# Patient Record
Sex: FEMALE | Race: WHITE | NOT HISPANIC OR LATINO | Employment: OTHER | URBAN - METROPOLITAN AREA
[De-identification: names, ages, dates, MRNs, and addresses within clinical notes are randomized per-mention and may not be internally consistent; named-entity substitution may affect disease eponyms.]

---

## 2017-01-09 ENCOUNTER — TRANSFERRED RECORDS (OUTPATIENT)
Dept: HEALTH INFORMATION MANAGEMENT | Facility: CLINIC | Age: 47
End: 2017-01-09

## 2017-01-24 ENCOUNTER — RADIANT APPOINTMENT (OUTPATIENT)
Dept: MAMMOGRAPHY | Facility: CLINIC | Age: 47
End: 2017-01-24

## 2017-01-24 ENCOUNTER — OFFICE VISIT (OUTPATIENT)
Dept: INTERNAL MEDICINE | Facility: CLINIC | Age: 47
End: 2017-01-24

## 2017-01-24 VITALS
SYSTOLIC BLOOD PRESSURE: 137 MMHG | OXYGEN SATURATION: 97 % | HEART RATE: 74 BPM | WEIGHT: 92.2 LBS | BODY MASS INDEX: 18.59 KG/M2 | HEIGHT: 59 IN | DIASTOLIC BLOOD PRESSURE: 88 MMHG | TEMPERATURE: 98.7 F

## 2017-01-24 DIAGNOSIS — J45.30 MILD PERSISTENT ASTHMA, UNCOMPLICATED: ICD-10-CM

## 2017-01-24 DIAGNOSIS — Z12.31 VISIT FOR SCREENING MAMMOGRAM: ICD-10-CM

## 2017-01-24 DIAGNOSIS — Z00.00 ROUTINE GENERAL MEDICAL EXAMINATION AT HEALTH CARE FACILITY: ICD-10-CM

## 2017-01-24 DIAGNOSIS — Z12.4 SCREENING FOR MALIGNANT NEOPLASM OF CERVIX: Primary | ICD-10-CM

## 2017-01-24 RX ORDER — ALBUTEROL SULFATE 0.83 MG/ML
1 SOLUTION RESPIRATORY (INHALATION) EVERY 6 HOURS PRN
Qty: 360 ML | Refills: 11 | Status: SHIPPED | OUTPATIENT
Start: 2017-01-24 | End: 2018-10-08

## 2017-01-24 RX ORDER — ALBUTEROL SULFATE 90 UG/1
2 AEROSOL, METERED RESPIRATORY (INHALATION) EVERY 4 HOURS PRN
Qty: 1 INHALER | Refills: 11 | Status: SHIPPED | OUTPATIENT
Start: 2017-01-24 | End: 2018-02-12

## 2017-01-24 ASSESSMENT — ENCOUNTER SYMPTOMS
WEIGHT GAIN: 1
HOARSE VOICE: 0
HOT FLASHES: 0
FATIGUE: 1
TROUBLE SWALLOWING: 1
EYE PAIN: 0
FEVER: 0
SINUS CONGESTION: 0
ALTERED TEMPERATURE REGULATION: 1
SINUS PAIN: 0
DECREASED APPETITE: 0
CHILLS: 0
INCREASED ENERGY: 1
EYE WATERING: 0
POLYPHAGIA: 0
WEIGHT LOSS: 0
SORE THROAT: 0
EYE IRRITATION: 1
TASTE DISTURBANCE: 0
POLYDIPSIA: 1
NIGHT SWEATS: 0
NECK MASS: 0
HALLUCINATIONS: 0
DOUBLE VISION: 0
EYE REDNESS: 0
DECREASED LIBIDO: 1
SMELL DISTURBANCE: 0

## 2017-01-24 ASSESSMENT — ACTIVITIES OF DAILY LIVING (ADL)
DO_MEMBERS_OF_YOUR_HOUSEHOLD_USE_SAFETY_HELMETS?: N
ARE_THERE_CARBON_MONOXIDE_DETECTORS_IN_YOUR_HOME?: Y
ARE_THERE_FIREARMS_IN_YOUR_HOME?: N
DO_MEMBERS_OF_YOUR_HOUSEHOLD_USE_SUNSCREEN?: N
DO_MEMBERS_OF_YOUR_HOUSEHOLD_WEAR_SEAT_BELTS?: N
ARE_THERE_SMOKE_DETECTORS_IN_YOUR_HOME?: Y

## 2017-01-24 ASSESSMENT — PAIN SCALES - GENERAL: PAINLEVEL: NO PAIN (0)

## 2017-01-24 NOTE — PROGRESS NOTES
Progress Note    SUBJECTIVE:  CC: Kristina Eldridge is an 46 year old female who presents for preventive health visit.      Besides routine health maintenance, needs inhalers refilled and also needs a note stating that she needs electricity restored to her home because of her nebulizer use.      Screening:  Outside of work or daily activities, how many days per week do you exercise for 30 minutes or longer? No    Have you had an eye exam in the past two years? yes    Do you see a dentist twice per year? No, has dentures      Abuse: Current or Past (Physical, Sexual, or Emotional): no  Do you feel safe in your environment?: yes    Alcohol use:  The patient does not drink >3 drinks per day nor >7 drinks per week.    [place] Risk Score: 2% (13 Total Points)    History of abnormal Pap smear: NO - age 30-65 PAP every 5 years with negative HPV co-testing recommended    ROS:  Gen: no fevers, night sweats or weight change  Eyes: no vision change, diplopia or red eyes  Ears, Noses, Mouth, Throat: no ringing in ears or hearing change, no epistaxis or nasal discharge, no oral lesions, throat clear  Cardiac: no chest pain, palpitations, or pain with walking  Lungs: no dyspnea, cough, or shortness of breath  GI: no nausea, vomiting, diarrhea or constipation, no abdominal pain  : no change in urine, hematuria, or sexual dysfunction  Musculoskeletal: no joint or muscle pain or swelling  Skin: no concerning lesions or moles  Neuro: no loss of strength or sensation, no numbness or tingling, no tremor  Endo: no polyuria or polydipsia, no temperature intolerance  Heme/Lymph: no concerning bumps, no bleeding problems  Allergy: no environmental or drug allergies  Psych: no depression or anxiety    Past Medical History   Diagnosis Date     Hepatitis C      treated ribaviron & interferon in 2008 for serotype 2 with failed 6 month treatment     TB lung, latent 1994     treated     COPD (chronic obstructive pulmonary disease) (H)       PFT's  FEV1/FVC = 2.68/3.20 = 84%     Hyperlipidemia      on simvatatin     RLS (restless legs syndrome)      Chronic back pain      hx spondylolisthesis     Chronic pain      Positive TB test      has taken INH     Uncomplicated asthma      Arthritis      Past Surgical History   Procedure Laterality Date     Gastric bypass       lost 150 lbs     D & c       Tubal ligation       Cholecystectomy       Optical tracking system fusion posterior lumbar one level  2013     Procedure: OPTICAL TRACKING SYSTEM FUSION SPINE POSTERIOR LUMBAR ONE LEVEL;  Lumbar 4-5 Transforaminal Interbody Fusion;  Surgeon: Amilcar Baldwin MD;  [facility]     Esophagoscopy, gastroscopy, duodenoscopy (egd), combined  2014     Procedure: COMBINED ESOPHAGOSCOPY, GASTROSCOPY, DUODENOSCOPY (EGD);  Surgeon: Ramesh Self MD;  [facility]     Fusion spine posterior one level N/A 2014     Procedure: FUSION SPINE POSTERIOR ONE LEVEL;  Surgeon: Amilcar Baldwin MD;  [facility]: [facility]     Fracture tx, ankle rt/lt       ORIF rt ankle     Orthopedic surgery       shoulder surgery, removed bone spur     Graft bone from iliac crest Left 2014     Procedure: GRAFT BONE FROM ILIAC CREST;  Surgeon: Amilcar Baldwin MD;  Location: [facility]     Fusion spine posterior one level Right 2015     Procedure: FUSION SPINE POSTERIOR ONE LEVEL;  Surgeon: Amilcar Baldwin MD;  Location: [facility]     Family History   Problem Relation Age of Onset     Hypertension Father      Respiratory Father      COPD     Glaucoma Father      C.A.D. Mother      CHF,  age 59 of MI     Other Cancer Mother      DIABETES Maternal Grandmother      DIABETES Paternal Grandmother      Glaucoma Paternal Grandmother      Social History   Substance Use Topics     Smoking status: Former Smoker -- 0.15 packs/day for 0 years     Types: Cigarettes     Quit date: 2014     Smokeless  "tobacco: Never Used     Alcohol Use: 0.0 oz/week     0 Standard drinks or equivalent per week      Comment: x3 drinks per year     Current Outpatient Prescriptions   Medication     cholecalciferol (VITAMIN D) 1000 UNIT tablet     cetirizine (ZYRTEC) 10 MG tablet     mirtazapine (REMERON) 15 MG tablet     diphenhydrAMINE (BENADRYL) 25 MG tablet     cyanocobalamin (VITAMIN B12) 1000 MCG/ML injection     oxyCODONE (ROXICODONE) 5 MG immediate release tablet     fluticasone (FLONASE) 50 MCG/ACT nasal spray     hydrocortisone 2.5 % cream     pramipexole (MIRAPEX) 0.125 MG tablet     traZODone (DESYREL) 100 MG tablet     mometasone-formoterol (DULERA) 100-5 MCG/ACT oral inhaler     albuterol (PROAIR HFA, PROVENTIL HFA, VENTOLIN HFA) 108 (90 BASE) MCG/ACT inhaler     No current facility-administered medications for this visit.       OBJECTIVE:    Estimated body mass index is 18.91 kg/(m^2) as calculated from the following:    Height as of this encounter: 1.487 m (4' 10.54\").    Weight as of this encounter: 41.822 kg (92 lb 3.2 oz).    Vitals:  /88 mmHg  Pulse 74  Temp(Src) 98.7  F (37.1  C) (Oral)  Ht 1.487 m (4' 10.54\")  Wt 41.822 kg (92 lb 3.2 oz)  BMI 18.91 kg/m2  SpO2 97%  Breastfeeding? No     Constitutional: no distress, comfortable, pleasant   Eyes: anicteric, normal extra-ocular movements   Ears, Nose and Throat: tympanic membranes clear, nose clear and free of lesions, throat clear, neck supple with full range of motion, no thyromegaly.   Cardiovascular: regular rate and rhythm, normal S1 and S2, no murmurs, rubs or gallops, peripheral pulses full and symmetric   Respiratory: clear to auscultation, no wheezes or crackles, normal breath sounds   Gastrointestinal: positive bowel sounds, nontender, no hepatosplenomegaly, no masses   Gentiourinary: normal external genitalia,  no enlargement of the [place] or [place] glands, urethra normal, perineum normal and free of lesions, cervix normal without " lesions  Musculoskeletal: full range of motion, no edema   Skin: no concerning lesions, no jaundice   Neurological: cranial nerves intact, normal strength and sensation, reflexes at patella and biceps normal, normal gait, no tremor   Psychological: appropriate mood   Lymphatic: no cervical  lymphadenopathy    Last Mammogram:  Mammo Screening Digital (bilat)    11/18/2015 Narrative: SCREENING MAMMOGRAM, BILATERAL, DIGITAL w/CAD - 11/17/2015 12:21 PM. HISTORY: No current breast complaints. 2 second degree relatives with history of breast cancer. Cousin with history of male breast cancer. Aunt with history of ovarian cancer in her 50s. COMPARISON:  10/1/2014, 6/14/2013, 8/6/2009. BREAST DENSITY: Scattered fibroglandular densities. COMMENTS: No significant change.         Mammogram - Routine Screening    10/2/2014 Narrative: Digital screening mammography with computer aided detection. Comparison: 6/14/2013, 8/6/2009 History: No symptoms, routine screening. BREAST DENSITY: Almost entirely fat. COMMENTS: There has been no significant change.          Ma Screening Digital Bilateral    6/20/2013 Narrative:  EXAM: Bilateral digital screening mammography with computer aided detection  HISTORY: No current breast problems are noted. One or 2 first degree relatives with breast cancer. Aunt with ovarian cancer in 50s or 60s.  COMPARISON: 08/06/2009  FINDINGS: Tissue density has scattered fibroglandular densities. No significant change.        Last Lipid Profile:  CHOLESTEROL   Date Value Ref Range Status   01/21/2016 209* <200 mg/dL Final     Comment:     Desirable:       <200 mg/dl     LDL CHOLESTEROL CALCULATED   Date Value Ref Range Status   01/21/2016 153* <100 mg/dL Final     Comment:     Above desirable:  100-129 mg/dl   Borderline High:  130-159 mg/dL   High:             160-189 mg/dL   Very high:       >189 mg/dl       HDL CHOLESTEROL   Date Value Ref Range Status   01/21/2016 30* >49 mg/dL Final     TRIGLYCERIDES   Date  Value Ref Range Status   01/21/2016 128 <150 mg/dL Final     Comment:     Fasting specimen     CHOLESTEROL/HDL RATIO   Date Value Ref Range Status   03/05/2015 4.0 0.0 - 5.0 Final     NON HDL CHOLESTEROL   Date Value Ref Range Status   01/21/2016 178* <130 mg/dL Final     Comment:     Above Desirable:  130-159 mg/dl   Borderline high:  160-189 mg/dl   High:             190-219 mg/dl   Very high:       >219 mg/dl         Health Maintenance   Topic Date Due     ANGEL QUESTIONNAIRE 1 YEAR  1970     DEPRESSION ACTION PLAN Q1 YR (NO INBASKET)  1970     ASTHMA ACTION PLAN Q1 YR (NO INBASKET)  08/26/1973     ASTHMA CONTROL TEST Q6 MOS (NO INBASKET)  08/26/1973     URINE DRUG SCREEN Q1 YR  08/26/1983     PAP SCREENING Q3 YR (SYSTEM ASSIGNED)  06/06/2016     PHQ-9 Q6 MONTHS (NO INBASKET)  11/06/2016     INFLUENZA VACCINE (SYSTEM ASSIGNED)  09/01/2017     LIPID SCREEN Q5 YR FEMALE (SYSTEM ASSIGNED)  01/21/2021     TETANUS IMMUNIZATION (SYSTEM ASSIGNED)  01/31/2023        ASSESSMENT/PLAN    Kristina was seen today for physical.    Diagnoses and all orders for this visit:    Screening for malignant neoplasm of cervix  -     Pap imaged thin layer screen with HPV - recommended age 30 - 65 years (select HPV order below)  -     HPV High Risk Types DNA Cervical    Routine general medical examination at health care facility    Mild persistent asthma, uncomplicated  -     albuterol (PROAIR HFA/PROVENTIL HFA/VENTOLIN HFA) 108 (90 BASE) MCG/ACT Inhaler; Inhale 2 puffs into the lungs every 4 hours as needed for shortness of breath / dyspnea or wheezing  -     mometasone-formoterol (DULERA) 100-5 MCG/ACT oral inhaler; Inhale 2 puffs into the lungs 2 times daily  -     albuterol (2.5 MG/3ML) 0.083% neb solution; Take 1 vial (2.5 mg) by nebulization every 6 hours as needed for shortness of breath / dyspnea or wheezing        COUNSELING:  I have reviewed with Ms. Eldridge and she was given a hand out adressing the following preventative  health recommendations: diet, exercise, cancer prevention, bone health, vaccinations, diabetes screening, vision screening and dental health.  She was given a hand out that addresses vision and hearing screening, and vaccinations.    Kristina  reports that she quit smoking about 2 years ago. Her smoking use included Cigarettes. She smoked 0.15 packs per day for 0 years. She has never used smokeless tobacco.      Body mass index is 18.91 kg/(m^2).    Mili Burch MD  01/24/2017

## 2017-01-24 NOTE — MR AVS SNAPSHOT
After Visit Summary   1/24/2017    Kristina Eldridge    MRN: [id number]           Patient Information     Date Of Birth          1970        Visit Information        Provider Department      1/24/2017 3:30 PM Mili Leon MD [facility]        Today's Diagnoses     Screening for malignant neoplasm of cervix    -  1     Routine general medical examination at health care facility         Mild persistent asthma, uncomplicated           Care Instructions    Primary Care Center Medication Refill Request Information:  * Please contact your pharmacy regarding ANY request for medication refills.  ** [facility] Prescription Fax = [phone]  * Please allow 3 business days for routine medication refills.  * Please allow 5 business days for controlled substance medication refills.     Primary Care Center Test Result notification information:  *You will be notified with in 7-10 days of your appointment day regarding the results of your test.  If you are on MyChart you will be notified as soon as the provider has reviewed the results and signed off on them.    Preventive Health Recommendations  Female Ages 40 to 49    Yearly exam:     See your health care provider every year in order to  1. Review health changes.   2. Discuss preventive care.    3. Review your medicines if your doctor prescribed any.      Get a Pap test every three years (unless you have an abnormal result and your provider advises testing more often).      If you get Pap tests with HPV test, you only need to test every 5 years, unless you have an abnormal result. You do not need a Pap test if your uterus was removed (hysterectomy) and you have not had cancer.      You should be tested each year for STDs (sexually transmitted diseases), if you're at risk.       Ask your doctor if you should have a mammogram.      Have a colonoscopy (test for colon cancer) if someone in your family has had colon cancer or polyps before  age 50.       Have a cholesterol test every 5 years.       Have a diabetes test (fasting glucose) after age 45. If you are at risk for diabetes, you should have this test every 3 years.    Shots: Get a flu shot each year. Get a tetanus shot every 10 years.     Nutrition:     Eat at least 5 servings of fruits and vegetables each day.    Eat whole-grain bread, whole-wheat pasta and brown rice instead of white grains and rice.    Talk to your provider about Calcium and Vitamin D.     Lifestyle    Exercise at least 150 minutes a week (an average of 30 minutes a day, 5 days a week). This will help you control your weight and prevent disease.    Limit alcohol to one drink per day.    No smoking.     Wear sunscreen to prevent skin cancer.    See your dentist every six months for an exam and cleaning.        Follow-ups after your visit        Who to contact     Please call your clinic at [phone] to:    Ask questions about your health    Make or cancel appointments    Discuss your medicines    Learn about your test results    Speak to your doctor   If you have compliments or concerns about an experience at your clinic, or if you wish to file a complaint, please contact [facility] Patient Relations at [phone] or email us at [email or web address]         Additional Information About Your Visit        [employer] Information     [employer] gives you secure access to your electronic health record. If you see a primary care provider, you can also send messages to your care team and make appointments. If you have questions, please call your primary care clinic.  If you do not have a primary care provider, please call [phone] and they will assist you.      [employer] is an electronic gateway that provides easy, online access to your medical records. With [employer], you can request a clinic appointment, read your test results, renew a prescription or communicate with your care team.     To  "access your existing account, please contact your [facility] or call [phone] for assistance.        Care EveryWhere ID     This is your Care EveryWhere ID. This could be used by other organizations to access your [facility] medical records  [id number]        Your Vitals Were     Pulse Temperature Height BMI (Body Mass Index) Pulse Oximetry Breastfeeding?    74 98.7  F (37.1  C) (Oral) 1.487 m (4' 10.54\") 18.91 kg/m2 97% No       Blood Pressure from Last 3 Encounters:   01/24/17 137/88   11/07/16 129/70   08/12/16 178/81    Weight from Last 3 Encounters:   01/24/17 41.822 kg (92 lb 3.2 oz)   11/07/16 39.917 kg (88 lb)   08/12/16 43.228 kg (95 lb 4.8 oz)              We Performed the Following     HPV High Risk Types DNA Cervical     Pap imaged thin layer screen with HPV - recommended age 30 - 65 years (select HPV order below)          Today's Medication Changes          These changes are accurate as of: 1/24/17  4:14 PM.  If you have any questions, ask your nurse or doctor.               These medicines have changed or have updated prescriptions.        Dose/Directions    * albuterol 108 (90 BASE) MCG/ACT Inhaler   Commonly known as:  PROAIR HFA/PROVENTIL HFA/VENTOLIN HFA   This may have changed:  Another medication with the same name was added. Make sure you understand how and when to take each.   Used for:  Mild persistent asthma, uncomplicated   Changed by:  Mili Leon MD        Dose:  2 puff   Inhale 2 puffs into the lungs every 4 hours as needed for shortness of breath / dyspnea or wheezing   Quantity:  1 Inhaler   Refills:  11       * albuterol (2.5 MG/3ML) 0.083% neb solution   This may have changed:  You were already taking a medication with the same name, and this prescription was added. Make sure you understand how and when to take each.   Used for:  Mild persistent asthma, uncomplicated   Changed by:  Mili Leon MD        Dose:  1 vial "   Take 1 vial (2.5 mg) by nebulization every 6 hours as needed for shortness of breath / dyspnea or wheezing   Quantity:  360 mL   Refills:  11       * Notice:  This list has 2 medication(s) that are the same as other medications prescribed for you. Read the directions carefully, and ask your doctor or other care provider to review them with you.         Where to get your medicines      These medications were sent to [facility] [address] [address] AT SEC [address]  [address] [facility] [address]     Phone:  [phone]    - albuterol (2.5 MG/3ML) 0.083% neb solution  - albuterol 108 (90 BASE) MCG/ACT Inhaler  - mometasone-formoterol 100-5 MCG/ACT oral inhaler             Primary Care Provider Office Phone # Fax #    Mili Juli Burch [phone]       [address] [place] [address]        Thank you!     Thank you for choosing [facility]  for your care. Our goal is always to provide you with excellent care. Hearing back from our patients is one way we can continue to improve our services. Please take a few minutes to complete the written survey that you may receive in the mail after your visit with us. Thank you!             Your Updated Medication List - Protect others around you: Learn how to safely use, store and throw away your medicines at www.disposemymeds.org.          This list is accurate as of: 1/24/17  4:14 PM.  Always use your most recent med list.                   Brand Name Dispense Instructions for use    * albuterol 108 (90 BASE) MCG/ACT Inhaler    PROAIR HFA/PROVENTIL HFA/VENTOLIN HFA    1 Inhaler    Inhale 2 puffs into the lungs every 4 hours as needed for shortness of breath / dyspnea or wheezing       * albuterol (2.5 MG/3ML) 0.083% neb solution     360 mL    Take 1 vial (2.5 mg) by nebulization every 6 hours as needed for shortness of breath / dyspnea or wheezing        cetirizine 10 MG tablet    zyrTEC    30 tablet    Take 1 tablet (10 mg) by mouth daily       cholecalciferol 1000 UNIT tablet    vitamin D    100 tablet    Take 1 tablet (1,000 Units) by mouth daily       cyanocobalamin 1000 MCG/ML injection    VITAMIN B12    1 mL    Inject 1 mL (1,000 mcg) into the muscle every 30 days       diphenhydrAMINE 25 MG tablet    BENADRYL    60 tablet    Take 1 tablet (25 mg) by mouth every 6 hours as needed for itching or allergies       fluticasone 50 MCG/ACT spray    FLONASE    16 g    Spray 2 sprays into both nostrils daily       hydrocortisone 2.5 % cream     30 g    Apply topically 2 times daily       mirtazapine 15 MG tablet    REMERON    90 tablet    Take 1 tablet (15 mg) by mouth At Bedtime       mometasone-formoterol 100-5 MCG/ACT oral inhaler    DULERA    1 Inhaler    Inhale 2 puffs into the lungs 2 times daily       oxyCODONE 5 MG IR tablet    ROXICODONE    30 tablet    Take by  mouth 1 tabs every 6 hrs prn pain       pramipexole 0.125 MG tablet    MIRAPEX    180 tablet    Take 1-3 tablets at bedtime       traZODone 100 MG tablet    DESYREL    30 tablet    Take 2 tablets (200 mg) by mouth nightly as needed for sleep       * Notice:  This list has 2 medication(s) that are the same as other medications prescribed for you. Read the directions carefully, and ask your doctor or other care provider to review them with you.

## 2017-01-24 NOTE — NURSING NOTE
Chief Complaint   Patient presents with     Physical     Patient here for physical.       Marcy Rollins CMA at 3:49 PM on 1/24/2017.

## 2017-01-24 NOTE — PATIENT INSTRUCTIONS
Primary Care Center Medication Refill Request Information:  * Please contact your pharmacy regarding ANY request for medication refills.  ** [facility] Prescription Fax = [phone]  * Please allow 3 business days for routine medication refills.  * Please allow 5 business days for controlled substance medication refills.     Primary Care Center Test Result notification information:  *You will be notified with in 7-10 days of your appointment day regarding the results of your test.  If you are on MyChart you will be notified as soon as the provider has reviewed the results and signed off on them.    Preventive Health Recommendations  Female Ages 40 to 49    Yearly exam:     See your health care provider every year in order to  1. Review health changes.   2. Discuss preventive care.    3. Review your medicines if your doctor prescribed any.      Get a Pap test every three years (unless you have an abnormal result and your provider advises testing more often).      If you get Pap tests with HPV test, you only need to test every 5 years, unless you have an abnormal result. You do not need a Pap test if your uterus was removed (hysterectomy) and you have not had cancer.      You should be tested each year for STDs (sexually transmitted diseases), if you're at risk.       Ask your doctor if you should have a mammogram.      Have a colonoscopy (test for colon cancer) if someone in your family has had colon cancer or polyps before age 50.       Have a cholesterol test every 5 years.       Have a diabetes test (fasting glucose) after age 45. If you are at risk for diabetes, you should have this test every 3 years.    Shots: Get a flu shot each year. Get a tetanus shot every 10 years.     Nutrition:     Eat at least 5 servings of fruits and vegetables each day.    Eat whole-grain bread, whole-wheat pasta and brown rice instead of white grains and rice.    Talk to your provider about Calcium and Vitamin D.     Lifestyle    Exercise  at least 150 minutes a week (an average of 30 minutes a day, 5 days a week). This will help you control your weight and prevent disease.    Limit alcohol to one drink per day.    No smoking.     Wear sunscreen to prevent skin cancer.    See your dentist every six months for an exam and cleaning.

## 2017-01-27 LAB
COPATH REPORT: ABNORMAL
PAP: ABNORMAL

## 2017-01-30 LAB
FINAL DIAGNOSIS: NORMAL
HPV HR 12 DNA CVX QL NAA+PROBE: NEGATIVE
HPV16 DNA SPEC QL NAA+PROBE: NEGATIVE
HPV18 DNA SPEC QL NAA+PROBE: NEGATIVE
SPECIMEN DESCRIPTION: NORMAL

## 2017-01-31 ENCOUNTER — TELEPHONE (OUTPATIENT)
Dept: INTERNAL MEDICINE | Facility: CLINIC | Age: 47
End: 2017-01-31

## 2017-01-31 DIAGNOSIS — R87.612 PAPANICOLAOU SMEAR OF CERVIX WITH LOW GRADE SQUAMOUS INTRAEPITHELIAL LESION (LGSIL): Primary | ICD-10-CM

## 2017-01-31 NOTE — TELEPHONE ENCOUNTER
Pap came back with LSIL. Needs to go to OB/GYN for colposcopy-IGLESIA  --------------------  Call to pt, discussed the above message with pt, verbalize understanding and agreed with the plan.  Provided phone # for pt to call for appt.  Irasema Lindo RN

## 2017-03-07 ENCOUNTER — TRANSFERRED RECORDS (OUTPATIENT)
Dept: HEALTH INFORMATION MANAGEMENT | Facility: CLINIC | Age: 47
End: 2017-03-07

## 2017-04-20 ENCOUNTER — TELEPHONE (OUTPATIENT)
Dept: OBGYN | Facility: CLINIC | Age: 47
End: 2017-04-20

## 2017-06-03 DIAGNOSIS — R63.4 LOSS OF WEIGHT: ICD-10-CM

## 2017-06-06 RX ORDER — MIRTAZAPINE 15 MG/1
TABLET, FILM COATED ORAL
Qty: 90 TABLET | Refills: 1 | Status: SHIPPED | OUTPATIENT
Start: 2017-06-06 | End: 2017-12-18

## 2017-06-06 NOTE — TELEPHONE ENCOUNTER
remeron  Last Written Prescription Date:  11/10/16  Last Fill Quantity: 90,   # refills: 1  Last Office Visit : 1/24/17  Future Office visit:  no    Routing refill request to provider for review/approval because:  Notes indicate pain clinic pt, routing to ensure med should be refilled

## 2017-06-27 DIAGNOSIS — G25.81 RESTLESS LEGS SYNDROME (RLS): ICD-10-CM

## 2017-06-27 DIAGNOSIS — Z00.00 PREVENTATIVE HEALTH CARE: Primary | ICD-10-CM

## 2017-06-27 RX ORDER — PRAMIPEXOLE DIHYDROCHLORIDE 0.12 MG/1
TABLET ORAL
Qty: 180 TABLET | Refills: 1 | Status: SHIPPED | OUTPATIENT
Start: 2017-06-27 | End: 2017-10-25

## 2017-06-27 RX ORDER — ASPIRIN 325 MG
1 TABLET ORAL DAILY
Qty: 90 TABLET | Refills: 1 | Status: SHIPPED | OUTPATIENT
Start: 2017-06-27 | End: 2018-01-25

## 2017-06-27 NOTE — TELEPHONE ENCOUNTER
Mirapex  Last Written Prescription Date:  5/6/16  Last Fill Quantity: 180,   # refills: 2  Last Office Visit : 1/24/17  Future Office visit:  No  [facility]  Last Written Prescription Date:  Not on med list    Routing refill request to provider for review/approval because:  Drug not active on patient's medication list  Mirapex last ordered by Dr. Costello, not mentioned in last chart note

## 2017-08-24 DIAGNOSIS — F51.01 PRIMARY INSOMNIA: ICD-10-CM

## 2017-08-24 RX ORDER — TRAZODONE HYDROCHLORIDE 100 MG/1
200 TABLET ORAL
Qty: 30 TABLET | Refills: 1 | Status: SHIPPED | OUTPATIENT
Start: 2017-08-24 | End: 2017-09-25

## 2017-08-24 NOTE — TELEPHONE ENCOUNTER
traZODone       Last Written Prescription Date:  5/6/16  Last Fill Quantity: 30,   # refills: 1  Last Office Visit : 1/24/17  Future Office visit:  0

## 2017-08-28 DIAGNOSIS — Z98.84 BARIATRIC SURGERY STATUS: Primary | ICD-10-CM

## 2017-08-28 NOTE — TELEPHONE ENCOUNTER
aspirin 81 MG chewable tablet   Last Written Prescription Date:  5/6/16  Last Fill Quantity: 100,   # refills: 3  Last Office Visit with G, P or [facility] prescribing provider: 1/24/17  Future Office visit:   none    Routing refill request to provider for review/approval because:  Aspirin 81 mg med end date 11/7/16. If approved need MD entry.  Please verify entry.   thanks.

## 2017-09-25 DIAGNOSIS — F51.01 PRIMARY INSOMNIA: ICD-10-CM

## 2017-09-26 RX ORDER — TRAZODONE HYDROCHLORIDE 100 MG/1
200 TABLET ORAL
Qty: 60 TABLET | Refills: 3 | Status: SHIPPED | OUTPATIENT
Start: 2017-09-26 | End: 2018-01-25

## 2017-09-26 NOTE — TELEPHONE ENCOUNTER
traZODone (DESYREL) 100 MG tablet    Last Written Prescription Date:  8/24/17  Last Fill Quantity: 30,   # refills: 1  Last Office Visit with [facility], [facility] or [facility] prescribing provider:  1/24/17  Future Office visit:   10/17/17

## 2017-10-17 ENCOUNTER — OFFICE VISIT (OUTPATIENT)
Dept: INTERNAL MEDICINE | Facility: CLINIC | Age: 47
End: 2017-10-17

## 2017-10-17 VITALS — RESPIRATION RATE: 16 BRPM | SYSTOLIC BLOOD PRESSURE: 120 MMHG | DIASTOLIC BLOOD PRESSURE: 76 MMHG | HEART RATE: 57 BPM

## 2017-10-17 DIAGNOSIS — L29.9 ITCHING: ICD-10-CM

## 2017-10-17 DIAGNOSIS — Z98.84 STATUS POST BARIATRIC SURGERY: ICD-10-CM

## 2017-10-17 DIAGNOSIS — L30.9 DERMATITIS: ICD-10-CM

## 2017-10-17 DIAGNOSIS — J44.9 CHRONIC OBSTRUCTIVE PULMONARY DISEASE, UNSPECIFIED COPD TYPE (H): ICD-10-CM

## 2017-10-17 DIAGNOSIS — Z98.84 BARIATRIC SURGERY STATUS: Primary | ICD-10-CM

## 2017-10-17 DIAGNOSIS — Z23 NEED FOR PROPHYLACTIC VACCINATION AND INOCULATION AGAINST INFLUENZA: ICD-10-CM

## 2017-10-17 DIAGNOSIS — J30.1 CHRONIC SEASONAL ALLERGIC RHINITIS DUE TO POLLEN: ICD-10-CM

## 2017-10-17 RX ORDER — FLUTICASONE PROPIONATE 50 MCG
2 SPRAY, SUSPENSION (ML) NASAL DAILY
Qty: 16 G | Refills: 2 | Status: SHIPPED | OUTPATIENT
Start: 2017-10-17 | End: 2018-01-16

## 2017-10-17 RX ORDER — CYANOCOBALAMIN 1000 UG/ML
1 INJECTION, SOLUTION INTRAMUSCULAR; SUBCUTANEOUS
Qty: 1 ML | Refills: 11 | Status: SHIPPED | OUTPATIENT
Start: 2017-10-17 | End: 2018-11-05

## 2017-10-17 RX ORDER — CETIRIZINE HYDROCHLORIDE 10 MG/1
10 TABLET ORAL DAILY
Qty: 30 TABLET | Refills: 11 | Status: SHIPPED | OUTPATIENT
Start: 2017-10-17 | End: 2019-11-18

## 2017-10-17 RX ORDER — DIPHENHYDRAMINE HCL 25 MG
25 TABLET ORAL EVERY 6 HOURS PRN
Qty: 60 TABLET | Refills: 3 | Status: SHIPPED | OUTPATIENT
Start: 2017-10-17 | End: 2018-09-13

## 2017-10-17 RX ORDER — HYDROCORTISONE 2.5 %
CREAM (GRAM) TOPICAL 2 TIMES DAILY
Qty: 30 G | Refills: 1 | Status: SHIPPED | OUTPATIENT
Start: 2017-10-17 | End: 2020-05-27

## 2017-10-17 ASSESSMENT — PAIN SCALES - GENERAL: PAINLEVEL: NO PAIN (0)

## 2017-10-17 NOTE — PATIENT INSTRUCTIONS
[facility]: [phone]     [facility] Medication Refill Request Information:  * Please contact your pharmacy regarding ANY request for medication refills.  ** [facility] Prescription Fax = [phone]  * Please allow 3 business days for routine medication refills.  * Please allow 5 business days for controlled substance medication refills.     [facility] Test Result notification information:  *You will be notified with in 7-10 days of your appointment day regarding the results of your test.  If you are on MyChart you will be notified as soon as the provider has reviewed the results and signed off on them.    For itching:   Do not use perfumed lotion  Try eucerin, aquaphor, Cera Ve (specifically can help with itching).   Can also use coconut oil, cocoa butter  Hydrocortisone as needed.

## 2017-10-17 NOTE — PROGRESS NOTES
PRIMARY CARE CENTER       SUBJECTIVE:  Kristina Eldridge is a 47 year old female who comes in for medication refills, needs mobility paperwork refilled, has dry skin, and her nebulizer broke.     1) Medications. Vit B12, Vit D, Zyrtec, Flonase, hydrocortisone cream, benadryl need to be refilled.  2) Mobility. Needs a form filled out for [employer] for low mobility. This is what her significant other has.  3) needs a new nebulizer.  4) lso has been having really dry skin. Has started to use a new perfumed lotion from [employer] to help this, however it has not helped. She has also noted some peeling of the skin. It is primarily affecting her back and her chest.    Medications and allergies reviewed by me today.     ROS:   Constitutional, HEENT, cardiovascular, pulmonary, gi and gu systems are negative, except as otherwise noted.      OBJECTIVE:  /76 (BP Location: Right arm, Patient Position: Chair, Cuff Size: Adult Regular)  Pulse 57  Resp 16  Breastfeeding? No   Wt Readings from Last 1 Encounters:   01/24/17 41.8 kg (92 lb 3.2 oz)     Gen.: Pleasant female, in no apparent distress  Skin:Dry scaly skin of her back and chest. No lesions, no obvious rashes.     ASSESSMENT/PLAN:    Kristina was seen today for refill request.medications refilled as listed below. Provided a new prescription for a new nebulizer machine. Completed mobility paperwork. Finally regarding her dry skin, I suspect that this is somewhat of a chronic issue as she has had medications prescribed for itching in the past. However I also suspect that she might have a low-grade dermatitis. This might actually be exacerbated by the lotion that she got from [employer]. I've instructed her to now longer uses. I have advised her to use either Eucerin cream, Aquaphor, or Cera Ve.she can also use the hydrocortisone cream as needed for significant itch.    Diagnoses and all orders for this visit:    Bariatric  surgery status  -     cyanocobalamin (VITAMIN B12) 1000 MCG/ML injection; Inject 1 mL (1,000 mcg) into the muscle every 30 days    Need for prophylactic vaccination and inoculation against influenza  -     FLU VACCINE, 3 YRS +, IM  [19524]    Status post bariatric surgery  -     cholecalciferol (VITAMIN D) 1000 UNIT tablet; Take 1 tablet (1,000 Units) by mouth daily    Chronic seasonal allergic rhinitis due to pollen  -     cetirizine (ZYRTEC) 10 MG tablet; Take 1 tablet (10 mg) by mouth daily  -     fluticasone (FLONASE) 50 MCG/ACT spray; Spray 2 sprays into both nostrils daily    Itching  -     diphenhydrAMINE (BENADRYL) 25 MG tablet; Take 1 tablet (25 mg) by mouth every 6 hours as needed for itching or allergies  -     hydrocortisone 2.5 % cream; Apply topically 2 times daily    Chronic obstructive pulmonary disease, unspecified COPD type (H)  -     order for DME; Nebulizer         Pt should return to clinic for f/u with me in PRN      Mili Burch MD  10/17/17

## 2017-10-17 NOTE — MR AVS SNAPSHOT
After Visit Summary   10/17/2017    Kristina Eldridge    MRN: [id number]           Patient Information     Date Of Birth          1970        Visit Information        Provider Department      10/17/2017 3:00 PM Mili Leon MD [facility]        Today's Diagnoses     Bariatric surgery status    -  1    Need for prophylactic vaccination and inoculation against influenza        Status post bariatric surgery        Chronic seasonal allergic rhinitis due to pollen        Itching        Chronic obstructive pulmonary disease, unspecified COPD type (H)          Care Instructions    Primary Care Center: [phone]     Primary Care Center Medication Refill Request Information:  * Please contact your pharmacy regarding ANY request for medication refills.  ** [facility] Prescription Fax = [phone]  * Please allow 3 business days for routine medication refills.  * Please allow 5 business days for controlled substance medication refills.     [facility] Test Result notification information:  *You will be notified with in 7-10 days of your appointment day regarding the results of your test.  If you are on MyChart you will be notified as soon as the provider has reviewed the results and signed off on them.    For itching:   Do not use perfumed lotion  Try eucerin, aquaphor, Cera Ve (specifically can help with itching).   Can also use coconut oil, cocoa butter  Hydrocortisone as needed.                   Follow-ups after your visit        Follow-up notes from your care team     Return in about 3 months (around 1/24/2018) for Pap.      Who to contact     Please call your clinic at [phone] to:    Ask questions about your health    Make or cancel appointments    Discuss your medicines    Learn about your test results    Speak to your doctor   If you have compliments or concerns about an experience at your clinic, or if you wish to file a complaint, please contact [facility] Patient Relations at [phone] or email us at [email or web address]         Additional Information About Your Visit        [employer] Information     [employer] gives you secure access to your electronic health record. If you see a primary care provider, you can also send messages to your care team and make appointments. If you have questions, please call your primary care clinic.  If you do not have a primary care provider, please call [phone] and they will assist you.      Smule is an electronic gateway that provides easy, online access to your medical records. With [employer]ule, you can request a clinic appointment, read your test results, renew a prescription or communicate with your care team.     To access your existing account, please contact your [facility] or call [phone] for assistance.        Care EveryWhere ID     This is your Care EveryWhere ID. This could be used by other organizations to access your [facility] medical records  [id number]        Your Vitals Were     Pulse Respirations Breastfeeding?             57 16 No          Blood Pressure from Last 3 Encounters:   10/17/17 120/76   01/24/17 137/88   11/07/16 129/70    Weight from Last 3 Encounters:   01/24/17 41.8 kg (92 lb 3.2 oz)   11/07/16 39.9 kg (88 lb)   08/12/16 43.2 kg (95 lb 4.8 oz)              We Performed the Following     FLU VACCINE, 3 YRS +, IM  [41363]          Today's Medication Changes          These changes are accurate as of: 10/17/17  3:37 PM.  If you have any questions, ask your nurse or doctor.               Start taking these medicines.        Dose/Directions    order for DME   Used for:  Chronic obstructive pulmonary disease, unspecified COPD type (H)   Started by:  Mili Leon MD        Nebulizer   Quantity:  1 each   Refills:  0            Where to get your medicines      These medications were sent to [id number] -  [place], [place] - [address]  [address] [address]     Phone:  [phone]     cetirizine 10 MG tablet    cholecalciferol 1000 UNIT tablet    cyanocobalamin 1000 MCG/ML injection    diphenhydrAMINE 25 MG tablet    fluticasone 50 MCG/ACT spray    hydrocortisone 2.5 % cream         Some of these will need a paper prescription and others can be bought over the counter.  Ask your nurse if you have questions.     Bring a paper prescription for each of these medications     order for DME                Primary Care Provider Office Phone # Fax #    Mili Juli Burch [phone]       [address] [place] [address] [address]  [facility] [address]        Equal Access to Services     JAGUAR BRONSON : James brodericko Sokeerthi, waaxda luqadaha, qaybta kaalmada adeegyada, kevin hoff . So [facility] [phone].    ATENCIÓN: Si habla español, tiene a pagan disposición servicios gratuitos de asistencia lingüística. Llame al [phone].    We comply with applicable federal civil rights laws and [place] laws. We do not discriminate on the basis of race, color, national origin, age, disability, sex, sexual orientation, or gender identity.            Thank you!     Thank you for choosing [facility]  for your care. Our goal is always to provide you with excellent care. Hearing back from our patients is one way we can continue to improve our services. Please take a few minutes to complete the written survey that you may receive in the mail after your visit with us. Thank you!             Your Updated Medication List - Protect others around you: Learn how to safely use, store and throw away your medicines at www.disposemymeds.org.          This list is accurate as of: 10/17/17  3:37 PM.  Always use your most recent med list.                   Brand Name Dispense Instructions for use Diagnosis    * albuterol 108 (90 BASE)  MCG/ACT Inhaler    PROAIR HFA/PROVENTIL HFA/VENTOLIN HFA    1 Inhaler    Inhale 2 puffs into the lungs every 4 hours as needed for shortness of breath / dyspnea or wheezing    Mild persistent asthma, uncomplicated       * albuterol (2.5 MG/3ML) 0.083% neb solution     360 mL    Take 1 vial (2.5 mg) by nebulization every 6 hours as needed for shortness of breath / dyspnea or wheezing    Mild persistent asthma, uncomplicated       aspirin 81 MG tablet     100 tablet    Chew and swallow 1 tab (81mg) by mouth daily    Bariatric surgery status       cetirizine 10 MG tablet    zyrTEC    30 tablet    Take 1 tablet (10 mg) by mouth daily    Chronic seasonal allergic rhinitis due to pollen       cholecalciferol 1000 UNIT tablet    vitamin D    100 tablet    Take 1 tablet (1,000 Units) by mouth daily    Status post bariatric surgery       cyanocobalamin 1000 MCG/ML injection    VITAMIN B12    1 mL    Inject 1 mL (1,000 mcg) into the muscle every 30 days    Bariatric surgery status       diphenhydrAMINE 25 MG tablet    BENADRYL    60 tablet    Take 1 tablet (25 mg) by mouth every 6 hours as needed for itching or allergies    Itching       fluticasone 50 MCG/ACT spray    FLONASE    16 g    Spray 2 sprays into both nostrils daily    Chronic seasonal allergic rhinitis due to pollen       hydrocortisone 2.5 % cream     30 g    Apply topically 2 times daily    Itching       mirtazapine 15 MG tablet    REMERON    90 tablet    TAKE 1 TABLET BY MOUTH EVERY DAY AT BEDTIME    Loss of weight       mometasone-formoterol 100-5 MCG/ACT oral inhaler    DULERA    1 Inhaler    Inhale 2 puffs into the lungs 2 times daily    Mild persistent asthma, uncomplicated       ONE DAILY MULTIVITAMIN/IRON Tabs     90 tablet    Take 1 tablet by mouth daily    Preventative health care       order for DME     1 each    Nebulizer    Chronic obstructive pulmonary disease, unspecified COPD type (H)       oxyCODONE 5 MG IR tablet    ROXICODONE    30 tablet     Take by  mouth 1 tabs every 6 hrs prn pain    Lumbar pseudoarthrosis       pramipexole 0.125 MG tablet    MIRAPEX    180 tablet    Take 1-3 tablets at bedtime    Restless legs syndrome (RLS)       traZODone 100 MG tablet    DESYREL    60 tablet    Take 2 tablets (200 mg) by mouth nightly as needed for sleep    Primary insomnia       * Notice:  This list has 2 medication(s) that are the same as other medications prescribed for you. Read the directions carefully, and ask your doctor or other care provider to review them with you.

## 2017-10-17 NOTE — NURSING NOTE
Chief Complaint   Patient presents with     Refill Request     Patient here for medication refill.       Marcy Rollins CMA at 3:08 PM on 10/17/2017.

## 2017-10-17 NOTE — NURSING NOTE
"Injectable Influenza Immunization Documentation    1.  Has the patient received the information for the injectable influenza vaccine? YES     2. Is the patient 6 months of age or older? YES     3. Does the patient have any of the following contraindications?         Severe allergy to eggs? No     Severe allergic reaction to previous influenza vaccines? No   Severe allergy to latex? No       History of Guillain-Blodgett syndrome? No     Currently have a temperature greater than 100.4F? No        4.  Severely egg allergic patients should have flu vaccine eligibility assessed by an MD, RN, or pharmacist, and those who received flu vaccine should be observed for 15 min by an MD, RN, Pharmacist, Medical Technician, or member of clinic staff.\": YES    5. Latex-allergic patients should be given latex-free influenza vaccine Yes. Please reference the Vaccine latex table to determine if your clinic s product is latex-containing.       Administered Influenza Fluzone Quadrivalent (see Immunizations in Chart Review). Patient tolerated well.        Omar Machado CMA at 3:52 PM on 10/17/2017         "

## 2017-10-25 DIAGNOSIS — G25.81 RESTLESS LEGS SYNDROME (RLS): ICD-10-CM

## 2017-10-25 DIAGNOSIS — J30.1 CHRONIC SEASONAL ALLERGIC RHINITIS DUE TO POLLEN: ICD-10-CM

## 2017-10-27 RX ORDER — CETIRIZINE HYDROCHLORIDE 10 MG/1
TABLET ORAL
Qty: 30 TABLET | Refills: 0 | OUTPATIENT
Start: 2017-10-27

## 2017-10-27 RX ORDER — PRAMIPEXOLE DIHYDROCHLORIDE 0.12 MG/1
TABLET ORAL
Qty: 180 TABLET | Refills: 3 | Status: SHIPPED | OUTPATIENT
Start: 2017-10-27 | End: 2020-05-27

## 2017-11-02 ENCOUNTER — MEDICAL CORRESPONDENCE (OUTPATIENT)
Dept: HEALTH INFORMATION MANAGEMENT | Facility: CLINIC | Age: 47
End: 2017-11-02

## 2017-12-18 DIAGNOSIS — R63.4 LOSS OF WEIGHT: ICD-10-CM

## 2017-12-20 NOTE — TELEPHONE ENCOUNTER
mirtazapine (REMERON) 15 MG tablet  Last Written Prescription Date:  6/6/17  Last Fill Quantity: 90,   # refills: 1  Last Office Visit : 10/17/17  Future Office visit:  None    PHQ-9 score:    PHQ-9 SCORE 5/6/2016   Total Score 8

## 2017-12-21 RX ORDER — MIRTAZAPINE 15 MG/1
15 TABLET, FILM COATED ORAL AT BEDTIME
Qty: 90 TABLET | Refills: 3 | Status: ON HOLD | OUTPATIENT
Start: 2017-12-21 | End: 2020-01-08

## 2018-01-16 DIAGNOSIS — J30.1 CHRONIC SEASONAL ALLERGIC RHINITIS DUE TO POLLEN: ICD-10-CM

## 2018-01-17 RX ORDER — FLUTICASONE PROPIONATE 50 MCG
2 SPRAY, SUSPENSION (ML) NASAL DAILY
Qty: 3 BOTTLE | Refills: 2 | Status: SHIPPED | OUTPATIENT
Start: 2018-01-17 | End: 2018-11-13

## 2018-01-17 NOTE — TELEPHONE ENCOUNTER
Last Clinic Visit: 10/17/2017  [facility]    Flonase is ordered for allergies - no Asthma - no ACT needed.

## 2018-01-25 DIAGNOSIS — Z00.00 PREVENTATIVE HEALTH CARE: ICD-10-CM

## 2018-01-25 DIAGNOSIS — F51.01 PRIMARY INSOMNIA: ICD-10-CM

## 2018-01-25 RX ORDER — TRAZODONE HYDROCHLORIDE 100 MG/1
200 TABLET ORAL
Qty: 60 TABLET | Refills: 5 | Status: SHIPPED | OUTPATIENT
Start: 2018-01-25 | End: 2018-08-13

## 2018-02-12 DIAGNOSIS — J45.30 MILD PERSISTENT ASTHMA, UNCOMPLICATED: ICD-10-CM

## 2018-02-12 RX ORDER — ALBUTEROL SULFATE 90 UG/1
2 AEROSOL, METERED RESPIRATORY (INHALATION) EVERY 4 HOURS PRN
Qty: 1 INHALER | Refills: 0 | Status: SHIPPED | OUTPATIENT
Start: 2018-02-12 | End: 2018-10-09

## 2018-03-25 ENCOUNTER — HEALTH MAINTENANCE LETTER (OUTPATIENT)
Age: 48
End: 2018-03-25

## 2018-04-23 ENCOUNTER — OFFICE VISIT (OUTPATIENT)
Dept: OBGYN | Facility: CLINIC | Age: 48
End: 2018-04-23
Attending: OBSTETRICS & GYNECOLOGY
Payer: COMMERCIAL

## 2018-04-23 VITALS
HEART RATE: 81 BPM | BODY MASS INDEX: 19.96 KG/M2 | HEIGHT: 59 IN | SYSTOLIC BLOOD PRESSURE: 119 MMHG | DIASTOLIC BLOOD PRESSURE: 75 MMHG | WEIGHT: 99 LBS

## 2018-04-23 DIAGNOSIS — Z00.00 ENCOUNTER FOR ROUTINE ADULT HEALTH EXAMINATION WITHOUT ABNORMAL FINDINGS: Primary | ICD-10-CM

## 2018-04-23 PROCEDURE — G0476 HPV COMBO ASSAY CA SCREEN: HCPCS | Performed by: OBSTETRICS & GYNECOLOGY

## 2018-04-23 PROCEDURE — G0145 SCR C/V CYTO,THINLAYER,RESCR: HCPCS | Performed by: OBSTETRICS & GYNECOLOGY

## 2018-04-23 PROCEDURE — G0463 HOSPITAL OUTPT CLINIC VISIT: HCPCS

## 2018-04-23 NOTE — LETTER
4/30/2018         Kristina Eldridge   [address]  [facility] [address]        Dear Ms. Eldridge:    Your labs were reviewed by Nereida Streeter MD. Recommend colposcopy for further evaluation.     Results for orders placed or performed in visit on 04/23/18   Pap imaged thin layer screen with HPV - recommended age 30 - 65 years (select HPV order below)   Result Value Ref Range    PAP ASC-US (A)     Copath Report         Patient Name: KRISTINA ELDRIDGE  MR#: [id number]  Specimen #: [id number]  Collected: 4/23/2018  Received: 4/24/2018  Reported: 4/25/2018 17:34  Ordering Phy(s): RAHUL HAINES    For improved result formatting, select 'View Enhanced Report Format' under   Linked Documents section.    SPECIMEN/STAIN PROCESS:  Pap imaged thin layer prep screening (Surepath, FocalPoint with guided   screening)       Pap-Cyto x 1, HPV ordered x 1    SOURCE: Cervical, endocervical  ----------------------------------------------------------------   Pap imaged thin layer prep screening (Surepath, FocalPoint with guided   screening)  SPECIMEN ADEQUACY:  Satisfactory for evaluation.  -Transformation zone component present.    CYTOLOGIC INTERPRETATION:    Epithelial cell abnormality:  squamous cell:  atypical squamous cells-of   undetermined significance (ASC-US).    I have personally reviewed all specimens and/or slides, including the   listed special stains, and used them  with my medical judgment to det ermine the final diagnosis.    Electronically signed out by:    Keshawn Lim M.D., [facility]    Processed and screened at [facility]    CLINICAL HISTORY:  LMP: 6/11/2012  Post Menopausal, Previous LGSIL  Date of Last Pap: 1/24/2017,    Papanicolaou Test Limitations:  Cervical cytology is a screening test with   limited sensitivity; regular  screening is critical for cancer prevention; Pap tests are primarily   effective for the diagnosis/prevention  of  squamous cell carcinoma, not adenocarcinomas or other cancers.    TESTING LAB LOCATION:  [facility], [facility] [address]  [address] [place]  [address] [phone]    COLLECTION SITE:  Client:  [facility]  [facility]       HPV High Risk Types DNA Cervical   Result Value Ref Range    HPV Source SurePath     HPV 16 DNA Negative NEG^Negative    HPV 18 DNA Negative NEG^Negative    Other HR HPV Negative NEG^Negative    Final Diagnosis This patient's sample is negative for HPV DNA.     Specimen Description Cervical Cells          Please note that test explanations are brief and do not reflect all diagnostic uses.  If you have any questions or concerns, please call the clinic at [phone].      Sincerely,      Ni Natarajan sent on behalf of  Nereida Streeter MD

## 2018-04-23 NOTE — PROGRESS NOTES
[facility]    HPI: Kristina Eldridge is a 47 year old P2 who presents for repeat pap. Last pap 1/24/17 was LSIL, HPV negative. Prior pap was NILM 6/2013. She reports feeling well; no new symptoms. Menopause at age 41; no bleeding since. Not sexually active. Sees Dr. Fuller for primary care. Has a mammogram scheduled next week- last was normal in 2017.    She is smoking 1 PPD; previously smoked 2 PPD. x40 years.     Past Medical History:   Diagnosis Date     Abnormal Pap smear 2/21/2017    Dr said pap was abnormal     Arthritis      Chronic back pain     hx spondylolisthesis     Chronic pain      COPD (chronic obstructive pulmonary disease) (H)     PFT's 2013 FEV1/FVC = 2.68/3.20 = 84%     Hepatitis C     treated ribaviron & interferon in 2008 for serotype 2 with failed 6 month treatment     Hyperlipidemia     on simvatatin     Positive TB test     has taken INH     RLS (restless legs syndrome)      TB lung, latent 1994    treated     Uncomplicated asthma      Wounds and injuries 2003     Past Surgical History:   Procedure Laterality Date     CHOLECYSTECTOMY       D & C  1987     ESOPHAGOSCOPY, GASTROSCOPY, DUODENOSCOPY (EGD), COMBINED  7/30/2014    Procedure: COMBINED ESOPHAGOSCOPY, GASTROSCOPY, DUODENOSCOPY (EGD);  Surgeon: Ramesh Self MD;  Location: [facility]     FRACTURE TX, ANKLE RT/LT  1991    ORIF rt ankle     FUSION SPINE POSTERIOR ONE LEVEL N/A 11/21/2014    Procedure: FUSION SPINE POSTERIOR ONE LEVEL;  Surgeon: Amilcar Baldwin MD;  Location: [facility]     FUSION SPINE POSTERIOR ONE LEVEL Right 7/30/2015    Procedure: FUSION SPINE POSTERIOR ONE LEVEL;  Surgeon: Amilcar Baldwin MD;  Location: [facility]     GASTRIC BYPASS  2007    lost 150 lbs     GRAFT BONE FROM ILIAC CREST Left 11/21/2014    Procedure: GRAFT BONE FROM ILIAC CREST;  Surgeon: Amilcar Baldwin MD;  Location: [facility]     OPTICAL TRACKING SYSTEM FUSION POSTERIOR SPINE LUMBAR  11/20/2013     Procedure: OPTICAL TRACKING SYSTEM FUSION SPINE POSTERIOR LUMBAR ONE LEVEL;  Lumbar 4-5 Transforaminal Interbody Fusion;  Surgeon: Amilcar Baldwin MD;  Location: [facility] OR     ORTHOPEDIC SURGERY      shoulder surgery, removed bone spur     TUBAL LIGATION         Current Outpatient Prescriptions:      albuterol (2.5 MG/3ML) 0.083% neb solution, Take 1 vial (2.5 mg) by nebulization every 6 hours as needed for shortness of breath / dyspnea or wheezing, Disp: 360 mL, Rfl: 11     albuterol (VENTOLIN HFA) 108 (90 BASE) MCG/ACT Inhaler, Inhale 2 puffs into the lungs every 4 hours as needed for shortness of breath / dyspnea or wheezing, Disp: 1 Inhaler, Rfl: 0     aspirin 81 MG tablet, Chew and swallow 1 tab (81mg) by mouth daily, Disp: 100 tablet, Rfl: 3     cetirizine (ZYRTEC) 10 MG tablet, Take 1 tablet (10 mg) by mouth daily, Disp: 30 tablet, Rfl: 11     cholecalciferol (VITAMIN D) 1000 UNIT tablet, Take 1 tablet (1,000 Units) by mouth daily, Disp: 100 tablet, Rfl: 3     cyanocobalamin (VITAMIN B12) 1000 MCG/ML injection, Inject 1 mL (1,000 mcg) into the muscle every 30 days, Disp: 1 mL, Rfl: 11     diphenhydrAMINE (BENADRYL) 25 MG tablet, Take 1 tablet (25 mg) by mouth every 6 hours as needed for itching or allergies, Disp: 60 tablet, Rfl: 3     fluticasone (FLONASE) 50 MCG/ACT spray, Spray 2 sprays into both nostrils daily, Disp: 3 Bottle, Rfl: 2     hydrocortisone 2.5 % cream, Apply topically 2 times daily, Disp: 30 g, Rfl: 1     mirtazapine (REMERON) 15 MG tablet, Take 1 tablet (15 mg) by mouth At Bedtime, Disp: 90 tablet, Rfl: 3     mometasone-formoterol (DULERA) 100-5 MCG/ACT oral inhaler, Inhale 2 puffs into the lungs 2 times daily, Disp: 1 Inhaler, Rfl: 11     Multiple Vitamins-Iron (DAILY-GEMA/IRON/BETA-CAROTENE) TABS, Take 1 tablet by mouth daily, Disp: 90 tablet, Rfl: 3     order for DME, Nebulizer, Disp: 1 each, Rfl: 0     oxyCODONE (ROXICODONE) 5 MG immediate release tablet, Take by  mouth 1 tabs  "every 6 hrs prn pain, Disp: 30 tablet, Rfl: 0     pramipexole (MIRAPEX) 0.125 MG tablet, TAKE 1 TO 3 TABLETS BY MOUTH AT BEDTIME, Disp: 180 tablet, Rfl: 3     traZODone (DESYREL) 100 MG tablet, Take 2 tablets (200 mg) by mouth nightly as needed for sleep, Disp: 60 tablet, Rfl: 5       Allergies   Allergen Reactions     Bee Venom Anaphylaxis, Anxiety, Difficulty breathing, Hives, Itching, Nausea and Vomiting, Palpitations, Shortness Of Breath and Swelling     Opium Alkaloids, Hcls Itching     Can take opiate derived narcotics with Benadryl.     Latex Rash     Exam:  Vitals:    04/23/18 1104   BP: 119/75   Pulse: 81   Weight: 44.9 kg (99 lb)   Height: 1.487 m (4' 10.54\")     Gen: alert, oriented, NAD  : normal external genitalia. Vaginal walls pink. Cervix multiparous and deviated to left. No lesions.   Bimanual: uterus mobile, small, non-tender. No adnexal masses.    A/P:  47 year old presenting for repeat pap one year after LSIL, HPV negative pap.    - Repeat co-testing collected  - Discussed with patient may need colpo, described procedure  - Phone call with results    Christel Penaloza MD          "

## 2018-04-23 NOTE — LETTER
4/23/2018       RE: Kristina Eldridge  [address]  [facility] [address]     Dear Colleague,    Thank you for referring your patient, Kristina Eldridge, to the [facility] at [facility]. Please see a copy of my visit note below.    [facility]    HPI: Kristina Eldridge is a 47 year old P2 who presents for repeat pap. Last pap 1/24/17 was LSIL, HPV negative. Prior pap was NILM 6/2013. She reports feeling well; no new symptoms. Menopause at age 41; no bleeding since. Not sexually active. Sees Dr. Fuller for primary care. Has a mammogram scheduled next week- last was normal in 2017.    She is smoking 1 PPD; previously smoked 2 PPD. x40 years.     Past Medical History:   Diagnosis Date     Abnormal Pap smear 2/21/2017     said pap was abnormal     Arthritis      Chronic back pain     hx spondylolisthesis     Chronic pain      COPD (chronic obstructive pulmonary disease) (H)     PFT's 2013 FEV1/FVC = 2.68/3.20 = 84%     Hepatitis C     treated ribaviron & interferon in 2008 for serotype 2 with failed 6 month treatment     Hyperlipidemia     on simvatatin     Positive TB test     has taken INH     RLS (restless legs syndrome)      TB lung, latent 1994    treated     Uncomplicated asthma      Wounds and injuries 2003     Past Surgical History:   Procedure Laterality Date     CHOLECYSTECTOMY       D & C  1987     ESOPHAGOSCOPY, GASTROSCOPY, DUODENOSCOPY (EGD), COMBINED  7/30/2014    Procedure: COMBINED ESOPHAGOSCOPY, GASTROSCOPY, DUODENOSCOPY (EGD);  Surgeon: Ramesh Self MD;  Location: [facility]     FRACTURE TX, ANKLE RT/LT  1991    ORIF rt ankle     FUSION SPINE POSTERIOR ONE LEVEL N/A 11/21/2014    Procedure: FUSION SPINE POSTERIOR ONE LEVEL;  Surgeon: Amilcar Baldwin MD;  Location: [facility]     FUSION SPINE POSTERIOR ONE LEVEL Right 7/30/2015    Procedure: FUSION SPINE POSTERIOR ONE LEVEL;  Surgeon: Amilcar Baldwin  MD [place];  Location: [facility]     GASTRIC BYPASS  2007    lost 150 lbs     GRAFT BONE FROM ILIAC CREST Left 11/21/2014    Procedure: GRAFT BONE FROM ILIAC CREST;  Surgeon: Amilcar Baldwin MD;  Location: [facility]     OPTICAL TRACKING SYSTEM FUSION POSTERIOR SPINE LUMBAR  11/20/2013    Procedure: OPTICAL TRACKING SYSTEM FUSION SPINE POSTERIOR LUMBAR ONE LEVEL;  Lumbar 4-5 Transforaminal Interbody Fusion;  Surgeon: Amilcar Baldwin MD;  Location: [facility]     ORTHOPEDIC SURGERY      shoulder surgery, removed bone spur     TUBAL LIGATION         Current Outpatient Prescriptions:      albuterol (2.5 MG/3ML) 0.083% neb solution, Take 1 vial (2.5 mg) by nebulization every 6 hours as needed for shortness of breath / dyspnea or wheezing, Disp: 360 mL, Rfl: 11     albuterol (VENTOLIN HFA) 108 (90 BASE) MCG/ACT Inhaler, Inhale 2 puffs into the lungs every 4 hours as needed for shortness of breath / dyspnea or wheezing, Disp: 1 Inhaler, Rfl: 0     aspirin 81 MG tablet, Chew and swallow 1 tab (81mg) by mouth daily, Disp: 100 tablet, Rfl: 3     cetirizine (ZYRTEC) 10 MG tablet, Take 1 tablet (10 mg) by mouth daily, Disp: 30 tablet, Rfl: 11     cholecalciferol (VITAMIN D) 1000 UNIT tablet, Take 1 tablet (1,000 Units) by mouth daily, Disp: 100 tablet, Rfl: 3     cyanocobalamin (VITAMIN B12) 1000 MCG/ML injection, Inject 1 mL (1,000 mcg) into the muscle every 30 days, Disp: 1 mL, Rfl: 11     diphenhydrAMINE (BENADRYL) 25 MG tablet, Take 1 tablet (25 mg) by mouth every 6 hours as needed for itching or allergies, Disp: 60 tablet, Rfl: 3     fluticasone (FLONASE) 50 MCG/ACT spray, Spray 2 sprays into both nostrils daily, Disp: 3 Bottle, Rfl: 2     hydrocortisone 2.5 % cream, Apply topically 2 times daily, Disp: 30 g, Rfl: 1     mirtazapine (REMERON) 15 MG tablet, Take 1 tablet (15 mg) by mouth At Bedtime, Disp: 90 tablet, Rfl: 3     mometasone-formoterol (DULERA) 100-5 MCG/ACT oral inhaler, Inhale 2 puffs into the  "lungs 2 times daily, Disp: 1 Inhaler, Rfl: 11     Multiple Vitamins-Iron (DAILY-GEMA/IRON/BETA-CAROTENE) TABS, Take 1 tablet by mouth daily, Disp: 90 tablet, Rfl: 3     order for DME, Nebulizer, Disp: 1 each, Rfl: 0     oxyCODONE (ROXICODONE) 5 MG immediate release tablet, Take by  mouth 1 tabs every 6 hrs prn pain, Disp: 30 tablet, Rfl: 0     pramipexole (MIRAPEX) 0.125 MG tablet, TAKE 1 TO 3 TABLETS BY MOUTH AT BEDTIME, Disp: 180 tablet, Rfl: 3     traZODone (DESYREL) 100 MG tablet, Take 2 tablets (200 mg) by mouth nightly as needed for sleep, Disp: 60 tablet, Rfl: 5       Allergies   Allergen Reactions     Bee Venom Anaphylaxis, Anxiety, Difficulty breathing, Hives, Itching, Nausea and Vomiting, Palpitations, Shortness Of Breath and Swelling     Opium Alkaloids, Hcls Itching     Can take opiate derived narcotics with Benadryl.     Latex Rash     Exam:  Vitals:    04/23/18 1104   BP: 119/75   Pulse: 81   Weight: 44.9 kg (99 lb)   Height: 1.487 m (4' 10.54\")     Gen: alert, oriented, NAD  : normal external genitalia. Vaginal walls pink. Cervix multiparous and deviated to left. No lesions.   Bimanual: uterus mobile, small, non-tender. No adnexal masses.    A/P:  47 year old presenting for repeat pap one year after LSIL, HPV negative pap.    - Repeat co-testing collected  - Discussed with patient may need colpo, described procedure  - Phone call with results      Again, thank you for allowing me to participate in the care of your patient.      Sincerely,    Christel Penaloza MD      "

## 2018-04-23 NOTE — MR AVS SNAPSHOT
"              After Visit Summary   4/23/2018    Kristina Eldridge    MRN: [id number]           Patient Information     Date Of Birth          1970        Visit Information        Provider Department      4/23/2018 11:15 AM Christel Penaloza MD [facility]        Today's Diagnoses     Encounter for routine adult health examination without abnormal findings    -  1       Follow-ups after your visit        Your next 10 appointments already scheduled     Apr 26, 2018  1:45 PM CDT   (Arrive by 1:30 PM)   MA SCREENING DIGITAL BILATERAL with [id number]   [facility] ([facility] [place])    [address]  [address]  [facility] [address] [phone]           Do not use any powder, lotion or deodorant under your arms or on your breast. If you do, we will ask you to remove it before your exam.  Wear comfortable, two-piece clothing.  If you have any allergies, tell your care team.  Bring any previous mammograms from other facilities or have them mailed to the breast center. Three-dimensional (3D) mammograms are available at [place] locations in [facility], [facility], [facility], and [place]. [facility] locations include [place] [facility] [place] in [place]. Benefits of 3D mammograms include: - Improved rate of cancer detection - Decreases your chance of having to go back for more tests, which means fewer: - \"False-positive\" results (This means that there is an abnormal area but it isn't cancer.) - Invasive testing procedures, such as a biopsy or surgery - Can provide clearer images of the breast if you have dense breast tissue. 3D mammography is an optional exam that anyone can have with a 2D mammogram. It doesn't replace or take the place of a 2D mammogram. 2D mammograms remain an effective screening test for all women.  Not all insurance companies cover the cost of a 3D mammogram. Check with " "your insurance.            Apr 26, 2018  2:20 PM CDT   (Arrive by 2:05 PM)   Return Visit with DO EDMOND Mc [facility] ([facility] [place])    [address] [address] [phone]              Who to contact     Please call your clinic at [phone] to:    Ask questions about your health    Make or cancel appointments    Discuss your medicines    Learn about your test results    Speak to your doctor            Additional Information About Your Visit        [employer] Information     [employer] gives you secure access to your electronic health record. If you see a primary care provider, you can also send messages to your care team and make appointments. If you have questions, please call your primary care clinic.  If you do not have a primary care provider, please call [phone] and they will assist you.      [employer] is an electronic gateway that provides easy, online access to your medical records. With [employer], you can request a clinic appointment, read your test results, renew a prescription or communicate with your care team.     To access your existing account, please contact your [facility] or call [phone] for assistance.        Care EveryWhere ID     This is your Care EveryWhere ID. This could be used by other organizations to access your [place] medical records  [id number]        Your Vitals Were     Pulse Height Last Period BMI (Body Mass Index)          81 1.487 m (4' 10.54\") 06/11/2012 20.31 kg/m2         Blood Pressure from Last 3 Encounters:   04/23/18 119/75   10/17/17 120/76   01/24/17 137/88    Weight from Last 3 Encounters:   04/23/18 44.9 kg (99 lb)   01/24/17 41.8 kg (92 lb 3.2 oz)   11/07/16 39.9 kg (88 lb)              We Performed the Following     Obtaining, preparing and conveyance of cervical or vaginal smear to laboratory.     Pap imaged thin layer screen with HPV - recommended " age 30 - 65 years (select HPV order below)        Primary Care Provider Office Phone # Fax #    Mili Juli Alina Burch [phone]       [address] [place] [address] [address] [place] [id number]        Equal Access to Services     JAGUAR BRONSON : Hadii aad ku hadjoaquinao Soomaali, waaxda luqadaha, qaybta kaalmada adeegyada, waxderrell baljitin haydenicen jenn glenisyariel del castillo. So [facility] [phone].    ATENCIÓN: Si habla español, tiene a pagan disposición servicios gratuitos de asistencia lingüística. Llame al [phone].    We comply with applicable federal civil rights laws and [place] laws. We do not discriminate on the basis of race, color, national origin, age, disability, sex, sexual orientation, or gender identity.            Thank you!     Thank you for choosing [facility]  for your care. Our goal is always to provide you with excellent care. Hearing back from our patients is one way we can continue to improve our services. Please take a few minutes to complete the written survey that you may receive in the mail after your visit with us. Thank you!             Your Updated Medication List - Protect others around you: Learn how to safely use, store and throw away your medicines at www.disposemymeds.org.          This list is accurate as of 4/23/18 12:00 PM.  Always use your most recent med list.                   Brand Name Dispense Instructions for use Diagnosis    * albuterol (2.5 MG/3ML) 0.083% neb solution     360 mL    Take 1 vial (2.5 mg) by nebulization every 6 hours as needed for shortness of breath / dyspnea or wheezing    Mild persistent asthma, uncomplicated       * albuterol 108 (90 Base) MCG/ACT Inhaler    VENTOLIN HFA    1 Inhaler    Inhale 2 puffs into the lungs every 4 hours as needed for shortness of breath / dyspnea or wheezing    Mild persistent asthma, uncomplicated       aspirin 81 MG tablet     100 tablet    Chew and swallow 1 tab (81mg) by mouth daily    Bariatric  surgery status       cetirizine 10 MG tablet    zyrTEC    30 tablet    Take 1 tablet (10 mg) by mouth daily    Chronic seasonal allergic rhinitis due to pollen       cholecalciferol 1000 UNIT tablet    vitamin D3    100 tablet    Take 1 tablet (1,000 Units) by mouth daily    Status post bariatric surgery       cyanocobalamin 1000 MCG/ML injection    VITAMIN B12    1 mL    Inject 1 mL (1,000 mcg) into the muscle every 30 days    Bariatric surgery status       DAILY-GEMA/IRON/BETA-CAROTENE Tabs     90 tablet    Take 1 tablet by mouth daily    Preventative health care       diphenhydrAMINE 25 MG tablet    BENADRYL    60 tablet    Take 1 tablet (25 mg) by mouth every 6 hours as needed for itching or allergies    Itching       fluticasone 50 MCG/ACT spray    FLONASE    3 Bottle    Spray 2 sprays into both nostrils daily    Chronic seasonal allergic rhinitis due to pollen       hydrocortisone 2.5 % cream     30 g    Apply topically 2 times daily    Itching       mirtazapine 15 MG tablet    REMERON    90 tablet    Take 1 tablet (15 mg) by mouth At Bedtime    Loss of weight       mometasone-formoterol 100-5 MCG/ACT oral inhaler    DULERA    1 Inhaler    Inhale 2 puffs into the lungs 2 times daily    Mild persistent asthma, uncomplicated       order for DME     1 each    Nebulizer    Chronic obstructive pulmonary disease, unspecified COPD type (H)       oxyCODONE IR 5 MG tablet    ROXICODONE    30 tablet    Take by  mouth 1 tabs every 6 hrs prn pain    Lumbar pseudoarthrosis       pramipexole 0.125 MG tablet    MIRAPEX    180 tablet    TAKE 1 TO 3 TABLETS BY MOUTH AT BEDTIME    Restless legs syndrome (RLS)       traZODone 100 MG tablet    DESYREL    60 tablet    Take 2 tablets (200 mg) by mouth nightly as needed for sleep    Primary insomnia       * Notice:  This list has 2 medication(s) that are the same as other medications prescribed for you. Read the directions carefully, and ask your doctor or other care provider to  review them with you.

## 2018-04-25 LAB
COPATH REPORT: ABNORMAL
PAP: ABNORMAL

## 2018-04-26 ENCOUNTER — RADIANT APPOINTMENT (OUTPATIENT)
Dept: MAMMOGRAPHY | Facility: CLINIC | Age: 48
End: 2018-04-26
Payer: COMMERCIAL

## 2018-04-26 ENCOUNTER — OFFICE VISIT (OUTPATIENT)
Dept: INTERNAL MEDICINE | Facility: CLINIC | Age: 48
End: 2018-04-26
Payer: COMMERCIAL

## 2018-04-26 VITALS
OXYGEN SATURATION: 96 % | WEIGHT: 100.1 LBS | DIASTOLIC BLOOD PRESSURE: 73 MMHG | BODY MASS INDEX: 20.54 KG/M2 | RESPIRATION RATE: 16 BRPM | SYSTOLIC BLOOD PRESSURE: 109 MMHG | HEART RATE: 79 BPM

## 2018-04-26 DIAGNOSIS — Z01.818 PRE-OP EXAM: Primary | ICD-10-CM

## 2018-04-26 DIAGNOSIS — Z12.31 VISIT FOR SCREENING MAMMOGRAM: ICD-10-CM

## 2018-04-26 ASSESSMENT — PAIN SCALES - GENERAL: PAINLEVEL: NO PAIN (0)
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
## 2018-04-26 NOTE — PROGRESS NOTES
Surgeon (please enter first and last name):  Zafar Jeff MD  Fax number for Preop Evaluation:  [phone]  Location of Surgery: [facility]  Date of Surgery:  5/8/18  Procedure:  Spinal cord stimulator
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
## 2018-04-26 NOTE — MR AVS SNAPSHOT
After Visit Summary   4/26/2018    Kristina Eldridge    MRN: [id number]           Patient Information     Date Of Birth          1970        Visit Information        Provider Department      4/26/2018 2:20 PM Erasmo Grimes DO M [facility]        Today's Diagnoses     Pre-op exam    -  1      Care Instructions    [facility]: [phone]     [facility] Medication Refill Request Information:  * Please contact your pharmacy regarding ANY request for medication refills.  ** [facility] Prescription Fax = [phone]  * Please allow 3 business days for routine medication refills.  * Please allow 5 business days for controlled substance medication refills.     Primary [facility] Center Test Result notification information:  *You will be notified with in 7-10 days of your appointment day regarding the results of your test.  If you are on MyChart you will be notified as soon as the provider has reviewed the results and signed off on them.          Follow-ups after your visit        Follow-up notes from your care team     Return if symptoms worsen or fail to improve.      Who to contact     Please call your clinic at [phone] to:    Ask questions about your health    Make or cancel appointments    Discuss your medicines    Learn about your test results    Speak to your doctor            Additional Information About Your Visit        MyChart Information     [email or web address] gives you secure access to your electronic health record. If you see a primary care provider, you can also send messages to your care team and make appointments. If you have questions, please call your primary care clinic.  If you do not have a primary care provider, please call [phone] and they will assist you.      [email or web address] is an electronic gateway that provides easy, online access to your medical records. With [email or web address], you can request a clinic appointment, read your test results, renew a prescription or communicate  with your care team.     To access your existing account, please contact your [facility] or call [phone] for assistance.        Care EveryWhere ID     This is your Care EveryWhere ID. This could be used by other organizations to access your [place] medical records  [id number]        Your Vitals Were     Pulse Respirations Last Period Pulse Oximetry Breastfeeding? BMI (Body Mass Index)    79 16 06/11/2012 96% No 20.54 kg/m2       Blood Pressure from Last 3 Encounters:   04/26/18 109/73   04/23/18 119/75   10/17/17 120/76    Weight from Last 3 Encounters:   04/26/18 45.4 kg (100 lb 1.6 oz)   04/23/18 44.9 kg (99 lb)   01/24/17 41.8 kg (92 lb 3.2 oz)              Today, you had the following     No orders found for display       Primary Care Provider Office Phone # Fax #    Mili Juli Burch [phone]       [address] [place] [address] [phone]  [facility] [address]        Equal Access to Services     [facility]: Hadii aad ku hadasho Soomaali, waaxda luqadaha, qaybta kaalmada adeegyada, kevin hoff . So [facility] [phone].    ATENCIÓN: Si habla español, tiene a pagan disposición servicios gratuitos de asistencia lingüística. Llame al [phone].    We comply with applicable federal civil rights laws and [place] laws. We do not discriminate on the basis of race, color, national origin, age, disability, sex, sexual orientation, or gender identity.            Thank you!     Thank you for choosing [facility]  for your care. Our goal is always to provide you with excellent care. Hearing back from our patients is one way we can continue to improve our services. Please take a few minutes to complete the written survey that you may receive in the mail after your visit with us. Thank you!             Your Updated Medication List - Protect others around you: Learn how to safely use, store and throw away your medicines at  www.disposemymeds.org.          This list is accurate as of 4/26/18  3:20 PM.  Always use your most recent med list.                   Brand Name Dispense Instructions for use Diagnosis    * albuterol (2.5 MG/3ML) 0.083% neb solution     360 mL    Take 1 vial (2.5 mg) by nebulization every 6 hours as needed for shortness of breath / dyspnea or wheezing    Mild persistent asthma, uncomplicated       * albuterol 108 (90 Base) MCG/ACT Inhaler    VENTOLIN HFA    1 Inhaler    Inhale 2 puffs into the lungs every 4 hours as needed for shortness of breath / dyspnea or wheezing    Mild persistent asthma, uncomplicated       cetirizine 10 MG tablet    zyrTEC    30 tablet    Take 1 tablet (10 mg) by mouth daily    Chronic seasonal allergic rhinitis due to pollen       cholecalciferol 1000 UNIT tablet    vitamin D3    100 tablet    Take 1 tablet (1,000 Units) by mouth daily    Status post bariatric surgery       cyanocobalamin 1000 MCG/ML injection    VITAMIN B12    1 mL    Inject 1 mL (1,000 mcg) into the muscle every 30 days    Bariatric surgery status       DAILY-GEMA/IRON/BETA-CAROTENE Tabs     90 tablet    Take 1 tablet by mouth daily    Preventative health care       diphenhydrAMINE 25 MG tablet    BENADRYL    60 tablet    Take 1 tablet (25 mg) by mouth every 6 hours as needed for itching or allergies    Itching       fluticasone 50 MCG/ACT spray    FLONASE    3 Bottle    Spray 2 sprays into both nostrils daily    Chronic seasonal allergic rhinitis due to pollen       hydrocortisone 2.5 % cream     30 g    Apply topically 2 times daily    Itching       mirtazapine 15 MG tablet    REMERON    90 tablet    Take 1 tablet (15 mg) by mouth At Bedtime    Loss of weight       mometasone-formoterol 100-5 MCG/ACT oral inhaler    DULERA    1 Inhaler    Inhale 2 puffs into the lungs 2 times daily    Mild persistent asthma, uncomplicated       order for DME     1 each    Nebulizer    Chronic obstructive pulmonary disease, unspecified  COPD type (H)       oxyCODONE IR 5 MG tablet    ROXICODONE    30 tablet    Take by  mouth 1 tabs every 6 hrs prn pain    Lumbar pseudoarthrosis       pramipexole 0.125 MG tablet    MIRAPEX    180 tablet    TAKE 1 TO 3 TABLETS BY MOUTH AT BEDTIME    Restless legs syndrome (RLS)       traZODone 100 MG tablet    DESYREL    60 tablet    Take 2 tablets (200 mg) by mouth nightly as needed for sleep    Primary insomnia       * Notice:  This list has 2 medication(s) that are the same as other medications prescribed for you. Read the directions carefully, and ask your doctor or other care provider to review them with you.

## 2018-04-26 NOTE — LETTER
2018      RE: Kristina Eldridge  [address]  [facility] [address]       .[facility]  [address]  [address]  [facility] [address] [phone]    PRE-OP EVALUATION:  Today's date: 2018    Kristina STEFFANIE Eldridge (: 1970) presents for pre-operative evaluation assessment as requested by  ***.  She requires evaluation and anesthesia risk assessment prior to undergoing surgery/procedure for treatment of *** .    {PREOP QUESTIONNAIRE OPTIONS (by MA):902068}    HPI:     HPI related to upcoming procedure: ***      {Ch. Problems:139562}    MEDICAL HISTORY:     Patient Active Problem List    Diagnosis Date Noted     Lumbar pseudoarthrosis 2015     Priority: Medium     Pseudoarthrosis of lumbar spine with nonunion 2014     Priority: Medium     HIV exposure 2014     Priority: Medium     Personal history of spine surgery 2013     Priority: Medium     Lumbar stenosis 10/23/2013     Priority: Medium     Depression 2013     Priority: Medium     Radicular leg pain 2013     Priority: Medium     Spondylolisthesis, grade 2 2013     Priority: Medium     Marijuana smoker, continuous (H) 2013     Priority: Medium     Chronic low back pain 02/15/2013     Priority: Medium     Chronic hepatitis C (H) 10/19/2012     Priority: Medium     Bariatric surgery status 10/19/2012     Priority: Medium     Hyperlipidemia      Priority: Medium     on simvatatin        Past Medical History:   Diagnosis Date     Abnormal Pap smear 2017     said pap was abnormal     Arthritis      Chronic back pain     hx spondylolisthesis     Chronic pain      COPD (chronic obstructive pulmonary disease) (H)     PFT's  FEV1/FVC = 2.68/3.20 = 84%     Hepatitis C     treated ribaviron & interferon in  for serotype 2 with failed 6 month treatment     Hyperlipidemia     on simvatatin     Positive TB test     has taken INH     RLS (restless legs syndrome)       TB lung, latent 1994    treated     Uncomplicated asthma      Wounds and injuries 2003     Past Surgical History:   Procedure Laterality Date     CHOLECYSTECTOMY       D & C  1987     ESOPHAGOSCOPY, GASTROSCOPY, DUODENOSCOPY (EGD), COMBINED  7/30/2014    Procedure: COMBINED ESOPHAGOSCOPY, GASTROSCOPY, DUODENOSCOPY (EGD);  Surgeon: Ramesh Self MD;  Location: [facility]     FRACTURE TX, ANKLE RT/LT  1991    ORIF rt ankle     FUSION SPINE POSTERIOR ONE LEVEL N/A 11/21/2014    Procedure: FUSION SPINE POSTERIOR ONE LEVEL;  Surgeon: Amilcar Baldwin MD;  [facility]     FUSION SPINE POSTERIOR ONE LEVEL Right 7/30/2015    Procedure: FUSION SPINE POSTERIOR ONE LEVEL;  Surgeon: Amilcar Baldwin MD;  [facility]     GASTRIC BYPASS  2007    lost 150 lbs     GRAFT BONE FROM ILIAC CREST Left 11/21/2014    Procedure: GRAFT BONE FROM ILIAC CREST;  Surgeon: Amilcar Baldwin MD;  [facility]     OPTICAL TRACKING SYSTEM FUSION POSTERIOR SPINE LUMBAR  11/20/2013    Procedure: OPTICAL TRACKING SYSTEM FUSION SPINE POSTERIOR LUMBAR ONE LEVEL;  Lumbar 4-5 Transforaminal Interbody Fusion;  Surgeon: Amilcar Baldwin MD;  [facility]     ORTHOPEDIC SURGERY      shoulder surgery, removed bone spur     TUBAL LIGATION       Current Outpatient Prescriptions   Medication Sig Dispense Refill     albuterol (2.5 MG/3ML) 0.083% neb solution Take 1 vial (2.5 mg) by nebulization every 6 hours as needed for shortness of breath / dyspnea or wheezing 360 mL 11     albuterol (VENTOLIN HFA) 108 (90 BASE) MCG/ACT Inhaler Inhale 2 puffs into the lungs every 4 hours as needed for shortness of breath / dyspnea or wheezing 1 Inhaler 0     aspirin 81 MG tablet Chew and swallow 1 tab (81mg) by mouth daily 100 tablet 3     cetirizine (ZYRTEC) 10 MG tablet Take 1 tablet (10 mg) by mouth daily 30 tablet 11     cholecalciferol (VITAMIN D) 1000 UNIT tablet Take 1 tablet (1,000 Units) by  "mouth daily 100 tablet 3     cyanocobalamin (VITAMIN B12) 1000 MCG/ML injection Inject 1 mL (1,000 mcg) into the muscle every 30 days 1 mL 11     diphenhydrAMINE (BENADRYL) 25 MG tablet Take 1 tablet (25 mg) by mouth every 6 hours as needed for itching or allergies 60 tablet 3     fluticasone (FLONASE) 50 MCG/ACT spray Spray 2 sprays into both nostrils daily 3 Bottle 2     hydrocortisone 2.5 % cream Apply topically 2 times daily 30 g 1     mirtazapine (REMERON) 15 MG tablet Take 1 tablet (15 mg) by mouth At Bedtime 90 tablet 3     mometasone-formoterol (DULERA) 100-5 MCG/ACT oral inhaler Inhale 2 puffs into the lungs 2 times daily 1 Inhaler 11     Multiple Vitamins-Iron (DAILY-GEMA/IRON/BETA-CAROTENE) TABS Take 1 tablet by mouth daily 90 tablet 3     order for DME Nebulizer 1 each 0     oxyCODONE (ROXICODONE) 5 MG immediate release tablet Take by  mouth 1 tabs every 6 hrs prn pain 30 tablet 0     pramipexole (MIRAPEX) 0.125 MG tablet TAKE 1 TO 3 TABLETS BY MOUTH AT BEDTIME 180 tablet 3     traZODone (DESYREL) 100 MG tablet Take 2 tablets (200 mg) by mouth nightly as needed for sleep 60 tablet 5     OTC products: {OTC ANALGESICS:453721}    Allergies   Allergen Reactions     Bee Venom Anaphylaxis, Anxiety, Difficulty breathing, Hives, Itching, Nausea and Vomiting, Palpitations, Shortness Of Breath and Swelling     Opium Alkaloids, Hcls Itching     Can take opiate derived narcotics with Benadryl.     Latex Rash      Latex Allergy: {YES/NO WITH DEFAULT:933259::\"NO\"}    Social History   Substance Use Topics     Smoking status: Former Smoker     Packs/day: 0.15     Years: 0.00     Types: Cigarettes     Quit date: 6/16/2014     Smokeless tobacco: Never Used     Alcohol use 0.0 oz/week      Comment: x3 drinks per year     History   Drug Use No     Comment: Marijuana---- denies use since June 2014         REVIEW OF SYSTEMS:   {ROS Preop Choices:971111}    EXAM:   /73  Pulse 79  Resp 16  Wt 45.4 kg (100 lb 1.6 oz)  " "LMP 06/11/2012  SpO2 96%  Breastfeeding? No  BMI 20.54 kg/m2  {EXAM Preop Choices:995054}    DIAGNOSTICS:   {DIAGNOSTIC FOR PREOP:611616}    Recent Labs   Lab Test  09/16/15   1720  08/03/15   1350  08/02/15   0641  08/01/15   0625   04/16/15   1245   04/07/14   1250   HGB  11.2*   --   8.4*  8.3*   < >   --    < >  9.9*   PLT  305   --    --   194   < >  184   < >  506*   INR   --    --    --    --    --   1.05   --   1.02   NA  137  136  142  144   < >   --    < >   --    POTASSIUM  3.4  3.4  3.3*  3.7   < >   --    < >   --    CR  0.83  0.70  0.72  0.88   < >  0.77   < >   --     < > = values in this interval not displayed.        IMPRESSION:   {PREOP REASONS:093056::\"Reason for surgery/procedure: ***\",\"Diagnosis/reason for consult: ***\"}    The proposed surgical procedure is considered {HIGH=major cardiovascular or procedures requiring prolonged anesthesia >4 hours or large fluid shifts;    INTERMEDIATE=abdominal, most orthopedic and intrathoracic surgery; LOW= endoscopy, cataract and breast surgery:418726} risk.    REVISED CARDIAC RISK INDEX  The patient has the following serious cardiovascular risks for perioperative complications such as (MI, PE, VFib and 3  AV Block):  {PREOP REVISED CARDIAC INDEX (RCI):133466:p:\"No serious cardiac risks\"}  INTERPRETATION: {REVISED CARDIAC RISK INTERPRETATION:497272}    The patient has the following additional risks for perioperative complications:  {Additional perioperative risks:716069:p:\"No identified additional risks\"}    No diagnosis found.    RECOMMENDATIONS:     {IMPORTANT - Conditions - complete carefully!!:732110}    {IMPORTANT - Medications:025380::\"--Patient is to take all scheduled medications on the day of surgery EXCEPT for modifications listed below.\"}    {IMPORTANT - Approval:611893:p:\"APPROVAL GIVEN to proceed with proposed procedure, without further diagnostic evaluation\"}       Signed Electronically by: Erasmo Grimes, DO    Copy of this evaluation report " is provided to requesting physician.    [facility] Preop Guidelines    Revised Cardiac Risk Index    Surgeon (please enter first and last name):  Zafar Jeff MD  Fax number for Preop Evaluation:  [phone]  Location of Surgery: [facility]  Date of Surgery:  5/8/18  Procedure:  Spinal cord stimulator       Erasmo Grimes DO

## 2018-04-26 NOTE — PATIENT INSTRUCTIONS
[facility]: [phone]     [facility] Medication Refill Request Information:  * Please contact your pharmacy regarding ANY request for medication refills.  ** [facility] Prescription Fax = [phone]  * Please allow 3 business days for routine medication refills.  * Please allow 5 business days for controlled substance medication refills.     [facility] Test Result notification information:  *You will be notified with in 7-10 days of your appointment day regarding the results of your test.  If you are on MyChart you will be notified as soon as the provider has reviewed the results and signed off on them.

## 2018-04-26 NOTE — NURSING NOTE
Chief Complaint   Patient presents with     Pre-Op Exam     Patient is here for pre-op exam on 5/8/18.      Lachelle Momin LPN at 2:19 PM on 4/26/2018.

## 2018-04-27 LAB
FINAL DIAGNOSIS: NORMAL
HPV HR 12 DNA CVX QL NAA+PROBE: NEGATIVE
HPV16 DNA SPEC QL NAA+PROBE: NEGATIVE
HPV18 DNA SPEC QL NAA+PROBE: NEGATIVE
SPECIMEN DESCRIPTION: NORMAL
SPECIMEN SOURCE CVX/VAG CYTO: NORMAL

## 2018-04-30 ENCOUNTER — OFFICE VISIT (OUTPATIENT)
Dept: DERMATOLOGY | Facility: CLINIC | Age: 48
End: 2018-04-30
Payer: COMMERCIAL

## 2018-04-30 VITALS — DIASTOLIC BLOOD PRESSURE: 86 MMHG | HEART RATE: 73 BPM | SYSTOLIC BLOOD PRESSURE: 138 MMHG

## 2018-04-30 DIAGNOSIS — D22.9 DERMAL AND EPIDERMAL NEVUS: ICD-10-CM

## 2018-04-30 ASSESSMENT — PAIN SCALES - GENERAL
PAINLEVEL: NO PAIN (0)
PAINLEVEL: NO PAIN (0)

## 2018-04-30 NOTE — PATIENT INSTRUCTIONS

## 2018-04-30 NOTE — LETTER
4/30/2018       RE: Kristina Eldridge  [address]  [place] [address]     Dear Colleague,    Thank you for referring your patient, Kristina Eldridge, to the [facility] DERMAT[facility]Y at [facility]. Please see a copy of my visit note below.    [facility] Dermatology Note      Dermatology Problem List:  1.comes for exophytic lesion on scalp temporal right    CC:   Chief Complaint   Patient presents with     Derm Problem     Yarelis is here related to a mole on the side of her head. She states that she would like the area removed.      Has this lesion since childhood and had it once a while injured. Now it was injured again by  and patient want it removed.   Otherwise no major changes on skin.    Encounter Date: Apr 30, 2018    History of Present Illness:  Ms. Kristina Eldridge is a 47 year old female     Past Medical History:   Patient Active Problem List   Diagnosis     Chronic hepatitis C (H)     Hyperlipidemia     Bariatric surgery status     Chronic low back pain     Radicular leg pain     Spondylolisthesis, grade 2     Marijuana smoker, continuous (H)     Depression     Lumbar stenosis     Personal history of spine surgery     HIV exposure     Pseudoarthrosis of lumbar spine with nonunion     Lumbar pseudoarthrosis     Past Medical History:   Diagnosis Date     Abnormal Pap smear 2/21/2017     said pap was abnormal     Arthritis      Chronic back pain     hx spondylolisthesis     Chronic pain      COPD (chronic obstructive pulmonary disease) (H)     PFT's 2013 FEV1/FVC = 2.68/3.20 = 84%     Hepatitis C     treated ribaviron & interferon in 2008 for serotype 2 with failed 6 month treatment     Hyperlipidemia     on simvatatin     Positive TB test     has taken INH     RLS (restless legs syndrome)      TB lung, latent 1994    treated     Uncomplicated asthma      Wounds and injuries 2003     Past Surgical History:   Procedure Laterality Date      CHOLECYSTECTOMY       D & C  1987     ESOPHAGOSCOPY, GASTROSCOPY, DUODENOSCOPY (EGD), COMBINED  7/30/2014    Procedure: COMBINED ESOPHAGOSCOPY, GASTROSCOPY, DUODENOSCOPY (EGD);  Surgeon: Ramesh Self MD;  Location: [facility]     FRACTURE TX, ANKLE RT/LT  1991    ORIF rt ankle     FUSION SPINE POSTERIOR ONE LEVEL N/A 11/21/2014    Procedure: FUSION SPINE POSTERIOR ONE LEVEL;  Surgeon: Amilcar Baldwin MD;  [facility]     FUSION SPINE POSTERIOR ONE LEVEL Right 7/30/2015    Procedure: FUSION SPINE POSTERIOR ONE LEVEL;  Surgeon: Amilcar Baldwin MD;  [facility]     GASTRIC BYPASS  2007    lost 150 lbs     GRAFT BONE FROM ILIAC CREST Left 11/21/2014    Procedure: GRAFT BONE FROM ILIAC CREST;  Surgeon: Amilcar Baldwin MD;  [facility]     OPTICAL TRACKING SYSTEM FUSION POSTERIOR SPINE LUMBAR  11/20/2013    Procedure: OPTICAL TRACKING SYSTEM FUSION SPINE POSTERIOR LUMBAR ONE LEVEL;  Lumbar 4-5 Transforaminal Interbody Fusion;  Surgeon: Amilcar Baldwin MD;  [facility]     ORTHOPEDIC SURGERY      shoulder surgery, removed bone spur     TUBAL LIGATION         Social History:  Does not really make sun protection, but does not expose to sun frequently    Family History:  In family cancers, but no skin cancers    Medications:  Current Outpatient Prescriptions   Medication Sig Dispense Refill     albuterol (2.5 MG/3ML) 0.083% neb solution Take 1 vial (2.5 mg) by nebulization every 6 hours as needed for shortness of breath / dyspnea or wheezing 360 mL 11     albuterol (VENTOLIN HFA) 108 (90 BASE) MCG/ACT Inhaler Inhale 2 puffs into the lungs every 4 hours as needed for shortness of breath / dyspnea or wheezing 1 Inhaler 0     cetirizine (ZYRTEC) 10 MG tablet Take 1 tablet (10 mg) by mouth daily 30 tablet 11     cyanocobalamin (VITAMIN B12) 1000 MCG/ML injection Inject 1 mL (1,000 mcg) into the muscle every 30 days 1 mL 11     diphenhydrAMINE  (BENADRYL) 25 MG tablet Take 1 tablet (25 mg) by mouth every 6 hours as needed for itching or allergies 60 tablet 3     fluticasone (FLONASE) 50 MCG/ACT spray Spray 2 sprays into both nostrils daily 3 Bottle 2     hydrocortisone 2.5 % cream Apply topically 2 times daily 30 g 1     mirtazapine (REMERON) 15 MG tablet Take 1 tablet (15 mg) by mouth At Bedtime 90 tablet 3     mometasone-formoterol (DULERA) 100-5 MCG/ACT oral inhaler Inhale 2 puffs into the lungs 2 times daily 1 Inhaler 11     order for DME Nebulizer 1 each 0     oxyCODONE (ROXICODONE) 5 MG immediate release tablet Take by  mouth 1 tabs every 6 hrs prn pain 30 tablet 0     pramipexole (MIRAPEX) 0.125 MG tablet TAKE 1 TO 3 TABLETS BY MOUTH AT BEDTIME 180 tablet 3     traZODone (DESYREL) 100 MG tablet Take 2 tablets (200 mg) by mouth nightly as needed for sleep 60 tablet 5     cholecalciferol (VITAMIN D) 1000 UNIT tablet Take 1 tablet (1,000 Units) by mouth daily (Patient not taking: Reported on 4/30/2018) 100 tablet 3     Multiple Vitamins-Iron (DAILY-GEMA/IRON/BETA-CAROTENE) TABS Take 1 tablet by mouth daily (Patient not taking: Reported on 4/30/2018) 90 tablet 3     Allergies   Allergen Reactions     Bee Venom Anaphylaxis, Anxiety, Difficulty breathing, Hives, Itching, Nausea and Vomiting, Palpitations, Shortness Of Breath and Swelling     Opium Alkaloids, Hcls Itching     Can take opiate derived narcotics with Benadryl.     Latex Rash         Review of Systems:  -As per HPI  -Constitutional: The patient denies fatigue, fevers, chills, unintended weight loss, and night sweats.  -HEENT: Patient denies nonhealing oral sores.  -Skin: As above in HPI. No additional skin concerns.    Physical exam:  Vitals: /86 (BP Location: Right arm, Patient Position: Chair, Cuff Size: Adult Regular)  Pulse 73  LMP 06/11/2012  GEN: This is a well developed, well-nourished female in no acute distress, in a pleasant mood.    SKIN: Waist-up skin, which includes the  head/face, neck, both arms, chest, back, abdomen, digits and/or nails was examined.  - on the scalp temporal right an exophytic dermal mole with some crust on the top  - on right upper lateral arm a histiocytoma (dermatofibroma)    Impression/Plan:      1. Exophytic irritated dermal naevus    Removal by shave biopsy in local anesthesia    Shave biopsy:  After discussion of benefits and risks including but not limited to bleeding/bruising, pain/swelling, infection, scar, incomplete removal, nerve damage/numbness, recurrence, and non-diagnostic biopsy, written consent, verbal consent and photographs were obtained. Time-out was performed. The area was cleaned with isopropyl alcohol. 0.5mL of 1% lidocaine with epinephrine was injected to obtain adequate anesthesia of the lesion on the scalp. A shave biopsy was performed. Hemostasis was achieved with aluminium chloride. Vaseline and a sterile dressing were applied. The patient tolerated the procedure and no complications were noted. The patient was provided with verbal and written post care instructions.     No further actions necessary    2. Dermatofibrom on right arm        Staff Involved:  Staff Only    Meet Garcia MD

## 2018-04-30 NOTE — MR AVS SNAPSHOT
After Visit Summary   4/30/2018    Kristina Eldridge    MRN: [id number]           Patient Information     Date Of Birth          1970        Visit Information        Provider Department      4/30/2018 12:15 PM Meet Garcia MD [facility]        Today's Diagnoses     Dermal and epidermal nevus          Care Instructions    Wound Care After a Biopsy    What is a skin biopsy?  A skin biopsy allows the doctor to examine a very small piece of tissue under the microscope to determine the diagnosis and the best treatment for the skin condition. A local anesthetic (numbing medicine)  is injected with a very small needle into the skin area to be tested. A small piece of skin is taken from the area. Sometimes a suture (stitch) is used.     What are the risks of a skin biopsy?  I will experience scar, bleeding, swelling, pain, crusting and redness. I may experience incomplete removal or recurrence. Risks of this procedure are excessive bleeding, bruising, infection, nerve damage, numbness, thick (hypertrophic or keloidal) scar and non-diagnostic biopsy.    How should I care for my wound for the first 24 hours?    Keep the wound dry and covered for 24 hours    If it bleeds, hold direct pressure on the area for 15 minutes. If bleeding does not stop then go to the emergency room    Avoid strenuous exercise the first 1-2 days or as your doctor instructs you    How should I care for the wound after 24 hours?    After 24 hours, remove the bandage    You may bathe or shower as normal    If you had a scalp biopsy, you can shampoo as usual and can use shower water to clean the biopsy site daily    Clean the wound twice a day with gentle soap and water    Do not scrub, be gentle    Apply white petroleum/Vaseline after cleaning the wound with a cotton swab or a clean finger, and keep the site covered with a Bandaid /bandage. Bandages are not necessary with a scalp biopsy    If you are unable to cover the  site with a Bandaid /bandage, re-apply ointment 2-3 times a day to keep the site moist. Moisture will help with healing    Avoid strenuous activity for first 1-2 days    Avoid lakes, rivers, pools, and oceans until the stitches are removed or the site is healed    How do I clean my wound?    Wash hands thoroughly with soap or use hand  before all wound care    Clean the wound with gentle soap and water    Apply white petroleum/Vaseline  to wound after it is clean    Replace the Bandaid /bandage to keep the wound covered for the first few days or as instructed by your doctor    If you had a scalp biopsy, warm shower water to the area on a daily basis should suffice    What should I use to clean my wound?     Cotton-tipped applicators (Qtips )    White petroleum jelly (Vaseline ). Use a clean new container and use Q-tips to apply.    Bandaids   as needed    Gentle soap     How should I care for my wound long term?    Do not get your wound dirty    Keep up with wound care for one week or until the area is healed.    A small scab will form and fall off by itself when the area is completely healed. The area will be red and will become pink in color as it heals. Sun protection is very important for how your scar will turn out. Sunscreen with an SPF 30 or greater is recommended once the area is healed.    You should have some soreness but it should be mild and slowly go away over several days. Talk to your doctor about using tylenol for pain,    When should I call my doctor?  If you have increased:     Pain or swelling    Pus or drainage (clear or slightly yellow drainage is ok)    Temperature over 100F    Spreading redness or warmth around wound    When will I hear about my results?  The biopsy results can take 2-3 weeks to come back. The clinic will call you with the results, send you a [employer] message, or have you schedule a follow-up clinic or phone time to discuss the results. Contact our clinics if you do  not hear from us in 3 weeks.     Who should I call with questions?    [facility]: [phone]     [facility]: [phone]    For urgent needs outside of business hours call the [facility] at [phone] and ask for the dermatology resident on call              Follow-ups after your visit        Your next 10 appointments already scheduled     May 15, 2018  1:00 PM CDT   Procedure with Christel Penaloza MD, [facility] RESOURCE PROCEDURE   [facility] ([facility])    [facility] [address]  [address]  [facility] [address] [phone]              Who to contact     Please call your clinic at [phone] to:    Ask questions about your health    Make or cancel appointments    Discuss your medicines    Learn about your test results    Speak to your doctor            Additional Information About Your Visit        [employer] Information     [employer] gives you secure access to your electronic health record. If you see a primary care provider, you can also send messages to your care team and make appointments. If you have questions, please call your primary care clinic.  If you do not have a primary care provider, please call [phone] and they will assist you.      [employer] is an electronic gateway that provides easy, online access to your medical records. With [employer], you can request a clinic appointment, read your test results, renew a prescription or communicate with your care team.     To access your existing account, please contact your [facility] or call [phone] for assistance.        Care EveryWhere ID     This is your Care EveryWhere ID. This could be used by other organizations to access your [place] medical records  [id number]        Your Vitals Were     Pulse Last Period                73 06/11/2012           Blood Pressure from Last  3 Encounters:   04/30/18 138/86   04/26/18 109/73   04/23/18 119/75    Weight from Last 3 Encounters:   04/26/18 45.4 kg (100 lb 1.6 oz)   04/23/18 44.9 kg (99 lb)   01/24/17 41.8 kg (92 lb 3.2 oz)              Today, you had the following     No orders found for display       Primary Care Provider Office Phone # Fax #    Mili Juli Alina Burch [phone]       [address] [place] [address] [address] [place] [address]        Equal Access to Services     [facility]: Hadii aad terrell hadasho Sokeerthi, waaxda lujonathanadaha, qaybta kaalmada adeanabel, kevin hoff . So [facility] [phone].    ATENCIÓN: Si habla español, tiene a pagan disposición servicios gratuitos de asistencia lingüística. [facility] [phone].    We comply with applicable federal civil rights laws and [place] laws. We do not discriminate on the basis of race, color, national origin, age, disability, sex, sexual orientation, or gender identity.            Thank you!     Thank you for choosing [facility]  for your care. Our goal is always to provide you with excellent care. Hearing back from our patients is one way we can continue to improve our services. Please take a few minutes to complete the written survey that you may receive in the mail after your visit with us. Thank you!             Your Updated Medication List - Protect others around you: Learn how to safely use, store and throw away your medicines at www.disposemymeds.org.          This list is accurate as of 4/30/18  1:18 PM.  Always use your most recent med list.                   Brand Name Dispense Instructions for use Diagnosis    * albuterol (2.5 MG/3ML) 0.083% neb solution     360 mL    Take 1 vial (2.5 mg) by nebulization every 6 hours as needed for shortness of breath / dyspnea or wheezing    Mild persistent asthma, uncomplicated       * albuterol 108 (90 Base) MCG/ACT Inhaler    VENTOLIN HFA    1 Inhaler    Inhale 2 puffs into the lungs  every 4 hours as needed for shortness of breath / dyspnea or wheezing    Mild persistent asthma, uncomplicated       cetirizine 10 MG tablet    zyrTEC    30 tablet    Take 1 tablet (10 mg) by mouth daily    Chronic seasonal allergic rhinitis due to pollen       cholecalciferol 1000 UNIT tablet    vitamin D3    100 tablet    Take 1 tablet (1,000 Units) by mouth daily    Status post bariatric surgery       cyanocobalamin 1000 MCG/ML injection    VITAMIN B12    1 mL    Inject 1 mL (1,000 mcg) into the muscle every 30 days    Bariatric surgery status       DAILY-GEMA/IRON/BETA-CAROTENE Tabs     90 tablet    Take 1 tablet by mouth daily    Preventative health care       diphenhydrAMINE 25 MG tablet    BENADRYL    60 tablet    Take 1 tablet (25 mg) by mouth every 6 hours as needed for itching or allergies    Itching       fluticasone 50 MCG/ACT spray    FLONASE    3 Bottle    Spray 2 sprays into both nostrils daily    Chronic seasonal allergic rhinitis due to pollen       hydrocortisone 2.5 % cream     30 g    Apply topically 2 times daily    Itching       mirtazapine 15 MG tablet    REMERON    90 tablet    Take 1 tablet (15 mg) by mouth At Bedtime    Loss of weight       mometasone-formoterol 100-5 MCG/ACT oral inhaler    DULERA    1 Inhaler    Inhale 2 puffs into the lungs 2 times daily    Mild persistent asthma, uncomplicated       order for DME     1 each    Nebulizer    Chronic obstructive pulmonary disease, unspecified COPD type (H)       oxyCODONE IR 5 MG tablet    ROXICODONE    30 tablet    Take by  mouth 1 tabs every 6 hrs prn pain    Lumbar pseudoarthrosis       pramipexole 0.125 MG tablet    MIRAPEX    180 tablet    TAKE 1 TO 3 TABLETS BY MOUTH AT BEDTIME    Restless legs syndrome (RLS)       traZODone 100 MG tablet    DESYREL    60 tablet    Take 2 tablets (200 mg) by mouth nightly as needed for sleep    Primary insomnia       * Notice:  This list has 2 medication(s) that are the same as other medications  prescribed for you. Read the directions carefully, and ask your doctor or other care provider to review them with you.

## 2018-04-30 NOTE — PROGRESS NOTES
[facility] Dermatology Note      Dermatology Problem List:  1.comes for exophytic lesion on scalp temporal right    CC:   Chief Complaint   Patient presents with     Derm Problem     Yarelis is here related to a mole on the side of her head. She states that she would like the area removed.      Has this lesion since childhood and had it once a while injured. Now it was injured again by  and patient want it removed.   Otherwise no major changes on skin.    Encounter Date: Apr 30, 2018    History of Present Illness:  Ms. Kristina Eldridge is a 47 year old female     Past Medical History:   Patient Active Problem List   Diagnosis     Chronic hepatitis C (H)     Hyperlipidemia     Bariatric surgery status     Chronic low back pain     Radicular leg pain     Spondylolisthesis, grade 2     Marijuana smoker, continuous (H)     Depression     Lumbar stenosis     Personal history of spine surgery     HIV exposure     Pseudoarthrosis of lumbar spine with nonunion     Lumbar pseudoarthrosis     Past Medical History:   Diagnosis Date     Abnormal Pap smear 2/21/2017     said pap was abnormal     Arthritis      Chronic back pain     hx spondylolisthesis     Chronic pain      COPD (chronic obstructive pulmonary disease) (H)     PFT's 2013 FEV1/FVC = 2.68/3.20 = 84%     Hepatitis C     treated ribaviron & interferon in 2008 for serotype 2 with failed 6 month treatment     Hyperlipidemia     on simvatatin     Positive TB test     has taken INH     RLS (restless legs syndrome)      TB lung, latent 1994    treated     Uncomplicated asthma      Wounds and injuries 2003     Past Surgical History:   Procedure Laterality Date     CHOLECYSTECTOMY       D & C  1987     ESOPHAGOSCOPY, GASTROSCOPY, DUODENOSCOPY (EGD), COMBINED  7/30/2014    Procedure: COMBINED ESOPHAGOSCOPY, GASTROSCOPY, DUODENOSCOPY (EGD);  Surgeon: Ramesh Self MD;  Location: [facility]     FRACTURE TX, ANKLE RT/LT  1991    ORIF rt ankle      FUSION SPINE POSTERIOR ONE LEVEL N/A 11/21/2014    Procedure: FUSION SPINE POSTERIOR ONE LEVEL;  Surgeon: Amilcar Baldwin MD;  Location: [facility]     FUSION SPINE POSTERIOR ONE LEVEL Right 7/30/2015    Procedure: FUSION SPINE POSTERIOR ONE LEVEL;  Surgeon: Amilcar Baldwin MD;  Location: [facility]     GASTRIC BYPASS  2007    lost 150 lbs     GRAFT BONE FROM ILIAC CREST Left 11/21/2014    Procedure: GRAFT BONE FROM ILIAC CREST;  Surgeon: Amilcar Baldwin MD;  Location: [facility]     OPTICAL TRACKING SYSTEM FUSION POSTERIOR SPINE LUMBAR  11/20/2013    Procedure: OPTICAL TRACKING SYSTEM FUSION SPINE POSTERIOR LUMBAR ONE LEVEL;  Lumbar 4-5 Transforaminal Interbody Fusion;  Surgeon: Amilcar Baldwin MD;  Location: [facility] OR     ORTHOPEDIC SURGERY      shoulder surgery, removed bone spur     TUBAL LIGATION         Social History:  Does not really make sun protection, but does not expose to sun frequently    Family History:  In family cancers, but no skin cancers    Medications:  Current Outpatient Prescriptions   Medication Sig Dispense Refill     albuterol (2.5 MG/3ML) 0.083% neb solution Take 1 vial (2.5 mg) by nebulization every 6 hours as needed for shortness of breath / dyspnea or wheezing 360 mL 11     albuterol (VENTOLIN HFA) 108 (90 BASE) MCG/ACT Inhaler Inhale 2 puffs into the lungs every 4 hours as needed for shortness of breath / dyspnea or wheezing 1 Inhaler 0     cetirizine (ZYRTEC) 10 MG tablet Take 1 tablet (10 mg) by mouth daily 30 tablet 11     cyanocobalamin (VITAMIN B12) 1000 MCG/ML injection Inject 1 mL (1,000 mcg) into the muscle every 30 days 1 mL 11     diphenhydrAMINE (BENADRYL) 25 MG tablet Take 1 tablet (25 mg) by mouth every 6 hours as needed for itching or allergies 60 tablet 3     fluticasone (FLONASE) 50 MCG/ACT spray Spray 2 sprays into both nostrils daily 3 Bottle 2     hydrocortisone 2.5 % cream Apply topically 2 times daily 30 g 1      mirtazapine (REMERON) 15 MG tablet Take 1 tablet (15 mg) by mouth At Bedtime 90 tablet 3     mometasone-formoterol (DULERA) 100-5 MCG/ACT oral inhaler Inhale 2 puffs into the lungs 2 times daily 1 Inhaler 11     order for DME Nebulizer 1 each 0     oxyCODONE (ROXICODONE) 5 MG immediate release tablet Take by  mouth 1 tabs every 6 hrs prn pain 30 tablet 0     pramipexole (MIRAPEX) 0.125 MG tablet TAKE 1 TO 3 TABLETS BY MOUTH AT BEDTIME 180 tablet 3     traZODone (DESYREL) 100 MG tablet Take 2 tablets (200 mg) by mouth nightly as needed for sleep 60 tablet 5     cholecalciferol (VITAMIN D) 1000 UNIT tablet Take 1 tablet (1,000 Units) by mouth daily (Patient not taking: Reported on 4/30/2018) 100 tablet 3     Multiple Vitamins-Iron (DAILY-GEMA/IRON/BETA-CAROTENE) TABS Take 1 tablet by mouth daily (Patient not taking: Reported on 4/30/2018) 90 tablet 3     Allergies   Allergen Reactions     Bee Venom Anaphylaxis, Anxiety, Difficulty breathing, Hives, Itching, Nausea and Vomiting, Palpitations, Shortness Of Breath and Swelling     Opium Alkaloids, Hcls Itching     Can take opiate derived narcotics with Benadryl.     Latex Rash         Review of Systems:  -As per HPI  -Constitutional: The patient denies fatigue, fevers, chills, unintended weight loss, and night sweats.  -HEENT: Patient denies nonhealing oral sores.  -Skin: As above in HPI. No additional skin concerns.    Physical exam:  Vitals: /86 (BP Location: Right arm, Patient Position: Chair, Cuff Size: Adult Regular)  Pulse 73  LMP 06/11/2012  GEN: This is a well developed, well-nourished female in no acute distress, in a pleasant mood.    SKIN: Waist-up skin, which includes the head/face, neck, both arms, chest, back, abdomen, digits and/or nails was examined.  - on the scalp temporal right an exophytic dermal mole with some crust on the top  - on right upper lateral arm a histiocytoma (dermatofibroma)    Impression/Plan:      1. Exophytic irritated dermal  naevus    Removal by shave biopsy in local anesthesia    Shave biopsy:  After discussion of benefits and risks including but not limited to bleeding/bruising, pain/swelling, infection, scar, incomplete removal, nerve damage/numbness, recurrence, and non-diagnostic biopsy, written consent, verbal consent and photographs were obtained. Time-out was performed. The area was cleaned with isopropyl alcohol. 0.5mL of 1% lidocaine with epinephrine was injected to obtain adequate anesthesia of the lesion on the scalp. A shave biopsy was performed. Hemostasis was achieved with aluminium chloride. Vaseline and a sterile dressing were applied. The patient tolerated the procedure and no complications were noted. The patient was provided with verbal and written post care instructions.     No further actions necessary    2. Dermatofibrom on right arm        Staff Involved:  Staff Only

## 2018-04-30 NOTE — NURSING NOTE
Dermatology Rooming Note    Kristina Eldridge's goals for this visit include:   Chief Complaint   Patient presents with     Derm Problem     Yarelis is here related to a mole on the side of her head. She states that she would like the area removed.      Chel Link LPN

## 2018-05-02 LAB — COPATH REPORT: NORMAL

## 2018-05-04 ENCOUNTER — TELEPHONE (OUTPATIENT)
Dept: INTERNAL MEDICINE | Facility: CLINIC | Age: 48
End: 2018-05-04

## 2018-05-04 NOTE — TELEPHONE ENCOUNTER
[facility] Call Center    Phone Message    May a detailed message be left on voicemail: yes    Reason for Call: Other: Pt calling again, states she is needing her pre-op records faxed to the [facility] for an upcoming pain pump procedure. Their fax is [phone]. Please advise.     Action Taken: Message routed to:  [facility]

## 2018-05-04 NOTE — TELEPHONE ENCOUNTER
[facility] Call Center    Phone Message    May a detailed message be left on voicemail: yes    Reason for Call: Other: Please follow up with patient about medical records being faxed.      Action Taken: Message routed to:  [facility] ([facility]): silverio coello

## 2018-05-22 ENCOUNTER — OFFICE VISIT (OUTPATIENT)
Dept: OBGYN | Facility: CLINIC | Age: 48
End: 2018-05-22
Payer: COMMERCIAL

## 2018-05-22 VITALS
HEART RATE: 81 BPM | BODY MASS INDEX: 19.49 KG/M2 | DIASTOLIC BLOOD PRESSURE: 75 MMHG | WEIGHT: 95 LBS | SYSTOLIC BLOOD PRESSURE: 111 MMHG

## 2018-05-22 DIAGNOSIS — R87.612 PAPANICOLAOU SMEAR OF CERVIX WITH LOW GRADE SQUAMOUS INTRAEPITHELIAL LESION (LGSIL): Primary | ICD-10-CM

## 2018-05-22 PROCEDURE — 88305 TISSUE EXAM BY PATHOLOGIST: CPT | Performed by: STUDENT IN AN ORGANIZED HEALTH CARE EDUCATION/TRAINING PROGRAM

## 2018-05-22 PROCEDURE — 57455 BIOPSY OF CERVIX W/SCOPE: CPT | Mod: ZF | Performed by: STUDENT IN AN ORGANIZED HEALTH CARE EDUCATION/TRAINING PROGRAM

## 2018-05-22 ASSESSMENT — PAIN SCALES - GENERAL: PAINLEVEL: EXTREME PAIN (8)

## 2018-05-22 NOTE — MR AVS SNAPSHOT
After Visit Summary   5/22/2018    Kristina Eldridge    MRN: [id number]           Patient Information     Date Of Birth          1970        Visit Information        Provider Department      5/22/2018 1:30 PM Tete Carter MD; [facility] RESOURCE PROCEDURE [facility]        Today's Diagnoses     Papanicolaou smear of cervix with low grade squamous intraepithelial lesion (LGSIL)    -  1      Care Instructions    Nice to meet you today!   We will contact you with the results of the cervical biopsies.     Colposcopy Post-Procedure Patient Instructions      You may experience any of the following for a couple of days following colposcopy:    Mild cramping    Vaginal bleeding     Vaginal discharge that looks black and clumpy    Please call your healthcare provider if you have any of the following symptoms:    Fever--Temperature greater than 100 degrees    Cramping after 48 hours    Bleeding heavier than a normal period in the first 24-48 hours    If you are bleeding and soaking more than one pad an hour    Or any other worrisome problems.    We recommend that:    You use pads, not tampons for the bleeding.    You may resume sexual activity in 2-3 days, or after bleeding stops.    You may use Tylenol or ibuprofen (Motrin or Advil) for any discomfort.          Follow-ups after your visit        Follow-up notes from your care team     Return if symptoms worsen or fail to improve, for pending Pathology results.      Who to contact     Please call your clinic at [phone] to:    Ask questions about your health    Make or cancel appointments    Discuss your medicines    Learn about your test results    Speak to your doctor            Additional Information About Your Visit        MyChart Information     [facility] gives you secure access to your electronic health record. If you see a primary care provider, you can also send messages to your care team and make appointments. If you  have questions, please call your primary care clinic.  If you do not have a primary care provider, please call [phone] and they will assist you.      [employer] is an electronic gateway that provides easy, online access to your medical records. With [employer], you can request a clinic appointment, read your test results, renew a prescription or communicate with your care team.     To access your existing account, please contact your [facility] or call [phone] for assistance.        Care EveryWhere ID     This is your Care EveryWhere ID. This could be used by other organizations to access your [place] medical records  [id number]        Your Vitals Were     Pulse Last Period Breastfeeding? BMI (Body Mass Index)          81 06/11/2012 No 19.49 kg/m2         Blood Pressure from Last 3 Encounters:   05/22/18 111/75   04/30/18 138/86   04/26/18 109/73    Weight from Last 3 Encounters:   05/22/18 43.1 kg (95 lb)   04/26/18 45.4 kg (100 lb 1.6 oz)   04/23/18 44.9 kg (99 lb)              We Performed the Following     Colposcopy Cervix/Upper Vagina with Biopsy of Cervix [71671]     Surgical pathology exam [[id number]]          Today's Medication Changes          These changes are accurate as of 5/22/18  2:24 PM.  If you have any questions, ask your nurse or doctor.               Stop taking these medicines if you haven't already. Please contact your care team if you have questions.     cholecalciferol 1000 UNIT tablet   Commonly known as:  vitamin D3   Stopped by:  Tete Carter MD           DAILY-GEMA/IRON/BETA-CAROTENE Tabs   Stopped by:  Tete Carter MD                    Primary Care Provider Office Phone # Fax #    Mili Juli Alina Burch [phone]       [address] [place] [address] [address] [place] [address]        Equal Access to Services     JAGUAR BRONSON AH: austin Paul, kevin justice  christina sendyemilia kennedypeter fentonaaanupam ah. So [facility] [phone].    ATENCIÓN: Si heatherla gio, tiene a pagan disposición servicios gratuitos de asistencia lingüística. Sohail kline [phone].    We comply with applicable federal civil rights laws and [place] laws. We do not discriminate on the basis of race, color, national origin, age, disability, sex, sexual orientation, or gender identity.            Thank you!     Thank you for choosing [facility]  for your care. Our goal is always to provide you with excellent care. Hearing back from our patients is one way we can continue to improve our services. Please take a few minutes to complete the written survey that you may receive in the mail after your visit with us. Thank you!             Your Updated Medication List - Protect others around you: Learn how to safely use, store and throw away your medicines at www.disposemymeds.org.          This list is accurate as of 5/22/18  2:24 PM.  Always use your most recent med list.                   Brand Name Dispense Instructions for use Diagnosis    * albuterol (2.5 MG/3ML) 0.083% neb solution     360 mL    Take 1 vial (2.5 mg) by nebulization every 6 hours as needed for shortness of breath / dyspnea or wheezing    Mild persistent asthma, uncomplicated       * albuterol 108 (90 Base) MCG/ACT Inhaler    VENTOLIN HFA    1 Inhaler    Inhale 2 puffs into the lungs every 4 hours as needed for shortness of breath / dyspnea or wheezing    Mild persistent asthma, uncomplicated       cetirizine 10 MG tablet    zyrTEC    30 tablet    Take 1 tablet (10 mg) by mouth daily    Chronic seasonal allergic rhinitis due to pollen       cyanocobalamin 1000 MCG/ML injection    VITAMIN B12    1 mL    Inject 1 mL (1,000 mcg) into the muscle every 30 days    Bariatric surgery status       diphenhydrAMINE 25 MG tablet    BENADRYL    60 tablet    Take 1 tablet (25 mg) by mouth every 6 hours as needed for itching or allergies    Itching        fluticasone 50 MCG/ACT spray    FLONASE    3 Bottle    Spray 2 sprays into both nostrils daily    Chronic seasonal allergic rhinitis due to pollen       hydrocortisone 2.5 % cream     30 g    Apply topically 2 times daily    Itching       mirtazapine 15 MG tablet    REMERON    90 tablet    Take 1 tablet (15 mg) by mouth At Bedtime    Loss of weight       mometasone-formoterol 100-5 MCG/ACT oral inhaler    DULERA    1 Inhaler    Inhale 2 puffs into the lungs 2 times daily    Mild persistent asthma, uncomplicated       order for DME     1 each    Nebulizer    Chronic obstructive pulmonary disease, unspecified COPD type (H)       oxyCODONE IR 5 MG tablet    ROXICODONE    30 tablet    Take by  mouth 1 tabs every 6 hrs prn pain    Lumbar pseudoarthrosis       pramipexole 0.125 MG tablet    MIRAPEX    180 tablet    TAKE 1 TO 3 TABLETS BY MOUTH AT BEDTIME    Restless legs syndrome (RLS)       traZODone 100 MG tablet    DESYREL    60 tablet    Take 2 tablets (200 mg) by mouth nightly as needed for sleep    Primary insomnia       * Notice:  This list has 2 medication(s) that are the same as other medications prescribed for you. Read the directions carefully, and ask your doctor or other care provider to review them with you.

## 2018-05-22 NOTE — PATIENT INSTRUCTIONS
Nice to meet you today!   We will contact you with the results of the cervical biopsies.     Colposcopy Post-Procedure Patient Instructions      You may experience any of the following for a couple of days following colposcopy:    Mild cramping    Vaginal bleeding     Vaginal discharge that looks black and clumpy    Please call your healthcare provider if you have any of the following symptoms:    Fever--Temperature greater than 100 degrees    Cramping after 48 hours    Bleeding heavier than a normal period in the first 24-48 hours    If you are bleeding and soaking more than one pad an hour    Or any other worrisome problems.    We recommend that:    You use pads, not tampons for the bleeding.    You may resume sexual activity in 2-3 days, or after bleeding stops.    You may use Tylenol or ibuprofen (Motrin or Advil) for any discomfort.

## 2018-05-22 NOTE — PROGRESS NOTES
Colposcopy Visit/Procedure Note:    Kristina Eldridge is an 47 year old,  who comes in for diagnosis of abnormal pap screen. See pap history below. She reports tobacco use, 1 PPD with history of 2 PPD for about 40 years. She is menopausal and her last period was 3-4 years ago.     Menstrual History:  Menstrual History 10/19/2012   LAST MENSTRUAL PERIOD 2012       Lab Results   Component Value Date    PAP ASC-US 2018    PAP LSIL 2017    PAP NIL 2013       Last   Lab Results   Component Value Date    HPV16 Negative 2018     Last   Lab Results   Component Value Date    HPV18 Negative 2018     Last   Lab Results   Component Value Date    HRHPV Negative 2018     Dates/results of previous cervical pathology: None        Dates of previous cervical pathology treatment: None    History   Smoking Status     Former Smoker     Packs/day: 0.15     Years: 0.00     Types: Cigarettes     Quit date: 2014   Smokeless Tobacco     Never Used       Allergies as of 2018 - Esdras as Reviewed 2018   Allergen Reaction Noted     Bee venom Anaphylaxis, Anxiety, Difficulty breathing, Hives, Itching, Nausea and Vomiting, Palpitations, Shortness Of Breath, and Swelling 2016     Opium alkaloids, hcls Itching 2012     Latex Rash 2014        Colposcopy Procedure:    Consent:  Details of the colposcopic procedure were reviewed. Risks, benefits of treatment, and alternate forms of evaluation were discussed.  Patient's questions were elicited and answered.   Written consent was obtained and scanned into medical record.     Verification of Procedure  Just before the procedure begins, through verbal and active participation of team members, I verified:   Initials   Patient Name MRS   Patient  MRS   Procedure to be performed MRS       OBJECTIVE: /75  Pulse 81  Wt 43.1 kg (95 lb)  LMP 2012  Breastfeeding? No  BMI 19.49 kg/m2    Pelvic Exam:  EG/BUS: Normal  genital architecture without lesions, erythema or abnormal secretions.   Vagina: moist, pink, rugae with creamy, white and odorlesssecretions  Cervix: Multiparous, and no lesions  Rectum:anus normal    PROCEDURE:   Acetic acid solution applied to cervix.  Colposcopic exam of the cervix and apex of the vagina was conducted in the usual fashion.     Findings:  SCJ was not seen entirely and the exam unsatisfactory.    There were acetowhite area(s) at 3:00 and 9:00    Biopsies were obtained at 3:00 and 9:00 and placed in labeled Formalin Jar.    ECC: was obtained and placed in labeled Formalin Jar.    Assessment: Kristina Eldridge is a 47 year old  who presents for colposcopy due to abnormal pap smears. Unsatisfactory colposcopy with acetowhite changes noted.    Plan:   Biopsies sent to pathology.  Will contact patient with results and recommended follow-up plan.     Patient advised to contact clinic with heavy vaginal bleeding, fever over 101 degrees F, or any other concerns.    Verbalized understanding and agreement with plan.    She will follow up in clinic as needed.     Patient seen and discussed with Dr. Sharp, who was present for the entire procedure.     Tete Carter MD PhD  Ob/Gyn PGY-2  5/22/2018 4:10 PM    The Patient was seen in [facility] by Dr. Carter.   I reviewed the history & exam. Assessment and plan were jointly made.  I was present for the entire colposcopy procedure as described above.   Radha Sharp MD, MPH

## 2018-06-03 LAB — COPATH REPORT: NORMAL

## 2018-06-04 NOTE — PROGRESS NOTES
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 No evidence of cervical cancer, which is great news.  It is important for her to return to clinic in one year for repeat Pap and HPV testing. Please call OBGYN clinic if you have any questions or concerns or need to schedule an appointment. You can reach them at [phone].    Thank you,    Helen Sprague MD   OBGYN, PGY-4  6/4/2018 10:32 AM

## 2018-08-13 DIAGNOSIS — F51.01 PRIMARY INSOMNIA: ICD-10-CM

## 2018-08-14 RX ORDER — TRAZODONE HYDROCHLORIDE 100 MG/1
TABLET ORAL
Qty: 60 TABLET | Refills: 2 | Status: SHIPPED | OUTPATIENT
Start: 2018-08-14 | End: 2018-11-05

## 2018-08-15 ENCOUNTER — OFFICE VISIT (OUTPATIENT)
Dept: INTERNAL MEDICINE | Facility: CLINIC | Age: 48
End: 2018-08-15
Payer: COMMERCIAL

## 2018-08-15 VITALS
DIASTOLIC BLOOD PRESSURE: 49 MMHG | SYSTOLIC BLOOD PRESSURE: 83 MMHG | BODY MASS INDEX: 17.89 KG/M2 | HEART RATE: 75 BPM | WEIGHT: 87.2 LBS

## 2018-08-15 DIAGNOSIS — Z72.0 TOBACCO ABUSE: ICD-10-CM

## 2018-08-15 DIAGNOSIS — K08.89 POORLY FITTING DENTURES: ICD-10-CM

## 2018-08-15 DIAGNOSIS — Z97.2 POORLY FITTING DENTURES: ICD-10-CM

## 2018-08-15 DIAGNOSIS — J98.01 ACUTE BRONCHOSPASM: ICD-10-CM

## 2018-08-15 DIAGNOSIS — Z02.89 ENCOUNTER FOR COMPLETION OF FORM WITH PATIENT: ICD-10-CM

## 2018-08-15 DIAGNOSIS — R09.A2 GLOBUS SENSATION: ICD-10-CM

## 2018-08-15 DIAGNOSIS — R13.13 PHARYNGEAL DYSPHAGIA: Primary | ICD-10-CM

## 2018-08-15 DIAGNOSIS — M43.10 SPONDYLOLISTHESIS, GRADE 2: ICD-10-CM

## 2018-08-15 RX ORDER — PREDNISONE 20 MG/1
40 TABLET ORAL DAILY
Qty: 10 TABLET | Refills: 0 | Status: SHIPPED | OUTPATIENT
Start: 2018-08-15 | End: 2019-02-15

## 2018-08-15 ASSESSMENT — PAIN SCALES - GENERAL: PAINLEVEL: SEVERE PAIN (7)

## 2018-08-15 NOTE — MR AVS SNAPSHOT
After Visit Summary   8/15/2018    Kristina Eldridge    MRN: [id number]           Patient Information     Date Of Birth          1970        Visit Information        Provider Department      8/15/2018 3:15 PM Katlin Steele APRN [facility]        Today's Diagnoses     Pharyngeal dysphagia    -  1    Globus sensation        Tobacco abuse        Encounter for completion of form with patient        Acute bronchospasm        Spondylolisthesis, grade 2        Poorly fitting dentures          Care Instructions    Primary Care Center Medication Refill Request Information:  * Please contact your pharmacy regarding ANY request for medication refills.  ** [facility] Prescription Fax = [phone]  * Please allow 3 business days for routine medication refills.  * Please allow 5 business days for controlled substance medication refills.     Primary Care Center Test Result notification information:  *You will be notified with in 7-10 days of your appointment day regarding the results of your test.  If you are on MyChart you will be notified as soon as the provider has reviewed the results and signed off on them.    Primary Care Center: [phone]       [phone] ([facility], 4th Floor)     Dental [phone]           Follow-ups after your visit        Additional Services     DENTAL REFERRAL       Your provider has referred you to: [facility]: Dental Clinic (Adult) - [place] [phone]   [email or web address]    Type: unable to wear dentures  Urgency: Routine      Please be aware that coverage of these services is subject to the terms and limitations of your health insurance plan.  Call member services at your health plan with any benefit or coverage questions.      Please bring the following with you to your appointment:    (1) Any X-Rays, CTs or MRIs which have been performed.  Contact the facility where they were done to arrange for  prior to your  scheduled appointment.  Any new CT, MRI or other procedures ordered by your specialist must be performed at a [facility] facility or coordinated by your clinic's referral office.    (2) List of current medications   (3) This referral request   (4) Any documents/labs given to you for this referral            OTOLARYNGOLOGY REFERRAL       Your provider has referred you to: [facility]: Adult Ear, Nose [facility] Throat Clinic (Otolaryngology) - [place] [phone]  [email or web address]    Please be aware that coverage of these services is subject to the terms and limitations of your health insurance plan.  Call member services at your health plan with any benefit or coverage questions.      Please bring the following with you to your appointment:    (1) Any X-Rays, CTs or MRIs which have been performed.  Contact the facility where they were done to arrange for  prior to your scheduled appointment.   (2) List of current medications  (3) This referral request   (4) Any documents/labs given to you for this referral                  Who to contact     Please call your clinic at [phone] to:    Ask questions about your health    Make or cancel appointments    Discuss your medicines    Learn about your test results    Speak to your doctor            Additional Information About Your Visit        [employer] Information     [employer] gives you secure access to your electronic health record. If you see a primary care provider, you can also send messages to your care team and make appointments. If you have questions, please call your primary care clinic.  If you do not have a primary care provider, please call [phone] and they will assist you.      [employer] is an electronic gateway that provides easy, online access to your medical records. With [employer], you can request a clinic appointment, read your test results, renew a prescription or communicate with your care team.     To access  your existing account, please contact your [facility] or call [phone] for assistance.        Care EveryWhere ID     This is your Care EveryWhere ID. This could be used by other organizations to access your [place] medical records  [id number]        Your Vitals Were     Pulse Last Period BMI (Body Mass Index)             75 06/11/2012 17.89 kg/m2          Blood Pressure from Last 3 Encounters:   08/15/18 (!) 83/49   05/22/18 111/75   04/30/18 138/86    Weight from Last 3 Encounters:   08/15/18 39.6 kg (87 lb 3.2 oz)   05/22/18 43.1 kg (95 lb)   04/26/18 45.4 kg (100 lb 1.6 oz)              We Performed the Following     DENTAL REFERRAL     OTOLARYNGOLOGY REFERRAL          Today's Medication Changes          These changes are accurate as of 8/15/18  3:45 PM.  If you have any questions, ask your nurse or doctor.               Start taking these medicines.        Dose/Directions    predniSONE 20 MG tablet   Commonly known as:  DELTASONE   Used for:  Acute bronchospasm   Started by:  Katlin Steele APRN CNP        Dose:  40 mg   Take 2 tablets (40 mg) by mouth daily   Quantity:  10 tablet   Refills:  0            Where to get your medicines      These medications were sent to [facility] [id number] [address] AT SEC OF [address] [address]     Phone:  [phone]     predniSONE 20 MG tablet                Primary Care Provider Office Phone # Fax #    Mili Juli Burch [phone]       [address] [place] [address] 74  [facility] [address]        Equal Access to Services     [facility]ELE [facility] : Hadii marcus Alexander, wapolida luqadaha, qaybta kaalmada perla, kevin del castillo. So [facility] [phone].    ATENCIÓN: Si habla español, tiene a pagan disposición servicios gratuitos de asistencia lingüística. Llame al [phone].    We comply with applicable federal  civil rights laws and [place] laws. We do not discriminate on the basis of race, color, national origin, age, disability, sex, sexual orientation, or gender identity.            Thank you!     Thank you for choosing [facility]  for your care. Our goal is always to provide you with excellent care. Hearing back from our patients is one way we can continue to improve our services. Please take a few minutes to complete the written survey that you may receive in the mail after your visit with us. Thank you!             Your Updated Medication List - Protect others around you: Learn how to safely use, store and throw away your medicines at www.disposemymeds.org.          This list is accurate as of 8/15/18  3:45 PM.  Always use your most recent med list.                   Brand Name Dispense Instructions for use Diagnosis    * albuterol (2.5 MG/3ML) 0.083% neb solution     360 mL    Take 1 vial (2.5 mg) by nebulization every 6 hours as needed for shortness of breath / dyspnea or wheezing    Mild persistent asthma, uncomplicated       * albuterol 108 (90 Base) MCG/ACT inhaler    VENTOLIN HFA    1 Inhaler    Inhale 2 puffs into the lungs every 4 hours as needed for shortness of breath / dyspnea or wheezing    Mild persistent asthma, uncomplicated       cetirizine 10 MG tablet    zyrTEC    30 tablet    Take 1 tablet (10 mg) by mouth daily    Chronic seasonal allergic rhinitis due to pollen       cyanocobalamin 1000 MCG/ML injection    VITAMIN B12    1 mL    Inject 1 mL (1,000 mcg) into the muscle every 30 days    Bariatric surgery status       diphenhydrAMINE 25 MG tablet    BENADRYL    60 tablet    Take 1 tablet (25 mg) by mouth every 6 hours as needed for itching or allergies    Itching       fluticasone 50 MCG/ACT spray    FLONASE    3 Bottle    Spray 2 sprays into both nostrils daily    Chronic seasonal allergic rhinitis due to pollen       hydrocortisone 2.5 % cream     30 g    Apply topically 2  times daily    Itching       mirtazapine 15 MG tablet    REMERON    90 tablet    Take 1 tablet (15 mg) by mouth At Bedtime    Loss of weight       mometasone-formoterol 100-5 MCG/ACT oral inhaler    DULERA    1 Inhaler    Inhale 2 puffs into the lungs 2 times daily    Mild persistent asthma, uncomplicated       order for DME     1 each    Nebulizer    Chronic obstructive pulmonary disease, unspecified COPD type (H)       oxyCODONE IR 5 MG tablet    ROXICODONE    30 tablet    Take by  mouth 1 tabs every 6 hrs prn pain    Lumbar pseudoarthrosis       pramipexole 0.125 MG tablet    MIRAPEX    180 tablet    TAKE 1 TO 3 TABLETS BY MOUTH AT BEDTIME    Restless legs syndrome (RLS)       predniSONE 20 MG tablet    DELTASONE    10 tablet    Take 2 tablets (40 mg) by mouth daily    Acute bronchospasm       traZODone 100 MG tablet    DESYREL    60 tablet    TAKE 2 TABLETS(200 MG) BY MOUTH EVERY NIGHT AS NEEDED FOR SLEEP    Primary insomnia       * Notice:  This list has 2 medication(s) that are the same as other medications prescribed for you. Read the directions carefully, and ask your doctor or other care provider to review them with you.

## 2018-08-15 NOTE — PROGRESS NOTES
[facility]  Primary Care Center   Katlin MARTINMILEY Arce CNP  08/15/2018      Chief Complaint:   Paperwork     History of Present Illness:   Kristina Eldridge is a 47 year old female with a history of chronic back pain and COPD who presents for paperwork.  The patient is requesting forms to be filled out so she have had a designated disability parking spot in front of her home.  Currently her daughter has to park farther away from her home wherever there is an available spot.  She cannot walk long distances without stopping to rest due to her chronic back pain.  She already has a disability card and [employer].    She also complains of 1 month of a sensation of a lump in the left side of her throat that does not clear with coughing or swallowing.  Her throat feels irritated, as if she has been coughing a lot.  She has been on a mainly liquid diet in the past month as she has not had much of an appetite after a recent back surgery.  She has a dry mouth normally which is worse at night because she breathes through her mouth.  She has postnasal drip from allergies even with being on Zyrtec and Benadryl.  She has had to use her rescue inhaler quite frequently in the past 3 weeks even though she has continued her Dulera inhaler as prescribed.  She has been a smoker for ~30 years, other than a temporary break for 1 year.  She notes a strong family history of various cancers in her mother's side of the family including lung, cervical, rectal, prostate.    She would like a referral to a dentist as she has dentures that were made for her which she is unable to wear due to discomfort and gum irritation.    Review of Systems:   Pertinent items are noted in HPI, remainder of complete ROS is negative.      Active Medications:      albuterol (2.5 MG/3ML) 0.083% neb solution, Take 1 vial (2.5 mg) by nebulization every 6 hours as needed for shortness of breath / dyspnea or wheezing, Disp: 360 mL, Rfl: 11     albuterol (VENTOLIN  HFA) 108 (90 BASE) MCG/ACT Inhaler, Inhale 2 puffs into the lungs every 4 hours as needed for shortness of breath / dyspnea or wheezing, Disp: 1 Inhaler, Rfl: 0     cetirizine (ZYRTEC) 10 MG tablet, Take 1 tablet (10 mg) by mouth daily, Disp: 30 tablet, Rfl: 11     cyanocobalamin (VITAMIN B12) 1000 MCG/ML injection, Inject 1 mL (1,000 mcg) into the muscle every 30 days, Disp: 1 mL, Rfl: 11     diphenhydrAMINE (BENADRYL) 25 MG tablet, Take 1 tablet (25 mg) by mouth every 6 hours as needed for itching or allergies, Disp: 60 tablet, Rfl: 3     fluticasone (FLONASE) 50 MCG/ACT spray, Spray 2 sprays into both nostrils daily, Disp: 3 Bottle, Rfl: 2     hydrocortisone 2.5 % cream, Apply topically 2 times daily, Disp: 30 g, Rfl: 1     mirtazapine (REMERON) 15 MG tablet, Take 1 tablet (15 mg) by mouth At Bedtime, Disp: 90 tablet, Rfl: 3     mometasone-formoterol (DULERA) 100-5 MCG/ACT oral inhaler, Inhale 2 puffs into the lungs 2 times daily, Disp: 1 Inhaler, Rfl: 11     oxyCODONE (ROXICODONE) 5 MG immediate release tablet, Take by  mouth 1 tabs every 6 hrs prn pain, Disp: 30 tablet, Rfl: 0     pramipexole (MIRAPEX) 0.125 MG tablet, TAKE 1 TO 3 TABLETS BY MOUTH AT BEDTIME, Disp: 180 tablet, Rfl: 3     traZODone (DESYREL) 100 MG tablet, TAKE 2 TABLETS(200 MG) BY MOUTH EVERY NIGHT AS NEEDED FOR SLEEP, Disp: 60 tablet, Rfl: 2      Allergies:   Opium alkaloids - itching  Latex - rash      Past Medical History:  Chronic Hepatitis C   Hyperlipidemia  Chronic obstructive pulmonary disease   Chronic low back pain  Radicular leg pain  Spondylolisthesis, grade 2  Lumbar pseudoarthrosis   Marijuana smoker, continuous  Depression  Latent tuberculosis      Past Surgical History:  Spinal cord stimulator placement and removal 2018  Posterior spinal fusion 11/21/14, 7/30/15  Posterior spinal fusion, L4-5 11/20/13  Gastric bypass 2007  Open reduction with internal fixation, right ankle 1991  Dilation and curettage  1987  Shoulder  surgery  Bilateral tubal ligation   Cholecystectomy    Family History:   Hypertension - father  COPD - father  Glaucoma - father, paternal grandmother   Coronary artery disease - mother  Congestive heart failure - mother  Cancer - mother  Diabetes - maternal grandmother, paternal grandmother  Crohn's disease - brother      Social History:   Marital Status:   Presents to clinic with her   Tobacco Use: Current daily smoker, 1 PPD.   Alcohol Use: 3 drinks per year  PCP: Mili Burch      Physical Exam:   BP (!) 83/49  Pulse 75  Wt 39.6 kg (87 lb 3.2 oz)  LMP 06/11/2012  BMI 17.89 kg/m2     Constitutional: Alert, oriented, pleasant, no acute distress  Head: Normocephalic, atraumatic  Eyes: Extra-ocular movements intact, pupils equally round and reactive bilaterally, no scleral icterus  Ears: tympanic membranes pearly gray with positive light reflex  ENT: Oropharynx clear, moist mucus membranes, edentulous  Neck: Supple, no lymphadenopathy, no thyromegaly, no palpable masses  Cardiovascular: Regular rate and rhythm, no murmurs, rubs or gallops  Respiratory: Good air movement bilaterally, mild expiratory wheezes on right side  Psychiatric: normal mentation, affect and mood       Assessment and Plan:  Pharyngeal dysphagia  Globus sensation  Visualized oropharynx is normal and there are no palpable masses in her neck.  Will have her see ENT for further evaluation and likely laryngoscopy, especially given her long-standing history of smoking. Encouraged her to continue with Flonase and Zyrtec to help reduce postnasal drip.  - OTOLARYNGOLOGY REFERRAL    Tobacco abuse  Encouraged smoking cessation.     Encounter for completion of form with patient  Disability parking form filled out for 6 months due to underlying back pain which prevents her from walking long distances.    Acute bronchospasm  Will treat for acute COPD exacerbation with 5-day Prednisone burst.  Patient encouraged to keep hydrated,  cut back on smoking if able, and continue her Dulera inhaler as prescribed.  - predniSONE (DELTASONE) 20 MG tablet  Dispense: 10 tablet; Refill: 0    Spondylolisthesis, grade 2  Managed as per [facility].    Poorly fitting dentures  - DENTAL REFERRAL        Scribe Disclosure:   I, Homa Vazquez, am serving as a scribe to document services personally performed by MILEY Hernadez CNP at this visit, based upon the provider's statements to me. All documentation has been reviewed by the aforementioned provider prior to being entered into the official medical record.     Portions of this medical record were completed by a scribe. UPON MY REVIEW AND AUTHENTICATION BY ELECTRONIC SIGNATURE, this confirms (a) I performed the applicable clinical services, and (b) the record is accurate.      MILEY Hernadez CNP

## 2018-08-15 NOTE — PATIENT INSTRUCTIONS
Primary Care Center Medication Refill Request Information:  * Please contact your pharmacy regarding ANY request for medication refills.  ** [facility] Prescription Fax = [phone]  * Please allow 3 business days for routine medication refills.  * Please allow 5 business days for controlled substance medication refills.     Primary Care Center Test Result notification information:  *You will be notified with in 7-10 days of your appointment day regarding the results of your test.  If you are on MyChart you will be notified as soon as the provider has reviewed the results and signed off on them.    Primary Care Center: [phone]       [phone] ([facility], 4th Floor)     Dental [phone]

## 2018-08-15 NOTE — NURSING NOTE
Chief Complaint   Patient presents with     Forms     pt has forms to be filled out     Throat Problem     pt states she feels a lump in her throat that makes it hard to swallow, feels raw,        Smiley Irwin CMA at 3:15 PM on 8/15/2018.

## 2018-09-13 DIAGNOSIS — L29.9 ITCHING: ICD-10-CM

## 2018-09-16 RX ORDER — DIPHENHYDRAMINE HCL 25 MG
TABLET ORAL
Qty: 60 TABLET | Refills: 0 | Status: SHIPPED | OUTPATIENT
Start: 2018-09-16 | End: 2019-05-28

## 2018-10-08 DIAGNOSIS — J45.30 MILD PERSISTENT ASTHMA, UNCOMPLICATED: ICD-10-CM

## 2018-10-09 ENCOUNTER — OFFICE VISIT (OUTPATIENT)
Dept: INTERNAL MEDICINE | Facility: CLINIC | Age: 48
End: 2018-10-09
Payer: COMMERCIAL

## 2018-10-09 ENCOUNTER — NURSE TRIAGE (OUTPATIENT)
Dept: NURSING | Facility: CLINIC | Age: 48
End: 2018-10-09

## 2018-10-09 ENCOUNTER — RADIANT APPOINTMENT (OUTPATIENT)
Dept: GENERAL RADIOLOGY | Facility: CLINIC | Age: 48
End: 2018-10-09
Attending: INTERNAL MEDICINE
Payer: COMMERCIAL

## 2018-10-09 VITALS
SYSTOLIC BLOOD PRESSURE: 118 MMHG | BODY MASS INDEX: 18.24 KG/M2 | WEIGHT: 88.9 LBS | OXYGEN SATURATION: 94 % | TEMPERATURE: 99.1 F | DIASTOLIC BLOOD PRESSURE: 80 MMHG | HEART RATE: 82 BPM

## 2018-10-09 DIAGNOSIS — J44.1 COPD EXACERBATION (H): ICD-10-CM

## 2018-10-09 DIAGNOSIS — J45.30 MILD PERSISTENT ASTHMA, UNCOMPLICATED: ICD-10-CM

## 2018-10-09 DIAGNOSIS — R05.9 COUGH: ICD-10-CM

## 2018-10-09 DIAGNOSIS — R05.9 COUGH: Primary | ICD-10-CM

## 2018-10-09 RX ORDER — PREDNISONE 20 MG/1
40 TABLET ORAL DAILY
Qty: 10 TABLET | Refills: 0 | Status: SHIPPED | OUTPATIENT
Start: 2018-10-09 | End: 2019-03-05

## 2018-10-09 RX ORDER — ALBUTEROL SULFATE 90 UG/1
2 AEROSOL, METERED RESPIRATORY (INHALATION) EVERY 4 HOURS PRN
Qty: 1 INHALER | Refills: 11 | Status: SHIPPED | OUTPATIENT
Start: 2018-10-09 | End: 2019-02-05

## 2018-10-09 RX ORDER — DOXYCYCLINE 100 MG/1
100 CAPSULE ORAL 2 TIMES DAILY
Qty: 10 CAPSULE | Refills: 0 | Status: SHIPPED | OUTPATIENT
Start: 2018-10-09 | End: 2019-02-15

## 2018-10-09 RX ORDER — ALBUTEROL SULFATE 0.83 MG/ML
SOLUTION RESPIRATORY (INHALATION)
Qty: 360 ML | Refills: 2 | Status: SHIPPED | OUTPATIENT
Start: 2018-10-09 | End: 2019-02-05

## 2018-10-09 ASSESSMENT — PAIN SCALES - GENERAL: PAINLEVEL: SEVERE PAIN (7)

## 2018-10-09 NOTE — TELEPHONE ENCOUNTER
[facility] didn't get the antibiotic or Albuterol Neb solution E-prescription.  Verbal order given.  Kita Escobar RN  [facility]    Additional Information    Pharmacy calling with prescription question and triager answers question    Protocols used: MEDICATION QUESTION CALL-ADULT-

## 2018-10-09 NOTE — NURSING NOTE
Chief Complaint   Patient presents with     Cough     Patient is here for cough and difficulty breathing     Refill Request     Also here for medication refills       Omar Machado CMA (AAMA) at 3:23 PM on 10/9/2018

## 2018-10-09 NOTE — MR AVS SNAPSHOT
After Visit Summary   10/9/2018    Kristina Eldridge    MRN: [id number]           Patient Information     Date Of Birth          1970        Visit Information        Provider Department      10/9/2018 3:30 PM Mili Leon MD [facility]        Today's Diagnoses     Cough    -  1    Mild persistent asthma, uncomplicated        COPD exacerbation (H)           Follow-ups after your visit        Your next 10 appointments already scheduled     Oct 09, 2018  4:05 PM CDT   XR CHEST 2 VIEWS with UCXR1   [facility] ([facility])    [address] [place] [id number] [phone]           How do I prepare for my exam? (Food and drink instructions) No Food and Drink Restrictions.  How do I prepare for my exam? (Other instructions) You do not need to do anything special for this exam.  What should I wear: Wear comfortable clothes.  How long does the exam take: Most scans take less than 5 minutes.  What should I bring: Bring a list of your medicines, including vitamins, minerals and over-the-counter drugs. It is safest to leave personal items at home.  Do I need a :  No  is needed.  What do I need to tell my doctor: Tell your doctor if there s any chance you are pregnant.  What should I do after the exam: No restrictions, You may resume normal activities.  What is this test: An image of a specific body part shown in shades of black and white.  Who should I call with questions: If you have any questions, please call the Imaging Department where you will have your exam. Directions, parking instructions, and other information is available on our website, [email or web address].            Oct 16, 2018  3:00 PM CDT   (Arrive by 2:45 PM)   Return Visit with Mili Burch MD   [facility] ([facility])    [address] [place] [address]  [address]   [phone]              Future tests that were ordered for you today     Open Future Orders        Priority Expected Expires Ordered    XR Chest 2 Views Routine 10/9/2018 10/9/2019 10/9/2018            Who to contact     Please call your clinic at [phone] to:    Ask questions about your health    Make or cancel appointments    Discuss your medicines    Learn about your test results    Speak to your doctor            Additional Information About Your Visit        [employer] Information     [employer] gives you secure access to your electronic health record. If you see a primary care provider, you can also send messages to your care team and make appointments. If you have questions, please call your primary care clinic.  If you do not have a primary care provider, please call [phone] and they will assist you.      [employer] is an electronic gateway that provides easy, online access to your medical records. With [employer], you can request a clinic appointment, read your test results, renew a prescription or communicate with your care team.     To access your existing account, please contact your [facility] or call [phone] for assistance.        Care EveryWhere ID     This is your Care EveryWhere ID. This could be used by other organizations to access your [place] medical records  [id number]        Your Vitals Were     Pulse Temperature Last Period Pulse Oximetry Breastfeeding? BMI (Body Mass Index)    82 99.1  F (37.3  C) (Oral) 06/11/2012 94% No 18.24 kg/m2       Blood Pressure from Last 3 Encounters:   10/09/18 118/80   08/15/18 (!) 83/49   05/22/18 111/75    Weight from Last 3 Encounters:   10/09/18 40.3 kg (88 lb 14.4 oz)   08/15/18 39.6 kg (87 lb 3.2 oz)   05/22/18 43.1 kg (95 lb)                 Today's Medication Changes          These changes are accurate as of 10/9/18  3:59 PM.  If you have any questions, ask your nurse or doctor.               Start taking  these medicines.        Dose/Directions    doxycycline 100 MG capsule   Commonly known as:  VIBRAMYCIN   Used for:  COPD exacerbation (H)   Started by:  Mili Leon MD        Dose:  100 mg   Take 1 capsule (100 mg) by mouth 2 times daily for 5 days   Quantity:  10 capsule   Refills:  0         These medicines have changed or have updated prescriptions.        Dose/Directions    * predniSONE 20 MG tablet   Commonly known as:  DELTASONE   This may have changed:  Another medication with the same name was added. Make sure you understand how and when to take each.   Used for:  Acute bronchospasm   Changed by:  Mili Leon MD        Dose:  40 mg   Take 2 tablets (40 mg) by mouth daily   Quantity:  10 tablet   Refills:  0       * predniSONE 20 MG tablet   Commonly known as:  DELTASONE   This may have changed:  You were already taking a medication with the same name, and this prescription was added. Make sure you understand how and when to take each.   Used for:  COPD exacerbation (H), Cough   Changed by:  Mili Leon MD        Dose:  40 mg   Take 2 tablets (40 mg) by mouth daily for 5 days   Quantity:  10 tablet   Refills:  0       * Notice:  This list has 2 medication(s) that are the same as other medications prescribed for you. Read the directions carefully, and ask your doctor or other care provider to review them with you.         Where to get your medicines      These medications were sent to [facility] [id number] [address] AT SEC OF [address] [address]     Phone:  [phone]     albuterol 108 (90 Base) MCG/ACT inhaler    doxycycline 100 MG capsule    mometasone-formoterol 100-5 MCG/ACT oral inhaler    predniSONE 20 MG tablet                Primary Care Provider Office Phone # Fax #    Mili Burch [phone]       [address] [place] [address] [address] [place] [address]  [id number]        Equal Access to Services     [facility]: Hadii marcus tejada megganjessica Rafaali, wapolida luqjoel, qaybta karajeevgracy duncan, kevin del castillo. So [facility] [phone].    ATENCIÓN: Si habla español, tiene a pagan disposición servicios gratuitos de asistencia lingüística. Jorgitoame al [phone].    We comply with applicable federal civil rights laws and [place] laws. We do not discriminate on the basis of race, color, national origin, age, disability, sex, sexual orientation, or gender identity.            Thank you!     Thank you for choosing [facility]  for your care. Our goal is always to provide you with excellent care. Hearing back from our patients is one way we can continue to improve our services. Please take a few minutes to complete the written survey that you may receive in the mail after your visit with us. Thank you!             Your Updated Medication List - Protect others around you: Learn how to safely use, store and throw away your medicines at www.disposemymeds.org.          This list is accurate as of 10/9/18  3:59 PM.  Always use your most recent med list.                   Brand Name Dispense Instructions for use Diagnosis    * albuterol (2.5 MG/3ML) 0.083% neb solution     360 mL    TAKE 1 VIAL BY NEBULIZATION EVERY 6 HOURS AS NEEDED FOR SHORTNESS OF BREATH    Mild persistent asthma, uncomplicated       * albuterol 108 (90 Base) MCG/ACT inhaler    VENTOLIN HFA    1 Inhaler    Inhale 2 puffs into the lungs every 4 hours as needed for shortness of breath / dyspnea or wheezing    Mild persistent asthma, uncomplicated       cetirizine 10 MG tablet    zyrTEC    30 tablet    Take 1 tablet (10 mg) by mouth daily    Chronic seasonal allergic rhinitis due to pollen       cyanocobalamin 1000 MCG/ML injection    VITAMIN B12    1 mL    Inject 1 mL (1,000 mcg) into the muscle every 30 days    Bariatric surgery status       diphenhydrAMINE 25 MG tablet    WAL-DRYL  ALLERGY    60 tablet    TAKE 1 TABLET(25 MG) BY MOUTH EVERY 6 HOURS AS NEEDED FOR ITCHING OR ALLERGIES    Itching       doxycycline 100 MG capsule    VIBRAMYCIN    10 capsule    Take 1 capsule (100 mg) by mouth 2 times daily for 5 days    COPD exacerbation (H)       fluticasone 50 MCG/ACT spray    FLONASE    3 Bottle    Spray 2 sprays into both nostrils daily    Chronic seasonal allergic rhinitis due to pollen       hydrocortisone 2.5 % cream     30 g    Apply topically 2 times daily    Itching       mirtazapine 15 MG tablet    REMERON    90 tablet    Take 1 tablet (15 mg) by mouth At Bedtime    Loss of weight       mometasone-formoterol 100-5 MCG/ACT oral inhaler    DULERA    1 Inhaler    Inhale 2 puffs into the lungs 2 times daily    Mild persistent asthma, uncomplicated       order for DME     1 each    Nebulizer    Chronic obstructive pulmonary disease, unspecified COPD type (H)       oxyCODONE IR 5 MG tablet    ROXICODONE    30 tablet    Take by  mouth 1 tabs every 6 hrs prn pain    Lumbar pseudoarthrosis       pramipexole 0.125 MG tablet    MIRAPEX    180 tablet    TAKE 1 TO 3 TABLETS BY MOUTH AT BEDTIME    Restless legs syndrome (RLS)       * predniSONE 20 MG tablet    DELTASONE    10 tablet    Take 2 tablets (40 mg) by mouth daily    Acute bronchospasm       * predniSONE 20 MG tablet    DELTASONE    10 tablet    Take 2 tablets (40 mg) by mouth daily for 5 days    COPD exacerbation (H), Cough       traZODone 100 MG tablet    DESYREL    60 tablet    TAKE 2 TABLETS(200 MG) BY MOUTH EVERY NIGHT AS NEEDED FOR SLEEP    Primary insomnia       * Notice:  This list has 4 medication(s) that are the same as other medications prescribed for you. Read the directions carefully, and ask your doctor or other care provider to review them with you.

## 2018-10-10 ENCOUNTER — TELEPHONE (OUTPATIENT)
Dept: INTERNAL MEDICINE | Facility: CLINIC | Age: 48
End: 2018-10-10

## 2018-10-10 DIAGNOSIS — J44.9 CHRONIC OBSTRUCTIVE PULMONARY DISEASE, UNSPECIFIED COPD TYPE (H): Primary | ICD-10-CM

## 2018-10-10 NOTE — TELEPHONE ENCOUNTER
Central Prior Authorization Team   Phone: [phone]      PA Initiation    Medication: mometasone-formoterol (DULERA) 100-5 MCG/ACT oral inhaler-Pa initiated  Insurance Company: [employer] - Phone [phone] Fax [phone]  Pharmacy Filling the Rx: [facility] [id number] [place] [address] [address] AT SEC OF [address]  Filling Pharmacy Phone: [phone]  Filling Pharmacy Fax: [phone]  Start Date: 10/10/2018

## 2018-10-10 NOTE — TELEPHONE ENCOUNTER
Prior Authorization Retail Medication Request    Medication/Dose: Dulera  ICD code (if different than what is on RX):  J45.30  Previously Tried and Failed:  Unknown  Rationale:  None    Insurance Name:  [employer]  Insurance ID:  [id number]      Pharmacy Information (if different than what is on RX)  Name:  [facility]  Phone:  [phone]

## 2018-10-10 NOTE — TELEPHONE ENCOUNTER
PRIOR AUTHORIZATION DENIED    Medication: mometasone-formoterol (DULERA) 100-5 MCG/ACT oral inhaler-DENIED    Denial Date: 10/10/2018    Denial Rational: Must have inadequate response or medical contraindications to generic AirDuo.            Appeal Information:

## 2018-10-10 NOTE — PROGRESS NOTES
PRIMARY CARE CENTER       SUBJECTIVE:  Kristina Eldridge is a 48 year old female who comes in for evaluation of a cough. Had a cold along with other family members about 1 mo ago. Everyone else recovered, she has persistent cough. Has gone through 3-4 albuterol inhalers. Cough is productive with clear sputum. Initially had fevers/chills/myalgias. No ear pain, throat pain, sinus congestion. Not using dulera. Doesn't feel albuterol is helping.     Past Medical History:   Diagnosis Date     Abnormal Pap smear 2/21/2017     said pap was abnormal     Arthritis      Chronic back pain     hx spondylolisthesis     Chronic pain      COPD (chronic obstructive pulmonary disease) (H)     PFT's 2013 FEV1/FVC = 2.68/3.20 = 84%     Hepatitis C     treated ribaviron & interferon in 2008 for serotype 2 with failed 6 month treatment     Hyperlipidemia     on simvatatin     Positive TB test     has taken INH     RLS (restless legs syndrome)      TB lung, latent 1994    treated     Uncomplicated asthma      Wounds and injuries 2003         Medications and allergies reviewed by me today.     ROS:   Constitutional, HEENT, cardiovascular, pulmonary, gi and gu systems are negative, except as otherwise noted.    OBJECTIVE:    /80 (BP Location: Left arm, Patient Position: Sitting, Cuff Size: Adult Small)  Pulse 82  Temp 99.1  F (37.3  C) (Oral)  Wt 40.3 kg (88 lb 14.4 oz)  LMP 06/11/2012  SpO2 94%  Breastfeeding? No  BMI 18.24 kg/m2   Wt Readings from Last 1 Encounters:   10/09/18 40.3 kg (88 lb 14.4 oz)     Gen: Thin female, in NAD  ENT: TMs normal, oropharynx clear, MMM  Neck: no cervical LAD  Resp: lungs diminished in bases, expiratory wheezes, no crackles  CV: Heart RRR, no MRG    CXR: no acute infiltrates.      ASSESSMENT/PLAN:    Kristina was seen today for cough and refill request. Will treat as COPD exacerbation with prednisone and doxycycline x5 days. Discussed importance of using Dulera regularly. RTC  in 1 week.     Diagnoses and all orders for this visit:    Cough  -     XR Chest 2 Views; Future  -     predniSONE (DELTASONE) 20 MG tablet; Take 2 tablets (40 mg) by mouth daily for 5 days    Mild persistent asthma, uncomplicated  -     mometasone-formoterol (DULERA) 100-5 MCG/ACT oral inhaler; Inhale 2 puffs into the lungs 2 times daily  -     albuterol (VENTOLIN HFA) 108 (90 Base) MCG/ACT inhaler; Inhale 2 puffs into the lungs every 4 hours as needed for shortness of breath / dyspnea or wheezing    COPD exacerbation (H)  -     predniSONE (DELTASONE) 20 MG tablet; Take 2 tablets (40 mg) by mouth daily for 5 days  -     doxycycline (VIBRAMYCIN) 100 MG capsule; Take 1 capsule (100 mg) by mouth 2 times daily for 5 days         Pt should return to clinic for f/u with me in 1 week     Mili Burch MD  10/10/18

## 2018-10-11 RX ORDER — FLUTICASONE PROPIONATE AND SALMETEROL 232; 14 UG/1; UG/1
1 POWDER, METERED RESPIRATORY (INHALATION) 2 TIMES DAILY
Qty: 3 INHALER | Refills: 3 | Status: SHIPPED | OUTPATIENT
Start: 2018-10-11 | End: 2019-02-05

## 2018-10-15 DIAGNOSIS — J44.1 COPD EXACERBATION (H): ICD-10-CM

## 2018-10-15 DIAGNOSIS — R05.9 COUGH: ICD-10-CM

## 2018-10-17 RX ORDER — PREDNISONE 20 MG/1
40 TABLET ORAL DAILY
Qty: 10 TABLET | Refills: 0 | OUTPATIENT
Start: 2018-10-17

## 2018-10-29 ENCOUNTER — TELEPHONE (OUTPATIENT)
Dept: INTERNAL MEDICINE | Facility: CLINIC | Age: 48
End: 2018-10-29

## 2018-10-29 NOTE — TELEPHONE ENCOUNTER
[facility] Call Center    Phone Message    May a detailed message be left on voicemail: yes    Reason for Call: Other: Pt calling asking for a referral to orthopedics for her knee. She also mentioned that Dr. Fuller was going to talk to another doctor about her ears and she is wondering if that was done. Please call to follow up.     Action Taken: Message routed to:  [facility] ([facility] HETAL MORGAN

## 2018-10-29 NOTE — TELEPHONE ENCOUNTER
Attempted to call patient, but it states that the patient is unable to accept phone calls at this time.     Patient has ENT referral placed by Katlin Steele. Will need to discuss with patient further as to why she is inquiring about referral to ortho as it was not mention in last two office visits. Lisa Hilton LPN 10/29/2018 3:18 PM

## 2018-11-05 ENCOUNTER — PATIENT OUTREACH (OUTPATIENT)
Dept: CARE COORDINATION | Facility: CLINIC | Age: 48
End: 2018-11-05

## 2018-11-05 DIAGNOSIS — F51.01 PRIMARY INSOMNIA: ICD-10-CM

## 2018-11-05 DIAGNOSIS — Z98.84 BARIATRIC SURGERY STATUS: ICD-10-CM

## 2018-11-06 RX ORDER — CYANOCOBALAMIN 1000 UG/ML
INJECTION, SOLUTION INTRAMUSCULAR; SUBCUTANEOUS
Qty: 1 ML | Refills: 11 | Status: SHIPPED | OUTPATIENT
Start: 2018-11-06 | End: 2019-11-25

## 2018-11-06 RX ORDER — TRAZODONE HYDROCHLORIDE 100 MG/1
TABLET ORAL
Qty: 60 TABLET | Refills: 2 | Status: SHIPPED | OUTPATIENT
Start: 2018-11-06 | End: 2022-03-29

## 2018-11-06 NOTE — TELEPHONE ENCOUNTER
Trazodone Refill  Last Clinic Visit: 10/9/2018  [facility]

## 2018-11-06 NOTE — TELEPHONE ENCOUNTER
cyanocobalamin (VITAMIN B12) 1000 MCG/ML injection      Last Written Prescription Date:  10-17-17  Last Fill Quantity: 1 ml,   # refills: 11  Last Office Visit : 10-9-18  Future Office visit:  none    Routing refill request to provider for review/approval because:  No Vitamin B 12 lab

## 2018-11-08 ENCOUNTER — OFFICE VISIT (OUTPATIENT)
Dept: ORTHOPEDICS | Facility: CLINIC | Age: 48
End: 2018-11-08
Payer: COMMERCIAL

## 2018-11-08 DIAGNOSIS — M25.562 ACUTE PAIN OF LEFT KNEE: Primary | ICD-10-CM

## 2018-11-08 RX ORDER — LORAZEPAM 0.5 MG/1
TABLET ORAL
Qty: 1 TABLET | Refills: 0 | Status: SHIPPED | OUTPATIENT
Start: 2018-11-08 | End: 2019-03-11

## 2018-11-08 NOTE — LETTER
"  11/8/2018      RE: Kristina Eldridge  [address]  [facility] [address]       November 8, 2018: Kristina Eldridge is a 48 year old female who is seen in f/u up with left knee pain. She states that she was squatting four weeks when she heard a pop in the knee.  The knee became swollen \"2-3x size\".  With icing the swelling resolved.  She now has limited ROM and pain.  Clicking and popping.  Points to medial joint line as area of pain.  She was given a knee brace two years ago but lost 45 pounds, so the brace no longer fits.  Had gastric bypass.    Exam: Bilateral knees, 3 views each. 2/16/2016.     Comparison: 4/1/2014.     Clinical history: Pain.     Findings: AP, lateral, and tangential views of the bilateral knees  were obtained. The bones are well aligned. The joint spaces are  well-maintained. No large joint effusion in either knee joint.         Impression: No acute bone abnormality in the bilateral knees.     KIMBERLY LUCAS MD        PMH:  Past Medical History:   Diagnosis Date     Abnormal Pap smear 2/21/2017    Dr said pap was abnormal     Arthritis      Chronic back pain     hx spondylolisthesis     Chronic pain      COPD (chronic obstructive pulmonary disease) (H)     PFT's 2013 FEV1/FVC = 2.68/3.20 = 84%     Hepatitis C     treated ribaviron & interferon in 2008 for serotype 2 with failed 6 month treatment     Hyperlipidemia     on simvatatin     Positive TB test     has taken INH     RLS (restless legs syndrome)      TB lung, latent 1994    treated     Uncomplicated asthma      Wounds and injuries 2003       Active problem list:  Patient Active Problem List   Diagnosis     Chronic hepatitis C (H)     Hyperlipidemia     Bariatric surgery status     Chronic low back pain     Radicular leg pain     Spondylolisthesis, grade 2     Marijuana smoker, continuous     Depression     Lumbar stenosis     Personal history of spine surgery     HIV exposure     Pseudoarthrosis of lumbar spine with nonunion     " Lumbar pseudoarthrosis     Dermal and epidermal nevus       FH:  Family History   Problem Relation Age of Onset     Hypertension Father      Respiratory Father      COPD     Glaucoma Father      C.A.D. Mother      CHF,  age 59 of [place]     Other Cancer Mother      Diabetes Maternal Grandmother      Diabetes Paternal Grandmother      Glaucoma Paternal Grandmother      Melanoma No family hx of      Skin Cancer No family hx of        SH:  Social History     Social History     Marital status:      Spouse name: N/A     Number of children: N/A     Years of education: N/A     Occupational History     Not on file.     Social History Main Topics     Smoking status: Current Every Day Smoker     Packs/day: 1.00     Years: 30.00     Types: Cigarettes     Last attempt to quit: 2014     Smokeless tobacco: Never Used      Comment: pack a day     Alcohol use 0.0 oz/week      Comment: x3 drinks per year     Drug use: No      Comment: Marijuana---- denies use since 2014       Sexual activity: Not Currently     Partners: Male     Birth control/ protection: Post-menopausal     Other Topics Concern     Not on file     Social History Narrative    Lives in [place] with significant other and 3 cats.  Pt does not work currently, has applied for disability        MEDS:  See EMR, reviewed  ALL:  See EMR, reviewed    REVIEW OF SYSTEMS:  CONSTITUTIONAL:NEGATIVE for fever, chills, change in weight  INTEGUMENTARY/SKIN: NEGATIVE for worrisome rashes, moles or lesions  EYES: NEGATIVE for vision changes or irritation  ENT/MOUTH: NEGATIVE for ear, mouth and throat problems  RESP:NEGATIVE for significant cough or SOB  BREAST: NEGATIVE for masses, tenderness or discharge  CV: NEGATIVE for chest pain, palpitations or peripheral edema  GI: NEGATIVE for nausea, abdominal pain, heartburn, or change in bowel habits  :NEGATIVE for frequency, dysuria, or hematuria  :NEGATIVE for frequency, dysuria, or hematuria  NEURO: NEGATIVE for  weakness, dizziness or paresthesias  ENDOCRINE: NEGATIVE for temperature intolerance, skin/hair changes  HEME/ALLERGY/IMMUNE: NEGATIVE for bleeding problems  PSYCHIATRIC: NEGATIVE for changes in mood or affect    Objective: She points to her medial left knee as the area persistent pain.  Bothers her with weightbearing squatting kneeling.  She is tender along the posterior medial joint line.  There is no effusion at the knee.  She is nontender about the medial lateral patellar facets.  Patellar apprehension signs are negative.  She can do an active straight leg raise with no extension lag.  Nontender over the patellar tendon peds anserine bursa or distal IT band.  Nontender over the lateral joint line.  Lachman's has a firm endpoint.  Anterior posterior drawer is negative.  Shadi's is negative.  Normal range of motion at the hip.  She is not willing to do a full squat due to pain.  Skin is normal.  Appropriate in conversation and affect.    Assessment: Left-sided knee discomfort times 4 weeks, rule out meniscal tear    Plan: An MRI of the knee is pending.  She would like a supportive knee brace which was provided.  She is on no narcotics currently.  She is on Remeron.  She would like a an anxiolytic to assist with claustrophobia for the MRI.  0.5 mg of Ativan, 1 pill was provided.  She will follow-up after the MRI to go over results.  We discussed if the MRI shows no signs of meniscal tear that this may be discomfort coming from patellofemoral space in which case physical therapy may be needed.  Discuss it further after the MRI.    This note was created with the use of Dragon software and unintentional spelling or errors may have occurred.            Marquez Love MD

## 2018-11-08 NOTE — PROGRESS NOTES
"November 8, 2018: Kristina Eldridge is a 48 year old female who is seen in f/u up with left knee pain. She states that she was squatting four weeks when she heard a pop in the knee.  The knee became swollen \"2-3x size\".  With icing the swelling resolved.  She now has limited ROM and pain.  Clicking and popping.  Points to medial joint line as area of pain.  She was given a knee brace two years ago but lost 45 pounds, so the brace no longer fits.  Had gastric bypass.    Exam: Bilateral knees, 3 views each. 2/16/2016.     Comparison: 4/1/2014.     Clinical history: Pain.     Findings: AP, lateral, and tangential views of the bilateral knees  were obtained. The bones are well aligned. The joint spaces are  well-maintained. No large joint effusion in either knee joint.         Impression: No acute bone abnormality in the bilateral knees.     KIMBERLY LUCAS MD        PMH:  Past Medical History:   Diagnosis Date     Abnormal Pap smear 2/21/2017     said pap was abnormal     Arthritis      Chronic back pain     hx spondylolisthesis     Chronic pain      COPD (chronic obstructive pulmonary disease) (H)     PFT's 2013 FEV1/FVC = 2.68/3.20 = 84%     Hepatitis C     treated ribaviron & interferon in 2008 for serotype 2 with failed 6 month treatment     Hyperlipidemia     on simvatatin     Positive TB test     has taken INH     RLS (restless legs syndrome)      TB lung, latent 1994    treated     Uncomplicated asthma      Wounds and injuries 2003       Active problem list:  Patient Active Problem List   Diagnosis     Chronic hepatitis C (H)     Hyperlipidemia     Bariatric surgery status     Chronic low back pain     Radicular leg pain     Spondylolisthesis, grade 2     Marijuana smoker, continuous     Depression     Lumbar stenosis     Personal history of spine surgery     HIV exposure     Pseudoarthrosis of lumbar spine with nonunion     Lumbar pseudoarthrosis     Dermal and epidermal nevus       FH:  Family History   Problem " Relation Age of Onset     Hypertension Father      Respiratory Father      COPD     Glaucoma Father      C.A.D. Mother      CHF,  age 59 of [place]     Other Cancer Mother      Diabetes Maternal Grandmother      Diabetes Paternal Grandmother      Glaucoma Paternal Grandmother      Melanoma No family hx of      Skin Cancer No family hx of        SH:  Social History     Social History     Marital status:      Spouse name: N/A     Number of children: N/A     Years of education: N/A     Occupational History     Not on file.     Social History Main Topics     Smoking status: Current Every Day Smoker     Packs/day: 1.00     Years: 30.00     Types: Cigarettes     Last attempt to quit: 2014     Smokeless tobacco: Never Used      Comment: pack a day     Alcohol use 0.0 oz/week      Comment: x3 drinks per year     Drug use: No      Comment: Marijuana---- denies use since 2014       Sexual activity: Not Currently     Partners: Male     Birth control/ protection: Post-menopausal     Other Topics Concern     Not on file     Social History Narrative    Lives in [place] with significant other and 3 cats.  Pt does not work currently, has applied for disability        MEDS:  See EMR, reviewed  ALL:  See EMR, reviewed    REVIEW OF SYSTEMS:  CONSTITUTIONAL:NEGATIVE for fever, chills, change in weight  INTEGUMENTARY/SKIN: NEGATIVE for worrisome rashes, moles or lesions  EYES: NEGATIVE for vision changes or irritation  ENT/MOUTH: NEGATIVE for ear, mouth and throat problems  RESP:NEGATIVE for significant cough or SOB  BREAST: NEGATIVE for masses, tenderness or discharge  CV: NEGATIVE for chest pain, palpitations or peripheral edema  GI: NEGATIVE for nausea, abdominal pain, heartburn, or change in bowel habits  :NEGATIVE for frequency, dysuria, or hematuria  :NEGATIVE for frequency, dysuria, or hematuria  NEURO: NEGATIVE for weakness, dizziness or paresthesias  ENDOCRINE: NEGATIVE for temperature intolerance,  skin/hair changes  HEME/ALLERGY/IMMUNE: NEGATIVE for bleeding problems  PSYCHIATRIC: NEGATIVE for changes in mood or affect    Objective: She points to her medial left knee as the area persistent pain.  Bothers her with weightbearing squatting kneeling.  She is tender along the posterior medial joint line.  There is no effusion at the knee.  She is nontender about the medial lateral patellar facets.  Patellar apprehension signs are negative.  She can do an active straight leg raise with no extension lag.  Nontender over the patellar tendon peds anserine bursa or distal IT band.  Nontender over the lateral joint line.  Lachman's has a firm endpoint.  Anterior posterior drawer is negative.  Shadi's is negative.  Normal range of motion at the hip.  She is not willing to do a full squat due to pain.  Skin is normal.  Appropriate in conversation and affect.    Assessment: Left-sided knee discomfort times 4 weeks, rule out meniscal tear    Plan: An MRI of the knee is pending.  She would like a supportive knee brace which was provided.  She is on no narcotics currently.  She is on Remeron.  She would like a an anxiolytic to assist with claustrophobia for the MRI.  0.5 mg of Ativan, 1 pill was provided.  She will follow-up after the MRI to go over results.  We discussed if the MRI shows no signs of meniscal tear that this may be discomfort coming from patellofemoral space in which case physical therapy may be needed.  Discuss it further after the MRI.    This note was created with the use of Dragon software and unintentional spelling or errors may have occurred.

## 2018-11-08 NOTE — MR AVS SNAPSHOT
After Visit Summary   11/8/2018    Kristina Eldridge    MRN: [id number]           Patient Information     Date Of Birth          1970        Visit Information        Provider Department      11/8/2018 12:30 PM Marquez Love MD [facility]        Today's Diagnoses     Acute pain of left knee    -  1       Follow-ups after your visit        Additional Services     DME REFERRAL       Please be aware that coverage of these services is subject to the terms and limitations of your health insurance plan.  Call member services at your health plan with any benefit or coverage questions.      Knee Brace    Please bring the following to your appointment:  Any x-rays, CTs or MRIs which have been performed.  Contact the facility where they were done to arrange for  prior to your scheduled appointment.    List of current medications   This referral request   Any documents/labs given to you for this referral                  Your next 10 appointments already scheduled     Nov 13, 2018  4:00 PM CST   (Arrive by 3:45 PM)   Return Visit with Mili Burch MD   [facility] ([facility])    [address]  4th Floor  [facility] [address] [phone]            Nov 20, 2018  3:15 PM CST   MR KNEE LEFT W/O CONTRAST with [id number]   [facility] [place] [facility] ([facility])    [address]  1st Floor  [facility] [id number] [phone]           How do I prepare for my exam? (Food and drink instructions) **If you will be receiving sedation or general anesthesia, please see special notes below.**  How do I prepare for my exam? (Other instructions) Take your medicines as usual, unless your doctor tells you not to. Please remove any body piercings and hair extensions before you arrive. Follow your doctor s orders. If you do not, we may have to postpone your exam. You may or may not receive IV  contrast for this exam pending the discretion of the Radiologist.  You do not need to do anything special to prepare. **If you will be receiving sedation or general anesthesia, please see special notes below.**  What should I wear:  The MRI machine uses a strong magnet. Please wear clothes without metal (snaps, zippers). A sweatsuit works well, or we may give you a hospital gown.  How long does the exam take: Most tests take 30 to 60 minutes.  HOWEVER, IF YOUR DOCTOR PRESCRIBES ANESTHESIA please plan on spending four to five hours in the recovery room.  What should I bring: Bring a list of your current medicines to your exam (including vitamins, minerals and over-the-counter drugs). Also bring the results of similar scans you may have had.  Do I need a : **If you will be receiving sedation or general anesthesia, please see special notes below.**  What should I do after the exam? No Restrictions, You may resume normal activities.  What is this test: MRI (magnetic resonance imaging) uses a strong magnet and radio waves to look inside the body. An MRA (magnetic resonance angiogram) does the same thing, but it lets us look at your blood vessels. A computer turns the radio waves into pictures showing cross sections of the body, much like slices of bread. This helps us see any problems more clearly.  Who should I call with questions: Please call the Imaging Department at your exam site with any questions. Directions, parking instructions, and other information is available on our website, [email or web address].  How do I prepare if I m having sedation or anesthesia? **IMPORTANT** THE INSTRUCTIONS BELOW ARE ONLY FOR THOSE PATIENTS WHO HAVE BEEN TOLD THEY WILL RECEIVE SEDATION OR GENERAL ANESTHESIA DURING THEIR MRI PROCEDURE:  IF YOU WILL RECEIVE SEDATION (take medicine to help you relax during your exam): You must get the medicine from your doctor before you arrive. Bring the medicine to the exam. Do not take it at  home. Arrive one hour early. Bring someone who can take you home after the test. Your medicine will make you sleepy. After the exam, you may not drive, take a bus or take a taxi by yourself. No eating 8 hours before your exam. You may have clear liquids up until 4 hours before your exam. (Clear liquids include water, clear tea, black coffee and fruit juice without pulp.)  IF YOU WILL RECEIVE ANESTHESIA (be asleep for your exam): Arrive 1 1/2 hours early. Bring someone who can take you home after the test. You may not drive, take a bus or take a taxi by yourself. No eating 8 hours before your exam. You may have clear liquids up until 4 hours before your exam. (Clear liquids include water, clear tea, black coffee and fruit juice without pulp.) You will spend four to five hours in the recovery room.            Nov 26, 2018  2:30 PM CST   (Arrive by 2:15 PM)   Return Visit with Marquez Love MD   [facility] ([facility])    [address] [place] [address] [phone]              Future tests that were ordered for you today     Open Future Orders        Priority Expected Expires Ordered    MR Knee Left w/o Contrast Routine  11/8/2019 11/8/2018            Who to contact     Please call your clinic at [phone] to:    Ask questions about your health    Make or cancel appointments    Discuss your medicines    Learn about your test results    Speak to your doctor            Additional Information About Your Visit        [employer] Information     [employer] gives you secure access to your electronic health record. If you see a primary care provider, you can also send messages to your care team and make appointments. If you have questions, please call your primary care clinic.  If you do not have a primary care provider, please call [phone] and they will assist you.      [employer] is an electronic gateway that provides easy, online access to your  medical records. With [employer], you can request a clinic appointment, read your test results, renew a prescription or communicate with your care team.     To access your existing account, please contact your [facility] or call [phone] for assistance.        Care EveryWhere ID     This is your Care EveryWhere ID. This could be used by other organizations to access your [place] medical records  [id number]        Your Vitals Were     Last Period                   06/11/2012            Blood Pressure from Last 3 Encounters:   10/09/18 118/80   08/15/18 (!) 83/49   05/22/18 111/75    Weight from Last 3 Encounters:   10/09/18 88 lb 14.4 oz (40.3 kg)   08/15/18 87 lb 3.2 oz (39.6 kg)   05/22/18 95 lb (43.1 kg)              We Performed the Following     DME REFERRAL          Today's Medication Changes          These changes are accurate as of 11/8/18  1:43 PM.  If you have any questions, ask your nurse or doctor.               Start taking these medicines.        Dose/Directions    LORazepam 0.5 MG tablet   Commonly known as:  ATIVAN   Used for:  Acute pain of left knee   Started by:  Marquez Love MD        Take one pill 30 minutes prior to procedure.  Do not operate a vehicle after taking this medication   Quantity:  1 tablet   Refills:  0            Where to get your medicines      Some of these will need a paper prescription and others can be bought over the counter.  Ask your nurse if you have questions.     Bring a paper prescription for each of these medications     LORazepam 0.5 MG tablet                Primary Care Provider Office Phone # Fax #    Mili Juli Alina Burch [phone]       [address] [facility] [address]        Equal Access to Services     [facility] AH: James Alexander, austin flores, kevin justice. [facility] [phone].    ATENCIÓN: Bakari powers,  tiene a pagan disposición servicios gratuitos de asistencia lingüística. Sohail kline [phone].    We comply with applicable federal civil rights laws and [place] laws. We do not discriminate on the basis of race, color, national origin, age, disability, sex, sexual orientation, or gender identity.            Thank you!     Thank you for choosing [facility]  for your care. Our goal is always to provide you with excellent care. Hearing back from our patients is one way we can continue to improve our services. Please take a few minutes to complete the written survey that you may receive in the mail after your visit with us. Thank you!             Your Updated Medication List - Protect others around you: Learn how to safely use, store and throw away your medicines at www.disposemymeds.org.          This list is accurate as of 11/8/18  1:43 PM.  Always use your most recent med list.                   Brand Name Dispense Instructions for use Diagnosis    * albuterol (2.5 MG/3ML) 0.083% neb solution     360 mL    TAKE 1 VIAL BY NEBULIZATION EVERY 6 HOURS AS NEEDED FOR SHORTNESS OF BREATH    Mild persistent asthma, uncomplicated       * albuterol 108 (90 Base) MCG/ACT inhaler    VENTOLIN HFA    1 Inhaler    Inhale 2 puffs into the lungs every 4 hours as needed for shortness of breath / dyspnea or wheezing    Mild persistent asthma, uncomplicated       cetirizine 10 MG tablet    zyrTEC    30 tablet    Take 1 tablet (10 mg) by mouth daily    Chronic seasonal allergic rhinitis due to pollen       cyanocobalamin 1000 MCG/ML injection    VITAMIN B12    1 mL    ADMINISTER 1 ML(1000 MCG) IN THE MUSCLE EVERY 30 DAYS    Bariatric surgery status       diphenhydrAMINE 25 MG tablet    WAL-DRYL ALLERGY    60 tablet    TAKE 1 TABLET(25 MG) BY MOUTH EVERY 6 HOURS AS NEEDED FOR ITCHING OR ALLERGIES    Itching       fluticasone 50 MCG/ACT spray    FLONASE    3 Bottle    Spray 2 sprays into both nostrils daily    Chronic  seasonal allergic rhinitis due to pollen       fluticasone-salmeterol 232-14 MCG/ACT inhaler    AIRDUO RESPICLICK    3 Inhaler    Inhale 1 puff into the lungs 2 times daily    Chronic obstructive pulmonary disease, unspecified COPD type (H)       hydrocortisone 2.5 % cream     30 g    Apply topically 2 times daily    Itching       LORazepam 0.5 MG tablet    ATIVAN    1 tablet    Take one pill 30 minutes prior to procedure.  Do not operate a vehicle after taking this medication    Acute pain of left knee       mirtazapine 15 MG tablet    REMERON    90 tablet    Take 1 tablet (15 mg) by mouth At Bedtime    Loss of weight       mometasone-formoterol 100-5 MCG/ACT oral inhaler    DULERA    1 Inhaler    Inhale 2 puffs into the lungs 2 times daily    Mild persistent asthma, uncomplicated       order for DME     1 each    Nebulizer    Chronic obstructive pulmonary disease, unspecified COPD type (H)       oxyCODONE IR 5 MG tablet    ROXICODONE    30 tablet    Take by  mouth 1 tabs every 6 hrs prn pain    Lumbar pseudoarthrosis       pramipexole 0.125 MG tablet    MIRAPEX    180 tablet    TAKE 1 TO 3 TABLETS BY MOUTH AT BEDTIME    Restless legs syndrome (RLS)       predniSONE 20 MG tablet    DELTASONE    10 tablet    Take 2 tablets (40 mg) by mouth daily    Acute bronchospasm       traZODone 100 MG tablet    DESYREL    60 tablet    TAKE 2 TABLETS(200 MG) BY MOUTH EVERY NIGHT AS NEEDED FOR SLEEP    Primary insomnia       * Notice:  This list has 2 medication(s) that are the same as other medications prescribed for you. Read the directions carefully, and ask your doctor or other care provider to review them with you.

## 2018-11-13 ENCOUNTER — OFFICE VISIT (OUTPATIENT)
Dept: INTERNAL MEDICINE | Facility: CLINIC | Age: 48
End: 2018-11-13
Payer: COMMERCIAL

## 2018-11-13 VITALS
SYSTOLIC BLOOD PRESSURE: 117 MMHG | RESPIRATION RATE: 24 BRPM | DIASTOLIC BLOOD PRESSURE: 72 MMHG | WEIGHT: 92 LBS | HEART RATE: 58 BPM | BODY MASS INDEX: 18.87 KG/M2

## 2018-11-13 DIAGNOSIS — H69.93 DYSFUNCTION OF BOTH EUSTACHIAN TUBES: Primary | ICD-10-CM

## 2018-11-13 RX ORDER — FLUTICASONE PROPIONATE 50 MCG
2 SPRAY, SUSPENSION (ML) NASAL DAILY
Qty: 1 BOTTLE | Refills: 3 | Status: SHIPPED | OUTPATIENT
Start: 2018-11-13 | End: 2020-05-27

## 2018-11-13 ASSESSMENT — PAIN SCALES - GENERAL: PAINLEVEL: NO PAIN (0)

## 2018-11-13 NOTE — PROGRESS NOTES
"                     PRIMARY CARE CENTER       SUBJECTIVE:  Kristina Eldridge is a 48 year old female who comes in for evaluation of ear pain. She reports that when she pulls on her L ear, she has a \"pop\" and feels it drains a little fluid. She'd like an ENT referral. She uses flonase nasal spray sometimes, but not regularly. Also needs handicap permit signed.       Medications and allergies reviewed by me today.     ROS:   Constitutional, HEENT, cardiovascular, pulmonary, gi and gu systems are negative, except as otherwise noted.    OBJECTIVE:    /72 (BP Location: Right arm, Patient Position: Sitting, Cuff Size: Adult Regular)  Pulse 58  Resp 24  Wt 41.7 kg (92 lb)  LMP 06/11/2012  Breastfeeding? No  BMI 18.87 kg/m2   Wt Readings from Last 1 Encounters:   11/13/18 41.7 kg (92 lb)     Gen: Pleasant female, in NAD  ENT: TMs clear bilaterally, no effusions     ASSESSMENT/PLAN:    Kristina was seen today for referral and forms. Ears are normal on exam, will treat for eustachian tube dysfunction, using flonase 2 sprays each nostril every day. If no improvement, refer to ENT at that time. Handicap parking form signed.     Diagnoses and all orders for this visit:    Dysfunction of both eustachian tubes  -     fluticasone (FLONASE) 50 MCG/ACT spray; Spray 2 sprays into both nostrils daily         Pt should return to clinic for f/u with me in PRN      Mili Burch MD  11/13/18    "

## 2018-11-13 NOTE — NURSING NOTE
Chief Complaint   Patient presents with     Referral     Patient is here for ENT referral for ear issues     Forms     Also here for disability parking form       Omar Machado CMA ([facility]) at 3:42 PM on 11/13/2018

## 2018-11-13 NOTE — MR AVS SNAPSHOT
After Visit Summary   11/13/2018    Kristina Eldridge    MRN: [id number]           Patient Information     Date Of Birth          1970        Visit Information        Provider Department      11/13/2018 4:00 PM Mili Leon MD [facility]        Today's Diagnoses     Dysfunction of both eustachian tubes    -  1      Care Instructions        Two sprays of flonase, each nostril every day.             Follow-ups after your visit        Your next 10 appointments already scheduled     Nov 20, 2018  3:15 PM CST   MR KNEE LEFT W/O CONTRAST with [id number]   [facility] ([facility])    [address] [address] [phone]           How do I prepare for my exam? (Food and drink instructions) **If you will be receiving sedation or general anesthesia, please see special notes below.**  How do I prepare for my exam? (Other instructions) Take your medicines as usual, unless your doctor tells you not to. Please remove any body piercings and hair extensions before you arrive. Follow your doctor s orders. If you do not, we may have to postpone your exam. You may or may not receive IV contrast for this exam pending the discretion of the Radiologist.  You do not need to do anything special to prepare. **If you will be receiving sedation or general anesthesia, please see special notes below.**  What should I wear:  The MRI machine uses a strong magnet. Please wear clothes without metal (snaps, zippers). A sweatsuit works well, or we may give you a hospital gown.  How long does the exam take: Most tests take 30 to 60 minutes.  HOWEVER, IF YOUR DOCTOR PRESCRIBES ANESTHESIA please plan on spending four to five hours in the recovery room.  What should I bring: Bring a list of your current medicines to your exam (including vitamins, minerals and over-the-counter drugs). Also bring the results of similar scans you may  have had.  Do I need a : **If you will be receiving sedation or general anesthesia, please see special notes below.**  What should I do after the exam? No Restrictions, You may resume normal activities.  What is this test: MRI (magnetic resonance imaging) uses a strong magnet and radio waves to look inside the body. An MRA (magnetic resonance angiogram) does the same thing, but it lets us look at your blood vessels. A computer turns the radio waves into pictures showing cross sections of the body, much like slices of bread. This helps us see any problems more clearly.  Who should I call with questions: Please call the Imaging Department at your exam site with any questions. Directions, parking instructions, and other information is available on our website, [employer].  How do I prepare if I m having sedation or anesthesia? **IMPORTANT** THE INSTRUCTIONS BELOW ARE ONLY FOR THOSE PATIENTS WHO HAVE BEEN TOLD THEY WILL RECEIVE SEDATION OR GENERAL ANESTHESIA DURING THEIR MRI PROCEDURE:  IF YOU WILL RECEIVE SEDATION (take medicine to help you relax during your exam): You must get the medicine from your doctor before you arrive. Bring the medicine to the exam. Do not take it at home. Arrive one hour early. Bring someone who can take you home after the test. Your medicine will make you sleepy. After the exam, you may not drive, take a bus or take a taxi by yourself. No eating 8 hours before your exam. You may have clear liquids up until 4 hours before your exam. (Clear liquids include water, clear tea, black coffee and fruit juice without pulp.)  IF YOU WILL RECEIVE ANESTHESIA (be asleep for your exam): Arrive 1 1/2 hours early. Bring someone who can take you home after the test. You may not drive, take a bus or take a taxi by yourself. No eating 8 hours before your exam. You may have clear liquids up until 4 hours before your exam. (Clear liquids include water, clear tea, black coffee and fruit juice  without pulp.) You will spend four to five hours in the recovery room.            Nov 26, 2018  2:30 PM CST   (Arrive by 2:15 PM)   Return Visit with Marquez Love MD   [facility] ([facility])    [address] [id number] [phone]              Who to contact     Please call your clinic at [phone] to:    Ask questions about your health    Make or cancel appointments    Discuss your medicines    Learn about your test results    Speak to your doctor            Additional Information About Your Visit        [employer] Information     [employer] gives you secure access to your electronic health record. If you see a primary care provider, you can also send messages to your care team and make appointments. If you have questions, please call your primary care clinic.  If you do not have a primary care provider, please call [phone] and they will assist you.      [employer] is an electronic gateway that provides easy, online access to your medical records. With [employer], you can request a clinic appointment, read your test results, renew a prescription or communicate with your care team.     To access your existing account, please contact your [facility] or call [phone] for assistance.        Care EveryWhere ID     This is your Care EveryWhere ID. This could be used by other organizations to access your [place] medical records  [id number]        Your Vitals Were     Pulse Respirations Last Period Breastfeeding? BMI (Body Mass Index)       58 24 06/11/2012 No 18.87 kg/m2        Blood Pressure from Last 3 Encounters:   11/13/18 117/72   10/09/18 118/80   08/15/18 (!) 83/49    Weight from Last 3 Encounters:   11/13/18 41.7 kg (92 lb)   10/09/18 40.3 kg (88 lb 14.4 oz)   08/15/18 39.6 kg (87 lb 3.2 oz)              Today, you had the following     No orders found for display         Where to get your  medicines      These medications were sent to [id number] - [place] [address]  [address] [facility] [address]     Phone:  [phone]     fluticasone 50 MCG/ACT spray          Primary Care Provider Office Phone # Fax #    Mili Juli Alina Burch [phone] [address] [facility] [address]        Equal Access to Services     JAGUAR BRONSON : Hadii aad ku hadasho Soomaali, waaxda luqadaha, qaybta kaalmada adeegyada, waxay idiin hayaan adeeg kharayariel laardha . So [facility] [phone].    ATENCIÓN: Si habla español, tiene a pagan disposición servicios gratuitos de asistencia lingüística. [facility] [phone].    We comply with applicable federal civil rights laws and [place] laws. We do not discriminate on the basis of race, color, national origin, age, disability, sex, sexual orientation, or gender identity.            Thank you!     Thank you for choosing [facility]  for your care. Our goal is always to provide you with excellent care. Hearing back from our patients is one way we can continue to improve our services. Please take a few minutes to complete the written survey that you may receive in the mail after your visit with us. Thank you!             Your Updated Medication List - Protect others around you: Learn how to safely use, store and throw away your medicines at www.disposemymeds.org.          This list is accurate as of 11/13/18  4:11 PM.  Always use your most recent med list.                   Brand Name Dispense Instructions for use Diagnosis    * albuterol (2.5 MG/3ML) 0.083% neb solution     360 mL    TAKE 1 VIAL BY NEBULIZATION EVERY 6 HOURS AS NEEDED FOR SHORTNESS OF BREATH    Mild persistent asthma, uncomplicated       * albuterol 108 (90 Base) MCG/ACT inhaler    VENTOLIN HFA    1 Inhaler    Inhale 2 puffs into the lungs every 4 hours as needed for shortness of breath / dyspnea or  wheezing    Mild persistent asthma, uncomplicated       cetirizine 10 MG tablet    zyrTEC    30 tablet    Take 1 tablet (10 mg) by mouth daily    Chronic seasonal allergic rhinitis due to pollen       cyanocobalamin 1000 MCG/ML injection    VITAMIN B12    1 mL    ADMINISTER 1 ML(1000 MCG) IN THE MUSCLE EVERY 30 DAYS    Bariatric surgery status       diphenhydrAMINE 25 MG tablet    WAL-DRYL ALLERGY    60 tablet    TAKE 1 TABLET(25 MG) BY MOUTH EVERY 6 HOURS AS NEEDED FOR ITCHING OR ALLERGIES    Itching       fluticasone 50 MCG/ACT spray    FLONASE    1 Bottle    Spray 2 sprays into both nostrils daily    Dysfunction of both eustachian tubes       fluticasone-salmeterol 232-14 MCG/ACT inhaler    AIRDUO RESPICLICK    3 Inhaler    Inhale 1 puff into the lungs 2 times daily    Chronic obstructive pulmonary disease, unspecified COPD type (H)       hydrocortisone 2.5 % cream     30 g    Apply topically 2 times daily    Itching       LORazepam 0.5 MG tablet    ATIVAN    1 tablet    Take one pill 30 minutes prior to procedure.  Do not operate a vehicle after taking this medication    Acute pain of left knee       mirtazapine 15 MG tablet    REMERON    90 tablet    Take 1 tablet (15 mg) by mouth At Bedtime    Loss of weight       mometasone-formoterol 100-5 MCG/ACT oral inhaler    DULERA    1 Inhaler    Inhale 2 puffs into the lungs 2 times daily    Mild persistent asthma, uncomplicated       order for DME     1 each    Nebulizer    Chronic obstructive pulmonary disease, unspecified COPD type (H)       oxyCODONE IR 5 MG tablet    ROXICODONE    30 tablet    Take by  mouth 1 tabs every 6 hrs prn pain    Lumbar pseudoarthrosis       pramipexole 0.125 MG tablet    MIRAPEX    180 tablet    TAKE 1 TO 3 TABLETS BY MOUTH AT BEDTIME    Restless legs syndrome (RLS)       predniSONE 20 MG tablet    DELTASONE    10 tablet    Take 2 tablets (40 mg) by mouth daily    Acute bronchospasm       traZODone 100 MG tablet    DESYREL    60 tablet     TAKE 2 TABLETS(200 MG) BY MOUTH EVERY NIGHT AS NEEDED FOR SLEEP    Primary insomnia       * Notice:  This list has 2 medication(s) that are the same as other medications prescribed for you. Read the directions carefully, and ask your doctor or other care provider to review them with you.

## 2018-11-20 ENCOUNTER — RADIANT APPOINTMENT (OUTPATIENT)
Dept: MRI IMAGING | Facility: CLINIC | Age: 48
End: 2018-11-20
Attending: FAMILY MEDICINE
Payer: COMMERCIAL

## 2018-11-20 DIAGNOSIS — M25.562 ACUTE PAIN OF LEFT KNEE: ICD-10-CM

## 2018-11-26 ENCOUNTER — OFFICE VISIT (OUTPATIENT)
Dept: ORTHOPEDICS | Facility: CLINIC | Age: 48
End: 2018-11-26
Payer: COMMERCIAL

## 2018-11-26 DIAGNOSIS — M22.2X2 PATELLOFEMORAL SYNDROME, LEFT: Primary | ICD-10-CM

## 2018-11-26 NOTE — PROGRESS NOTES
"November 26, 2018: Kristina Eldridge is a 48 year old female who is seen in f/u up for MRI left knee    She states that she was squatting four weeks when she heard a pop in the knee.  The knee became swollen \"2-3x size\".  With icing the swelling resolved.   Ongoing left ant knee clicking and popping.  Bothers her descending stairs.  Bothers her when sitting with her knee in hyperflexion.  Had gastric bypass.    IMPRESSION:   1. Increased horizontal oblique intrasubstance signal within the  posterior horn of the medial meniscus that does not reach the  articular surface, therefore is not considered a meniscal tear but  rather meniscal degeneration. Subtle mild superficial 4 mm focal  cartilage defect in the weightbearing aspect of the posterior medial  tibial plateau.  2. Mild joint effusion with some fluid in the medial head of the  gastrocnemius/semimembranosus bursa.  3. Otherwise, mild thinning of the articular surface of the medial and  lateral compartment. No other focal defects are seen.        PMH:  Past Medical History:   Diagnosis Date     Abnormal Pap smear 2/21/2017     said pap was abnormal     Arthritis      Chronic back pain     hx spondylolisthesis     Chronic pain      COPD (chronic obstructive pulmonary disease) (H)     PFT's 2013 FEV1/FVC = 2.68/3.20 = 84%     Hepatitis C     treated ribaviron & interferon in 2008 for serotype 2 with failed 6 month treatment     Hyperlipidemia     on simvatatin     Positive TB test     has taken INH     RLS (restless legs syndrome)      TB lung, latent 1994    treated     Uncomplicated asthma      Wounds and injuries 2003       Active problem list:  Patient Active Problem List   Diagnosis     Chronic hepatitis C (H)     Hyperlipidemia     Bariatric surgery status     Chronic low back pain     Radicular leg pain     Spondylolisthesis, grade 2     Marijuana smoker, continuous     Depression     Lumbar stenosis     Personal history of spine surgery     HIV exposure     " Pseudoarthrosis of lumbar spine with nonunion     Lumbar pseudoarthrosis     Dermal and epidermal nevus       FH:  Family History   Problem Relation Age of Onset     Hypertension Father      Respiratory Father      COPD     Glaucoma Father      C.A.D. Mother      CHF,  age 59 of MI     Other Cancer Mother      Diabetes Maternal Grandmother      Diabetes Paternal Grandmother      Glaucoma Paternal Grandmother      Melanoma No family hx of      Skin Cancer No family hx of        SH:  Social History     Social History     Marital status:      Spouse name: N/A     Number of children: N/A     Years of education: N/A     Occupational History     Not on file.     Social History Main Topics     Smoking status: Current Every Day Smoker     Packs/day: 1.00     Years: 30.00     Types: Cigarettes     Last attempt to quit: 2014     Smokeless tobacco: Never Used      Comment: pack a day     Alcohol use 0.0 oz/week      Comment: x3 drinks per year     Drug use: No      Comment: Marijuana---- denies use since 2014       Sexual activity: Not Currently     Partners: Male     Birth control/ protection: Post-menopausal     Other Topics Concern     Not on file     Social History Narrative    Lives in [place] with significant other and 3 cats.  Pt does not work currently, has applied for disability        MEDS:  See EMR, reviewed  ALL:  See EMR, reviewed    REVIEW OF SYSTEMS:  CONSTITUTIONAL:NEGATIVE for fever, chills, change in weight  INTEGUMENTARY/SKIN: NEGATIVE for worrisome rashes, moles or lesions  EYES: NEGATIVE for vision changes or irritation  ENT/MOUTH: NEGATIVE for ear, mouth and throat problems  RESP:NEGATIVE for significant cough or SOB  BREAST: NEGATIVE for masses, tenderness or discharge  CV: NEGATIVE for chest pain, palpitations or peripheral edema  GI: NEGATIVE for nausea, abdominal pain, heartburn, or change in bowel habits  :NEGATIVE for frequency, dysuria, or hematuria  :NEGATIVE for  frequency, dysuria, or hematuria  NEURO: NEGATIVE for weakness, dizziness or paresthesias  ENDOCRINE: NEGATIVE for temperature intolerance, skin/hair changes  HEME/ALLERGY/IMMUNE: NEGATIVE for bleeding problems  PSYCHIATRIC: NEGATIVE for changes in mood or affect      Objective: The left knee reveals no effusion.  She is nontender along the medial lateral joint line.  Nontender along the patellar facets.  She can move the knee into hyperflexion but has some discomfort at extremes of hyperflexion.  Nontender in full extension.  Anterior posterior drawer is negative Lachman's is negative.  Overlying skin is intact.  Appropriate in conversation and affect.    Assessment: Left-sided knee discomfort suspect patellofemoral discomfort    Plan: We went over her MRI together in detail.  She has some signs of mild degenerative changes to her meniscus but no meniscal tear that would require surgery.  There were no obvious abnormalities in the patellofemoral space.  No other internal derangement.  Possible that her discomfort could be coming from patellofemoral discomfort based on her clinical story and her MRI findings.  She would like to do physical therapy and referral was placed.  She has a knee brace available.  She will follow-up as needed.

## 2018-11-26 NOTE — MR AVS SNAPSHOT
After Visit Summary   11/26/2018    Kristina Eldridge    MRN: [id number]           Patient Information     Date Of Birth          1970        Visit Information        Provider Department      11/26/2018 2:30 PM Marquez Love MD [facility]        Today's Diagnoses     Patellofemoral syndrome, left    -  1       Follow-ups after your visit        Additional Services     PHYSICAL THERAPY REFERRAL (Internal)       Physical Therapy Referral                  Future tests that were ordered for you today     Open Future Orders        Priority Expected Expires Ordered    PHYSICAL THERAPY REFERRAL (Internal) Routine  11/26/2019 11/26/2018            Who to contact     Please call your clinic at [phone] to:    Ask questions about your health    Make or cancel appointments    Discuss your medicines    Learn about your test results    Speak to your doctor            Additional Information About Your Visit        MyChart Information     [employer] gives you secure access to your electronic health record. If you see a primary care provider, you can also send messages to your care team and make appointments. If you have questions, please call your primary care clinic.  If you do not have a primary care provider, please call [phone] and they will assist you.      [employer] is an electronic gateway that provides easy, online access to your medical records. With [employer], you can request a clinic appointment, read your test results, renew a prescription or communicate with your care team.     To access your existing account, please contact your [facility] or call [phone] for assistance.        Care EveryWhere ID     This is your Care EveryWhere ID. This could be used by other organizations to access your [place] medical records  [id number]        Your Vitals Were     Last Period                   06/11/2012            Blood Pressure from Last 3  Encounters:   11/13/18 117/72   10/09/18 118/80   08/15/18 (!) 83/49    Weight from Last 3 Encounters:   11/13/18 92 lb (41.7 kg)   10/09/18 88 lb 14.4 oz (40.3 kg)   08/15/18 87 lb 3.2 oz (39.6 kg)               Primary Care Provider Office Phone # Fax #    Mili Juli Alina Burch [phone] [address] [facility] [address]        Equal Access to Services     [facility]: Hadii aad ku hadasho Soomaali, waaxda luqadaha, qaybta kaalmada adeegyada, kevin hoff . So [facility] [phone].    ATENCIÓN: Si habla español, tiene a pagan disposición servicios gratuitos de asistencia lingüística. [facility] [phone].    We comply with applicable federal civil rights laws and [place] laws. We do not discriminate on the basis of race, color, national origin, age, disability, sex, sexual orientation, or gender identity.            Thank you!     Thank you for choosing [facility]  for your care. Our goal is always to provide you with excellent care. Hearing back from our patients is one way we can continue to improve our services. Please take a few minutes to complete the written survey that you may receive in the mail after your visit with us. Thank you!             Your Updated Medication List - Protect others around you: Learn how to safely use, store and throw away your medicines at www.disposemymeds.org.          This list is accurate as of 11/26/18  2:33 PM.  Always use your most recent med list.                   Brand Name Dispense Instructions for use Diagnosis    * albuterol (2.5 MG/3ML) 0.083% neb solution    PROVENTIL    360 mL    TAKE 1 VIAL BY NEBULIZATION EVERY 6 HOURS AS NEEDED FOR SHORTNESS OF BREATH    Mild persistent asthma, uncomplicated       * albuterol 108 (90 Base) MCG/ACT inhaler    VENTOLIN HFA    1 Inhaler    Inhale 2 puffs into the lungs every 4 hours as needed for shortness of breath / dyspnea or wheezing    Mild persistent  asthma, uncomplicated       cetirizine 10 MG tablet    zyrTEC    30 tablet    Take 1 tablet (10 mg) by mouth daily    Chronic seasonal allergic rhinitis due to pollen       cyanocobalamin 1000 MCG/ML injection    VITAMIN B12    1 mL    ADMINISTER 1 ML(1000 MCG) IN THE MUSCLE EVERY 30 DAYS    Bariatric surgery status       diphenhydrAMINE 25 MG tablet    WAL-DRYL ALLERGY    60 tablet    TAKE 1 TABLET(25 MG) BY MOUTH EVERY 6 HOURS AS NEEDED FOR ITCHING OR ALLERGIES    Itching       fluticasone 50 MCG/ACT nasal spray    FLONASE    1 Bottle    Spray 2 sprays into both nostrils daily    Dysfunction of both eustachian tubes       fluticasone-salmeterol 232-14 MCG/ACT inhaler    AIRDUO RESPICLICK    3 Inhaler    Inhale 1 puff into the lungs 2 times daily    Chronic obstructive pulmonary disease, unspecified COPD type (H)       hydrocortisone 2.5 % cream     30 g    Apply topically 2 times daily    Itching       LORazepam 0.5 MG tablet    ATIVAN    1 tablet    Take one pill 30 minutes prior to procedure.  Do not operate a vehicle after taking this medication    Acute pain of left knee       mirtazapine 15 MG tablet    REMERON    90 tablet    Take 1 tablet (15 mg) by mouth At Bedtime    Loss of weight       mometasone-formoterol 100-5 MCG/ACT inhaler    DULERA    1 Inhaler    Inhale 2 puffs into the lungs 2 times daily    Mild persistent asthma, uncomplicated       order for DME     1 each    Nebulizer    Chronic obstructive pulmonary disease, unspecified COPD type (H)       oxyCODONE 5 MG tablet    ROXICODONE    30 tablet    Take by  mouth 1 tabs every 6 hrs prn pain    Lumbar pseudoarthrosis       pramipexole 0.125 MG tablet    MIRAPEX    180 tablet    TAKE 1 TO 3 TABLETS BY MOUTH AT BEDTIME    Restless legs syndrome (RLS)       predniSONE 20 MG tablet    DELTASONE    10 tablet    Take 2 tablets (40 mg) by mouth daily    Acute bronchospasm       traZODone 100 MG tablet    DESYREL    60 tablet    TAKE 2 TABLETS(200 MG) BY  MOUTH EVERY NIGHT AS NEEDED FOR SLEEP    Primary insomnia       * Notice:  This list has 2 medication(s) that are the same as other medications prescribed for you. Read the directions carefully, and ask your doctor or other care provider to review them with you.

## 2018-11-26 NOTE — LETTER
"  11/26/2018      RE: Kristina Eldridge  [address]  [facility] [address]       November 26, 2018: Kristina Eldridge is a 48 year old female who is seen in f/u up for MRI left knee    She states that she was squatting four weeks when she heard a pop in the knee.  The knee became swollen \"2-3x size\".  With icing the swelling resolved.    Ongoing left ant knee clicking and popping.   Bothers her descending stairs.  Bothers her when sitting with her knee in hyperflexion.  Had gastric bypass.    IMPRESSION:   1. Increased horizontal oblique intrasubstance signal within the  posterior horn of the medial meniscus that does not reach the  articular surface, therefore is not considered a meniscal tear but  rather meniscal degeneration. Subtle mild superficial 4 mm focal  cartilage defect in the weightbearing aspect of the posterior medial  tibial plateau.  2. Mild joint effusion with some fluid in the medial head of the  gastrocnemius/semimembranosus bursa.  3. Otherwise, mild thinning of the articular surface of the medial and  lateral compartment. No other focal defects are seen.        PMH:  Past Medical History:   Diagnosis Date     Abnormal Pap smear 2/21/2017     said pap was abnormal     Arthritis      Chronic back pain     hx spondylolisthesis     Chronic pain      COPD (chronic obstructive pulmonary disease) (H)     PFT's 2013 FEV1/FVC = 2.68/3.20 = 84%     Hepatitis C     treated ribaviron & interferon in 2008 for serotype 2 with failed 6 month treatment     Hyperlipidemia     on simvatatin     Positive TB test     has taken INH     RLS (restless legs syndrome)      TB lung, latent 1994    treated     Uncomplicated asthma      Wounds and injuries 2003       Active problem list:  Patient Active Problem List   Diagnosis     Chronic hepatitis C (H)     Hyperlipidemia     Bariatric surgery status     Chronic low back pain     Radicular leg pain     Spondylolisthesis, grade 2     Marijuana smoker, continuous "     Depression     Lumbar stenosis     Personal history of spine surgery     HIV exposure     Pseudoarthrosis of lumbar spine with nonunion     Lumbar pseudoarthrosis     Dermal and epidermal nevus       FH:  Family History   Problem Relation Age of Onset     Hypertension Father      Respiratory Father      COPD     Glaucoma Father      C.A.D. Mother      CHF,  age 59 of MI     Other Cancer Mother      Diabetes Maternal Grandmother      Diabetes Paternal Grandmother      Glaucoma Paternal Grandmother      Melanoma No family hx of      Skin Cancer No family hx of        SH:  Social History     Social History     Marital status:      Spouse name: N/A     Number of children: N/A     Years of education: N/A     Occupational History     Not on file.     Social History Main Topics     Smoking status: Current Every Day Smoker     Packs/day: 1.00     Years: 30.00     Types: Cigarettes     Last attempt to quit: 2014     Smokeless tobacco: Never Used      Comment: pack a day     Alcohol use 0.0 oz/week      Comment: x3 drinks per year     Drug use: No      Comment: Marijuana---- denies use since 2014       Sexual activity: Not Currently     Partners: Male     Birth control/ protection: Post-menopausal     Other Topics Concern     Not on file     Social History Narrative    Lives in [place] with significant other and 3 cats.  Pt does not work currently, has applied for disability        MEDS:  See EMR, reviewed  ALL:  See EMR, reviewed    REVIEW OF SYSTEMS:  CONSTITUTIONAL:NEGATIVE for fever, chills, change in weight  INTEGUMENTARY/SKIN: NEGATIVE for worrisome rashes, moles or lesions  EYES: NEGATIVE for vision changes or irritation  ENT/MOUTH: NEGATIVE for ear, mouth and throat problems  RESP:NEGATIVE for significant cough or SOB  BREAST: NEGATIVE for masses, tenderness or discharge  CV: NEGATIVE for chest pain, palpitations or peripheral edema  GI: NEGATIVE for nausea, abdominal pain, heartburn, or  change in bowel habits  :NEGATIVE for frequency, dysuria, or hematuria  :NEGATIVE for frequency, dysuria, or hematuria  NEURO: NEGATIVE for weakness, dizziness or paresthesias  ENDOCRINE: NEGATIVE for temperature intolerance, skin/hair changes  HEME/ALLERGY/IMMUNE: NEGATIVE for bleeding problems  PSYCHIATRIC: NEGATIVE for changes in mood or affect      Objective: The left knee reveals no effusion.  She is nontender along the medial lateral joint line.  Nontender along the patellar facets.  She can move the knee into hyperflexion but has some discomfort at extremes of hyperflexion.  Nontender in full extension.  Anterior posterior drawer is negative Lachman's is negative.  Overlying skin is intact.  Appropriate in conversation and affect.    Assessment: Left-sided knee discomfort suspect patellofemoral discomfort    Plan: We went over her MRI together in detail.  She has some signs of mild degenerative changes to her meniscus but no meniscal tear that would require surgery.  There were no obvious abnormalities in the patellofemoral space.  No other internal derangement.  Possible that her discomfort could be coming from patellofemoral discomfort based on her clinical story and her MRI findings.  She would like to do physical therapy and referral was placed.  She has a knee brace available.  She will follow-up as needed.      Marquez Love MD

## 2018-12-06 ENCOUNTER — THERAPY VISIT (OUTPATIENT)
Dept: PHYSICAL THERAPY | Facility: CLINIC | Age: 48
End: 2018-12-06
Payer: COMMERCIAL

## 2018-12-06 DIAGNOSIS — M22.2X2 PATELLOFEMORAL SYNDROME, LEFT: ICD-10-CM

## 2018-12-06 DIAGNOSIS — M25.562 LEFT KNEE PAIN: Primary | ICD-10-CM

## 2018-12-06 PROCEDURE — 97530 THERAPEUTIC ACTIVITIES: CPT | Mod: GP | Performed by: PHYSICAL THERAPIST

## 2018-12-06 PROCEDURE — 97110 THERAPEUTIC EXERCISES: CPT | Mod: GP | Performed by: PHYSICAL THERAPIST

## 2018-12-06 PROCEDURE — 97161 PT EVAL LOW COMPLEX 20 MIN: CPT | Mod: GP | Performed by: PHYSICAL THERAPIST

## 2018-12-06 ASSESSMENT — ACTIVITIES OF DAILY LIVING (ADL)
WEAKNESS: THE SYMPTOM PREVENTS ME FROM ALL DAILY ACTIVITIES
KNEE_ACTIVITY_OF_DAILY_LIVING_SUM: 38
PAIN: THE SYMPTOM AFFECTS MY ACTIVITY MODERATELY
GO DOWN STAIRS: ACTIVITY IS NOT DIFFICULT
STAND: ACTIVITY IS MINIMALLY DIFFICULT
WALK: ACTIVITY IS SOMEWHAT DIFFICULT
KNEE_ACTIVITY_OF_DAILY_LIVING_SCORE: 54.29
SWELLING: I HAVE THE SYMPTOM BUT IT DOES NOT AFFECT MY ACTIVITY
HOW_WOULD_YOU_RATE_THE_CURRENT_FUNCTION_OF_YOUR_KNEE_DURING_YOUR_USUAL_DAILY_ACTIVITIES_ON_A_SCALE_FROM_0_TO_100_WITH_100_BEING_YOUR_LEVEL_OF_KNEE_FUNCTION_PRIOR_TO_YOUR_INJURY_AND_0_BEING_THE_INABILITY_TO_PERFORM_ANY_OF_YOUR_USUAL_DAILY_ACTIVITIES?: 65
SIT WITH YOUR KNEE BENT: ACTIVITY IS SOMEWHAT DIFFICULT
SQUAT: ACTIVITY IS VERY DIFFICULT
LIMPING: THE SYMPTOM AFFECTS MY ACTIVITY SLIGHTLY
GO UP STAIRS: ACTIVITY IS VERY DIFFICULT
STIFFNESS: I DO NOT HAVE THE SYMPTOM
HOW_WOULD_YOU_RATE_THE_OVERALL_FUNCTION_OF_YOUR_KNEE_DURING_YOUR_USUAL_DAILY_ACTIVITIES?: NEARLY NORMAL
PAIN: THE SYMPTOM AFFECTS MY ACTIVITY MODERATELY
AS_A_RESULT_OF_YOUR_KNEE_INJURY,_HOW_WOULD_YOU_RATE_YOUR_CURRENT_LEVEL_OF_DAILY_ACTIVITY?: ABNORMAL
RISE FROM A CHAIR: ACTIVITY IS MINIMALLY DIFFICULT
KNEEL ON THE FRONT OF YOUR KNEE: ACTIVITY IS VERY DIFFICULT
RAW_SCORE: 38
GIVING WAY, BUCKLING OR SHIFTING OF KNEE: THE SYMPTOM AFFECTS MY ACTIVITY MODERATELY
STIFFNESS: I DO NOT HAVE THE SYMPTOM

## 2018-12-06 NOTE — MR AVS SNAPSHOT
After Visit Summary   12/6/2018    Kristina Eldridge    MRN: [id number]           Patient Information     Date Of Birth          1970        Visit Information        Provider Department      12/6/2018 1:30 PM Arnaldo Silva PT M [facility] Physical Therapy [facility]        Today's Diagnoses     Left knee pain    -  1    Patellofemoral syndrome, left           Follow-ups after your visit        Your next 10 appointments already scheduled     Dec 13, 2018  2:50 PM CST   [facility] Extremity with EDWIN Medley [facility] Physical Therapy [facility] ([facility])    [address] [place] [address] [phone]            Dec 20, 2018  1:30 PM CST   [facility] Extremity with EDWIN Medley [facility] Physical Therapy [facility] ([facility])    [address] [place] [address] [phone]              Who to contact     If you have questions or need follow up information about today's clinic visit or your schedule please contact [facility] PHYSICAL THERAPY [facility] directly at [phone].  Normal or non-critical lab and imaging results will be communicated to you by Myerhart, letter or phone within 4 business days after the clinic has received the results. If you do not hear from us within 7 days, please contact the clinic through [facility] or phone. If you have a critical or abnormal lab result, we will notify you by phone as soon as possible.  Submit refill requests through Boond or call your pharmacy and they will forward the refill request to us. Please allow 3 business days for your refill to be completed.          Additional Information About Your Visit        Myerhart Information     Boond gives you secure access to your electronic health record. If you see a primary care provider, you can also send messages to your care team and make appointments. If you have questions, please call your primary care clinic.  If you do not  have a primary care provider, please call [phone] and they will assist you.        Care EveryWhere ID     This is your Care EveryWhere ID. This could be used by other organizations to access your [facility] medical records  [id number]        Your Vitals Were     Last Period                   06/11/2012            Blood Pressure from Last 3 Encounters:   11/13/18 117/72   10/09/18 118/80   08/15/18 (!) 83/49    Weight from Last 3 Encounters:   11/13/18 41.7 kg (92 lb)   10/09/18 40.3 kg (88 lb 14.4 oz)   08/15/18 39.6 kg (87 lb 3.2 oz)              We Performed the Following     HC PT EVAL, LOW COMPLEXITY     [facility] INITIAL EVAL REPORT     PHYSICAL THERAPY REFERRAL (Internal)     THERAPEUTIC ACTIVITIES     THERAPEUTIC EXERCISES        Primary Care Provider Office Phone # Fax #    Mili Juli Burch [phone] [address] [facility] [address]        Equal Access to Services     JAGUAR BRONSON : Hadii aad ku hadasho Soomaali, waaxda luqadaha, qaybta kaalmada adeegyada, waxay idiin hayaan jenn hoff . So [facility] [phone].    ATENCIÓN: Si habla español, tiene a pagan disposición servicios gratuitos de asistencia lingüística. Llame al [phone].    We comply with applicable federal civil rights laws and [place] laws. We do not discriminate on the basis of race, color, national origin, age, disability, sex, sexual orientation, or gender identity.            Thank you!     Thank you for choosing [facility] PHYSICAL THERAPY [facility]  for your care. Our goal is always to provide you with excellent care. Hearing back from our patients is one way we can continue to improve our services. Please take a few minutes to complete the written survey that you may receive in the mail after your visit with us. Thank you!             Your Updated Medication List - Protect others around you: Learn how to safely use, store and throw away your medicines at www.disposemymeds.org.          This  list is accurate as of 12/6/18  2:07 PM.  Always use your most recent med list.                   Brand Name Dispense Instructions for use Diagnosis    * albuterol (2.5 MG/3ML) 0.083% neb solution    PROVENTIL    360 mL    TAKE 1 VIAL BY NEBULIZATION EVERY 6 HOURS AS NEEDED FOR SHORTNESS OF BREATH    Mild persistent asthma, uncomplicated       * albuterol 108 (90 Base) MCG/ACT inhaler    VENTOLIN HFA    1 Inhaler    Inhale 2 puffs into the lungs every 4 hours as needed for shortness of breath / dyspnea or wheezing    Mild persistent asthma, uncomplicated       cetirizine 10 MG tablet    zyrTEC    30 tablet    Take 1 tablet (10 mg) by mouth daily    Chronic seasonal allergic rhinitis due to pollen       diphenhydrAMINE 25 MG tablet    WAL-DRYL ALLERGY    60 tablet    TAKE 1 TABLET(25 MG) BY MOUTH EVERY 6 HOURS AS NEEDED FOR ITCHING OR ALLERGIES    Itching       fluticasone 50 MCG/ACT nasal spray    FLONASE    1 Bottle    Spray 2 sprays into both nostrils daily    Dysfunction of both eustachian tubes       fluticasone-salmeterol 232-14 MCG/ACT inhaler    AIRDUO RESPICLICK    3 Inhaler    Inhale 1 puff into the lungs 2 times daily    Chronic obstructive pulmonary disease, unspecified COPD type (H)       hydrocortisone 2.5 % cream     30 g    Apply topically 2 times daily    Itching       LORazepam 0.5 MG tablet    ATIVAN    1 tablet    Take one pill 30 minutes prior to procedure.  Do not operate a vehicle after taking this medication    Acute pain of left knee       mirtazapine 15 MG tablet    REMERON    90 tablet    Take 1 tablet (15 mg) by mouth At Bedtime    Loss of weight       mometasone-formoterol 100-5 MCG/ACT inhaler    DULERA    1 Inhaler    Inhale 2 puffs into the lungs 2 times daily    Mild persistent asthma, uncomplicated       order for DME     1 each    Nebulizer    Chronic obstructive pulmonary disease, unspecified COPD type (H)       oxyCODONE 5 MG tablet    ROXICODONE    30 tablet    Take by  mouth 1  tabs every 6 hrs prn pain    Lumbar pseudoarthrosis       pramipexole 0.125 MG tablet    MIRAPEX    180 tablet    TAKE 1 TO 3 TABLETS BY MOUTH AT BEDTIME    Restless legs syndrome (RLS)       predniSONE 20 MG tablet    DELTASONE    10 tablet    Take 2 tablets (40 mg) by mouth daily    Acute bronchospasm       traZODone 100 MG tablet    DESYREL    60 tablet    TAKE 2 TABLETS(200 MG) BY MOUTH EVERY NIGHT AS NEEDED FOR SLEEP    Primary insomnia       vitamin B-12 1000 MCG/ML injection    CYANOCOBALAMIN    1 mL    ADMINISTER 1 ML(1000 MCG) IN THE MUSCLE EVERY 30 DAYS    Bariatric surgery status       * Notice:  This list has 2 medication(s) that are the same as other medications prescribed for you. Read the directions carefully, and ask your doctor or other care provider to review them with you.

## 2018-12-06 NOTE — PROGRESS NOTES
"Physical Therapy Initial Evaluation  December 6, 2018     MD Instructions/Precautions/Restrictions: PT eval and treat.     Therapist Impression:   Kristina Eldridge presents with findings consistent with PFPS vs meniscal tear, with related impairments limiting her ability to sit and deep squat. Skilled PT services are necessary in order to reduce impairments and improve independent function.     Subjective:   Date of Onset: 6 weeks ago  C/C: L posterior knee pain. Occurred while squatting down to get into her purse (deep squat - butt to heel), felt pop in the posteromedial and anterolateral portions (back felt worse) of the knee and reports knee swelled to \"2-3x\" the size it normally is. Saw Dr. Love who ordered MRI and brace. MRI shows some meniscal pathology more indicative of degenerative than traumatic tear and a cartilage deficit on the weightbearing surface.   Has 22-30 stairs to get into home, 4 flights to do laundry - wants to be able to stairs much easier.   Quality of pain is dull and aching. Pains are described as intermittent in nature. Pain is worse: not dependent on time of day. Pain is rated 4/10.   History of symptoms: Pains began suddenly as the result of squatting. Since onset, symptoms are improving.  Worsened by: Deep squatting, sitting for prolonged periods of time.    Alleviated by: Rest and Ice. Got brace but doesn't help.     General health as reported by patient: good  Pertinent medical/surgical history: Refer to health history in EMR. Imaging: MRI. Current occupational status: On disability. Patient's goals are: decrease pain, return to active lifestyle. Return to MD:  PRN.     Objective:  KNEE EXAMINATION    Dynamic Movement Screen:  DL Squat: Anterior knee translation, Knee valgus, Hip internal rotation and Improper use of glutes/hips  SL Squat: Not assessed    (*Indicates patient s pain)  Knee PROM L  R   Hyperextension 3* 3   Extension 0 0   Flexion 145* 155     (*Indicates patient s " pain)  Resisted Tests Strength (MMT)    L R   Knee Ext 4- 4   Knee Flx 4- 4-   Hip ABD 3+ 3+   Hip EXT 3 3     Other:  Joint Line Swellincm circumference bilaterally, mild effusion on sweep test  Passive Patellar Mobility: WNL all directions bilaterally  Meniscal: equivocal flexion-rotation and Shadi's testing    Assessment/Plan:    The patient is a 48 year old female with chief complaint of L knee pain.    The patient has the following significant findings with corresponding treatment plan.  Diagnosis 1:  L knee pain, PFPS vs meniscal pathology    Pain -  hot/cold therapy, manual therapy, splint/taping/bracing/orthotics and self management  Decreased ROM/flexibility - manual therapy, therapeutic exercise and home program  Decreased joint mobility - manual therapy, therapeutic exercise and home program  Decreased strength - therapeutic exercise, therapeutic activities and home program  Impaired balance - neuro re-education, therapeutic activities and home program  Decreased proprioception - neuro re-education and therapeutic activities  Impaired gait - gait training and assistive devices  Impaired muscle performance - neuro re-education and home program  Decreased function - therapeutic activities and home program  Impaired posture - neuro re-education, therapeutic activities and home program  Instability -  Therapeutic Activity, Therapeutic Exercise, Neuromuscular Re-education, Splinting/Taping/Bracing/Orthotic, home program    Therapy Evaluation Codes:   1) History comprised of:   Personal factors that impact the plan of care:      Please refer to health history in EMR.    Comorbidity factors that impact the plan of care are:      Please refer to health history in EMR.     Medications impacting care: None.  2) Examination of Body Systems comprised of:   Body structures and functions that impact the plan of care:      Knee.   Activity limitations that impact the plan of care are:      Sitting and  Squatting/kneeling.   Clinical presentation characteristics are:    Stable/Uncomplicated.  3) Presentation comprised of:   Presentation scored as Low complexity with uncomplicated characteristics..  4) Decision-Making    Low complexity using standardized patient assessment instrument and/or measureable assessment of functional outcome.  Cumulative Therapy Evaluation is: Low complexity.    Previous and current functional limitations:  (See Goal Flow Sheet for this information)    Short term and Long term goals: (See Goal Flow Sheet for this information)     Communication ability:  Patient appears to be able to clearly communicate and understand verbal and written communication and follow directions correctly.  Treatment Explanation - The following has been discussed with the patient: RX ordered/plan of care, anticipated outcomes, and possible risks and side effects.  This patient would benefit from PT intervention to resume normal activities.   Rehab potential is good.    Frequency:  1 X week, once daily  Duration:  for 6 weeks  Discharge Plan: Achieve all LTGs, be independent in home treatment program, and reach maximal therapeutic benefit.    Please refer to the daily flowsheet for treatment today, total treatment time and time spent performing 1:1 timed codes.

## 2018-12-21 DIAGNOSIS — J45.30 MILD PERSISTENT ASTHMA, UNCOMPLICATED: ICD-10-CM

## 2018-12-26 RX ORDER — MOMETASONE FUROATE AND FORMOTEROL FUMARATE DIHYDRATE 100; 5 UG/1; UG/1
AEROSOL RESPIRATORY (INHALATION)
Qty: 13 G | Refills: 0 | OUTPATIENT
Start: 2018-12-26

## 2019-01-17 ENCOUNTER — TELEPHONE (OUTPATIENT)
Dept: INTERNAL MEDICINE | Facility: CLINIC | Age: 49
End: 2019-01-17

## 2019-01-17 NOTE — TELEPHONE ENCOUNTER
Prior Authorization Retail Medication Request    Medication/Dose: Dulera   ICD code (if different than what is on RX):  J45.30  Previously Tried and Failed:  unknown  Rationale:  none    Insurance Name:  [employer]  Insurance ID:  [id number]       RENE MELGAR at 8:40 AM on 1/17/2019.

## 2019-01-19 NOTE — TELEPHONE ENCOUNTER
See encounter from 10/10/2018 - Dulera Inhaler was denied (Patient needed to have a trial of Airduo). Airduo was then prescribed. I don't see any documentation regarding that trial. We have received another P/A request from [facility] for the Dulera. Please advise.     (P/A team - If moving forward with P/A there is a key started [id number])

## 2019-01-21 NOTE — TELEPHONE ENCOUNTER
Patient was called at home number, and family member relayed that patient was sleeping.  Spouse relayed that Air Duo is not helping, as patient is still coughing despite using Air Duo as prescribed.    Eva Winston RN on 1/21/2019 at 10:20 AM

## 2019-01-21 NOTE — TELEPHONE ENCOUNTER
Central Prior Authorization Team   Phone: [phone]    PA Initiation    Medication: mometasone-formoterol (DULERA) 100-5 MCG/ACT oral inhaler  Insurance Company: [id number] - Phone [phone] Fax [phone]  Pharmacy Filling the Rx: [facility] [id number] [place] [address] [place]  Filling Pharmacy Phone: [phone]  Filling Pharmacy Fax: [phone]  Start Date: 1/19/2019

## 2019-01-22 NOTE — TELEPHONE ENCOUNTER
Prior Authorization Approval    Authorization Effective Date: 12/22/2018  Authorization Expiration Date: 1/20/2022  Medication: mometasone-formoterol (DULERA) 100-5 MCG/ACT oral inhaler - P/A APPROVED  Approved Dose/Quantity: 1 unit  Reference #: CASE# [id number]   Insurance Company: [employer] - Phone [phone] Fax [phone]  Expected CoPay:       CoPay Card Available:      Foundation Assistance Needed:    Which Pharmacy is filling the prescription (Not needed for infusion/clinic administered): [id number] - [place], [place] - [address] [facility] AT [address] [facility]  Pharmacy Notified: Yes - spoke to pharmacy, they were already notified and had rx ready  Patient Notified:

## 2019-01-28 ENCOUNTER — TELEPHONE (OUTPATIENT)
Dept: INTERNAL MEDICINE | Facility: CLINIC | Age: 49
End: 2019-01-28

## 2019-01-28 NOTE — TELEPHONE ENCOUNTER
Spoke with patient uses Albuterol inhaler 5-6 times daily, using mostly at night, wakes up after 4 hours and has a hard time catching breath. Continues with Albuteral neb as needed as well, also non-effective, continues with Dullera inhaler, already sleeps with the head of the bed elevated and has tried neb just before bedtime nothing seems to help wakes up after 4 hours SOB, uses inhaler which helps briefly but wakes up SOB 4 hours later.

## 2019-01-28 NOTE — TELEPHONE ENCOUNTER
Health Call Center    Phone Message    May a detailed message be left on voicemail: yes    Reason for Call: Medication Question or concern regarding medication   Prescription Clarification  Name of Medication: Albuterol  Prescribing Provider: Dr. Fuller   [facility]   What on the order needs clarification? Albuterol is not working out for patient.  She can go through 1 inhaler in 1 week.  Just not effective.  Dr. Fuller had checked in with her a couple weeks ago (spoke with spouse) and said she would get back to them with alternatives.          Action Taken: Message routed to:  [facility] primary

## 2019-01-29 NOTE — TELEPHONE ENCOUNTER
Called to assist in scheduling as per Dr. Fuller follow up with any providers in [facility] and MTM referral to see Viktor. Patient not available and ask to call back later in the afternoon.

## 2019-01-29 NOTE — TELEPHONE ENCOUNTER
Patient contact and schedule follow up 2/5 and seeing MTM provider, Viktor, on 2/15. Appointment confirm via patient.

## 2019-02-05 ENCOUNTER — ANCILLARY PROCEDURE (OUTPATIENT)
Dept: CARDIOLOGY | Facility: CLINIC | Age: 49
End: 2019-02-05
Attending: INTERNAL MEDICINE
Payer: COMMERCIAL

## 2019-02-05 ENCOUNTER — OFFICE VISIT (OUTPATIENT)
Dept: INTERNAL MEDICINE | Facility: CLINIC | Age: 49
End: 2019-02-05
Payer: COMMERCIAL

## 2019-02-05 ENCOUNTER — ANCILLARY PROCEDURE (OUTPATIENT)
Dept: GENERAL RADIOLOGY | Facility: CLINIC | Age: 49
End: 2019-02-05
Attending: INTERNAL MEDICINE
Payer: COMMERCIAL

## 2019-02-05 ENCOUNTER — ANCILLARY PROCEDURE (OUTPATIENT)
Dept: CT IMAGING | Facility: CLINIC | Age: 49
End: 2019-02-05
Attending: INTERNAL MEDICINE
Payer: COMMERCIAL

## 2019-02-05 VITALS
HEART RATE: 68 BPM | DIASTOLIC BLOOD PRESSURE: 85 MMHG | SYSTOLIC BLOOD PRESSURE: 124 MMHG | OXYGEN SATURATION: 100 % | WEIGHT: 87 LBS | BODY MASS INDEX: 17.85 KG/M2

## 2019-02-05 DIAGNOSIS — Z71.6 ENCOUNTER FOR SMOKING CESSATION COUNSELING: ICD-10-CM

## 2019-02-05 DIAGNOSIS — H93.13 TINNITUS OF BOTH EARS: ICD-10-CM

## 2019-02-05 DIAGNOSIS — J44.9 CHRONIC OBSTRUCTIVE PULMONARY DISEASE, UNSPECIFIED COPD TYPE (H): ICD-10-CM

## 2019-02-05 DIAGNOSIS — J45.30 MILD PERSISTENT ASTHMA, UNCOMPLICATED: ICD-10-CM

## 2019-02-05 DIAGNOSIS — L60.0 INGROWN TOENAIL: ICD-10-CM

## 2019-02-05 DIAGNOSIS — R06.02 SOB (SHORTNESS OF BREATH): ICD-10-CM

## 2019-02-05 DIAGNOSIS — J44.9 CHRONIC OBSTRUCTIVE PULMONARY DISEASE, UNSPECIFIED COPD TYPE (H): Primary | ICD-10-CM

## 2019-02-05 DIAGNOSIS — Z00.00 HEALTHCARE MAINTENANCE: ICD-10-CM

## 2019-02-05 RX ORDER — ALBUTEROL SULFATE 0.83 MG/ML
SOLUTION RESPIRATORY (INHALATION)
Qty: 360 ML | Refills: 2 | Status: SHIPPED | OUTPATIENT
Start: 2019-02-05 | End: 2020-05-27

## 2019-02-05 RX ORDER — ALBUTEROL SULFATE 90 UG/1
2 AEROSOL, METERED RESPIRATORY (INHALATION) EVERY 4 HOURS PRN
Qty: 1 INHALER | Refills: 11 | Status: SHIPPED | OUTPATIENT
Start: 2019-02-05 | End: 2019-02-19

## 2019-02-05 RX ORDER — TIOTROPIUM BROMIDE 18 UG/1
18 CAPSULE ORAL; RESPIRATORY (INHALATION) DAILY
Qty: 90 CAPSULE | Refills: 3 | Status: CANCELLED | OUTPATIENT
Start: 2019-02-05 | End: 2020-02-05

## 2019-02-05 RX ORDER — VARENICLINE TARTRATE 0.5 MG/1
0.5 TABLET, FILM COATED ORAL 2 TIMES DAILY
Qty: 180 TABLET | Refills: 0 | Status: SHIPPED | OUTPATIENT
Start: 2019-02-05 | End: 2019-04-17 | Stop reason: DRUGHIGH

## 2019-02-05 RX ORDER — FLUTICASONE PROPIONATE AND SALMETEROL 232; 14 UG/1; UG/1
1 POWDER, METERED RESPIRATORY (INHALATION) 2 TIMES DAILY
Qty: 3 INHALER | Refills: 3 | Status: SHIPPED | OUTPATIENT
Start: 2019-02-05 | End: 2019-02-15

## 2019-02-05 ASSESSMENT — ANXIETY QUESTIONNAIRES
3. WORRYING TOO MUCH ABOUT DIFFERENT THINGS: NOT AT ALL
5. BEING SO RESTLESS THAT IT IS HARD TO SIT STILL: NOT AT ALL
GAD7 TOTAL SCORE: 0
2. NOT BEING ABLE TO STOP OR CONTROL WORRYING: NOT AT ALL
7. FEELING AFRAID AS IF SOMETHING AWFUL MIGHT HAPPEN: NOT AT ALL
6. BECOMING EASILY ANNOYED OR IRRITABLE: NOT AT ALL
1. FEELING NERVOUS, ANXIOUS, OR ON EDGE: NOT AT ALL

## 2019-02-05 ASSESSMENT — PATIENT HEALTH QUESTIONNAIRE - PHQ9
SUM OF ALL RESPONSES TO PHQ QUESTIONS 1-9: 6
5. POOR APPETITE OR OVEREATING: NOT AT ALL

## 2019-02-05 NOTE — NURSING NOTE
Kristina Eldridge    1. Has the patient received the information for the influenza vaccine? YES    2.  Does the patient have any of the following contraindications?  Allergy to to eggs? No  Allergic reaction to previous influenza vaccines?  No  Any other problems to previous influenza vaccines? No  Paralyzed by Guillain-Fairfield syndrome? No  Currently pregnant? NO  Current moderate or severe illness?  No  Allergy to contact lens solution?  No    3. The vaccine has been administered in the usual fashion and the patient was instructed to wait 20 minutes before leaving the building in the even of an allergic reaction: YES    Recorded by Nereida Dey

## 2019-02-05 NOTE — PATIENT INSTRUCTIONS
Dear patient,    Your health care provider has recommended that you receive the new shingles vaccine  called Shingrix to prevent shingles. Many private pay insurance and Medicare Part D cover Shingrix. However, not all insurance carriers cover the entire cost of the Shingrix vaccine if the vaccine is administered at your primary care clinic.    Prior to receiving the vaccine, we recommend that you call your insurance carrier and  ask them the following questions:    1. Does my insurance cover the Shingrix vaccine and the administration of the  vaccine?  2. What is my co-pay or deductible for the vaccine?  3. Is there a cost difference if I receive the vaccine at my doctor s office or a  Pharmacy?    The clinic cannot determine your insurance benefits. Please call your insurance provider  prior to scheduling an appointment to receive the Shingrix vaccine. If you decide to  receive your vaccine at the Primary Care Center, please call [phone] and  request a nurse-only appointment for the Shingrix vaccine. The vaccine comes in two doses. Your second dose should be 2-6 months after your first Shingrix injections.  Nurse visit hours are available Monday, Wednesday, and Friday from 9-11:00 AM and 1:00-3:00PM.    Sincerely,    The Primary Care Team                      Primary Care Center Phone Number: [phone]   Primary Care Center Medication Refill Request Information:  * Please contact your pharmacy regarding ANY request for medication refills.  ** [facility] Prescription Fax = [phone]  * Please allow 3 business days for routine medication refills.  * Please allow 5 business days for controlled substance medication refills.     Primary Care Center Test Result notification information:  *You will be notified with in 7-10 days of your appointment day regarding the results of your test.  If you are on MyChart you will be notified as soon as the provider has reviewed the results and signed off on them.                [facility]         Internal Medicine Resident                   Continuity Clinic    Who We Are    Resident Continuity Clinic is a part of the [facility] Primary Care Clinic.  Resident physicians see patients independently and establish a relationship with them over the course of their three-year residency program.  As with the Primary Care Clinic, our Resident Continuity Clinic models a group practice.  If your doctor is not available, you will be able to see another resident physician.  At the end of a resident s training, patients will be transitioned to a new resident physician for ongoing care.     We treat patients with a wide array of medical needs from routine physicals, to acute illnesses, to diabetes and blood pressure management, to complex medical illness.  What is a Resident Physician?    Resident physicians hold medical degrees and are doctors. They are training to become specialists in Internal Medicine. They work under the supervision of board-certified faculty physicians.  Expectations for Your Care    We strive to provide accessible, quality care at all times.    In order to provide this care, it is best to see your primary care resident doctor consistently rather switching between providers.  In the event you do see another physician, you should schedule a follow-up visit with your usual primary care doctor.    If you are transitioning your care from another clinic, it is helpful to have your records available for your doctor to review.    We do not prescribe controlled substances, such as ADD medications or narcotic pain medications, on your first visit.  We will review your health records and concerns prior to devising a treatment plan with you in order to provide the best care.      Clinic Services     Extended clinic hours; patient  to help navigate your visit;  parking; laboratory and imaging services with evening and weekend hours    Multiple medical and surgical  specialties in one building    Complementary services, including Nutrition, Integrative Medicine, Pharmacy consultations, Mental and Behavioral Health, Sports Medicine and Physical Therapy    Thank You    We would like to thank you for choosing the [facility] Internal Medicine Resident Continuity Clinic for your primary care. You are making a priceless contribution to the training of the next generation of health care practitioners.     Contact us at [phone] for appointments or questions.    Resident Clinic Hours are Tuesdays and Thursdays, 7:30am-5:00pm    Residents   Srinivas Cruz MD   (Male)   Shae Pepper MD  (Female)  Elvi Ng MD   (Female)   Anne Butler MD   (Female)   Remberto Bianchi MD  (Male)   Dami Gamez MD  (Male)   Gary Piper MD    (Female)   Richard Fournier MD  (Male)   Ganesh Negro MD  (Male)    Cristiana Villareal MD  (Female)   Damir Ocampo MD  (Male)   Lalo St MD  (Female)   Tam Luciano MD    (Female)   Juice Shelley MD  (Male)   Jesus Manuel Hearn MD  (Male)   Dami Aleman MD (Male)   Sheldon Swan MD  (Male)   Marilu Grady MD (Female)    Sruthi Alvarado MD (Female)   Tucker Penny MD  (Male)   Rosalina Mcnally MD    (Female)   Dasha Kenney MD  (Female)    Supervising Physicians   MD Mili Almonte MD Briar Duffy, MD James Langland, MD Mary Logeais, MD Tanya Melnik, MD Charles Moldow, MD Heather Thompson Buum, MD Kathleen Watson, MD

## 2019-02-05 NOTE — PROGRESS NOTES
PRIMARY CARE CENTER       SUBJECTIVE:  Kristina Eldridge is a 48 year old female with a PMHx of asthma, HLD, depression, s/p bariatric surgery, current tobacco abuse, marijuana abuse,  who comes in for check up for asthma.      Patient uses Albuterol inhaler 5-6 times daily, using mostly at night, wakes up after 4 hours and has a hard time catching breath. Is going through an inhaler every 9 days. Continues with Albuteral neb as needed as well, also non-effective. Continues with Dullera inhaler, already sleeps with the head of the bed elevated and has tried neb just before bedtime nothing seems to help. She has woken up coughing and with SOB every 4 hrs at night.    Can climb 4 flights without stopping. Denies LE edema.    Last PFT in 2013 showed FEV1 2.68 L that was 105% predicted and FEV1/% that showed normal lung volumes and normal spirometry.     Current Meds:  Mometasone Formoterol 2 puffs bid  Fluticasone Salmeterol 1 puff BID  Fluticasone nasal spray BID  Albuterol 2 puffs q4h prn  Albuterol neb q6hr prn    ANGEL is score of 6.    Has been smoking actively since 15 1.5 ppd, started smoking age 7.     She is interested in smoking cessation. Had a headache in past with chantix. Says nicotine replacement does not work.     Medications and allergies reviewed by me today.     ROS:   14 pt ROS completed and negative unless otherwise listed in HPI.     OBJECTIVE:  /85   Pulse 68   Wt 39.5 kg (87 lb)   LMP 06/11/2012   SpO2 100%   BMI 17.85 kg/m     Wt Readings from Last 1 Encounters:   02/05/19 39.5 kg (87 lb)     General: AAOx3, NAD  HEENT: NC/AT, MMM, oropharynx clear, anicteric sclera, tympanic membranes normal  CV: RRR, normal S1S2, no murmur, clicks, rubs appreciated  Resp: CTAB, no wheezes, even after inducing cough  Extremities: wwp,  no pedal edema  Feet: toe of L foot with ingrown toenail without signs of erythema or infection (no drainage)  Skin: No obvious rashes or  lesions  Neuro: no focal deficits  Psych: Appropriate mood    ASSESSMENT/PLAN:  Kristina was seen today for asthma.    Diagnoses and all orders for this visit:    #Chronic obstructive pulmonary disease, unspecified COPD type (H)  -     General PFT Lab (Please always keep checked); Future  -     Pulmonary Function Test; Future  -     XR Chest 2 Views; Future  -     fluticasone-salmeterol (AIRDUO RESPICLICK) 232-14 MCG/ACT inhaler; Inhale 1 puff into the lungs 2 times daily  -     Spacer/Aero Chamber Mouthpiece MISC; 1 Units daily    #SOB (shortness of breath)  C/f heart failure vs malignancy.   -     Echocardiogram Complete; Future  -     CT Chest w/o contrast; Future    #Encounter for smoking cessation counseling  -     varenicline (CHANTIX) 0.5 MG tablet; Take 1 tablet (0.5 mg) by mouth 2 times daily  Told her to slowly uptitrate starting with 0.25 mg daily for a week to 0.5 mg daily for a week to twice daily to prevent side effect of headache.     #Ingrown toenail  -     PODIATRY/FOOT & ANKLE SURGERY REFERRAL  Does not appear infected    #Strange sound in ears  -     AUDIOLOGY ADULT REFERRAL with tympanograms    #Healthcare maintenance  -     FLU VACCINE, AGE >= 3 YR  -     ZOSTER VACCINE RECOMBINANT ADJUVANTED IM NJX  -     Hemoglobin A1c; Future  -     CBC with platelets; Future  -     Comprehensive metabolic panel; Future  -     Lipid panel reflex to direct LDL Fasting; Future  -     GASTROENTEROLOGY ADULT REF PROCEDURE ONLY    Pt to return for pap smear in 4 weeks during general f/u.     Marilu Grady MD  Feb 5, 2019    Pt was seen and plan of care discussed with Dr Brothers.     Marilu Grady MD PhD   Internal Medicine, PGY 2  p     Pt was seen and examined with Dr. Grady.  I agree with her documentation as noted above.    My additional comments: None    Milton Brothers

## 2019-02-05 NOTE — NURSING NOTE
Patient received Shingrix vaccine.  Patient confirmed that they have contacted their insurance carrier and are aware of the costs of the vaccine, including administration fees. See immunization list for administration details.      Patient tolerated injection well.         Nereida Dey at 2:45 PM on 2/5/2019.

## 2019-02-05 NOTE — NURSING NOTE
Patient received Influenza and Shingrix vaccine.  See immunization list for administration details.  Patient tolerated injections well.           Nereida Dey at 2:45 PM on 2/5/2019.

## 2019-02-05 NOTE — NURSING NOTE
Chief Complaint   Patient presents with     Asthma     Pt is here to go over asthma problems       Nereida Dey, Clinic EMT at 1:32 PM on 2/5/2019

## 2019-02-06 LAB — RADIOLOGIST FLAGS: NORMAL

## 2019-02-06 ASSESSMENT — ANXIETY QUESTIONNAIRES: GAD7 TOTAL SCORE: 0

## 2019-02-08 ENCOUNTER — TELEPHONE (OUTPATIENT)
Dept: INTERNAL MEDICINE | Facility: CLINIC | Age: 49
End: 2019-02-08

## 2019-02-08 ENCOUNTER — TELEPHONE (OUTPATIENT)
Dept: GASTROENTEROLOGY | Facility: OUTPATIENT CENTER | Age: 49
End: 2019-02-08

## 2019-02-08 DIAGNOSIS — I34.1 MYXOMATOUS MITRAL VALVE: ICD-10-CM

## 2019-02-08 DIAGNOSIS — R91.8 PULMONARY NODULES: ICD-10-CM

## 2019-02-08 DIAGNOSIS — J44.9 CHRONIC OBSTRUCTIVE PULMONARY DISEASE, UNSPECIFIED COPD TYPE (H): Primary | ICD-10-CM

## 2019-02-08 NOTE — RESULT ENCOUNTER NOTE
.Dear Yarelis,    It was nice to see you in clinic. The results of your CT scan shows some mucous plugging in your lung airways. This could be causing your shortness of breath. Given the need to escalate your medications to control your COPD and given that you do have some mucous plugging that might be able to be treated, I am referring you to see a pulmonologist. I hope that your symptoms are better. Please feel free followup in clinic in the interim if things aren't going better before you are able to see a pulmonologist.     Your CT scan also shows lung nodules. These are new growths on the lung. Yours are all less than 3 millimeters. When they are this small like yours (less than 5 mm is the cutoff), they are likely benign, but we typically like to follow these with a repeat CT scan in the future (within 12 months) to make sure they do not grow larger and become malignant. We do have a clinic that is pro-active in contacting people with nodules so that we make sure that you are followed with a repeat CT scan. Therefore, I have also referred you to pulmonary clinic.       Please let me know if you have any questions or concerns.    Sincerely,   Dr. Grady

## 2019-02-08 NOTE — TELEPHONE ENCOUNTER
[facility] Call Center    Phone Message    May a detailed message be left on voicemail: yes    Reason for Call: Incidental Finding  Name/type of test: CT CHEST WO   Date of test: 2.5.19  Completed at [facility]    Radiologist has flagged for Lung Nodule.  Please provide a verbal acknowledgement or document in chart.    Action Taken: Message routed to:  [facility] ([facility]): [facility] primary

## 2019-02-08 NOTE — RESULT ENCOUNTER NOTE
"Dear Yarelis,    It was nice to see you in clinic. The results of your echocardiogram (picture of your heart) is for the most part normal. 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 This is a small amount of degeneration in the fiber that makes up the mitral valve (the valve on the L side of the heart between the upper and lower chambers). You do not have any regurgitation of blood over the valve at this time which is good, but over time, this could happen and cause heart failure. The myxomatous nature of your valve also puts you at risk for infection of the valve. I think it would be a good thing to go see a cardiologist and make sure no more imaging is needed and also to determine if we need to get a repeat ECHO in the future to look at this. Therefore, I am referring you to a cardiologist. I will have my nurse help to get you the numbers to call to get scheduled with cardiology and also pulmonology and pulmonary nodule clinic.      Please let me know if you have any questions or concerns.    Sincerely,   Dr. Grady  "

## 2019-02-08 NOTE — TELEPHONE ENCOUNTER
Message will be forwarded to provider. Provider is in clinic 2/8/2019 in the PM. Lisa Hilton LPN 2/8/2019 9:40 AM

## 2019-02-08 NOTE — TELEPHONE ENCOUNTER
Patient taking any blood thinners ? No     Heart disease ? Denies     Lung disease ? Asthma. Advised patient to bring inhaler      Sleep apnea ? Denies     Diabetic ? Denies     Kidney disease ? Denies     Dialysis ? N/a     Electronic implanted medical devices ? Denies     Are you taking any narcotic pain medication ? Oxycodone  What is your daily dosage ?    PTSD ? N/a     Prep instructions reviewed with patient ? Instructions, policy,MAC sedation plan reviewed. Advised patient to have someone stay with them post exam. Patient requesting  be present for exam. Advised patient that only provider and staff were allowed in exam room.    Pharmacy : N/a    Indication for procedure : Screening colonoscopy    Referring provider :Milton Brothers MD     Arrival Time : 1 PM

## 2019-02-11 ENCOUNTER — TELEPHONE (OUTPATIENT)
Dept: PULMONOLOGY | Facility: CLINIC | Age: 49
End: 2019-02-11

## 2019-02-11 NOTE — TELEPHONE ENCOUNTER
ONCOLOGY INTAKE: Records Information      APPT INFORMATION: 02/19 w/ Dr. Orosco at [facility] [place]   Referring provider:   Milton Brothers MD  Referring provider s clinic:  [facility] MEDICINE  Reason for visit/diagnosis:  Pulmonary nodules [R91.8]    Were the records received with the referral (via [employer])? Complete     Has patient been seen for any external appt for this diagnosis (enter clinic/location)? Yes    Pulmonary nodules [R91.8]: caller intake: Ref by: Milton Brothers MD: Records In Epic

## 2019-02-13 ENCOUNTER — TELEPHONE (OUTPATIENT)
Dept: PULMONOLOGY | Facility: CLINIC | Age: 49
End: 2019-02-13

## 2019-02-13 NOTE — TELEPHONE ENCOUNTER
ONCOLOGY INTAKE: Records Information      APPT INFORMATION:  Referring provider:  Zev  Referring provider s clinic:  [facility] [place] [facility]  Reason for visit/diagnosis:  Lung Nodules    Were the records received with the referral (via [employer])? No, internal    Has patient been seen for any external appt for this diagnosis (enter clinic/location)? No, CT scan with us on 2/5/19

## 2019-02-15 ENCOUNTER — OFFICE VISIT (OUTPATIENT)
Dept: PHARMACY | Facility: CLINIC | Age: 49
End: 2019-02-15
Payer: COMMERCIAL

## 2019-02-15 DIAGNOSIS — G47.00 INSOMNIA, UNSPECIFIED TYPE: ICD-10-CM

## 2019-02-15 DIAGNOSIS — Z91.09 ENVIRONMENTAL ALLERGIES: ICD-10-CM

## 2019-02-15 DIAGNOSIS — E53.8 VITAMIN B12 DEFICIENCY (NON ANEMIC): ICD-10-CM

## 2019-02-15 DIAGNOSIS — Z72.0 TOBACCO ABUSE: ICD-10-CM

## 2019-02-15 DIAGNOSIS — J44.9 CHRONIC OBSTRUCTIVE PULMONARY DISEASE, UNSPECIFIED COPD TYPE (H): Primary | ICD-10-CM

## 2019-02-15 DIAGNOSIS — J44.9 CHRONIC OBSTRUCTIVE PULMONARY DISEASE, UNSPECIFIED COPD TYPE (H): ICD-10-CM

## 2019-02-15 DIAGNOSIS — G25.81 RESTLESS LEGS SYNDROME (RLS): ICD-10-CM

## 2019-02-15 PROCEDURE — 99605 MTMS BY PHARM NP 15 MIN: CPT | Performed by: PHARMACIST

## 2019-02-15 PROCEDURE — 99607 MTMS BY PHARM ADDL 15 MIN: CPT | Performed by: PHARMACIST

## 2019-02-15 NOTE — PROGRESS NOTES
SUBJECTIVE/OBJECTIVE:                           Kristina Eldridge is a 48 year old female coming in for an initial visit for Medication Therapy Management.  She was referred to me from Mili Fuller. She is here with her , Stas, today.     Chief Complaint: CMR - COPD.    Allergies/ADRs: Reviewed in Epic  Tobacco: 0-1 pack per day - is interested in quittingTobacco Cessation Action Plan: Pharmacotherapies : Chantix  Alcohol: not currently using  Caffeine: 2-3 cups/day of coffee  Activity: limited be obstructive lung disease  PMH: Reviewed in Epic    Medication Adherence/Access:  Issues with adherence to Dulera are discovered during our discussion today.  No issues with other therapies except overuse of rescue therapies.     COPD: Current medications: Short-Acting Bronchodilator: Ipratropium MDI, Albuterol HFA 6-7 times a day.  She doesn't have any albuterol right now because she used the current inhaler she had too quickly and her refill is too soon at the pharmacy.  Now, she is using the Atrovent for help with breathing at about the same frequency until she is able to get another albuterol inhaler. She also has a nebulizer at home that she uses every 3-4 days when her breathing gets really bad.   ICS/LABA- Dulera 2 puff(s) twice daily. She endorses forgetting about every other day. She keeps this inhaler in the kitchen and routinely forgets to use it. When asked, she doesn't know how the Dulera is supposed to work.  She only knows that she doesn't feel better when she takes it so she does not prioritize using it.  She has a new spacer that she really likes (OptiChamber). She wonders why it whistles sometimes when she uses it.  We review inhaler technique with the spacer today.   Pt is not experiencing side effects.   Pt reports the following symptoms: increased need of albuterol.  Pt does not have an COPD Action Plan on file.   Has spirometry been completed: Doesn't know  PIF was completed today:  No  Inhaler/device technique reviewed: Aerosol inhaler and holding chamber     Smoking Cessation:  Chantix 0.5 mg twice daily. She has cut back from 25 cigarettes a day to 20 CPD. She has been working on quitting smoking for about 4 weeks.  She is pleased with her progress but endorses nausea with Chantix.  It goes away within about 30 minutes.  She has a history of gastric bypass and a lot of medications make her nauseous. Despite this side effect, she intends to continue using Chantix.     Last Comprehensive Metabolic Panel:  Sodium   Date Value Ref Range Status   02/19/2019 137 133 - 144 mmol/L Final     Potassium   Date Value Ref Range Status   02/19/2019 3.4 3.4 - 5.3 mmol/L Final     Chloride   Date Value Ref Range Status   02/19/2019 105 94 - 109 mmol/L Final     Carbon Dioxide   Date Value Ref Range Status   02/19/2019 27 20 - 32 mmol/L Final     Anion Gap   Date Value Ref Range Status   02/19/2019 5 3 - 14 mmol/L Final     Glucose   Date Value Ref Range Status   02/19/2019 82 70 - 99 mg/dL Final     Urea Nitrogen   Date Value Ref Range Status   02/19/2019 4 (L) 7 - 30 mg/dL Final     Creatinine   Date Value Ref Range Status   02/19/2019 0.84 0.52 - 1.04 mg/dL Final     GFR Estimate   Date Value Ref Range Status   02/19/2019 81 >60 mL/min/[1.73_m2] Final     Comment:     Non  GFR Calc  Starting 12/18/2018, serum creatinine based estimated GFR (eGFR) will be   calculated using the Chronic Kidney Disease Epidemiology Collaboration   (CKD-EPI) equation.       Calcium   Date Value Ref Range Status   02/19/2019 7.8 (L) 8.5 - 10.1 mg/dL Final     Estimated Creatinine Clearance: 48.8 mL/min (based on SCr of 0.84 mg/dL).    RLS/Insomnia: Current medications include mirtazapine 15 mg at night, trazodone 100 mg at bedtime and pramipexole 3 tablets at bedtime as needed.  She doesn't take the pramipexole every night but reports that this regimen works really well for her.  She thinks that the mirtazapine  isn't helpful.  She likens it to how well diphenhdyramine works for her - not well at all. Her sleep schedule is unusual and she will sleep only 3-4 hours at a time.  She is typically unable to fall back asleep and will be awake for the day at 4 AM.  When asked, she says she wakes up coughing frequently. She sleeps in a very cold room because warm air makes her breathing worse.  She will have long coughing fits when she transitions from her cold bedroom to the warm rooms in other parts of her house. She has never connected her COPD with her nighttime awakenings.     Allergies: Current medications include cetirizine 10 mg daily as needed, montelukast 10 mg as needed and fluticasone 50 mcg/act 2 sprays into each nostril as needed. She has allegies on and off all year and finds that these are effetive.  She will also use diphenhdyramine 25 mg as needed and hydrocortinsone 2.5% cream as needed for itching if her allergies affect her skin. The diphenhydramine is not helpful for sleep.    Vitamins: Current medications include vitamin B12 1000 mcg/ml injections once monthly. No updated levels are available today.     Today's Vitals: LMP 06/11/2012  - ran out of time    BP Readings from Last 6 Encounters:   02/05/19 124/85   11/13/18 117/72   10/09/18 118/80   08/15/18 (!) 83/49   05/22/18 111/75     ASSESSMENT:                             Current medications were reviewed today.     Medication Adherence: fair, needs improvement - see below    COPD: Needs Improvement. Patient would benefit from using Dulera twice daily as directed. This is likely to prevent frequent episodes of poor breathing, reduce use of emergency inhalers and improve sleep.  Patient would also benefit from inhaling medicine from her OptiChamber more slowly - the whistling indicates inhalation that is too rapid, reducing efficiency of drug delivery.     Smoking cessation: Improved. Pt continues to use tobacco but is reducing her cigarette consumption  with Chantix.  It is worthwhile to continue monitoring for reduction in cigarette consumption.     RLS/Insomnia: Unclear. It seems plausible that her poor sleep is more related to her frequent nighttime awakenings related to her breathing.  It is worth attempting to optimize inhaler therapy and adherence prior to adjusting medications for insomnia and RLS further.     Allergies: Apparently well controlled.     Vitamins: Apparently stable. Patient would benefit form an updated B12 level.     PLAN:                            1. Put your Dulera on your bedstand and take it first thing when you wake up and last thing before you go to bed.   2. Great work with reducing your cigarettes! Keep up the hard work on making new habits to avoid cigarettes.   3. B12 level ordered.   4. Make sure you slow down your inhale if the spacer starts to whistle when you take your inhalers.     I spent 60 minutes with this patient today. A copy of the visit note was provided to the patient's primary care provider.    Will follow up in 2 weeks.    The patient was sent via [employer] a summary of these recommendations as an after visit summary.     Karen Reyes, Pharm.D., BCACP  Medication Therapy Management Pharmacist  Page/VM:  [phone]

## 2019-02-19 ENCOUNTER — OFFICE VISIT (OUTPATIENT)
Dept: PULMONOLOGY | Facility: CLINIC | Age: 49
End: 2019-02-19
Attending: INTERNAL MEDICINE
Payer: COMMERCIAL

## 2019-02-19 VITALS
BODY MASS INDEX: 17.16 KG/M2 | HEIGHT: 59 IN | TEMPERATURE: 98.8 F | WEIGHT: 85.1 LBS | RESPIRATION RATE: 16 BRPM | SYSTOLIC BLOOD PRESSURE: 116 MMHG | DIASTOLIC BLOOD PRESSURE: 77 MMHG | HEART RATE: 72 BPM | OXYGEN SATURATION: 96 %

## 2019-02-19 DIAGNOSIS — J45.30 MILD PERSISTENT ASTHMA, UNCOMPLICATED: ICD-10-CM

## 2019-02-19 DIAGNOSIS — Z00.00 HEALTHCARE MAINTENANCE: ICD-10-CM

## 2019-02-19 DIAGNOSIS — R91.8 PULMONARY NODULES: ICD-10-CM

## 2019-02-19 LAB
ALBUMIN SERPL-MCNC: 3.1 G/DL (ref 3.4–5)
ALP SERPL-CCNC: 108 U/L (ref 40–150)
ALT SERPL W P-5'-P-CCNC: 120 U/L (ref 0–50)
ANION GAP SERPL CALCULATED.3IONS-SCNC: 5 MMOL/L (ref 3–14)
AST SERPL W P-5'-P-CCNC: 106 U/L (ref 0–45)
BILIRUB SERPL-MCNC: 0.4 MG/DL (ref 0.2–1.3)
BUN SERPL-MCNC: 4 MG/DL (ref 7–30)
CALCIUM SERPL-MCNC: 7.8 MG/DL (ref 8.5–10.1)
CHLORIDE SERPL-SCNC: 105 MMOL/L (ref 94–109)
CHOLEST SERPL-MCNC: 108 MG/DL
CO2 SERPL-SCNC: 27 MMOL/L (ref 20–32)
CREAT SERPL-MCNC: 0.84 MG/DL (ref 0.52–1.04)
DLCOUNC-%PRED-PRE: 78 %
DLCOUNC-PRE: 13.58 ML/MIN/MMHG
DLCOUNC-PRED: 17.32 ML/MIN/MMHG
ERV-%PRED-PRE: 87 %
ERV-PRE: 1.05 L
ERV-PRED: 1.21 L
ERYTHROCYTE [DISTWIDTH] IN BLOOD BY AUTOMATED COUNT: 13.8 % (ref 10–15)
EXPTIME-PRE: 4.79 SEC
FEF2575-%PRED-PRE: 103 %
FEF2575-PRE: 2.37 L/SEC
FEF2575-PRED: 2.28 L/SEC
FEFMAX-%PRED-PRE: 67 %
FEFMAX-PRE: 3.98 L/SEC
FEFMAX-PRED: 5.93 L/SEC
FEV1-%PRED-PRE: 104 %
FEV1-PRE: 2.22 L
FEV1FEV6-PRE: 84 %
FEV1FEV6-PRED: 82 %
FEV1FVC-PRE: 84 %
FEV1FVC-PRED: 82 %
FEV1SVC-PRE: 81 %
FEV1SVC-PRED: 77 %
FIFMAX-PRE: 4.28 L/SEC
FRCPLETH-%PRED-PRE: 144 %
FRCPLETH-PRE: 3.44 L
FRCPLETH-PRED: 2.38 L
FVC-%PRED-PRE: 101 %
FVC-PRE: 2.64 L
FVC-PRED: 2.59 L
GFR SERPL CREATININE-BSD FRML MDRD: 81 ML/MIN/{1.73_M2}
GLUCOSE SERPL-MCNC: 82 MG/DL (ref 70–99)
HBA1C MFR BLD: 5.5 % (ref 0–5.6)
HCT VFR BLD AUTO: 37.3 % (ref 35–47)
HDLC SERPL-MCNC: 30 MG/DL
HGB BLD-MCNC: 12.1 G/DL (ref 11.7–15.7)
IC-%PRED-PRE: 107 %
IC-PRE: 1.68 L
IC-PRED: 1.57 L
LDLC SERPL CALC-MCNC: 65 MG/DL
MCH RBC QN AUTO: 33.4 PG (ref 26.5–33)
MCHC RBC AUTO-ENTMCNC: 32.4 G/DL (ref 31.5–36.5)
MCV RBC AUTO: 103 FL (ref 78–100)
NONHDLC SERPL-MCNC: 78 MG/DL
PLATELET # BLD AUTO: 146 10E9/L (ref 150–450)
POTASSIUM SERPL-SCNC: 3.4 MMOL/L (ref 3.4–5.3)
PROT SERPL-MCNC: 5.9 G/DL (ref 6.8–8.8)
RBC # BLD AUTO: 3.62 10E12/L (ref 3.8–5.2)
RVPLETH-%PRED-PRE: 159 %
RVPLETH-PRE: 2.38 L
RVPLETH-PRED: 1.49 L
SODIUM SERPL-SCNC: 137 MMOL/L (ref 133–144)
TLCPLETH-%PRED-PRE: 127 %
TLCPLETH-PRE: 5.12 L
TLCPLETH-PRED: 4.02 L
TRIGL SERPL-MCNC: 63 MG/DL
VA-%PRED-PRE: 107 %
VA-PRE: 4.13 L
VC-%PRED-PRE: 98 %
VC-PRE: 2.74 L
VC-PRED: 2.78 L
WBC # BLD AUTO: 7 10E9/L (ref 4–11)

## 2019-02-19 PROCEDURE — G0463 HOSPITAL OUTPT CLINIC VISIT: HCPCS | Mod: ZF

## 2019-02-19 PROCEDURE — 82785 ASSAY OF IGE: CPT

## 2019-02-19 PROCEDURE — 86003 ALLG SPEC IGE CRUDE XTRC EA: CPT | Performed by: INTERNAL MEDICINE

## 2019-02-19 RX ORDER — PREDNISONE 20 MG/1
40 TABLET ORAL 2 TIMES DAILY
Qty: 28 TABLET | Refills: 1 | Status: SHIPPED | OUTPATIENT
Start: 2019-02-19 | End: 2019-04-12

## 2019-02-19 RX ORDER — ALBUTEROL SULFATE 90 UG/1
2 AEROSOL, METERED RESPIRATORY (INHALATION) EVERY 4 HOURS PRN
Qty: 1 INHALER | Refills: 11 | Status: SHIPPED | OUTPATIENT
Start: 2019-02-19 | End: 2022-03-29

## 2019-02-19 RX ORDER — MONTELUKAST SODIUM 10 MG/1
10 TABLET ORAL AT BEDTIME
Qty: 90 TABLET | Refills: 3 | Status: SHIPPED | OUTPATIENT
Start: 2019-02-19 | End: 2020-04-08

## 2019-02-19 RX ORDER — LEVOFLOXACIN 500 MG/1
500 TABLET, FILM COATED ORAL DAILY
Qty: 7 TABLET | Refills: 0 | Status: SHIPPED | OUTPATIENT
Start: 2019-02-19 | End: 2019-03-05

## 2019-02-19 ASSESSMENT — MIFFLIN-ST. JEOR: SCORE: 906.24

## 2019-02-19 ASSESSMENT — PAIN SCALES - GENERAL: PAINLEVEL: NO PAIN (0)

## 2019-02-19 NOTE — NURSING NOTE
"Oncology Rooming Note    February 19, 2019 2:17 PM   Kristina Eldridge is a 48 year old female who presents for:    Chief Complaint   Patient presents with     Oncology Clinic Visit     Lung , Pulmonary Nodgules     Initial Vitals: /77   Pulse 72   Temp 98.8  F (37.1  C) (Oral)   Resp 16   Ht 1.49 m (4' 10.66\")   Wt 38.6 kg (85 lb 1.6 oz)   LMP 06/11/2012   SpO2 96%   BMI 17.39 kg/m   Estimated body mass index is 17.39 kg/m  as calculated from the following:    Height as of this encounter: 1.49 m (4' 10.66\").    Weight as of this encounter: 38.6 kg (85 lb 1.6 oz). Body surface area is 1.26 meters squared.  No Pain (0) Comment: Data Unavailable   Patient's last menstrual period was 06/11/2012.  Allergies reviewed: Yes  Medications reviewed: Yes    Medications: Medication refills not needed today.  Pharmacy name entered into [facility]:    [facility] - [place], [place] - [address] AT [address] [facility]  [id number] - [place], [place] - [address]  [id number] [place] [facility] - [place], [place] - [address]    Clinical concerns: results  Cho was notified.  8     Lisandra Bell MA              "

## 2019-02-19 NOTE — PROGRESS NOTES
LUNG NODULE & INTERVENTIONAL PULMONARY CLINIC  [facility]     Kristina Eldridge MRN# [id number]   Age: 48 year old YOB: 1970     Reason for Consultation: lung nodule(s)    Requesting Physician: Milton Brothers MD  [address] [place] [address]       Assessment and Plan:    1. New multiple pulmonary lung nodule(s). Given the characteristics on current/previous imaging and risk factors; I would classify this to be Intermediate (6-65%) risk for cancer. Sub4mm nodules in setting of mucous plugging. Recommend no close surveillance but would enroll in annual lung cancer screening.     2. Dyspnea, wheezing 2/2 asthma/COPD exacerbation. Although PFT today does not show obstruction, the effort was not optimal. I suspect she has overlap syndrome however. Plan to cont Dulera and add singulair for allergy sx. Given acute exacerbation, I will prescribe prednisone and antibiotics. Plan 40mg prednisone for at least 2 weeks then reassess in clinic in addition to 1 week of levaquin. Will check aspergillus ab and IgE for completeness. Follow-up in 2wks.    3. Latent TB. Completed INH. No evidence of reactivation on CT today.     Billing: The patient was seen and examined by me and the findings, assessment, and plan as documented was explained to the patient/family who expressed understand.     Mekhi Orosco MD   of Medicine  Interventional Pulmonology  Department of Pulmonary, Allergy, Critical Care and Sleep Medicine   [facility]  Pager: [phone]          History:     Kristina Eldridge is a 48 year old female with sig h/o for asthma/copd, RLS, latent TB,hepatitis C,  who is here for evaluation/followup of lung nodule(s).    - Persistent dyspnea and wheezing despite RTC inhalers. Had CT performed to evaluate sx and found nodules.   - Personal hx of cancer: No. Up-to-date on mammo. Never had  c-scope.   - Family hx of cancer: Uncle had lung cancer.   - Exposure hx: Denies asbestos or radon exposure   - Tobacco hx: Current Smoker: 1.5ppd at age 15yrs. On chantix now.    - My interpretation of the images relevant for this visit includes: nodules   - My interpretation of the PFT's relevant for this visit includes: difficulty reaching ATS criteria     Culprit Nodule(s):   Multiple bilateral lung nodule(s) that are sub 4 mm. First seen by chest CT on 2/5/19. First observed on this date     Other active medical problems include:   - Has asthma/COPD. Severe persistent. On Dulera and atrovent+albuterol q6 RTC for past 1.5months. Last dose of prednisone was 6mo ago. No hospitalization.   - Had latent TB from homeless shelter in [place]. Completed 9mo INH in 1994.    - Has Hepatitis C. Stable.           Past Medical History:      Past Medical History:   Diagnosis Date     Abnormal Pap smear 2/21/2017     said pap was abnormal     Arthritis      Chronic back pain     hx spondylolisthesis     Chronic pain      COPD (chronic obstructive pulmonary disease) (H)     PFT's 2013 FEV1/FVC = 2.68/3.20 = 84%     Hepatitis C     treated ribaviron & interferon in 2008 for serotype 2 with failed 6 month treatment     Hyperlipidemia     on simvatatin     Positive TB test     has taken INH     RLS (restless legs syndrome)      TB lung, latent 1994    treated     Uncomplicated asthma      Wounds and injuries 2003           Past Surgical History:      Past Surgical History:   Procedure Laterality Date     CHOLECYSTECTOMY       D & C  1987     ESOPHAGOSCOPY, GASTROSCOPY, DUODENOSCOPY (EGD), COMBINED  7/30/2014    Procedure: COMBINED ESOPHAGOSCOPY, GASTROSCOPY, DUODENOSCOPY (EGD);  Surgeon: Ramesh Self MD;  Location: [facility]     FRACTURE TX, ANKLE RT/LT  1991    ORIF rt ankle     FUSION SPINE POSTERIOR ONE LEVEL N/A 11/21/2014    Procedure: FUSION SPINE POSTERIOR ONE LEVEL;  Surgeon: Amilcar Baldwin MD;   [facility]     FUSION SPINE POSTERIOR ONE LEVEL Right 2015    Procedure: FUSION SPINE POSTERIOR ONE LEVEL;  Surgeon: Amilcar Baldwin MD;  [facility]     GASTRIC BYPASS  2007    lost 150 lbs     GRAFT BONE FROM ILIAC CREST Left 2014    Procedure: GRAFT BONE FROM ILIAC CREST;  Surgeon: Amilcar Baldwin MD;  [facility]     OPTICAL TRACKING SYSTEM FUSION POSTERIOR SPINE LUMBAR  2013    Procedure: OPTICAL TRACKING SYSTEM FUSION SPINE POSTERIOR LUMBAR ONE LEVEL;  Lumbar 4-5 Transforaminal Interbody Fusion;  Surgeon: Amilcar Baldwin MD;  [facility]     ORTHOPEDIC SURGERY      shoulder surgery, removed bone spur     TUBAL LIGATION            Social History:     Social History     Tobacco Use     Smoking status: Current Every Day Smoker     Packs/day: 1.00     Years: 30.00     Pack years: 30.00     Types: Cigarettes     Last attempt to quit: 2014     Years since quittin.6     Smokeless tobacco: Never Used     Tobacco comment: pack a day   Substance Use Topics     Alcohol use: Yes     Alcohol/week: 0.0 oz     Comment: x3 drinks per year          Family History:     Family History   Problem Relation Age of Onset     Hypertension Father      Respiratory Father         COPD     Glaucoma Father      C.A.D. Mother         CHF,  age 59 of [place]     Other Cancer Mother      Diabetes Maternal Grandmother      Diabetes Paternal Grandmother      Glaucoma Paternal Grandmother      Melanoma No family hx of      Skin Cancer No family hx of            Allergies:      Allergies   Allergen Reactions     Bee Venom Anaphylaxis, Anxiety, Difficulty breathing, Hives, Itching, Nausea and Vomiting, Palpitations, Shortness Of Breath and Swelling     Opium Alkaloids, Hcls Itching     Can take opiate derived narcotics with Benadryl.     Latex Rash          Medications:     Current Outpatient Medications   Medication Sig     albuterol (PROVENTIL) (2.5 MG/3ML)  0.083% neb solution TAKE 1 VIAL BY NEBULIZATION EVERY 6 HOURS AS NEEDED FOR SHORTNESS OF BREATH     albuterol (VENTOLIN HFA) 108 (90 Base) MCG/ACT inhaler Inhale 2 puffs into the lungs every 4 hours as needed for shortness of breath / dyspnea or wheezing     cetirizine (ZYRTEC) 10 MG tablet Take 1 tablet (10 mg) by mouth daily (Patient not taking: Reported on 2/15/2019)     cyanocobalamin (VITAMIN B12) 1000 MCG/ML injection ADMINISTER 1 ML(1000 MCG) IN THE MUSCLE EVERY 30 DAYS     diphenhydrAMINE (WAL-DRYL ALLERGY) 25 MG tablet TAKE 1 TABLET(25 MG) BY MOUTH EVERY 6 HOURS AS NEEDED FOR ITCHING OR ALLERGIES (Patient not taking: Reported on 2/5/2019)     fluticasone (FLONASE) 50 MCG/ACT spray Spray 2 sprays into both nostrils daily (Patient not taking: Reported on 2/15/2019)     hydrocortisone 2.5 % cream Apply topically 2 times daily (Patient not taking: Reported on 2/5/2019)     ipratropium (ATROVENT HFA) 17 MCG/ACT inhaler Inhale 2 puffs into the lungs every 6 hours     LORazepam (ATIVAN) 0.5 MG tablet Take one pill 30 minutes prior to procedure.  Do not operate a vehicle after taking this medication (Patient not taking: Reported on 2/5/2019)     mirtazapine (REMERON) 15 MG tablet Take 1 tablet (15 mg) by mouth At Bedtime     mometasone-formoterol (DULERA) 100-5 MCG/ACT inhaler Inhale 2 puffs into the lungs 2 times daily     order for DME Nebulizer     pramipexole (MIRAPEX) 0.125 MG tablet TAKE 1 TO 3 TABLETS BY MOUTH AT BEDTIME     Spacer/Aero Chamber Mouthpiece MISC 1 Units daily     traZODone (DESYREL) 100 MG tablet TAKE 2 TABLETS(200 MG) BY MOUTH EVERY NIGHT AS NEEDED FOR SLEEP     varenicline (CHANTIX) 0.5 MG tablet Take 1 tablet (0.5 mg) by mouth 2 times daily     No current facility-administered medications for this visit.           Review of Systems:     CONSTITUTIONAL: negative for fever, chills, change in weight  INTEGUMENTARY/SKIN: no rash or obvious new lesions  ENT/MOUTH: no sore throat, new sinus pain  or nasal drainage  RESP: see interval history  CV: negative for chest pain, palpitations or peripheral edema  GI: no nausea, vomiting, change in stools  : no dysuria  MUSCULOSKELETAL: no myalgias, arthralgias  ENDOCRINE: blood sugars with adequate control  PSYCHIATRIC: mood stable  LYMPHATIC: no new lymphadenopathy  HEME: no bleeding or easy bruisability  NEURO: no numbness, weakness, headaches         Physical Exam:     Temp:  [98.8  F (37.1  C)] 98.8  F (37.1  C)  Pulse:  [72] 72  Resp:  [16] 16  BP: (116)/(77) 116/77  SpO2:  [96 %] 96 %  Wt Readings from Last 4 Encounters:   02/19/19 38.6 kg (85 lb 1.6 oz)   02/05/19 39.5 kg (87 lb)   11/13/18 41.7 kg (92 lb)   10/09/18 40.3 kg (88 lb 14.4 oz)     Constitutional:   Awake, alert and in no apparent distress     Eyes:   Nonicteric, ARMANI     ENT:    Trachea is midline. No gross neck abnormalities      Neck:   Supple without supraclavicular or cervical lymphadenopathy     Lungs:   Good air flow.  No crackles. No rhonchi.  No wheezes.     Cardiovascular:   Normal S1 and S2.  RRR.  No murmur, gallop or rub.  Radial, DP and PT pulses normal and symmetric     Abdomen:   NABS, soft, nontender, nondistended.  No HSM.     Musculoskeletal:   No edema.      Neurologic:   Alert and conversant. Cranial nerves  intact.       Skin:   Warm, dry.  No rash on limited exam.           Current Laboratory Data:   All laboratory and imaging data reviewed.    No results found for this or any previous visit (from the past 24 hour(s)).         Previous Pulmonary Function Testing     FVC-Pred   Date Value Ref Range Status   02/15/2019 2.59 L      FVC-Pre   Date Value Ref Range Status   02/15/2019 2.64 L      FVC-%Pred-Pre   Date Value Ref Range Status   02/15/2019 101 %      FEV1-Pre   Date Value Ref Range Status   02/15/2019 2.22 L      FEV1-%Pred-Pre   Date Value Ref Range Status   02/15/2019 104 %      FEV1FVC-Pred   Date Value Ref Range Status   02/15/2019 82 %      FEV1FVC-Pre   Date  Value Ref Range Status   02/15/2019 84 %      No results found for: 20029  FEFMax-Pred   Date Value Ref Range Status   02/15/2019 5.93 L/sec      FEFMax-Pre   Date Value Ref Range Status   02/15/2019 3.98 L/sec      FEFMax-%Pred-Pre   Date Value Ref Range Status   02/15/2019 67 %      ExpTime-Pre   Date Value Ref Range Status   02/15/2019 4.79 sec      FIFMax-Pre   Date Value Ref Range Status   02/15/2019 4.28 L/sec      FEV1FEV6-Pred   Date Value Ref Range Status   02/15/2019 82 %      FEV1FEV6-Pre   Date Value Ref Range Status   02/15/2019 84 %             Previous Cardiology Imaging   No results found for this or any previous visit (from the past 8760 hour(s)).

## 2019-02-19 NOTE — LETTER
2/19/2019     RE: Kristina Eldridge  [address]  [facility] [address]     Dear Colleague,    Thank you for referring your patient, Kristina Eldridge, to the [facility] at [facility]. Please see a copy of my visit note below.    LUNG NODULE & INTERVENTIONAL PULMONARY CLINIC  CLINICS & SURGERY Formerly [facility], [facility]     Kristina Eldridge MRN# [id number]   Age: 48 year old YOB: 1970     Reason for Consultation: lung nodule(s)    Requesting Physician: Milton Brothers MD  [address] [place] [address]       Assessment and Plan:    1. New multiple pulmonary lung nodule(s). Given the characteristics on current/previous imaging and risk factors; I would classify this to be Intermediate (6-65%) risk for cancer. Sub4mm nodules in setting of mucous plugging. Recommend no close surveillance but would enroll in annual lung cancer screening.     2. Dyspnea, wheezing 2/2 asthma/COPD exacerbation. Although PFT today does not show obstruction, the effort was not optimal. I suspect she has overlap syndrome however. Plan to cont Dulera and add singulair for allergy sx. Given acute exacerbation, I will prescribe prednisone and antibiotics. Plan 40mg prednisone for at least 2 weeks then reassess in clinic in addition to 1 week of levaquin. Will check aspergillus ab and IgE for completeness. Follow-up in 2wks.    3. Latent TB. Completed INH. No evidence of reactivation on CT today.     Billing: The patient was seen and examined by me and the findings, assessment, and plan as documented was explained to the patient/family who expressed understand.     Mekhi Orosco MD   of Medicine  Interventional Pulmonology  Department of Pulmonary, Allergy, Critical Care and Sleep Medicine   [facility]  Pager: [phone]          History:     Kristina Eldridge is a 48 year old female  with sig h/o for asthma/copd, RLS, latent TB,hepatitis C,  who is here for evaluation/followup of lung nodule(s).    - Persistent dyspnea and wheezing despite RTC inhalers. Had CT performed to evaluate sx and found nodules.   - Personal hx of cancer: No. Up-to-date on mammo. Never had c-scope.   - Family hx of cancer: Uncle had lung cancer.   - Exposure hx: Denies asbestos or radon exposure   - Tobacco hx: Current Smoker: 1.5ppd at age 15yrs. On chantix now.    - My interpretation of the images relevant for this visit includes: nodules   - My interpretation of the PFT's relevant for this visit includes: difficulty reaching ATS criteria     Culprit Nodule(s):   Multiple bilateral lung nodule(s) that are sub 4 mm. First seen by chest CT on 2/5/19. First observed on this date     Other active medical problems include:   - Has asthma/COPD. Severe persistent. On Dulera and atrovent+albuterol q6 RTC for past 1.5months. Last dose of prednisone was 6mo ago. No hospitalization.   - Had latent TB from homeless shelter in [place]. Completed 9mo INH in 1994.    - Has Hepatitis C. Stable.           Past Medical History:      Past Medical History:   Diagnosis Date     Abnormal Pap smear 2/21/2017     said pap was abnormal     Arthritis      Chronic back pain     hx spondylolisthesis     Chronic pain      COPD (chronic obstructive pulmonary disease) (H)     PFT's 2013 FEV1/FVC = 2.68/3.20 = 84%     Hepatitis C     treated ribaviron & interferon in 2008 for serotype 2 with failed 6 month treatment     Hyperlipidemia     on simvatatin     Positive TB test     has taken INH     RLS (restless legs syndrome)      TB lung, latent 1994    treated     Uncomplicated asthma      Wounds and injuries 2003           Past Surgical History:      Past Surgical History:   Procedure Laterality Date     CHOLECYSTECTOMY       D & C  1987     ESOPHAGOSCOPY, GASTROSCOPY, DUODENOSCOPY (EGD), COMBINED  7/30/2014    Procedure: COMBINED ESOPHAGOSCOPY,  GASTROSCOPY, DUODENOSCOPY (EGD);  Surgeon: Ramesh Self MD;  Location: [facility]     FRACTURE TX, ANKLE RT/LT      ORIF rt ankle     FUSION SPINE POSTERIOR ONE LEVEL N/A 2014    Procedure: FUSION SPINE POSTERIOR ONE LEVEL;  Surgeon: Amilcar Baldwin MD;  [facility]     FUSION SPINE POSTERIOR ONE LEVEL Right 2015    Procedure: FUSION SPINE POSTERIOR ONE LEVEL;  Surgeon: Amilcar Baldwin MD;  [facility]     GASTRIC BYPASS      lost 150 lbs     GRAFT BONE FROM ILIAC CREST Left 2014    Procedure: GRAFT BONE FROM ILIAC CREST;  Surgeon: Amilcar Baldwin MD;  [facility]     OPTICAL TRACKING SYSTEM FUSION POSTERIOR SPINE LUMBAR  2013    Procedure: OPTICAL TRACKING SYSTEM FUSION SPINE POSTERIOR LUMBAR ONE LEVEL;  Lumbar 4-5 Transforaminal Interbody Fusion;  Surgeon: Amilcar Baldwin MD;  [facility]     ORTHOPEDIC SURGERY      shoulder surgery, removed bone spur     TUBAL LIGATION            Social History:     Social History     Tobacco Use     Smoking status: Current Every Day Smoker     Packs/day: 1.00     Years: 30.00     Pack years: 30.00     Types: Cigarettes     Last attempt to quit: 2014     Years since quittin.6     Smokeless tobacco: Never Used     Tobacco comment: pack a day   Substance Use Topics     Alcohol use: Yes     Alcohol/week: 0.0 oz     Comment: x3 drinks per year          Family History:     Family History   Problem Relation Age of Onset     Hypertension Father      Respiratory Father         COPD     Glaucoma Father      C.A.D. Mother         CHF,  age 59 of [place]     Other Cancer Mother      Diabetes Maternal Grandmother      Diabetes Paternal Grandmother      Glaucoma Paternal Grandmother      Melanoma No family hx of      Skin Cancer No family hx of            Allergies:      Allergies   Allergen Reactions     Bee Venom Anaphylaxis, Anxiety, Difficulty breathing, Hives, Itching,  Nausea and Vomiting, Palpitations, Shortness Of Breath and Swelling     Opium Alkaloids, Hcls Itching     Can take opiate derived narcotics with Benadryl.     Latex Rash          Medications:     Current Outpatient Medications   Medication Sig     albuterol (PROVENTIL) (2.5 MG/3ML) 0.083% neb solution TAKE 1 VIAL BY NEBULIZATION EVERY 6 HOURS AS NEEDED FOR SHORTNESS OF BREATH     albuterol (VENTOLIN HFA) 108 (90 Base) MCG/ACT inhaler Inhale 2 puffs into the lungs every 4 hours as needed for shortness of breath / dyspnea or wheezing     cetirizine (ZYRTEC) 10 MG tablet Take 1 tablet (10 mg) by mouth daily (Patient not taking: Reported on 2/15/2019)     cyanocobalamin (VITAMIN B12) 1000 MCG/ML injection ADMINISTER 1 ML(1000 MCG) IN THE MUSCLE EVERY 30 DAYS     diphenhydrAMINE (WAL-DRYL ALLERGY) 25 MG tablet TAKE 1 TABLET(25 MG) BY MOUTH EVERY 6 HOURS AS NEEDED FOR ITCHING OR ALLERGIES (Patient not taking: Reported on 2/5/2019)     fluticasone (FLONASE) 50 MCG/ACT spray Spray 2 sprays into both nostrils daily (Patient not taking: Reported on 2/15/2019)     hydrocortisone 2.5 % cream Apply topically 2 times daily (Patient not taking: Reported on 2/5/2019)     ipratropium (ATROVENT HFA) 17 MCG/ACT inhaler Inhale 2 puffs into the lungs every 6 hours     LORazepam (ATIVAN) 0.5 MG tablet Take one pill 30 minutes prior to procedure.  Do not operate a vehicle after taking this medication (Patient not taking: Reported on 2/5/2019)     mirtazapine (REMERON) 15 MG tablet Take 1 tablet (15 mg) by mouth At Bedtime     mometasone-formoterol (DULERA) 100-5 MCG/ACT inhaler Inhale 2 puffs into the lungs 2 times daily     order for DME Nebulizer     pramipexole (MIRAPEX) 0.125 MG tablet TAKE 1 TO 3 TABLETS BY MOUTH AT BEDTIME     Spacer/Aero Chamber Mouthpiece MISC 1 Units daily     traZODone (DESYREL) 100 MG tablet TAKE 2 TABLETS(200 MG) BY MOUTH EVERY NIGHT AS NEEDED FOR SLEEP     varenicline (CHANTIX) 0.5 MG tablet Take 1 tablet  (0.5 mg) by mouth 2 times daily     No current facility-administered medications for this visit.           Review of Systems:     CONSTITUTIONAL: negative for fever, chills, change in weight  INTEGUMENTARY/SKIN: no rash or obvious new lesions  ENT/MOUTH: no sore throat, new sinus pain or nasal drainage  RESP: see interval history  CV: negative for chest pain, palpitations or peripheral edema  GI: no nausea, vomiting, change in stools  : no dysuria  MUSCULOSKELETAL: no myalgias, arthralgias  ENDOCRINE: blood sugars with adequate control  PSYCHIATRIC: mood stable  LYMPHATIC: no new lymphadenopathy  HEME: no bleeding or easy bruisability  NEURO: no numbness, weakness, headaches         Physical Exam:     Temp:  [98.8  F (37.1  C)] 98.8  F (37.1  C)  Pulse:  [72] 72  Resp:  [16] 16  BP: (116)/(77) 116/77  SpO2:  [96 %] 96 %  Wt Readings from Last 4 Encounters:   02/19/19 38.6 kg (85 lb 1.6 oz)   02/05/19 39.5 kg (87 lb)   11/13/18 41.7 kg (92 lb)   10/09/18 40.3 kg (88 lb 14.4 oz)     Constitutional:   Awake, alert and in no apparent distress     Eyes:   Nonicteric, ARMANI     ENT:    Trachea is midline. No gross neck abnormalities      Neck:   Supple without supraclavicular or cervical lymphadenopathy     Lungs:   Good air flow.  No crackles. No rhonchi.  No wheezes.     Cardiovascular:   Normal S1 and S2.  RRR.  No murmur, gallop or rub.  Radial, DP and PT pulses normal and symmetric     Abdomen:   NABS, soft, nontender, nondistended.  No HSM.     Musculoskeletal:   No edema.      Neurologic:   Alert and conversant. Cranial nerves  intact.       Skin:   Warm, dry.  No rash on limited exam.           Current Laboratory Data:   All laboratory and imaging data reviewed.    No results found for this or any previous visit (from the past 24 hour(s)).         Previous Pulmonary Function Testing     FVC-Pred   Date Value Ref Range Status   02/15/2019 2.59 L      FVC-Pre   Date Value Ref Range Status   02/15/2019 2.64 L       FVC-%Pred-Pre   Date Value Ref Range Status   02/15/2019 101 %      FEV1-Pre   Date Value Ref Range Status   02/15/2019 2.22 L      FEV1-%Pred-Pre   Date Value Ref Range Status   02/15/2019 104 %      FEV1FVC-Pred   Date Value Ref Range Status   02/15/2019 82 %      FEV1FVC-Pre   Date Value Ref Range Status   02/15/2019 84 %      No results found for: 20029  FEFMax-Pred   Date Value Ref Range Status   02/15/2019 5.93 L/sec      FEFMax-Pre   Date Value Ref Range Status   02/15/2019 3.98 L/sec      FEFMax-%Pred-Pre   Date Value Ref Range Status   02/15/2019 67 %      ExpTime-Pre   Date Value Ref Range Status   02/15/2019 4.79 sec      FIFMax-Pre   Date Value Ref Range Status   02/15/2019 4.28 L/sec      FEV1FEV6-Pred   Date Value Ref Range Status   02/15/2019 82 %      FEV1FEV6-Pre   Date Value Ref Range Status   02/15/2019 84 %             Previous Cardiology Imaging   No results found for this or any previous visit (from the past 8760 hour(s)).             Again, thank you for allowing me to participate in the care of your patient.      Sincerely,    Mekhi Orosco MD

## 2019-02-20 DIAGNOSIS — R74.01 NONSPECIFIC ELEVATION OF LEVELS OF TRANSAMINASE OR LACTIC ACID DEHYDROGENASE (LDH): Primary | ICD-10-CM

## 2019-02-20 DIAGNOSIS — R74.02 NONSPECIFIC ELEVATION OF LEVELS OF TRANSAMINASE OR LACTIC ACID DEHYDROGENASE (LDH): Primary | ICD-10-CM

## 2019-02-20 NOTE — RESULT ENCOUNTER NOTE
Dear Yarelis,    It was nice to see you in clinic. The results of your bloodwork show normal kidney function and that you do not have diabetes.    However, your liver enzymes (AST and ALT) are elevated slightly. There are a lot of different things that could cause this. I wanted to further evaluate your liver by getting an ultrasound of the liver and also some bloodwork to make sure that you do not have an infection that could cause this elevation in enzymes (like a hepatitis virus). In the meantime, it would be a good idea to stop drinking alcohol if you do so.    Please make an appointment to see your PCP or myself or another physician in clinic for two weeks so that we can recheck your liver enzymes.       Please let me know if you have any questions or concerns.    Sincerely,   Dr. Grady

## 2019-02-21 LAB
A FUMIGATUS IGE QN: <0.1 KU(A)/L
IGE SERPL-ACNC: 16 KIU/L (ref 0–114)

## 2019-02-21 NOTE — PATIENT INSTRUCTIONS
Recommendations from today's MTM visit:                                                    Today we reviewed what your medicines are for, how to know if they are working, that your medicines are safe and how to make your medicine regimen as easy as possible.     1. Put your Dulera on your bedside table. Take it first thing in the morning and last thing before bed.  Dulera helps to prevent bad breathing but it only works if you take it twice a day, every day. You will know it is helping if, over time, you are need your albuterol less often.     2. When you spacer whistles, that means you are inhaling too quickly and reducing the amount of medicine that is getting into your lungs.  Breath is a little less rapidly when this happens.     Next MTM visit: I will call on Wednesday, March 20th to check in on how often you are remembering Dulera.    To schedule another MTM appointment, please call the clinic directly or you may call the MTM scheduling line at [phone] or toll-free at [phone].     My Clinical Pharmacist's contact information:                                                      It was a pleasure talking with you today!  Please feel free to contact me with any questions or concerns you have.      Karen Reyes, Pharm.D., [facility]  Medication Therapy Management Pharmacist  Page/VM:  [phone]      You may receive a survey about the MTM services you received by email and/or US Mail.  I would appreciate your feedback to help me serve you better in the future. Your comments will be anonymous.

## 2019-03-01 ASSESSMENT — ENCOUNTER SYMPTOMS
NAIL CHANGES: 0
DYSURIA: 0
DOUBLE VISION: 0
POOR WOUND HEALING: 0
DYSPNEA ON EXERTION: 0
HEMOPTYSIS: 0
EYE PAIN: 1
NECK MASS: 0
NERVOUS/ANXIOUS: 0
DECREASED CONCENTRATION: 0
DYSPNEA ON EXERTION: 0
HOARSE VOICE: 0
SPUTUM PRODUCTION: 0
SKIN CHANGES: 0
DYSURIA: 0
EYE WATERING: 0
FLANK PAIN: 1
EYE REDNESS: 0
EYE WATERING: 0
TROUBLE SWALLOWING: 1
ABDOMINAL PAIN: 0
PANIC: 0
SINUS PAIN: 0
HEMATURIA: 0
INSOMNIA: 1
NECK MASS: 0
NAIL CHANGES: 0
BLOATING: 0
EYE PAIN: 1
SORE THROAT: 1
SORE THROAT: 1
POSTURAL DYSPNEA: 0
VOMITING: 0
NAUSEA: 0
TASTE DISTURBANCE: 0
DEPRESSION: 0
NERVOUS/ANXIOUS: 0
COUGH DISTURBING SLEEP: 0
EYE REDNESS: 0
DOUBLE VISION: 0
BOWEL INCONTINENCE: 0
EYE IRRITATION: 1
HEMOPTYSIS: 0
BLOOD IN STOOL: 0
SNORES LOUDLY: 0
BLOOD IN STOOL: 0
HOARSE VOICE: 0
SINUS CONGESTION: 1
TASTE DISTURBANCE: 0
BLOATING: 0
SPUTUM PRODUCTION: 0
DIARRHEA: 0
CONSTIPATION: 1
CONSTIPATION: 1
HEARTBURN: 1
BOWEL INCONTINENCE: 0
COUGH DISTURBING SLEEP: 0
ABDOMINAL PAIN: 0
RECTAL PAIN: 0
COUGH: 0
EYE IRRITATION: 1
RECTAL PAIN: 0
SHORTNESS OF BREATH: 0
JAUNDICE: 0
DEPRESSION: 0
HEARTBURN: 1
DIFFICULTY URINATING: 1
VOMITING: 0
DIARRHEA: 0
POSTURAL DYSPNEA: 0
JAUNDICE: 0
INSOMNIA: 1
SKIN CHANGES: 0
FLANK PAIN: 1
SHORTNESS OF BREATH: 0
PANIC: 0
SNORES LOUDLY: 0
TROUBLE SWALLOWING: 1
NAUSEA: 0
WHEEZING: 0
SINUS PAIN: 0
WHEEZING: 0
POOR WOUND HEALING: 0
DECREASED CONCENTRATION: 0
SMELL DISTURBANCE: 0
HEMATURIA: 0
SMELL DISTURBANCE: 0
COUGH: 0
SINUS CONGESTION: 1
DIFFICULTY URINATING: 1

## 2019-03-03 NOTE — TELEPHONE ENCOUNTER
*Pending clarification on appt type - inbasket sent to Christy DE LA VEGA*    Pt was scheduled as a new pt on 2/19/19 with Dr Orosco for lung nodules, and appt is listed as completed. Pt is scheduled again as a new pt on 3/5/19 with Dr Orosco for lung nodules.    RECORDS STATUS - ALL OTHER DIAGNOSIS      RECORDS RECEIVED FROM:  - in [facility] (internal referral)   DATE RECEIVED: 3/3/19   NOTES STATUS DETAILS   OFFICE NOTE from referring provider Received Epic- Dr. Milton Brothers   OFFICE NOTE from medical oncologist     DISCHARGE SUMMARY from hospital Received Epic   DISCHARGE REPORT from the ER Received [facility]   OPERATIVE REPORT Received Epic   MEDICATION LIST Reiceived [facility]   CLINICAL TRIAL TREATMENTS TO DATE     LABS     PATHOLOGY REPORTS Received [facility]   ANYTHING RELATED TO DIAGNOSIS Received Epic/[employer] [facility]   GENONOMIC TESTING     TYPE:     IMAGING (NEED IMAGES & REPORT)     CT SCANS Received Epic/[employer]   MRI     MAMMO Received Epic   ULTRASOUND Received Epic/[employer]   PET

## 2019-03-05 ENCOUNTER — PRE VISIT (OUTPATIENT)
Dept: PULMONOLOGY | Facility: CLINIC | Age: 49
End: 2019-03-05

## 2019-03-05 ENCOUNTER — ANCILLARY PROCEDURE (OUTPATIENT)
Dept: ULTRASOUND IMAGING | Facility: CLINIC | Age: 49
End: 2019-03-05
Attending: INTERNAL MEDICINE
Payer: COMMERCIAL

## 2019-03-05 ENCOUNTER — OFFICE VISIT (OUTPATIENT)
Dept: INTERNAL MEDICINE | Facility: CLINIC | Age: 49
End: 2019-03-05
Payer: COMMERCIAL

## 2019-03-05 ENCOUNTER — OFFICE VISIT (OUTPATIENT)
Dept: PULMONOLOGY | Facility: CLINIC | Age: 49
End: 2019-03-05
Attending: INTERNAL MEDICINE
Payer: COMMERCIAL

## 2019-03-05 VITALS
DIASTOLIC BLOOD PRESSURE: 55 MMHG | OXYGEN SATURATION: 100 % | HEART RATE: 60 BPM | RESPIRATION RATE: 16 BRPM | BODY MASS INDEX: 18.55 KG/M2 | TEMPERATURE: 98.2 F | HEIGHT: 59 IN | SYSTOLIC BLOOD PRESSURE: 93 MMHG | WEIGHT: 92 LBS

## 2019-03-05 VITALS
DIASTOLIC BLOOD PRESSURE: 55 MMHG | HEART RATE: 60 BPM | WEIGHT: 92 LBS | BODY MASS INDEX: 18.8 KG/M2 | SYSTOLIC BLOOD PRESSURE: 93 MMHG | OXYGEN SATURATION: 100 %

## 2019-03-05 DIAGNOSIS — R79.89 ABNORMAL LFTS: ICD-10-CM

## 2019-03-05 DIAGNOSIS — R74.02 NONSPECIFIC ELEVATION OF LEVELS OF TRANSAMINASE OR LACTIC ACID DEHYDROGENASE (LDH): ICD-10-CM

## 2019-03-05 DIAGNOSIS — R74.01 NONSPECIFIC ELEVATION OF LEVELS OF TRANSAMINASE OR LACTIC ACID DEHYDROGENASE (LDH): ICD-10-CM

## 2019-03-05 DIAGNOSIS — Z00.00 HEALTHCARE MAINTENANCE: ICD-10-CM

## 2019-03-05 DIAGNOSIS — R91.8 PULMONARY NODULES: Primary | ICD-10-CM

## 2019-03-05 DIAGNOSIS — E53.8 VITAMIN B12 DEFICIENCY (NON ANEMIC): ICD-10-CM

## 2019-03-05 DIAGNOSIS — Z72.0 TOBACCO ABUSE: ICD-10-CM

## 2019-03-05 DIAGNOSIS — Z12.4 SCREENING FOR CERVICAL CANCER: ICD-10-CM

## 2019-03-05 DIAGNOSIS — Z00.00 HEALTHCARE MAINTENANCE: Primary | ICD-10-CM

## 2019-03-05 LAB
ALBUMIN SERPL-MCNC: 2.8 G/DL (ref 3.4–5)
ALP SERPL-CCNC: 75 U/L (ref 40–150)
ALT SERPL W P-5'-P-CCNC: 146 U/L (ref 0–50)
ANION GAP SERPL CALCULATED.3IONS-SCNC: 4 MMOL/L (ref 3–14)
AST SERPL W P-5'-P-CCNC: 86 U/L (ref 0–45)
BILIRUB SERPL-MCNC: 0.4 MG/DL (ref 0.2–1.3)
BUN SERPL-MCNC: 7 MG/DL (ref 7–30)
CALCIUM SERPL-MCNC: 7.4 MG/DL (ref 8.5–10.1)
CHLORIDE SERPL-SCNC: 101 MMOL/L (ref 94–109)
CO2 SERPL-SCNC: 31 MMOL/L (ref 20–32)
CREAT SERPL-MCNC: 0.85 MG/DL (ref 0.52–1.04)
GFR SERPL CREATININE-BSD FRML MDRD: 80 ML/MIN/{1.73_M2}
GLUCOSE SERPL-MCNC: 85 MG/DL (ref 70–99)
POTASSIUM SERPL-SCNC: 3.2 MMOL/L (ref 3.4–5.3)
PROT SERPL-MCNC: 5.2 G/DL (ref 6.8–8.8)
SODIUM SERPL-SCNC: 136 MMOL/L (ref 133–144)
VIT B12 SERPL-MCNC: 927 PG/ML (ref 193–986)

## 2019-03-05 PROCEDURE — G0463 HOSPITAL OUTPT CLINIC VISIT: HCPCS | Mod: ZF

## 2019-03-05 ASSESSMENT — MIFFLIN-ST. JEOR: SCORE: 935

## 2019-03-05 ASSESSMENT — PAIN SCALES - GENERAL: PAINLEVEL: NO PAIN (0)

## 2019-03-05 NOTE — TELEPHONE ENCOUNTER
I changed the appt type from NP to RTN after receiving confirmation from Christy DE LA VEGA that this pt should've been scheduled as a return patient not a new patient.

## 2019-03-05 NOTE — LETTER
3/5/2019       RE: Kristina Eldridge  [address]  [facility] [address]     Dear Colleague,    Thank you for referring your patient, Kristina Eldridge, to the [facility] at [facility]. Please see a copy of my visit note below.    LUNG NODULE & INTERVENTIONAL PULMONARY CLINIC  CLINICS & SURGERY [place], [facility], [facility]     Kristina Eldridge MRN# [id number]   Age: 48 year old YOB: 1970     Reason for Consultation: lung nodule(s) and COPD    Requesting Physician: Milton Brothers MD  [address] [place] [address]       Assessment and Plan:    1. New multiple pulmonary lung nodule(s). Given the characteristics on current/previous imaging and risk factors; I would classify this to be Intermediate (6-65%) risk for cancer. Sub4mm nodules in setting of mucous plugging. Recommend annual lung cancer screening.      2. Dyspnea, wheezing 2/2 asthma/COPD exacerbation. Feels much better following prednisone and abx. Continuing dulera, singulair and prn atrovent. Has a few more days of prednisone left. Will observe her off the prednisone without taper. I discussed that if her dyspnea worsens, to call the office for prednisone taper.      3. Latent TB. Completed INH. No evidence of reactivation on CT today.        Billing: I spent more than 30 minutes face to face and greater than 50% of time was for counseling and coordination of care about the issues above.      Mekhi Orosco MD           History:   Kristina Eldridge is a 48 year old female with sig h/o for asthma/copd, RLS, latent TB,hepatitis C,  who is here for evaluation/followup of lung nodule(s).     -  Breathing is much improved following inhaler therapies.   - Personal hx of cancer: No. Up-to-date on mammo. Never had c-scope.   - Family hx of cancer: Uncle had lung cancer.   - Exposure hx: Denies asbestos or radon exposure   - Tobacco hx: Current  Smoker: 1.5ppd at age 15yrs. On chantix now.    - My interpretation of the images relevant for this visit includes: nodules   - My interpretation of the PFT's relevant for this visit includes: difficulty reaching ATS criteria      Culprit Nodule(s):   Multiple bilateral lung nodule(s) that are sub 4 mm. First seen by chest CT on 2/5/19. First observed on this date      Other active medical problems include:   - Has asthma/COPD. Severe persistent. On Dulera and atrovent+albuterol q6 RTC for past 1.5months. Last dose of prednisone was 6mo ago. No hospitalization.   - Had latent TB from homeless [facility] in [place]. Completed 9mo INH in 1994.    - Has Hepatitis C. Stable.           Past Medical History:      Past Medical History:   Diagnosis Date     Abnormal Pap smear 2/21/2017     said pap was abnormal     Arthritis      Chronic back pain     hx spondylolisthesis     Chronic pain      COPD (chronic obstructive pulmonary disease) (H)     PFT's 2013 FEV1/FVC = 2.68/3.20 = 84%     Hepatitis C     treated ribaviron & interferon in 2008 for serotype 2 with failed 6 month treatment     Hyperlipidemia     on simvatatin     Positive TB test     has taken INH     RLS (restless legs syndrome)      TB lung, latent 1994    treated     Uncomplicated asthma      Wounds and injuries 2003           Past Surgical History:      Past Surgical History:   Procedure Laterality Date     CHOLECYSTECTOMY       D & C  1987     ESOPHAGOSCOPY, GASTROSCOPY, DUODENOSCOPY (EGD), COMBINED  7/30/2014    Procedure: COMBINED ESOPHAGOSCOPY, GASTROSCOPY, DUODENOSCOPY (EGD);  Surgeon: Ramesh Self MD;  Location: [facility]     FRACTURE TX, ANKLE RT/LT  1991    ORIF rt ankle     FUSION SPINE POSTERIOR ONE LEVEL N/A 11/21/2014    Procedure: FUSION SPINE POSTERIOR ONE LEVEL;  Surgeon: Amilcar Baldwin MD;  Location: [facility]     FUSION SPINE POSTERIOR ONE LEVEL Right 7/30/2015    Procedure: FUSION SPINE POSTERIOR ONE LEVEL;  Surgeon:  Amilcar Baldwin MD;  Location: [facility]     GASTRIC BYPASS      lost 150 lbs     GRAFT BONE FROM ILIAC CREST Left 2014    Procedure: GRAFT BONE FROM ILIAC CREST;  Surgeon: Amilcar Baldwin MD;  Location: [facility]     OPTICAL TRACKING SYSTEM FUSION POSTERIOR SPINE LUMBAR  2013    Procedure: OPTICAL TRACKING SYSTEM FUSION SPINE POSTERIOR LUMBAR ONE LEVEL;  Lumbar 4-5 Transforaminal Interbody Fusion;  Surgeon: Amilcar Baldwin MD;  Location: [facility]     ORTHOPEDIC SURGERY      shoulder surgery, removed bone spur     TUBAL LIGATION            Social History:     Social History     Tobacco Use     Smoking status: Current Every Day Smoker     Packs/day: 1.00     Years: 30.00     Pack years: 30.00     Types: Cigarettes     Last attempt to quit: 2014     Years since quittin.7     Smokeless tobacco: Never Used     Tobacco comment: pack a day   Substance Use Topics     Alcohol use: Yes     Alcohol/week: 0.0 oz     Comment: x3 drinks per year          Family History:     Family History   Problem Relation Age of Onset     Hypertension Father      Respiratory Father         COPD     Glaucoma Father      C.A.D. Mother         CHF,  age 59 of MI     Other Cancer Mother      Diabetes Maternal Grandmother      Diabetes Paternal Grandmother      Glaucoma Paternal Grandmother      Melanoma No family hx of      Skin Cancer No family hx of            Allergies:      Allergies   Allergen Reactions     Bee Venom Anaphylaxis, Anxiety, Difficulty breathing, Hives, Itching, Nausea and Vomiting, Palpitations, Shortness Of Breath and Swelling     Opium Alkaloids, Hcls Itching     Can take opiate derived narcotics with Benadryl.     Latex Rash          Medications:     Current Outpatient Medications   Medication Sig     albuterol (PROVENTIL) (2.5 MG/3ML) 0.083% neb solution TAKE 1 VIAL BY NEBULIZATION EVERY 6 HOURS AS NEEDED FOR SHORTNESS OF BREATH     albuterol (VENTOLIN HFA) 108  (90 Base) MCG/ACT inhaler Inhale 2 puffs into the lungs every 4 hours as needed for shortness of breath / dyspnea or wheezing     cetirizine (ZYRTEC) 10 MG tablet Take 1 tablet (10 mg) by mouth daily     cyanocobalamin (VITAMIN B12) 1000 MCG/ML injection ADMINISTER 1 ML(1000 MCG) IN THE MUSCLE EVERY 30 DAYS     diphenhydrAMINE (WAL-DRYL ALLERGY) 25 MG tablet TAKE 1 TABLET(25 MG) BY MOUTH EVERY 6 HOURS AS NEEDED FOR ITCHING OR ALLERGIES     fluticasone (FLONASE) 50 MCG/ACT spray Spray 2 sprays into both nostrils daily     hydrocortisone 2.5 % cream Apply topically 2 times daily     ipratropium (ATROVENT HFA) 17 MCG/ACT inhaler Inhale 2 puffs into the lungs every 6 hours     mirtazapine (REMERON) 15 MG tablet Take 1 tablet (15 mg) by mouth At Bedtime     mometasone-formoterol (DULERA) 100-5 MCG/ACT inhaler Inhale 2 puffs into the lungs 2 times daily     montelukast (SINGULAIR) 10 MG tablet Take 1 tablet (10 mg) by mouth At Bedtime     order for DME Nebulizer     pramipexole (MIRAPEX) 0.125 MG tablet TAKE 1 TO 3 TABLETS BY MOUTH AT BEDTIME     predniSONE (DELTASONE) 20 MG tablet Take 40 mg by mouth 2 times daily for 7 days.     traZODone (DESYREL) 100 MG tablet TAKE 2 TABLETS(200 MG) BY MOUTH EVERY NIGHT AS NEEDED FOR SLEEP     varenicline (CHANTIX) 0.5 MG tablet Take 1 tablet (0.5 mg) by mouth 2 times daily     LORazepam (ATIVAN) 0.5 MG tablet Take one pill 30 minutes prior to procedure.  Do not operate a vehicle after taking this medication (Patient not taking: Reported on 2/5/2019)     Spacer/Aero Chamber Mouthpiece MISC 1 Units daily     No current facility-administered medications for this visit.           Review of Systems:     CONSTITUTIONAL: negative for fever, chills, change in weight  INTEGUMENTARY/SKIN: no rash or obvious new lesions  ENT/MOUTH: no sore throat, new sinus pain or nasal drainage  RESP: see interval history  CV: negative for chest pain, palpitations or peripheral edema  GI: no nausea, vomiting,  change in stools  : no dysuria  MUSCULOSKELETAL: no myalgias, arthralgias  ENDOCRINE: blood sugars with adequate control  PSYCHIATRIC: mood stable  LYMPHATIC: no new lymphadenopathy  HEME: no bleeding or easy bruisability  NEURO: no numbness, weakness, headaches         Physical Exam:     Temp:  [98.2  F (36.8  C)] 98.2  F (36.8  C)  Pulse:  [60] 60  Resp:  [16] 16  BP: (93)/(55) 93/55  SpO2:  [100 %] 100 %  Wt Readings from Last 4 Encounters:   03/05/19 41.7 kg (92 lb)   03/05/19 41.7 kg (92 lb)   02/19/19 38.6 kg (85 lb 1.6 oz)   02/05/19 39.5 kg (87 lb)     Constitutional:   Awake, alert and in no apparent distress     Eyes:   Nonicteric, ARMANI     ENT:    Trachea is midline. No gross neck abnormalities      Neck:   Supple without supraclavicular or cervical lymphadenopathy     Lungs:   Good air flow.  No crackles. No rhonchi.  No wheezes.     Cardiovascular:   Normal S1 and S2.  RRR.  No murmur, gallop or rub.  Radial, DP and PT pulses normal and symmetric     Abdomen:   NABS, soft, nontender, nondistended.  No HSM.     Musculoskeletal:   No edema.      Neurologic:   Alert and conversant. Cranial nerves  intact.       Skin:   Warm, dry.  No rash on limited exam.           Current Laboratory Data:   All laboratory and imaging data reviewed.    Results for orders placed or performed in visit on 03/05/19 (from the past 24 hour(s))   US Abdomen complete    Narrative    EXAMINATION: US ABDOMEN COMPLETE,  3/5/2019 3:07 PM     COMPARISON: 3/24/2015.    HISTORY: Nonspecific elevation of transaminases or lactic acid  dehydrogenase.    TECHNIQUE: The abdomen was scanned in standard fashion with  specialized ultrasound transducer(s) using both gray-scale and limited  color Doppler techniques.    FINDINGS:  Liver: Homogeneous echotexture of the liver parenchyma with diffuse  increased echogenicity. No focal liver lesions. Main portal vein  measures 1.2 cm with antegrade flow.    Gallbladder: Surgically absent.    Bile  Ducts: Both the intra- and extrahepatic biliary system are of  normal caliber.  The common bile duct measures 7 millimeter in  diameter.    Pancreas: Visualized portions of the head and body of the pancreas are  unremarkable.     Kidneys: Both kidneys are of normal echotexture, without solid mass or  hydronephrosis.   The craniocaudal dimensions are: right- 11.1 cm,  left- 8.4 cm. Tiny cysts with a punctate mural calcification in the  inferior pole of the left kidney measuring 0.5 cm.    Spleen: The spleen is normal in size,  measuring 8.5 cm in sagittal  dimension.    Aorta and IVC: Proximal, mid and distal abdominal aorta measure 2.1,  1.7 and 1.4 cm, respectively. Proximal IVC measures 1.3 cm.    Fluid: No evidence of ascites or pleural effusions.      Impression    IMPRESSION: Diffuse increased echogenicity throughout the liver  parenchyma which could be seen in hepatic intrinsic parenchymal  disease such as hepatic steatosis. No focal liver lesions.    I have personally reviewed the examination and initial interpretation  and I agree with the findings.    HUMPHREY CARTER MD            Previous Pulmonary Function Testing     FVC-Pred   Date Value Ref Range Status   02/15/2019 2.59 L      FVC-Pre   Date Value Ref Range Status   02/15/2019 2.64 L      FVC-%Pred-Pre   Date Value Ref Range Status   02/15/2019 101 %      FEV1-Pre   Date Value Ref Range Status   02/15/2019 2.22 L      FEV1-%Pred-Pre   Date Value Ref Range Status   02/15/2019 104 %      FEV1FVC-Pred   Date Value Ref Range Status   02/15/2019 82 %      FEV1FVC-Pre   Date Value Ref Range Status   02/15/2019 84 %      No results found for: 20029  FEFMax-Pred   Date Value Ref Range Status   02/15/2019 5.93 L/sec      FEFMax-Pre   Date Value Ref Range Status   02/15/2019 3.98 L/sec      FEFMax-%Pred-Pre   Date Value Ref Range Status   02/15/2019 67 %      ExpTime-Pre   Date Value Ref Range Status   02/15/2019 4.79 sec      FIFMax-Pre   Date Value Ref  Range Status   02/15/2019 4.28 L/sec      FEV1FEV6-Pred   Date Value Ref Range Status   02/15/2019 82 %      FEV1FEV6-Pre   Date Value Ref Range Status   02/15/2019 84 %      No results found for: 20055         Previous Cardiology Imaging   No results found for this or any previous visit (from the past 8760 hour(s)).                 Mekhi Orosco MD

## 2019-03-05 NOTE — NURSING NOTE
"Oncology Rooming Note    March 5, 2019 3:24 PM   Kristina Eldridge is a 48 year old female who presents for:    Chief Complaint   Patient presents with     Oncology Clinic Visit     New; Lung Nodules     Initial Vitals: BP 93/55   Pulse 60   Temp 98.2  F (36.8  C) (Oral)   Resp 16   Ht 1.486 m (4' 10.5\")   Wt 41.7 kg (92 lb)   LMP 06/11/2012   SpO2 100%   BMI 18.90 kg/m   Estimated body mass index is 18.9 kg/m  as calculated from the following:    Height as of this encounter: 1.486 m (4' 10.5\").    Weight as of this encounter: 41.7 kg (92 lb). Body surface area is 1.31 meters squared.  No Pain (0) Comment: Data Unavailable   Patient's last menstrual period was 06/11/2012.  Allergies reviewed: Yes  Medications reviewed: Yes    Medications: Medication refills not needed today.  Pharmacy name entered into [facility]: [id number] - [place], [place] - [address]    Clinical concerns: New; Lung Nodules       Yessi Sanches, STEVE              "

## 2019-03-05 NOTE — PROGRESS NOTES
LUNG NODULE & INTERVENTIONAL PULMONARY CLINIC  [facility]     Kristina Eldridge MRN# [id number]   Age: 48 year old YOB: 1970     Reason for Consultation: lung nodule(s) and COPD    Requesting Physician: Milton Brothers MD  [address] [place] [address]       Assessment and Plan:    1. New multiple pulmonary lung nodule(s). Given the characteristics on current/previous imaging and risk factors; I would classify this to be Intermediate (6-65%) risk for cancer. Sub4mm nodules in setting of mucous plugging. Recommend annual lung cancer screening.      2. Dyspnea, wheezing 2/2 asthma/COPD exacerbation. Feels much better following prednisone and abx. Continuing dulera, singulair and prn atrovent. Has a few more days of prednisone left. Will observe her off the prednisone without taper. I discussed that if her dyspnea worsens, to call the office for prednisone taper.      3. Latent TB. Completed INH. No evidence of reactivation on CT today.        Billing: I spent more than 30 minutes face to face and greater than 50% of time was for counseling and coordination of care about the issues above.      Mekhi Orosco MD   of Medicine  Interventional Pulmonology  Department of Pulmonary, Allergy, Critical Care and Sleep Medicine   [facility]  Pager: [phone]          History:   Kristina Eldridge is a 48 year old female with sig h/o for asthma/copd, RLS, latent TB,hepatitis C,  who is here for evaluation/followup of lung nodule(s).     - Breathing is much improved following inhaler therapies.   - Personal hx of cancer: No. Up-to-date on mammo. Never had c-scope.   - Family hx of cancer: Uncle had lung cancer.   - Exposure hx: Denies asbestos or radon exposure   - Tobacco hx: Current Smoker: 1.5ppd at age 15yrs. On chantix now.    - My interpretation of the images relevant for this visit  includes: nodules   - My interpretation of the PFT's relevant for this visit includes: difficulty reaching ATS criteria      Culprit Nodule(s):   Multiple bilateral lung nodule(s) that are sub 4 mm. First seen by chest CT on 2/5/19. First observed on this date      Other active medical problems include:   - Has asthma/COPD. Severe persistent. On Dulera and atrovent+albuterol q6 RTC for past 1.5months. Last dose of prednisone was 6mo ago. No hospitalization.   - Had latent TB from homeless shelter in [place]. Completed 9mo INH in 1994.    - Has Hepatitis C. Stable.           Past Medical History:      Past Medical History:   Diagnosis Date     Abnormal Pap smear 2/21/2017    Dr said pap was abnormal     Arthritis      Chronic back pain     hx spondylolisthesis     Chronic pain      COPD (chronic obstructive pulmonary disease) (H)     PFT's 2013 FEV1/FVC = 2.68/3.20 = 84%     Hepatitis C     treated ribaviron & interferon in 2008 for serotype 2 with failed 6 month treatment     Hyperlipidemia     on simvatatin     Positive TB test     has taken INH     RLS (restless legs syndrome)      TB lung, latent 1994    treated     Uncomplicated asthma      Wounds and injuries 2003           Past Surgical History:      Past Surgical History:   Procedure Laterality Date     CHOLECYSTECTOMY       D & C  1987     ESOPHAGOSCOPY, GASTROSCOPY, DUODENOSCOPY (EGD), COMBINED  7/30/2014    Procedure: COMBINED ESOPHAGOSCOPY, GASTROSCOPY, DUODENOSCOPY (EGD);  Surgeon: Ramesh Self MD;  Location: [facility]     FRACTURE TX, ANKLE RT/LT  1991    ORIF rt ankle     FUSION SPINE POSTERIOR ONE LEVEL N/A 11/21/2014    Procedure: FUSION SPINE POSTERIOR ONE LEVEL;  Surgeon: Amilcar Baldwin MD;  Location: [facility]     FUSION SPINE POSTERIOR ONE LEVEL Right 7/30/2015    Procedure: FUSION SPINE POSTERIOR ONE LEVEL;  Surgeon: Amilcar Baldwin MD;  Location: [facility]     GASTRIC BYPASS  2007    lost 150 lbs     GRAFT BONE  FROM ILIAC CREST Left 2014    Procedure: GRAFT BONE FROM ILIAC CREST;  Surgeon: Amilcar Baldwin MD;  Location: [facility]     OPTICAL TRACKING SYSTEM FUSION POSTERIOR SPINE LUMBAR  2013    Procedure: OPTICAL TRACKING SYSTEM FUSION SPINE POSTERIOR LUMBAR ONE LEVEL;  Lumbar 4-5 Transforaminal Interbody Fusion;  Surgeon: Amilcar Baldwin MD;  Location: [facility]     ORTHOPEDIC SURGERY      shoulder surgery, removed bone spur     TUBAL LIGATION            Social History:     Social History     Tobacco Use     Smoking status: Current Every Day Smoker     Packs/day: 1.00     Years: 30.00     Pack years: 30.00     Types: Cigarettes     Last attempt to quit: 2014     Years since quittin.7     Smokeless tobacco: Never Used     Tobacco comment: pack a day   Substance Use Topics     Alcohol use: Yes     Alcohol/week: 0.0 oz     Comment: x3 drinks per year          Family History:     Family History   Problem Relation Age of Onset     Hypertension Father      Respiratory Father         COPD     Glaucoma Father      C.A.D. Mother         CHF,  age 59 of MI     Other Cancer Mother      Diabetes Maternal Grandmother      Diabetes Paternal Grandmother      Glaucoma Paternal Grandmother      Melanoma No family hx of      Skin Cancer No family hx of            Allergies:      Allergies   Allergen Reactions     Bee Venom Anaphylaxis, Anxiety, Difficulty breathing, Hives, Itching, Nausea and Vomiting, Palpitations, Shortness Of Breath and Swelling     Opium Alkaloids, Hcls Itching     Can take opiate derived narcotics with Benadryl.     Latex Rash          Medications:     Current Outpatient Medications   Medication Sig     albuterol (PROVENTIL) (2.5 MG/3ML) 0.083% neb solution TAKE 1 VIAL BY NEBULIZATION EVERY 6 HOURS AS NEEDED FOR SHORTNESS OF BREATH     albuterol (VENTOLIN HFA) 108 (90 Base) MCG/ACT inhaler Inhale 2 puffs into the lungs every 4 hours as needed for shortness of breath /  dyspnea or wheezing     cetirizine (ZYRTEC) 10 MG tablet Take 1 tablet (10 mg) by mouth daily     cyanocobalamin (VITAMIN B12) 1000 MCG/ML injection ADMINISTER 1 ML(1000 MCG) IN THE MUSCLE EVERY 30 DAYS     diphenhydrAMINE (WAL-DRYL ALLERGY) 25 MG tablet TAKE 1 TABLET(25 MG) BY MOUTH EVERY 6 HOURS AS NEEDED FOR ITCHING OR ALLERGIES     fluticasone (FLONASE) 50 MCG/ACT spray Spray 2 sprays into both nostrils daily     hydrocortisone 2.5 % cream Apply topically 2 times daily     ipratropium (ATROVENT HFA) 17 MCG/ACT inhaler Inhale 2 puffs into the lungs every 6 hours     mirtazapine (REMERON) 15 MG tablet Take 1 tablet (15 mg) by mouth At Bedtime     mometasone-formoterol (DULERA) 100-5 MCG/ACT inhaler Inhale 2 puffs into the lungs 2 times daily     montelukast (SINGULAIR) 10 MG tablet Take 1 tablet (10 mg) by mouth At Bedtime     order for DME Nebulizer     pramipexole (MIRAPEX) 0.125 MG tablet TAKE 1 TO 3 TABLETS BY MOUTH AT BEDTIME     predniSONE (DELTASONE) 20 MG tablet Take 40 mg by mouth 2 times daily for 7 days.     traZODone (DESYREL) 100 MG tablet TAKE 2 TABLETS(200 MG) BY MOUTH EVERY NIGHT AS NEEDED FOR SLEEP     varenicline (CHANTIX) 0.5 MG tablet Take 1 tablet (0.5 mg) by mouth 2 times daily     LORazepam (ATIVAN) 0.5 MG tablet Take one pill 30 minutes prior to procedure.  Do not operate a vehicle after taking this medication (Patient not taking: Reported on 2/5/2019)     Spacer/Aero Chamber Mouthpiece MISC 1 Units daily     No current facility-administered medications for this visit.           Review of Systems:     CONSTITUTIONAL: negative for fever, chills, change in weight  INTEGUMENTARY/SKIN: no rash or obvious new lesions  ENT/MOUTH: no sore throat, new sinus pain or nasal drainage  RESP: see interval history  CV: negative for chest pain, palpitations or peripheral edema  GI: no nausea, vomiting, change in stools  : no dysuria  MUSCULOSKELETAL: no myalgias, arthralgias  ENDOCRINE: blood sugars with  adequate control  PSYCHIATRIC: mood stable  LYMPHATIC: no new lymphadenopathy  HEME: no bleeding or easy bruisability  NEURO: no numbness, weakness, headaches         Physical Exam:     Temp:  [98.2  F (36.8  C)] 98.2  F (36.8  C)  Pulse:  [60] 60  Resp:  [16] 16  BP: (93)/(55) 93/55  SpO2:  [100 %] 100 %  Wt Readings from Last 4 Encounters:   03/05/19 41.7 kg (92 lb)   03/05/19 41.7 kg (92 lb)   02/19/19 38.6 kg (85 lb 1.6 oz)   02/05/19 39.5 kg (87 lb)     Constitutional:   Awake, alert and in no apparent distress     Eyes:   Nonicteric, ARMANI     ENT:    Trachea is midline. No gross neck abnormalities      Neck:   Supple without supraclavicular or cervical lymphadenopathy     Lungs:   Good air flow.  No crackles. No rhonchi.  No wheezes.     Cardiovascular:   Normal S1 and S2.  RRR.  No murmur, gallop or rub.  Radial, DP and PT pulses normal and symmetric     Abdomen:   NABS, soft, nontender, nondistended.  No HSM.     Musculoskeletal:   No edema.      Neurologic:   Alert and conversant. Cranial nerves  intact.       Skin:   Warm, dry.  No rash on limited exam.           Current Laboratory Data:   All laboratory and imaging data reviewed.    Results for orders placed or performed in visit on 03/05/19 (from the past 24 hour(s))   US Abdomen complete    Narrative    EXAMINATION: US ABDOMEN COMPLETE,  3/5/2019 3:07 PM     COMPARISON: 3/24/2015.    HISTORY: Nonspecific elevation of transaminases or lactic acid  dehydrogenase.    TECHNIQUE: The abdomen was scanned in standard fashion with  specialized ultrasound transducer(s) using both gray-scale and limited  color Doppler techniques.    FINDINGS:  Liver: Homogeneous echotexture of the liver parenchyma with diffuse  increased echogenicity. No focal liver lesions. Main portal vein  measures 1.2 cm with antegrade flow.    Gallbladder: Surgically absent.    Bile Ducts: Both the intra- and extrahepatic biliary system are of  normal caliber.  The common bile duct measures  7 millimeter in  diameter.    Pancreas: Visualized portions of the head and body of the pancreas are  unremarkable.     Kidneys: Both kidneys are of normal echotexture, without solid mass or  hydronephrosis.   The craniocaudal dimensions are: right- 11.1 cm,  left- 8.4 cm. Tiny cysts with a punctate mural calcification in the  inferior pole of the left kidney measuring 0.5 cm.    Spleen: The spleen is normal in size,  measuring 8.5 cm in sagittal  dimension.    Aorta and IVC: Proximal, mid and distal abdominal aorta measure 2.1,  1.7 and 1.4 cm, respectively. Proximal IVC measures 1.3 cm.    Fluid: No evidence of ascites or pleural effusions.      Impression    IMPRESSION: Diffuse increased echogenicity throughout the liver  parenchyma which could be seen in hepatic intrinsic parenchymal  disease such as hepatic steatosis. No focal liver lesions.    I have personally reviewed the examination and initial interpretation  and I agree with the findings.    HUMPHREY CARTER MD            Previous Pulmonary Function Testing     FVC-Pred   Date Value Ref Range Status   02/15/2019 2.59 L      FVC-Pre   Date Value Ref Range Status   02/15/2019 2.64 L      FVC-%Pred-Pre   Date Value Ref Range Status   02/15/2019 101 %      FEV1-Pre   Date Value Ref Range Status   02/15/2019 2.22 L      FEV1-%Pred-Pre   Date Value Ref Range Status   02/15/2019 104 %      FEV1FVC-Pred   Date Value Ref Range Status   02/15/2019 82 %      FEV1FVC-Pre   Date Value Ref Range Status   02/15/2019 84 %      No results found for: 03675  FEFMax-Pred   Date Value Ref Range Status   02/15/2019 5.93 L/sec      FEFMax-Pre   Date Value Ref Range Status   02/15/2019 3.98 L/sec      FEFMax-%Pred-Pre   Date Value Ref Range Status   02/15/2019 67 %      ExpTime-Pre   Date Value Ref Range Status   02/15/2019 4.79 sec      FIFMax-Pre   Date Value Ref Range Status   02/15/2019 4.28 L/sec      FEV1FEV6-Pred   Date Value Ref Range Status   02/15/2019 82 %       FEV1FEV6-Pre   Date Value Ref Range Status   02/15/2019 84 %      No results found for: 20055         Previous Cardiology Imaging   No results found for this or any previous visit (from the past 8760 hour(s)).

## 2019-03-05 NOTE — PROGRESS NOTES
PRIMARY [facility]ARE CENTER       SUBJECTIVE:  Kristina Eldridge is a 48 year old female with a PMHx of asthma, HLD, depression, s/p bariatric surgery, HCC s/p treatment with liver fibrosis, current tobacco abuse, marijuana abuse,  who comes in for followup and pap smear.    Pt states that since starting ipratropium, her cough and SOB were improved. She also saw pharmacy for education on inhaler use. She feels much better. No fevers/chills, abdominal pain, nausea, vomiting or change in bowel habits.    LFTs were elevated on last office visit. She has a hx of Heptiatis C with biopsy that showed fibrosis. She was treated and had undetectable viral load in 2016. She denies alcohol use. Denies herbal supplements. Denies tylenol use. She denies recent illnesses.     Previous pap smear in 4/2018 showed ASCUS with negative HPV testig.       Medications and allergies reviewed by me today.     ROS:   14 pt ROS completed and negative unless otherwise listed in HPI.     OBJECTIVE:  BP 93/55   Pulse 60   Wt 41.7 kg (92 lb)   LMP 06/11/2012   SpO2 100%   BMI 18.80 kg/m     Wt Readings from Last 1 Encounters:   03/05/19 41.7 kg (92 lb)     General: AAOx3, NAD,   HEENT: no scleral jaundice, injection, MMM  CV: regular  Resp: breathing comfortably on room air  Abd: Soft, non-distended, non-tender, normoactive bs, no HSM, no masses appreciated  Extremities: warm  Skin: dry skin  Neuro: no focal deficits  Psych: Appropriate mood  : normal labia minora/majora, normal vaginal mucosa and cervix    ASSESSMENT/PLAN:  Kristina Eldridge is a 48 year old female with a PMHx of asthma, HLD, depression, s/p bariatric surgery, HCC s/p treatment with liver fibrosis, current tobacco abuse, marijuana abuse,  who comes in for followup and pap smear.    #Healthcare maintenance  Hx of ASCUS on pap smear with negative HPV. Today pap smear was completed without knowledge of this in the past.   Pt has been referred for colonoscopy and needs  to schedule this.     #Tobacco Abuse  Currently in the process of cutting back.    #Elevated LFTs: will get U/S liver today. Hx of HCC with bx showing fibrosis and treatment with negative RNA in blood in Jan 2016. Has not had f/u since that time. Obtaining heptatitis serologies and HIV. Not on alcohol, no tylenol, no herbal/OTC use, no current medications to explain elevation. She denies recent illnesses.     #Pulmonary nodules on CT: enrolled in pulmonary nodule clinic.       Pt should return to clinic for f/u with me as needed.     Marilu Grady MD  Mar 5, 2019    Pt was seen and plan of care discussed with Dr Krishnan.     Marilu Grady MD PhD   Internal Medicine, PGY 2  p     Attending Addendum:  Patient seen and examined with resident in clinic today.  Pertinent portions of history and exam were independently verified by myself.  I agree with the exam and plan as outlined above with the following modifications: none.  Homa Krishnan MD  Internal Medicine        Answers for HPI/ROS submitted by the patient on 3/1/2019   General Symptoms: No  Skin Symptoms: Yes  HENT Symptoms: Yes  EYE SYMPTOMS: Yes  HEART SYMPTOMS: No  LUNG SYMPTOMS: Yes  INTESTINAL SYMPTOMS: Yes  URINARY SYMPTOMS: Yes  GYNECOLOGIC SYMPTOMS: No  BREAST SYMPTOMS: No  SKELETAL SYMPTOMS: No  BLOOD SYMPTOMS: No  NERVOUS SYSTEM SYMPTOMS: No  MENTAL HEALTH SYMPTOMS: Yes  Changes in hair: No  Changes in moles/birth marks: No  Itching: Yes  Rashes: No  Changes in nails: No  Acne: No  Hair in places you don't want it: No  Change in facial hair: No  Warts: No  Non-healing sores: No  Scarring: No  Flaking of skin: Yes  Color changes of hands/feet in cold : No  Sun sensitivity: No  Skin thickening: No  Ear pain: No  Ear discharge: No  Hearing loss: No  Tinnitus: Yes  Nosebleeds: No  Congestion: Yes  Sinus pain: No  Trouble swallowing: Yes   Voice hoarseness: No  Mouth sores: No  Sore throat: Yes  Tooth pain: No  Gum tenderness:  No  Bleeding gums: No  Change in taste: No  Change in sense of smell: No  Dry mouth: Yes  Hearing aid used: No  Neck lump: No  Eye pain: Yes  Vision loss: Yes  Dry eyes: Yes  Watery eyes: No  Eye bulging: No  Double vision: No  Flashing of lights: Yes  Spots: No  Floaters: Yes  Redness: No  Crossed eyes: No  Tunnel Vision: No  Yellowing of eyes: No  Eye irritation: Yes  Cough: No  Sputum or phlegm: No  Coughing up blood: No  Difficulty breating or shortness of breath: No  Snoring: No  Wheezing: No  Difficulty breathing on exertion: No  Nighttime Cough: No  Difficulty breathing when lying flat: No  Heart burn or indigestion: Yes  Nausea: No  Vomiting: No  Abdominal pain: No  Bloating: No  Constipation: Yes  Diarrhea: No  Blood in stool: No  Black stools: No  Rectal or Anal pain: No  Fecal incontinence: No  Yellowing of skin or eyes: No  Vomit with blood: No  Change in stools: No  Trouble holding urine or incontinence: No  Pain or burning: No  Trouble starting or stopping: Yes  Increased frequency of urination: No  Blood in urine: No  Decreased frequency of urination: No  Frequent nighttime urination: No  Flank pain: Yes  Difficulty emptying bladder: Yes  Nervous or Anxious: No  Depression: No  Trouble sleeping: Yes  Trouble thinking or concentrating: No  Mood changes: No  Panic attacks: No

## 2019-03-05 NOTE — PATIENT INSTRUCTIONS
Primary Care Center Phone Number: [phone]   Primary Care Center Medication Refill Request Information:  * Please contact your pharmacy regarding ANY request for medication refills.  ** [facility] Prescription Fax = [phone]  * Please allow 3 business days for routine medication refills.  * Please allow 5 business days for controlled substance medication refills.     Primary Care Center Test Result notification information:  *You will be notified with in 7-10 days of your appointment day regarding the results of your test.  If you are on MyChart you will be notified as soon as the provider has reviewed the results and signed off on them.               [facility]         Internal Medicine Resident                   Continuity Clinic    Who We Are    Resident Continuity Clinic is a part of the [facility].  Resident physicians see patients independently and establish a relationship with them over the course of their three-year residency program.  As with the Primary Care Clinic, our Resident Continuity Clinic models a group practice.  If your doctor is not available, you will be able to see another resident physician.  At the end of a resident s training, patients will be transitioned to a new resident physician for ongoing care.     We treat patients with a wide array of medical needs from routine physicals, to acute illnesses, to diabetes and blood pressure management, to complex medical illness.  What is a Resident Physician?    Resident physicians hold medical degrees and are doctors. They are training to become specialists in Internal Medicine. They work under the supervision of board-certified faculty physicians.  Expectations for Your Care    We strive to provide accessible, quality care at all times.    In order to provide this care, it is best to see your primary care resident doctor consistently rather switching between providers.  In the event you do see another physician, you  should schedule a follow-up visit with your usual primary care doctor.    If you are transitioning your care from another clinic, it is helpful to have your records available for your doctor to review.    We do not prescribe controlled substances, such as ADD medications or narcotic pain medications, on your first visit.  We will review your health records and concerns prior to devising a treatment plan with you in order to provide the best care.      Clinic Services     Extended clinic hours; patient  to help navigate your visit;  parking; laboratory and imaging services with evening and weekend hours    Multiple medical and surgical specialties in one building    Complementary services, including Nutrition, Integrative Medicine, Pharmacy consultations, Mental and Behavioral Health, Sports Medicine and Physical Therapy    Thank You    We would like to thank you for choosing the [facility] Internal Medicine Resident Continuity Clinic for your primary care. You are making a priceless contribution to the training of the next generation of health care practitioners.     Contact us at [phone] for appointments or questions.    Resident Clinic Hours are Tuesdays and Thursdays, 7:30am-5:00pm    Residents   Srinivas Cruz MD   (Male)   Shae Pepper MD  (Female)  Elvi Ng MD   (Female)   Anne Butler MD   (Female)   Remberto Bianchi MD  (Male)   Dami Gamez MD  (Male)   Gary Piper MD    (Female)   Richard Fournier MD  (Male)   Ganesh Negro MD  (Male)    Cristiana Villareal MD  (Female)   Damir Ocampo MD  (Male)   Lalo St MD  (Female)   Tam Luciano MD    (Female)   Juice Shelley MD  (Male)   Jesus Manuel Hearn MD  (Male)   Dami Aleman MD (Male)   Sheldon Swan MD  (Male)   Marilu Grady MD (Female)    Sruthi Alvarado MD (Female)   Tucker Penny MD  (Male)   Rosalina Mcnally MD    (Female)   Dasha Kenney MD  (Female)    Supervising  Physicians   MD Mili Almonte, MD Bran Horta, MD Milton Brothers, MD Homa Krishnan, MD Nereida Streeter, MD Markell Chirinos, MD Kayla Pike MD

## 2019-03-05 NOTE — NURSING NOTE
Chief Complaint   Patient presents with     Gyn Exam     Pt is here for a pap smear       Malinda Hill EMT at 1:34 PM on 3/5/2019

## 2019-03-06 LAB
HAV IGG SER QL IA: NONREACTIVE
HBV CORE AB SERPL QL IA: NONREACTIVE
HBV SURFACE AB SERPL IA-ACNC: 1.08 M[IU]/ML
HBV SURFACE AG SERPL QL IA: NONREACTIVE
HIV 1+2 AB+HIV1 P24 AG SERPL QL IA: NONREACTIVE

## 2019-03-07 DIAGNOSIS — E78.5 HYPERLIPIDEMIA LDL GOAL <100: Primary | ICD-10-CM

## 2019-03-07 LAB
HCV AB SERPL QL IA: REACTIVE
HCV RNA SERPL NAA+PROBE-ACNC: NORMAL [IU]/ML
HCV RNA SERPL NAA+PROBE-LOG IU: NORMAL LOG IU/ML

## 2019-03-08 LAB
COPATH REPORT: NORMAL
PAP: NORMAL

## 2019-03-09 NOTE — RESULT ENCOUNTER NOTE
"Dear Yarelis,    Your pap smear was negative, however, the \"transformation zone\", which is a part of the cervix where most of the pathology can be detected was not present. Given this, it's difficult to interpret if your pap is truly negative. You last had a pap smear in April 2018 that showed atypical cells that were negative for HPV. Given this finding, we should repeat the pap smear 3 years from April 2018. So you should get your next pap on April 2021.    Please let me know if you have any questions or concerns.    Sincerely,   Dr Grady"

## 2019-03-11 ENCOUNTER — OFFICE VISIT (OUTPATIENT)
Dept: CARDIOLOGY | Facility: CLINIC | Age: 49
End: 2019-03-11
Attending: INTERNAL MEDICINE
Payer: COMMERCIAL

## 2019-03-11 DIAGNOSIS — E78.5 HYPERLIPIDEMIA, UNSPECIFIED HYPERLIPIDEMIA TYPE: ICD-10-CM

## 2019-03-11 DIAGNOSIS — R53.83 OTHER FATIGUE: Primary | ICD-10-CM

## 2019-03-11 DIAGNOSIS — E78.5 HYPERLIPIDEMIA LDL GOAL <100: ICD-10-CM

## 2019-03-11 LAB
CREAT UR-MCNC: 41 MG/DL
CRP SERPL HS-MCNC: 4.1 MG/L
FEF 25/75: NORMAL
FEV-1: NORMAL
FEV1/FVC: NORMAL
FINAL DIAGNOSIS: NORMAL
FVC: NORMAL
HPV HR 12 DNA CVX QL NAA+PROBE: NEGATIVE
HPV16 DNA SPEC QL NAA+PROBE: NEGATIVE
HPV18 DNA SPEC QL NAA+PROBE: NEGATIVE
INTERPRETATION ECG - MUSE: NORMAL
MICROALBUMIN UR-MCNC: 6 MG/L
MICROALBUMIN/CREAT UR: 14.83 MG/G CR (ref 0–25)
NT-PROBNP SERPL-MCNC: 129 PG/ML (ref 0–125)
SPECIMEN DESCRIPTION: NORMAL
SPECIMEN SOURCE CVX/VAG CYTO: NORMAL
TSH SERPL DL<=0.005 MIU/L-ACNC: 3.12 MU/L (ref 0.4–4)

## 2019-03-11 ASSESSMENT — MIFFLIN-ST. JEOR: SCORE: 949.74

## 2019-03-11 NOTE — PROGRESS NOTES
[facility] - Exam Note    Active Problems   Patient Active Problem List    Diagnosis Date Noted     Left knee pain 12/06/2018     Priority: Medium     Dermal and epidermal nevus 04/30/2018     Priority: Medium     irritated       Lumbar pseudoarthrosis 07/30/2015     Priority: Medium     Pseudoarthrosis of lumbar spine with nonunion 11/21/2014     Priority: Medium     HIV exposure 03/20/2014     Priority: Medium     Personal history of spine surgery 11/20/2013     Priority: Medium     Lumbar stenosis 10/23/2013     Priority: Medium     Depression 04/19/2013     Priority: Medium     Radicular leg pain 03/01/2013     Priority: Medium     Spondylolisthesis, grade 2 03/01/2013     Priority: Medium     Marijuana smoker, continuous 03/01/2013     Priority: Medium     Chronic hepatitis C (H) 10/19/2012     Priority: Medium     Bariatric surgery status 10/19/2012     Priority: Medium     Hyperlipidemia      Priority: Medium     on simvatatin         Reason For Visit   Patient here for [facility] early detection of atherosclerosis and CVD exam.    Pain Evaluation  Current history of pain associated with this visit is: denied    HPI   Kristina Eldridge is a 48 year old year old female with a history of hyperlipidemia, elevated LFT's,  tobacco use, gastric bypass with 150 pounds weight loss and insomnia. Her LDL has ranged from 78 to 174 without statin medication. She says her lipids are better because she has been eating better. She said she would not take a statin since her father did not do well with a statin. She is currently taking chantix for smoking cessation and would like to quit. She has decrease her packs from 1.5 per day to one per day. No reports of mental health changes.  She has a recent URI and was taking prednisone for a period of time but has been off for several days.     She is bothered by insomnia but drinks 8 cans of caffeine soda per day. We discussed a reduction  plan to see if this would help her sleep. She has a sleep study in 2016 with no apnea reported but she also said she was unable to sleep during the study and was awake all night.    She has a history of TB treated with INH and hepatitis C treated in 2007 with ribaviron with reduction in LFT's until 2-19-19 her LFT's are again elevated.     Nutrition assessment per patient report:     We discussed that the caffeine in the coke may be interfering with her sleep. She may be using it to treat fatigue caused by not sleeping so a [place] of cause and effect. Discussed gradual reduction with 50-50% decafe for a few weeks then additional decrease until she is not drinking caffeine. Her intake of vitamin D and calcium rich foods is low. A follow up vitamin D level is recommended.  Foods with fat/cholesterol (fried foods, fatty meats, junk food):  0 servings , chicken can't eat grease. No butter.  Fruits and vegetables (  cup cooked, 1 cup raw):  green beans, corn, spinach, no fruit.  Caffeine (1 cup coffee, soda, etc):  8 cans of coke.  Alcohol servings (12 oz. beer, 4 oz. wine, 1  oz. in mixed drink):  1-2 times per year  Calcium servings (dairy foods, 8 oz. milk, yogurt, cheese, ice cream):  0 servings, lactose intolerant, lactaid  Salt/sodium use:  light use  Special dietary habits:  low lactose,    Activity  Patient is no active outside in the winter because of her weight loss she is always cold. She is active in the warmer months outside. She is encouraged to be active in her home and she would like a treadmill but cannot afford one right now.    Laboratory Results Review  We discussed laboratory results today including lipids targets and how foods influence cholesterol.    Weight  Her perceived healthy weight is 125 pounds.  A normal BMI of 25 is equal to greater than 96 pounds.  The current BMI of 17.6  is underweight. An increase of weight to 97 pounds recommended and she has tried but been unable to increase her  weight.  PMH   Past Medical History:   Diagnosis Date     Abnormal Pap smear 2/21/2017    Dr said pap was abnormal     Arthritis      Chronic back pain     hx spondylolisthesis     Chronic pain      COPD (chronic obstructive pulmonary disease) (H)     PFT's 2013 FEV1/FVC = 2.68/3.20 = 84%     H/O gastric bypass 2007     Hepatitis C     treated ribaviron & interferon in 2008 for serotype 2 with failed 6 month treatment     Hyperlipidemia     on simvatatin     Insomnia      Positive TB test     has taken INH     RLS (restless legs syndrome)      TB lung, latent 1994    treated     Uncomplicated asthma      Wounds and injuries 2003       PSH  Past Surgical History:   Procedure Laterality Date     CHOLECYSTECTOMY       D & C  1987     ESOPHAGOSCOPY, GASTROSCOPY, DUODENOSCOPY (EGD), COMBINED  7/30/2014    Procedure: COMBINED ESOPHAGOSCOPY, GASTROSCOPY, DUODENOSCOPY (EGD);  Surgeon: Ramesh Self MD;  Location: [facility]     FRACTURE TX, ANKLE RT/LT  1991    ORIF rt ankle     FUSION SPINE POSTERIOR ONE LEVEL N/A 11/21/2014    Procedure: FUSION SPINE POSTERIOR ONE LEVEL;  Surgeon: Amilcar Baldwin MD;  Location: [facility]     FUSION SPINE POSTERIOR ONE LEVEL Right 7/30/2015    Procedure: FUSION SPINE POSTERIOR ONE LEVEL;  Surgeon: Amilcar Baldwin MD;  Location: [facility]     GASTRIC BYPASS  2007    lost 150 lbs     GRAFT BONE FROM ILIAC CREST Left 11/21/2014    Procedure: GRAFT BONE FROM ILIAC CREST;  Surgeon: Amilcar Baldwin MD;  Location: [facility]     OPTICAL TRACKING SYSTEM FUSION POSTERIOR SPINE LUMBAR  11/20/2013    Procedure: OPTICAL TRACKING SYSTEM FUSION SPINE POSTERIOR LUMBAR ONE LEVEL;  Lumbar 4-5 Transforaminal Interbody Fusion;  Surgeon: Amilcar Baldwin MD;  Location: [facility]     ORTHOPEDIC SURGERY      shoulder surgery, removed bone spur     TUBAL LIGATION         Current Meds   Current Outpatient Medications   Medication Sig Dispense Refill     albuterol  (PROVENTIL) (2.5 MG/3ML) 0.083% neb solution TAKE 1 VIAL BY NEBULIZATION EVERY 6 HOURS AS NEEDED FOR SHORTNESS OF BREATH 360 mL 2     albuterol (VENTOLIN HFA) 108 (90 Base) MCG/ACT inhaler Inhale 2 puffs into the lungs every 4 hours as needed for shortness of breath / dyspnea or wheezing 1 Inhaler 11     cetirizine (ZYRTEC) 10 MG tablet Take 1 tablet (10 mg) by mouth daily 30 tablet 11     cyanocobalamin (VITAMIN B12) 1000 MCG/ML injection ADMINISTER 1 ML(1000 MCG) IN THE MUSCLE EVERY 30 DAYS 1 mL 11     diphenhydrAMINE (WAL-DRYL ALLERGY) 25 MG tablet TAKE 1 TABLET(25 MG) BY MOUTH EVERY 6 HOURS AS NEEDED FOR ITCHING OR ALLERGIES 60 tablet 0     fluticasone (FLONASE) 50 MCG/ACT spray Spray 2 sprays into both nostrils daily 1 Bottle 3     hydrocortisone 2.5 % cream Apply topically 2 times daily 30 g 1     ipratropium (ATROVENT HFA) 17 MCG/ACT inhaler Inhale 2 puffs into the lungs every 6 hours 1 Inhaler 11     mirtazapine (REMERON) 15 MG tablet Take 1 tablet (15 mg) by mouth At Bedtime 90 tablet 3     mometasone-formoterol (DULERA) 100-5 MCG/ACT inhaler Inhale 2 puffs into the lungs 2 times daily 1 Inhaler 11     montelukast (SINGULAIR) 10 MG tablet Take 1 tablet (10 mg) by mouth At Bedtime 90 tablet 3     pramipexole (MIRAPEX) 0.125 MG tablet TAKE 1 TO 3 TABLETS BY MOUTH AT BEDTIME 180 tablet 3     Spacer/Aero Chamber Mouthpiece MISC 1 Units daily 1 each 0     traZODone (DESYREL) 100 MG tablet TAKE 2 TABLETS(200 MG) BY MOUTH EVERY NIGHT AS NEEDED FOR SLEEP 60 tablet 2     varenicline (CHANTIX) 0.5 MG tablet Take 1 tablet (0.5 mg) by mouth 2 times daily 180 tablet 0     order for DME Nebulizer 1 each 0       Allergies      Allergies   Allergen Reactions     Bee Venom Anaphylaxis, Anxiety, Difficulty breathing, Hives, Itching, Nausea and Vomiting, Palpitations, Shortness Of Breath and Swelling     Opium Alkaloids, Hcls Itching     Can take opiate derived narcotics with Benadryl.     Latex Rash       Family Hx   Family  History   Problem Relation Age of Onset     Hypertension Father      Respiratory Father         COPD, smoked     Glaucoma Father      C.A.D. 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 Her days are semi active..  She is  with three children ages 30M, 32 M and 35F (adopted daughter)..     Enjoyment of life is 7 with 10 enjoys life fully.    Tobacco History  History   Smoking Status     Current Every Day Smoker     Packs/day: 1.00     Years: 30.00     Types: Cigarettes     Last attempt to quit: 2014   Smokeless Tobacco     Never Used     Comment: pack a day       ROS  CONSTITUTIONAL:  No fever, chills, or sweats. No weight gain/loss.   EENT:  No visual disturbance, ear ache, epistaxis, sore throat  ALLERGIES/IMMUNOLOGIC:  Negative  RESPIRATORY:  No cough, hemoptysis  CARDIOVASCULAR:  As per HPI  GI:  No nausea, vomiting, hematemesis, melena  :  No urinary frequency, dysuria, or hematuria  INTEGUMENT:  Negative  PSYCHIATRIC:  Negative  NEURO:  Negative  ENDOCRINE:  Negative  MUSCULOSKELETAL:  Negative     Vital Signs   BP (P) 106/60 (BP Location: Right arm, Patient Position: Sitting, Cuff Size: Adult Regular)   Pulse (P) 58   Ht (P) 1.524 m (5')   Wt (P) 40.8 kg (90 lb)   LMP 2012   SpO2 (P) 97%   BMI (P) 17.58 kg/m        Waist: 26 inches  Hip: 32 inches    Physical Exam   In general, the patient is a pleasant female in no apparent distress.    HEENT: NC/AT.  PERRLA.  EOMI.  Sclerae white, not injected.  Nares clear.  Pharynx without erythema or exudate.  Dentition intact.    Neck: No adenopathy.  No  thyromegaly.Carotids +4/4 bilaterally without bruits.  No jugular venous distension.   Lungs: CTA.  No ronchi, wheezes, rales.     Cor: RRR. Normal S1, S2 splits physiologically. Grade 1-2 systolic murmur LLSB and RICS, no rub, click, or gallop. The PMI is in the 5th ICS in the midclavicular line. There is no heave.   Abdomen: Soft, nontender, nondistended. No organomegaly.  No bruits.   Extremities: No clubbing, cyanosis, or edema. The pulses are +2/2 at the post-tibial sites bilaterally. No bruits are noted.    Recent Labs  Lab Results   Component Value Date    GLC 85 03/05/2019      Lab Results   Component Value Date    NTBNP 129 (H) 03/11/2019     No results found for: NTBNPI   Lab Results   Component Value Date    UCRR 41 03/11/2019      Lab Results   Component Value Date    MICROL 6 03/11/2019      No results found for: MICROALBUMIN   Lab Results   Component Value Date    CRP 4.1 03/11/2019      Lab Results   Component Value Date    CHOL 108 02/19/2019      Lab Results   Component Value Date    TRIG 63 02/19/2019      Lab Results   Component Value Date    HDL 30 (L) 02/19/2019      Lab Results   Component Value Date    LDL 65 02/19/2019      Lab Results   Component Value Date    VLDL 17 03/05/2015      Lab Results   Component Value Date    CHOLHDLRATIO 4.0 03/05/2015     Lab Results   Component Value Date    NHDL 78 02/19/2019          Hirsch Test Results    BASIC SPIROMETRY: Summary of two attempts (see printout for details of results)  Results Estimated range for ht/age   FVC: 2.86 liter FVC: 1.59-2.44 liter   FEV1: 2.17 liter FEV1: 1.27-1.98 liter     History of asthma:  NO   History of respiratory infection current/recent:  NO     Spirometry Results:  normal      WALKING BLOOD PRESSURE RESPONSE (3 minute, 5 MET level walk)   Pre BP: 80/52 mmHg  3 min BP: 144/50 mmHg  1 min post BP: 86/50 mmHg    Pre HR: 81 bpm  3 min HR: 115 bpm  1 min post HR: 80 bpm       RETINAL VASCULAR ASSESSMENT   Left Eye  Abnormality:  silver wire  AV Ratio: 0.93    Right Eye Abnormality:  silver wire  AV Ratio: 0.82     Retinal Assessment:  borderline      ABDOMINAL AORTA ULTRASOUND (< 2.5 normal, borderline 2.5-2.9, abnormal > 3)   SupraIliac 1.95 cm    SupraRenal 1.81 cm    InfraRenal Proximal 2.14 cm    InfraRenal Distal 2.0 cm      Abdominal Aorta Assessment:  normal      LEFT VENTRICULAR ULTRASOUND MEASUREMENTS (adjusted for BSA)  LVIDD 40.3 mm   Septa 7.8 mm   Posterior 8.9 mm     Left Ventricular US Assessment:  normal      Carotid Artery IMT measurements report and plaques in the small area examined:   Left IMT 0.708 mm  Plaques mild plaque build up in left bulb area size 3.5 and 1.1 mm.     Right IMT 0.664 mm  Plaques mild plaque in right bulb area size 3.5 mm.        ECG (see tracing):  normal sinus rhythm;  rate: 60 bpm      Arterial Elasticity per age and gender (see printout):   C1 11.4 mL/mmHg x 10  borderline   C2 2.3 mL/mmHg x 100 abnormal   Supine blood pressure: 113/53 mmHg       Assessment:     Cardiovascular:  ECG sinus rhythm rate 60. Nt pro BNP borderline at 129. No chest pain or shortness of breath at this time but with recent URI she did feel short of breath. No palpitations, occasionally light headed but her blood pressure is low normal. She feels cold most of the time with fatigue although she did get an energy boost this week and cleaned her kitchen. She is bothered by insomnia. Recommend she reduce her 8 cans of coke to 4 for a few weeks then reduce further until she is not drinking coke to see if this would help her sleeping. Although soda is not recommended if she is working on smoking cessation continuing caffeine free coke is acceptable since her weight is not elevated and triglycerides are normal. Multiple small to moderate size carotid plaques incidentally observed with carotid IMT exam.    Blood Pressure:  Low normal blood pressure 106/60 today. Her arterial stiffnesscompliance is low and we  discussed exercise and smoking cessation to help preserve her compliance.    Lipids:  Fluctuation in LDL up and down from 78 to 174 in the past. Most recent LDL 2-19-19 was 65.  Glucose and Ac1 have been normal range. Recent increase in LFT's with hx of treated Hep C. Hep C antibody reactive pending Hep C RNA She is in the process of follow up for this result with her PCP. She is vocal that she does not want to take a statin. Would recommend a statin if her LDL exceeded 100 mg/dl based on her disease score of 14/20 and ongoing tobacco use.    Health Habit Summary:  Nutrition: Heart Healthy Eating:  some of the time   Exercise:  not regularly active  Weight:  underweight  Tobacco Use:  current tobacco use; 1 ppd    Full report to follow prevention team review of test results with scanned final report.    Time spent for patient visit was 60 minutes with more than half the time spent on counseling and coordination of care.    MILEY Wilkinson CNP       CC  Patient Care Team:  Mili Leon MD as PCP - General (Internal Medicine)  Amilcar Baldwin MD as MD (Specialist)  Nidhi Franco MD as MD (Internal Medicine)  Maine Reeder MD as MD (Internal Medicine)  Bishop Schuster MD as MD (Dermapathology)  Marquez Love MD as MD (Family Practice)  Zafar Trinidad MD as MD (Family Medicine - Sports Medicine)  Markell Vasquez MD as MD (INTERNAL MEDICINE - ENDOCRINOLOGY, DIABETES & METABOLISM)  CAROLANN VARNER  Answers for HPI/ROS submitted by the patient on 3/1/2019   General Symptoms: No  Skin Symptoms: Yes  HENT Symptoms: Yes  EYE SYMPTOMS: Yes  HEART SYMPTOMS: No  LUNG SYMPTOMS: Yes  INTESTINAL SYMPTOMS: Yes  URINARY SYMPTOMS: Yes  GYNECOLOGIC SYMPTOMS: No  BREAST SYMPTOMS: No  SKELETAL SYMPTOMS: No  BLOOD SYMPTOMS: No  NERVOUS SYSTEM SYMPTOMS: No  MENTAL HEALTH SYMPTOMS: Yes  Changes in hair: No  Changes in moles/birth marks:  No  Itching: Yes  Rashes: No  Changes in nails: No  Acne: No  Hair in places you don't want it: No  Change in facial hair: No  Warts: No  Non-healing sores: No  Scarring: No  Flaking of skin: Yes  Color changes of hands/feet in cold : No  Sun sensitivity: No  Skin thickening: No  Ear pain: No  Ear discharge: No  Hearing loss: No  Tinnitus: Yes  Nosebleeds: No  Congestion: Yes  Sinus pain: No  Trouble swallowing: Yes   Voice hoarseness: No  Mouth sores: No  Sore throat: Yes  Tooth pain: No  Gum tenderness: No  Bleeding gums: No  Change in taste: No  Change in sense of smell: No  Dry mouth: Yes  Hearing aid used: No  Neck lump: No  Eye pain: Yes  Vision loss: Yes  Dry eyes: Yes  Watery eyes: No  Eye bulging: No  Double vision: No  Flashing of lights: Yes  Spots: No  Floaters: Yes  Redness: No  Crossed eyes: No  Tunnel Vision: No  Yellowing of eyes: No  Eye irritation: Yes  Cough: No  Sputum or phlegm: No  Coughing up blood: No  Difficulty breating or shortness of breath: No  Snoring: No  Wheezing: No  Difficulty breathing on exertion: No  Nighttime Cough: No  Difficulty breathing when lying flat: No  Heart burn or indigestion: Yes  Nausea: No  Vomiting: No  Abdominal pain: No  Bloating: No  Constipation: Yes  Diarrhea: No  Blood in stool: No  Black stools: No  Rectal or Anal pain: No  Fecal incontinence: No  Yellowing of skin or eyes: No  Vomit with blood: No  Change in stools: No  Trouble holding urine or incontinence: No  Pain or burning: No  Trouble starting or stopping: Yes  Increased frequency of urination: No  Blood in urine: No  Decreased frequency of urination: No  Frequent nighttime urination: No  Flank pain: Yes  Difficulty emptying bladder: Yes  Nervous or Anxious: No  Depression: No  Trouble sleeping: Yes  Trouble thinking or concentrating: No  Mood changes: No  Panic attacks: No

## 2019-03-11 NOTE — LETTER
3/11/2019      RE: Kristina Eldridge  [address]  [facility] [address]       Dear Colleague,    Thank you for the opportunity to participate in the care of your patient, Kristina Eldridge, at the [facility]ARDIOVASCULAR DISEASE PREVENT[facility] at [facility]. Please see a copy of my visit note below.    [facility] for Cardiovascular Disease Prevention - Exam Note    Active Problems   Patient Active Problem List    Diagnosis Date Noted     Left knee pain 12/06/2018     Priority: Medium     Dermal and epidermal nevus 04/30/2018     Priority: Medium     irritated       Lumbar pseudoarthrosis 07/30/2015     Priority: Medium     Pseudoarthrosis of lumbar spine with nonunion 11/21/2014     Priority: Medium     HIV exposure 03/20/2014     Priority: Medium     Personal history of spine surgery 11/20/2013     Priority: Medium     Lumbar stenosis 10/23/2013     Priority: Medium     Depression 04/19/2013     Priority: Medium     Radicular leg pain 03/01/2013     Priority: Medium     Spondylolisthesis, grade 2 03/01/2013     Priority: Medium     Marijuana smoker, continuous 03/01/2013     Priority: Medium     Chronic hepatitis C (H) 10/19/2012     Priority: Medium     Bariatric surgery status 10/19/2012     Priority: Medium     Hyperlipidemia      Priority: Medium     on simvatatin         Reason For Visit   Patient here for [facility] early detection of atherosclerosis and CVD exam.    Pain Evaluation  Current history of pain associated with this visit is: denied    HPI   Kristina Eldridge is a 48 year old year old female with a history of hyperlipidemia, elevated LFT's,  tobacco use, gastric bypass with 150 pounds weight loss and insomnia. Her LDL has ranged from 78 to 174 without statin medication. She says her lipids are better because she has been eating better. She said she would not take a statin since her father did not do well with a statin. She is currently taking  chantix for smoking cessation and would like to quit. She has decrease her packs from 1.5 per day to one per day. No reports of mental health changes.  She has a recent URI and was taking prednisone for a period of time but has been off for several days.     She is bothered by insomnia but drinks 8 cans of caffeine soda per day. We discussed a reduction plan to see if this would help her sleep. She has a sleep study in 2016 with no apnea reported but she also said she was unable to sleep during the study and was awake all night.    She has a history of TB treated with INH and hepatitis C treated in 2007 with ribaviron with reduction in LFT's until 2-19-19 her LFT's are again elevated.     Nutrition assessment per patient report:     We discussed that the caffeine in the coke may be interfering with her sleep. She may be using it to treat fatigue caused by not sleeping so a [place] of cause and effect. Discussed gradual reduction with 50-50% decafe for a few weeks then additional decrease until she is not drinking caffeine. Her intake of vitamin D and calcium rich foods is low. A follow up vitamin D level is recommended.  Foods with fat/cholesterol (fried foods, fatty meats, junk food):  0 servings , chicken can't eat grease. No butter.  Fruits and vegetables (  cup cooked, 1 cup raw):  green beans, corn, spinach, no fruit.  Caffeine (1 cup coffee, soda, etc):  8 cans of coke.  Alcohol servings (12 oz. beer, 4 oz. wine, 1  oz. in mixed drink):  1-2 times per year  Calcium servings (dairy foods, 8 oz. milk, yogurt, cheese, ice cream):  0 servings, lactose intolerant, lactaid  Salt/sodium use:  light use  Special dietary habits:  low lactose,    Activity  Patient is no active outside in the winter because of her weight loss she is always cold. She is active in the warmer months outside. She is encouraged to be active in her home and she would like a treadmill but cannot afford one right now.    Laboratory Results  Review  We discussed laboratory results today including lipids targets and how foods influence cholesterol.    Weight  Her perceived healthy weight is 125 pounds.  A normal BMI of 25 is equal to greater than 96 pounds.  The current BMI of 17.6  is underweight. An increase of weight to 97 pounds recommended and she has tried but been unable to increase her weight.  PMH   Past Medical History:   Diagnosis Date     Abnormal Pap smear 2/21/2017    Dr said pap was abnormal     Arthritis      Chronic back pain     hx spondylolisthesis     Chronic pain      COPD (chronic obstructive pulmonary disease) (H)     PFT's 2013 FEV1/FVC = 2.68/3.20 = 84%     H/O gastric bypass 2007     Hepatitis C     treated ribaviron & interferon in 2008 for serotype 2 with failed 6 month treatment     Hyperlipidemia     on simvatatin     Insomnia      Positive TB test     has taken INH     RLS (restless legs syndrome)      TB lung, latent 1994    treated     Uncomplicated asthma      Wounds and injuries 2003       PSH  Past Surgical History:   Procedure Laterality Date     CHOLECYSTECTOMY       D & C  1987     ESOPHAGOSCOPY, GASTROSCOPY, DUODENOSCOPY (EGD), COMBINED  7/30/2014    Procedure: COMBINED ESOPHAGOSCOPY, GASTROSCOPY, DUODENOSCOPY (EGD);  Surgeon: Ramesh Self MD;  Location: [facility]     FRACTURE TX, ANKLE RT/LT  1991    ORIF rt ankle     FUSION SPINE POSTERIOR ONE LEVEL N/A 11/21/2014    Procedure: FUSION SPINE POSTERIOR ONE LEVEL;  Surgeon: Amilcar Baldwin MD;  Location: [facility] OR     FUSION SPINE POSTERIOR ONE LEVEL Right 7/30/2015    Procedure: FUSION SPINE POSTERIOR ONE LEVEL;  Surgeon: Amilcar Baldwin MD;  Location: [facility] OR     GASTRIC BYPASS  2007    lost 150 lbs     GRAFT BONE FROM ILIAC CREST Left 11/21/2014    Procedure: GRAFT BONE FROM ILIAC CREST;  Surgeon: Amilcar Baldwin MD;  Location: [facility] OR     OPTICAL TRACKING SYSTEM FUSION POSTERIOR SPINE LUMBAR  11/20/2013    Procedure:  OPTICAL TRACKING SYSTEM FUSION SPINE POSTERIOR LUMBAR ONE LEVEL;  Lumbar 4-5 Transforaminal Interbody Fusion;  Surgeon: Amilcar Baldwin MD;  Location: [facility] OR     ORTHOPEDIC SURGERY      shoulder surgery, removed bone spur     TUBAL LIGATION         Current Meds   Current Outpatient Medications   Medication Sig Dispense Refill     albuterol (PROVENTIL) (2.5 MG/3ML) 0.083% neb solution TAKE 1 VIAL BY NEBULIZATION EVERY 6 HOURS AS NEEDED FOR SHORTNESS OF BREATH 360 mL 2     albuterol (VENTOLIN HFA) 108 (90 Base) MCG/ACT inhaler Inhale 2 puffs into the lungs every 4 hours as needed for shortness of breath / dyspnea or wheezing 1 Inhaler 11     cetirizine (ZYRTEC) 10 MG tablet Take 1 tablet (10 mg) by mouth daily 30 tablet 11     cyanocobalamin (VITAMIN B12) 1000 MCG/ML injection ADMINISTER 1 ML(1000 MCG) IN THE MUSCLE EVERY 30 DAYS 1 mL 11     diphenhydrAMINE (WAL-DRYL ALLERGY) 25 MG tablet TAKE 1 TABLET(25 MG) BY MOUTH EVERY 6 HOURS AS NEEDED FOR ITCHING OR ALLERGIES 60 tablet 0     fluticasone (FLONASE) 50 MCG/ACT spray Spray 2 sprays into both nostrils daily 1 Bottle 3     hydrocortisone 2.5 % cream Apply topically 2 times daily 30 g 1     ipratropium (ATROVENT HFA) 17 MCG/ACT inhaler Inhale 2 puffs into the lungs every 6 hours 1 Inhaler 11     mirtazapine (REMERON) 15 MG tablet Take 1 tablet (15 mg) by mouth At Bedtime 90 tablet 3     mometasone-formoterol (DULERA) 100-5 MCG/ACT inhaler Inhale 2 puffs into the lungs 2 times daily 1 Inhaler 11     montelukast (SINGULAIR) 10 MG tablet Take 1 tablet (10 mg) by mouth At Bedtime 90 tablet 3     pramipexole (MIRAPEX) 0.125 MG tablet TAKE 1 TO 3 TABLETS BY MOUTH AT BEDTIME 180 tablet 3     Spacer/Aero Chamber Mouthpiece MISC 1 Units daily 1 each 0     traZODone (DESYREL) 100 MG tablet TAKE 2 TABLETS(200 MG) BY MOUTH EVERY NIGHT AS NEEDED FOR SLEEP 60 tablet 2     varenicline (CHANTIX) 0.5 MG tablet Take 1 tablet (0.5 mg) by mouth 2 times daily 180 tablet 0      order for DME Nebulizer 1 each 0       Allergies      Allergies   Allergen Reactions     Bee Venom Anaphylaxis, Anxiety, Difficulty breathing, Hives, Itching, Nausea and Vomiting, Palpitations, Shortness Of Breath and Swelling     Opium Alkaloids, Hcls Itching     Can take opiate derived narcotics with Benadryl.     Latex Rash       Family Hx   Family History   Problem Relation Age of Onset     Hypertension Father      Respiratory Father         COPD, smoked     Glaucoma Father      C.A.D. 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 Her days are semi active..  She is  with three children ages 30M, 32 M and 35F (adopted daughter)..     Enjoyment of life is 7 with 10 enjoys life fully.    Tobacco History  History   Smoking Status     Current Every Day Smoker     Packs/day: 1.00     Years: 30.00     Types: Cigarettes     Last attempt to quit: 2014   Smokeless Tobacco     Never Used     Comment: pack a day       ROS  CONSTITUTIONAL:  No fever, chills, or sweats. No weight gain/loss.   EENT:  No visual disturbance, ear ache, epistaxis, sore throat  ALLERGIES/IMMUNOLOGIC:  Negative  RESPIRATORY:  No cough, hemoptysis  CARDIOVASCULAR:  As per HPI  GI:  No nausea, vomiting, hematemesis, melena  :  No urinary frequency, dysuria, or hematuria  INTEGUMENT:  Negative  PSYCHIATRIC:  Negative  NEURO:  Negative  ENDOCRINE:  Negative  MUSCULOSKELETAL:  Negative     Vital Signs   BP (P) 106/60 (BP Location: Right arm, Patient Position: Sitting, Cuff Size: Adult Regular)   Pulse (P) 58   Ht (P) 1.524 m (5')   Wt (P) 40.8 kg (90 lb)   LMP  06/11/2012   SpO2 (P) 97%   BMI (P) 17.58 kg/m         Waist: 26 inches  Hip: 32 inches    Physical Exam   In general, the patient is a pleasant female in no apparent distress.    HEENT: NC/AT.  PERRLA.  EOMI.  Sclerae white, not injected.  Nares clear.  Pharynx without erythema or exudate.  Dentition intact.    Neck: No adenopathy.  No thyromegaly.Carotids +4/4 bilaterally without bruits.  No jugular venous distension.   Lungs: CTA.  No ronchi, wheezes, rales.     Cor: RRR. Normal S1, S2 splits physiologically. Grade 1-2 systolic murmur LLSB and RICS, no rub, click, or gallop. The PMI is in the 5th ICS in the midclavicular line. There is no heave.   Abdomen: Soft, nontender, nondistended. No organomegaly.  No bruits.   Extremities: No clubbing, cyanosis, or edema. The pulses are +2/2 at the post-tibial sites bilaterally. No bruits are noted.    Recent Labs  Lab Results   Component Value Date    GLC 85 03/05/2019      Lab Results   Component Value Date    NTBNP 129 (H) 03/11/2019     No results found for: NTBNPI   Lab Results   Component Value Date    UCRR 41 03/11/2019      Lab Results   Component Value Date    MICROL 6 03/11/2019      No results found for: MICROALBUMIN   Lab Results   Component Value Date    CRP 4.1 03/11/2019      Lab Results   Component Value Date    CHOL 108 02/19/2019      Lab Results   Component Value Date    TRIG 63 02/19/2019      Lab Results   Component Value Date    HDL 30 (L) 02/19/2019      Lab Results   Component Value Date    LDL 65 02/19/2019      Lab Results   Component Value Date    VLDL 17 03/05/2015      Lab Results   Component Value Date    CHOLHDLRATIO 4.0 03/05/2015     Lab Results   Component Value Date    NHDL 78 02/19/2019          Hirsch Test Results    BASIC SPIROMETRY: Summary of two attempts (see printout for details of results)  Results Estimated range for ht/age   FVC: 2.86 liter FVC: 1.59-2.44 liter   FEV1: 2.17 liter FEV1: 1.27-1.98 liter     History of asthma:   NO   History of respiratory infection current/recent:  NO     Spirometry Results:  normal      WALKING BLOOD PRESSURE RESPONSE (3 minute, 5 MET level walk)   Pre BP: 80/52 mmHg  3 min BP: 144/50 mmHg  1 min post BP: 86/50 mmHg    Pre HR: 81 bpm  3 min HR: 115 bpm  1 min post HR: 80 bpm       RETINAL VASCULAR ASSESSMENT   Left Eye Abnormality:  silver wire  AV Ratio: 0.93    Right Eye Abnormality:  silver wire  AV Ratio: 0.82     Retinal Assessment:  borderline      ABDOMINAL AORTA ULTRASOUND (< 2.5 normal, borderline 2.5-2.9, abnormal > 3)   SupraIliac 1.95 cm    SupraRenal 1.81 cm    InfraRenal Proximal 2.14 cm    InfraRenal Distal 2.0 cm      Abdominal Aorta Assessment:  normal      LEFT VENTRICULAR ULTRASOUND MEASUREMENTS (adjusted for BSA)  LVIDD 40.3 mm   Septa 7.8 mm   Posterior 8.9 mm     Left Ventricular US Assessment:  normal      Carotid Artery IMT measurements report and plaques in the small area examined:   Left IMT 0.708 mm  Plaques mild plaque build up in left bulb area size 3.5 and 1.1 mm.     Right IMT 0.664 mm  Plaques mild plaque in right bulb area size 3.5 mm.        ECG (see tracing):  normal sinus rhythm;  rate: 60 bpm      Arterial Elasticity per age and gender (see printout):   C1 11.4 mL/mmHg x 10  borderline   C2 2.3 mL/mmHg x 100 abnormal   Supine blood pressure: 113/53 mmHg       Assessment:     Cardiovascular:  ECG sinus rhythm rate 60. Nt pro BNP borderline at 129. No chest pain or shortness of breath at this time but with recent URI she did feel short of breath. No palpitations, occasionally light headed but her blood pressure is low normal. She feels cold most of the time with fatigue although she did get an energy boost this week and cleaned her kitchen. She is bothered by insomnia. Recommend she reduce her 8 cans of coke to 4 for a few weeks then reduce further until she is not drinking coke to see if this would help her sleeping. Although soda is not recommended if she is working  on smoking cessation continuing caffeine free coke is acceptable since her weight is not elevated and triglycerides are normal. Multiple small to moderate size carotid plaques incidentally observed with carotid IMT exam.    Blood Pressure:  Low normal blood pressure 106/60 today. Her arterial stiffnesscompliance is low and we discussed exercise and smoking cessation to help preserve her compliance.    Lipids:  Fluctuation in LDL up and down from 78 to 174 in the past. Most recent LDL 2-19-19 was 65.  Glucose and Ac1 have been normal range. Recent increase in LFT's with hx of treated Hep C. Hep C antibody reactive pending Hep C RNA She is in the process of follow up for this result with her PCP. She is vocal that she does not want to take a statin. Would recommend a statin if her LDL exceeded 100 mg/dl based on her disease score of 14/20 and ongoing tobacco use.    Health Habit Summary:  Nutrition: Heart Healthy Eating:  some of the time   Exercise:  not regularly active  Weight:  underweight  Tobacco Use:  current tobacco use; 1 ppd    Full report to follow prevention team review of test results with scanned final report.    Time spent for patient visit was 60 minutes with more than half the time spent on counseling and coordination of care.    MILEY Wilkinson CNP       CC  Patient Care Team:  Mili Leon MD as PCP - General (Internal Medicine)  Amilcar Baldwin MD as MD (Specialist)  Nidhi Franco MD as MD (Internal Medicine)  Maine Reeder MD as MD (Internal Medicine)  Bishop Schuster MD as MD (Dermapathology)  Marquez Love MD as MD (Family Practice)  Zafar Trinidad MD as MD (Family Medicine - Sports Medicine)  Markell Vasquez MD as MD (INTERNAL MEDICINE - ENDOCRINOLOGY, DIABETES & METABOLISM)  CAROLANN VARNER      Answers for HPI/ROS submitted by the patient on 3/1/2019   General Symptoms: No  Skin Symptoms: Yes  ODALIS  Symptoms: Yes  EYE SYMPTOMS: Yes  HEART SYMPTOMS: No  LUNG SYMPTOMS: Yes  INTESTINAL SYMPTOMS: Yes  URINARY SYMPTOMS: Yes  GYNECOLOGIC SYMPTOMS: No  BREAST SYMPTOMS: No  SKELETAL SYMPTOMS: No  BLOOD SYMPTOMS: No  NERVOUS SYSTEM SYMPTOMS: No  MENTAL HEALTH SYMPTOMS: Yes  Changes in hair: No  Changes in moles/birth marks: No  Itching: Yes  Rashes: No  Changes in nails: No  Acne: No  Hair in places you don't want it: No  Change in facial hair: No  Warts: No  Non-healing sores: No  Scarring: No  Flaking of skin: Yes  Color changes of hands/feet in cold : No  Sun sensitivity: No  Skin thickening: No  Ear pain: No  Ear discharge: No  Hearing loss: No  Tinnitus: Yes  Nosebleeds: No  Congestion: Yes  Sinus pain: No  Trouble swallowing: Yes   Voice hoarseness: No  Mouth sores: No  Sore throat: Yes  Tooth pain: No  Gum tenderness: No  Bleeding gums: No  Change in taste: No  Change in sense of smell: No  Dry mouth: Yes  Hearing aid used: No  Neck lump: No  Eye pain: Yes  Vision loss: Yes  Dry eyes: Yes  Watery eyes: No  Eye bulging: No  Double vision: No  Flashing of lights: Yes  Spots: No  Floaters: Yes  Redness: No  Crossed eyes: No  Tunnel Vision: No  Yellowing of eyes: No  Eye irritation: Yes  Cough: No  Sputum or phlegm: No  Coughing up blood: No  Difficulty breating or shortness of breath: No  Snoring: No  Wheezing: No  Difficulty breathing on exertion: No  Nighttime Cough: No  Difficulty breathing when lying flat: No  Heart burn or indigestion: Yes  Nausea: No  Vomiting: No  Abdominal pain: No  Bloating: No  Constipation: Yes  Diarrhea: No  Blood in stool: No  Black stools: No  Rectal or Anal pain: No  Fecal incontinence: No  Yellowing of skin or eyes: No  Vomit with blood: No  Change in stools: No  Trouble holding urine or incontinence: No  Pain or burning: No  Trouble starting or stopping: Yes  Increased frequency of urination: No  Blood in urine: No  Decreased frequency of urination: No  Frequent nighttime urination:  No  Flank pain: Yes  Difficulty emptying bladder: Yes  Nervous or Anxious: No  Depression: No  Trouble sleeping: Yes  Trouble thinking or concentrating: No  Mood changes: No  Panic attacks: No

## 2019-03-13 ENCOUNTER — OFFICE VISIT (OUTPATIENT)
Dept: AUDIOLOGY | Facility: CLINIC | Age: 49
End: 2019-03-13
Attending: INTERNAL MEDICINE
Payer: COMMERCIAL

## 2019-03-13 DIAGNOSIS — H90.A11 CONDUCTIVE HEARING LOSS OF RIGHT EAR WITH RESTRICTED HEARING OF LEFT EAR: Primary | ICD-10-CM

## 2019-03-13 NOTE — PROGRESS NOTES
AUDIOLOGY REPORT    SUBJECTIVE: Kristina Eldridge is a 48 year old female who was seen in the Audiology Clinic at the [facility] for audiologic evaluation, referred by Milton Brothers M.D. The patient reports it sounds like water is in her ears, worse in the right ear, whenever she moves her jaw. The patient denies bilateral pain, bilateral drainage, bilateral hearing concerns, bilateral tinnitus, or a history of ear surgeries.    OBJECTIVE:  Fall Risk Screen:  1. Have you fallen two or more times in the past year? No  2. Have you fallen and had an injury in the past year? No    Timed Up and Go Score (in seconds): Not tested  Is patient a fall risk? No  Referral initiated: No  Fall Risk Assessment Completed by Audiology    Otoscopic exam indicates ears are clear of cerumen bilaterally    Pure Tone Thresholds assessed using conventional audiometry with good reliability from 250-8000 Hz bilaterally using insert earphones and circumaural headphones     RIGHT:  Mild conductive hearing loss rising to normal hearing    LEFT:    Normal hearing    Tympanogram:    RIGHT: Normal eardrum mobility    LEFT:   Normal eardrum mobility    Reflexes (reported by stimulus ear):    RIGHT: Ipsilateral is absent at frequencies tested    RIGHT: Contralateral is absent at frequencies tested    LEFT:   Ipsilateral is present at normal levels    LEFT:   Contralateral is absent at frequencies tested    Speech Reception Threshold:    RIGHT: 20 dB HL    LEFT:   10 dB HL    Word Recognition Score:     RIGHT: 100% at 60 dB HL using NU-6 recorded word list    LEFT:   100% at 50 dB HL using NU-6 recorded word list    ASSESSMENT: Right mild conductive hearing loss and left normal hearing. Today s results were discussed with the patient in detail.     PLAN: It is recommended that the patient follow-up with ENT regarding the right conductive hearing loss. Please call this clinic with questions  regarding these results or recommendations.        Isa Benavidez M.A.  Audiology Doctoral Extern     I was present with the patient for the entire Audiology appointment including all procedures/testing performed by the AuD student, and agree with the student s assessment and plan as documented.      Sury Bailey, [facility]  Licensed Audiologist  [place] #[id number]

## 2019-03-14 ENCOUNTER — TELEPHONE (OUTPATIENT)
Dept: OTOLARYNGOLOGY | Facility: CLINIC | Age: 49
End: 2019-03-14

## 2019-03-18 NOTE — TELEPHONE ENCOUNTER
RECORDS RECEIVED FROM: Internal Referral for Ingrown toenail - Schedule Per PT   DATE RECEIVED: 03/18/19    NOTES STATUS DETAILS   OFFICE NOTE from referring provider Internal Dr. Ramirez 2/5/19   OFFICE NOTE from other specialist N/A    DISCHARGE SUMMARY from hospital N/A    DISCHARGE REPORT from the ER N/A    OPERATIVE REPORT N/A    MEDICATION LIST Internal    IMPLANT RECORD/STICKER N/A    LABS     CBC/DIFF Internal 2/19/19   CULTURES N/A    INJECTIONS DONE IN RADIOLOGY N/A    MRI N/A    CT SCAN N/A    XRAYS (IMAGES & REPORTS) N/A    TUMOR     PATHOLOGY  Slides & report N/A

## 2019-03-20 ENCOUNTER — ALLIED HEALTH/NURSE VISIT (OUTPATIENT)
Dept: PHARMACY | Facility: CLINIC | Age: 49
End: 2019-03-20
Payer: COMMERCIAL

## 2019-03-20 DIAGNOSIS — Z72.0 TOBACCO ABUSE: ICD-10-CM

## 2019-03-20 DIAGNOSIS — J44.9 CHRONIC OBSTRUCTIVE PULMONARY DISEASE, UNSPECIFIED COPD TYPE (H): Primary | ICD-10-CM

## 2019-03-20 PROCEDURE — 99607 MTMS BY PHARM ADDL 15 MIN: CPT | Mod: U4 | Performed by: PHARMACIST

## 2019-03-20 PROCEDURE — 99606 MTMS BY PHARM EST 15 MIN: CPT | Mod: U4 | Performed by: PHARMACIST

## 2019-03-20 RX ORDER — NICOTINE 21 MG/24HR
1 PATCH, TRANSDERMAL 24 HOURS TRANSDERMAL EVERY 24 HOURS
Qty: 30 PATCH | Refills: 2 | Status: SHIPPED | OUTPATIENT
Start: 2019-03-20 | End: 2019-10-10

## 2019-03-20 NOTE — PROGRESS NOTES
"SUBJECTIVE/OBJECTIVE:                Kristina Eldridge is a 48 year old female called for a follow-up visit for Medication Therapy Management.  She was referred to me from Mili Fuller.     Chief Complaint: Follow up from our visit on 2/15/19.      Tobacco: 0-1 pack per day - is interested in quittingTobacco Cessation Action Plan: Pharmacotherapies : Chantix  Alcohol: not currently using    Medication Adherence/Access:  no issues reported    COPD: Current medications: Short-Acting Bronchodilator: Ipratropium MDI twice daily, Albuterol HFA (has not used since last visit).  She reports good adherence to Dulera since last visit.  She puts her Dulera in the bathroom now since it is the first and last place she goes prior to bed. This has really helped improve her adherence.  She also reports that her technique is improved with her Optichamber.      Pt is not experiencing side effects.   Pt reports the following symptoms: increased need of albuterol.  Pt does not have an COPD Action Plan on file.   Has spirometry been completed: Doesn't know  PIF was completed today: No  Inhaler/device technique reviewed: none    Smoking Cessation: Current medications include Chantix 0.5 mg twice daily. She notes that this isn't \"strong enough\" for her.  She was able to cut back to 1 PPD but hasn't been able cut back further. She is open to nicotine replacement therapy.      Estimated Creatinine Clearance: 52.1 mL/min (based on SCr of 0.85 mg/dL).    Today's Vitals: LMP 06/11/2012  - telemed      ASSESSMENT:              Current medications were reviewed today as discussed above.      Medication Adherence: good, no issues identified    COPD: Stable.     Smoking cessation: Needs Improvement. Pt continues to use tobacco. Based on patient preferences and history, patient would benefit from adding a nicotine patch to Chantix to further reduce smoking.  Explore full dose of Chantix in the future (unclear why she is taking only 0.5 mg twice " daily).       PLAN:                  1. Keep up the great work with remembering Dulera!  2. Start nicotine patch 21 mg/24 hr.     I spent 15 minutes with this patient today. All changes were made via collaborative practice agreement with Mili Fuller. A copy of the visit note was provided to the patient's primary care provider.     Will follow up in 1 month.    The patient declined a summary of these recommendations as an after visit summary.    Karen Reyes, Pharm.D., [facility]  Medication Therapy Management Pharmacist  Page/VM:  [phone]

## 2019-03-22 NOTE — TELEPHONE ENCOUNTER
FUTURE VISIT INFORMATION      FUTURE VISIT INFORMATION:    Date: 4/12/19    Time: 1:30 pm    [facility] [facility]  REFERRAL INFORMATION:    Referring provider:  Madison Barger    Referring providers clinic:  [facility] Audiology    Reason for visit/diagnosis  Conductive Hearing Loss    RECORDS REQUESTED FROM:       Clinic name Comments Records Status Imaging Status   [facility] Audiogram w/graphs-3/13/19 Marisel Carrasco    [facility]iology Office visit-3/13/19 Marisel Herrera [facility] Radiology CT Head-9/23/15 [facility] Office Visit-11/13/18 Dr. Alina Burch Epic

## 2019-03-26 ENCOUNTER — PRE VISIT (OUTPATIENT)
Dept: ORTHOPEDICS | Facility: CLINIC | Age: 49
End: 2019-03-26

## 2019-04-08 PROBLEM — M25.562 LEFT KNEE PAIN: Status: RESOLVED | Noted: 2018-12-06 | Resolved: 2019-04-08

## 2019-04-08 NOTE — PROGRESS NOTES
DISCHARGE REPORT    Patient seen one time for evaluation and treat. Please see initial evaluation for discharge information. Episode to be closed at this time and patient formally discharged from therapy.      Arnaldo Silva, PT, DPT, OCS

## 2019-04-12 ENCOUNTER — PRE VISIT (OUTPATIENT)
Dept: OTOLARYNGOLOGY | Facility: CLINIC | Age: 49
End: 2019-04-12

## 2019-04-12 ENCOUNTER — OFFICE VISIT (OUTPATIENT)
Dept: OTOLARYNGOLOGY | Facility: CLINIC | Age: 49
End: 2019-04-12
Payer: COMMERCIAL

## 2019-04-12 VITALS — HEIGHT: 59 IN | HEART RATE: 97 BPM | WEIGHT: 86 LBS | BODY MASS INDEX: 17.34 KG/M2

## 2019-04-12 DIAGNOSIS — H69.93 DYSFUNCTION OF BOTH EUSTACHIAN TUBES: Primary | ICD-10-CM

## 2019-04-12 RX ORDER — FLUTICASONE PROPIONATE 50 MCG
2 SPRAY, SUSPENSION (ML) NASAL DAILY
Qty: 16 G | Refills: 1 | Status: SHIPPED | OUTPATIENT
Start: 2019-04-12 | End: 2019-06-25

## 2019-04-12 RX ORDER — LORATADINE 10 MG/1
10 CAPSULE, LIQUID FILLED ORAL DAILY
Qty: 60 CAPSULE | Refills: 3 | Status: SHIPPED | OUTPATIENT
Start: 2019-04-12 | End: 2019-06-25

## 2019-04-12 ASSESSMENT — PAIN SCALES - GENERAL: PAINLEVEL: NO PAIN (0)

## 2019-04-12 ASSESSMENT — MIFFLIN-ST. JEOR: SCORE: 922.84

## 2019-04-12 NOTE — PATIENT INSTRUCTIONS
1.  You were seen in the ENT Clinic today by Dr. Milligan.  If you have any questions or concerns after your appointment, please call [phone]. Press option #1 for scheduling related needs. Press option #3 for Nurse advice.    2.  Plan is to return to clinic in 1 month with an Audiogram and Tympanogram (hearing test) prior to appointment.      Michelle Espinal LPN  [facility]  [phone]    The patient presents with a history of eustacian tube dysfunction.  The patient will be treated with Claritin and Flonase Nasal Spray for one month.  The patient will be seen again in clinic in month to assess progress with medical therapy and to review a repeat Audiogram and Tympanogram

## 2019-04-12 NOTE — PROGRESS NOTES
The patient presents with a history of eustacian tube dysfunction worse in the right ear than in the left.  The patient is not having difficulty with infections of the ears.  The patient reports pressure in the ears with some decrease in the hearing at time, although she reports that since her Audiogram and Tympanogram, the condition has improved. The patient denies sinusitis, rhinitis, facial pain, nasal obstruction or purulent nasal discharge. The patient denies chronic or recurrent tonsillitis, chronic or recurrent pharyngitis. The patient's Audiogram and Tympanogram from 3/13/19 is reviewed with her and this demonstrates a mild conductive hearing loss in the right ear with otherwise normal hearing. Her word recognition scores are 100% bilaterally and her tympanograms are type A with mild to moderate negative pressure.     This patient is seen in consultation at the request of Dr. Mili Burch.    All other systems were reviewed and they are either negative or they are not directly pertinent to this Otolaryngology examination.      Past Medical History:    Past Medical History:   Diagnosis Date     Abnormal Pap smear 2/21/2017     said pap was abnormal     Arthritis      Chronic back pain     hx spondylolisthesis     Chronic pain      COPD (chronic obstructive pulmonary disease) (H)     PFT's 2013 FEV1/FVC = 2.68/3.20 = 84%     H/O gastric bypass 2007     Hepatitis C     treated ribaviron & interferon in 2008 for serotype 2 with failed 6 month treatment     Hyperlipidemia     on simvatatin     Insomnia      Positive TB test     has taken INH     RLS (restless legs syndrome)      TB lung, latent 1994    treated     Uncomplicated asthma      Wounds and injuries 2003       Past Surgical History:    Past Surgical History:   Procedure Laterality Date     CHOLECYSTECTOMY       D & C  1987     ESOPHAGOSCOPY, GASTROSCOPY, DUODENOSCOPY (EGD), COMBINED  7/30/2014    Procedure: COMBINED ESOPHAGOSCOPY, GASTROSCOPY,  DUODENOSCOPY (EGD);  Surgeon: Ramesh Self MD;  Location: [facility]     FRACTURE TX, ANKLE RT/LT  1991    ORIF rt ankle     FUSION SPINE POSTERIOR ONE LEVEL N/A 11/21/2014    Procedure: FUSION SPINE POSTERIOR ONE LEVEL;  Surgeon: Amilcar Baldwin MD;  [facility]: [facility]     FUSION SPINE POSTERIOR ONE LEVEL Right 7/30/2015    Procedure: FUSION SPINE POSTERIOR ONE LEVEL;  Surgeon: Amilcar Baldwin MD;  [facility]: [facility]     GASTRIC BYPASS  2007    lost 150 lbs     GRAFT BONE FROM ILIAC CREST Left 11/21/2014    Procedure: GRAFT BONE FROM ILIAC CREST;  Surgeon: Amilcar Baldwin MD;  [facility]     OPTICAL TRACKING SYSTEM FUSION POSTERIOR SPINE LUMBAR  11/20/2013    Procedure: OPTICAL TRACKING SYSTEM FUSION SPINE POSTERIOR LUMBAR ONE LEVEL;  Lumbar 4-5 Transforaminal Interbody Fusion;  Surgeon: Amilcar Baldwin MD;  [facility]     ORTHOPEDIC SURGERY      shoulder surgery, removed bone spur     TUBAL LIGATION         Medications:      Current Outpatient Medications:      albuterol (PROVENTIL) (2.5 MG/3ML) 0.083% neb solution, TAKE 1 VIAL BY NEBULIZATION EVERY 6 HOURS AS NEEDED FOR SHORTNESS OF BREATH, Disp: 360 mL, Rfl: 2     albuterol (VENTOLIN HFA) 108 (90 Base) MCG/ACT inhaler, Inhale 2 puffs into the lungs every 4 hours as needed for shortness of breath / dyspnea or wheezing, Disp: 1 Inhaler, Rfl: 11     cetirizine (ZYRTEC) 10 MG tablet, Take 1 tablet (10 mg) by mouth daily, Disp: 30 tablet, Rfl: 11     cyanocobalamin (VITAMIN B12) 1000 MCG/ML injection, ADMINISTER 1 ML(1000 MCG) IN THE MUSCLE EVERY 30 DAYS, Disp: 1 mL, Rfl: 11     diphenhydrAMINE (WAL-DRYL ALLERGY) 25 MG tablet, TAKE 1 TABLET(25 MG) BY MOUTH EVERY 6 HOURS AS NEEDED FOR ITCHING OR ALLERGIES, Disp: 60 tablet, Rfl: 0     fluticasone (FLONASE) 50 MCG/ACT spray, Spray 2 sprays into both nostrils daily, Disp: 1 Bottle, Rfl: 3     hydrocortisone 2.5 % cream, Apply topically 2 times daily,  Disp: 30 g, Rfl: 1     ipratropium (ATROVENT HFA) 17 MCG/ACT inhaler, Inhale 2 puffs into the lungs every 6 hours, Disp: 1 Inhaler, Rfl: 11     mirtazapine (REMERON) 15 MG tablet, Take 1 tablet (15 mg) by mouth At Bedtime, Disp: 90 tablet, Rfl: 3     mometasone-formoterol (DULERA) 100-5 MCG/ACT inhaler, Inhale 2 puffs into the lungs 2 times daily, Disp: 1 Inhaler, Rfl: 11     montelukast (SINGULAIR) 10 MG tablet, Take 1 tablet (10 mg) by mouth At Bedtime, Disp: 90 tablet, Rfl: 3     nicotine (NICODERM CQ) 21 MG/24HR 24 hr patch, Place 1 patch onto the skin every 24 hours, Disp: 30 patch, Rfl: 2     order for DME, Nebulizer, Disp: 1 each, Rfl: 0     pramipexole (MIRAPEX) 0.125 MG tablet, TAKE 1 TO 3 TABLETS BY MOUTH AT BEDTIME, Disp: 180 tablet, Rfl: 3     Spacer/Aero Chamber Mouthpiece MISC, 1 Units daily, Disp: 1 each, Rfl: 0     traZODone (DESYREL) 100 MG tablet, TAKE 2 TABLETS(200 MG) BY MOUTH EVERY NIGHT AS NEEDED FOR SLEEP, Disp: 60 tablet, Rfl: 2     varenicline (CHANTIX) 0.5 MG tablet, Take 1 tablet (0.5 mg) by mouth 2 times daily, Disp: 180 tablet, Rfl: 0    Allergies:    Bee venom; Opium alkaloids, hcls; and Latex    Physical Examination:    The patient is a well developed, well nourished female in no apparent distress.  She is normocephalic, atraumatic with pupils equally round and reactive to light.    Oral Cavity Examination:  Normal mucosa with no masses or lesions  Nasal Examination:  Normal mucosa with no masses or lesions  Ear Examination: Ear canals clear with no serous or purulent effusions in the middle ear spaces bilaterally.  The  tympanic membranes are normal.  Neurological Examination: Facial nerve function intact and symmetric  Integumentary Examination: No lesions on the skin of the head and neck  Neck Examination:  No masses or lesions, no lymphadenopathy  Endocrine Examination:  Normal thyroid examination    Assessment and Plan:    The patient presents with a history of eustacian tube  dysfunction.  The patient will be treated with Claritin and Flonase Nasal Spray for one month.  The patient will be seen again in clinic in month to assess progress with medical therapy and to review a repeat Audiogram and Tympanogram     CC: Dr. Mili Burch

## 2019-04-12 NOTE — NURSING NOTE
"Chief Complaint   Patient presents with     Consult     conductive hearing loss . Machine unable to detect BP.      Pulse 97, height 1.51 m (4' 11.45\"), weight 39 kg (86 lb), last menstrual period 06/11/2012, not currently breastfeeding.    Jaxon Gann LPN    "

## 2019-04-12 NOTE — LETTER
4/12/2019       RE: Kristina Eldridge  [address]  [facility] [address]     Dear Colleague,    Thank you for referring your patient, Kristina Eldridge, to the [facility]. Please see a copy of my visit note below.    The patient presents with a history of eustacian tube dysfunction worse in the right ear than in the left.  The patient is not having difficulty with infections of the ears.  The patient reports pressure in the ears with some decrease in the hearing at time, although she reports that since her Audiogram and Tympanogram, the condition has improved. The patient denies sinusitis, rhinitis, facial pain, nasal obstruction or purulent nasal discharge. The patient denies chronic or recurrent tonsillitis, chronic or recurrent pharyngitis. The patient's Audiogram and Tympanogram from 3/13/19 is reviewed with her and this demonstrates a mild conductive hearing loss in the right ear with otherwise normal hearing. Her word recognition scores are 100% bilaterally and her tympanograms are type A with mild to moderate negative pressure.     This patient is seen in consultation at the request of Dr. Mili Burch.    All other systems were reviewed and they are either negative or they are not directly pertinent to this Otolaryngology examination.      Past Medical History:    Past Medical History:   Diagnosis Date     Abnormal Pap smear 2/21/2017     said pap was abnormal     Arthritis      Chronic back pain     hx spondylolisthesis     Chronic pain      COPD (chronic obstructive pulmonary disease) (H)     PFT's 2013 FEV1/FVC = 2.68/3.20 = 84%     H/O gastric bypass 2007     Hepatitis C     treated ribaviron & interferon in 2008 for serotype 2 with failed 6 month treatment     Hyperlipidemia     on simvatatin     Insomnia      Positive TB test     has taken INH     RLS (restless legs syndrome)      TB lung, latent 1994    treated      Uncomplicated asthma      Wounds and injuries 2003       Past Surgical History:    Past Surgical History:   Procedure Laterality Date     CHOLECYSTECTOMY       D & C  1987     ESOPHAGOSCOPY, GASTROSCOPY, DUODENOSCOPY (EGD), COMBINED  7/30/2014    Procedure: COMBINED ESOPHAGOSCOPY, GASTROSCOPY, DUODENOSCOPY (EGD);  Surgeon: Ramesh Self MD;  Location: [facility]     FRACTURE TX, ANKLE RT/LT  1991    ORIF rt ankle     FUSION SPINE POSTERIOR ONE LEVEL N/A 11/21/2014    Procedure: FUSION SPINE POSTERIOR ONE LEVEL;  Surgeon: Amilcar Baldwin MD;  [facility]     FUSION SPINE POSTERIOR ONE LEVEL Right 7/30/2015    Procedure: FUSION SPINE POSTERIOR ONE LEVEL;  Surgeon: Amilcar Baldwin MD;  [facility]     GASTRIC BYPASS  2007    lost 150 lbs     GRAFT BONE FROM ILIAC CREST Left 11/21/2014    Procedure: GRAFT BONE FROM ILIAC CREST;  Surgeon: Amilcar Baldwin MD;  [facility]     OPTICAL TRACKING SYSTEM FUSION POSTERIOR SPINE LUMBAR  11/20/2013    Procedure: OPTICAL TRACKING SYSTEM FUSION SPINE POSTERIOR LUMBAR ONE LEVEL;  Lumbar 4-5 Transforaminal Interbody Fusion;  Surgeon: Amilcar Baldwin MD;  [facility]     ORTHOPEDIC SURGERY      shoulder surgery, removed bone spur     TUBAL LIGATION         Medications:      Current Outpatient Medications:      albuterol (PROVENTIL) (2.5 MG/3ML) 0.083% neb solution, TAKE 1 VIAL BY NEBULIZATION EVERY 6 HOURS AS NEEDED FOR SHORTNESS OF BREATH, Disp: 360 mL, Rfl: 2     albuterol (VENTOLIN HFA) 108 (90 Base) MCG/ACT inhaler, Inhale 2 puffs into the lungs every 4 hours as needed for shortness of breath / dyspnea or wheezing, Disp: 1 Inhaler, Rfl: 11     cetirizine (ZYRTEC) 10 MG tablet, Take 1 tablet (10 mg) by mouth daily, Disp: 30 tablet, Rfl: 11     cyanocobalamin (VITAMIN B12) 1000 MCG/ML injection, ADMINISTER 1 ML(1000 MCG) IN THE MUSCLE EVERY 30 DAYS, Disp: 1 mL, Rfl: 11     diphenhydrAMINE (WAL-DRYL  ALLERGY) 25 MG tablet, TAKE 1 TABLET(25 MG) BY MOUTH EVERY 6 HOURS AS NEEDED FOR ITCHING OR ALLERGIES, Disp: 60 tablet, Rfl: 0     fluticasone (FLONASE) 50 MCG/ACT spray, Spray 2 sprays into both nostrils daily, Disp: 1 Bottle, Rfl: 3     hydrocortisone 2.5 % cream, Apply topically 2 times daily, Disp: 30 g, Rfl: 1     ipratropium (ATROVENT HFA) 17 MCG/ACT inhaler, Inhale 2 puffs into the lungs every 6 hours, Disp: 1 Inhaler, Rfl: 11     mirtazapine (REMERON) 15 MG tablet, Take 1 tablet (15 mg) by mouth At Bedtime, Disp: 90 tablet, Rfl: 3     mometasone-formoterol (DULERA) 100-5 MCG/ACT inhaler, Inhale 2 puffs into the lungs 2 times daily, Disp: 1 Inhaler, Rfl: 11     montelukast (SINGULAIR) 10 MG tablet, Take 1 tablet (10 mg) by mouth At Bedtime, Disp: 90 tablet, Rfl: 3     nicotine (NICODERM CQ) 21 MG/24HR 24 hr patch, Place 1 patch onto the skin every 24 hours, Disp: 30 patch, Rfl: 2     order for DME, Nebulizer, Disp: 1 each, Rfl: 0     pramipexole (MIRAPEX) 0.125 MG tablet, TAKE 1 TO 3 TABLETS BY MOUTH AT BEDTIME, Disp: 180 tablet, Rfl: 3     Spacer/Aero Chamber Mouthpiece MISC, 1 Units daily, Disp: 1 each, Rfl: 0     traZODone (DESYREL) 100 MG tablet, TAKE 2 TABLETS(200 MG) BY MOUTH EVERY NIGHT AS NEEDED FOR SLEEP, Disp: 60 tablet, Rfl: 2     varenicline (CHANTIX) 0.5 MG tablet, Take 1 tablet (0.5 mg) by mouth 2 times daily, Disp: 180 tablet, Rfl: 0    Allergies:    Bee venom; Opium alkaloids, hcls; and Latex    Physical Examination:    The patient is a well developed, well nourished female in no apparent distress.  She is normocephalic, atraumatic with pupils equally round and reactive to light.    Oral Cavity Examination:  Normal mucosa with no masses or lesions  Nasal Examination:  Normal mucosa with no masses or lesions  Ear Examination: Ear canals clear with no serous or purulent effusions in the middle ear spaces bilaterally.  The  tympanic membranes are normal.  Neurological Examination: Facial nerve  function intact and symmetric  Integumentary Examination: No lesions on the skin of the head and neck  Neck Examination:  No masses or lesions, no lymphadenopathy  Endocrine Examination:  Normal thyroid examination    Assessment and Plan:    The patient presents with a history of eustacian tube dysfunction.  The patient will be treated with Claritin and Flonase Nasal Spray for one month.  The patient will be seen again in clinic in month to assess progress with medical therapy and to review a repeat Audiogram and Tympanogram     CC: Dr. Mili Burch      Again, thank you for allowing me to participate in the care of your patient.      Sincerely,    Dami Milligan MD

## 2019-04-17 ENCOUNTER — ALLIED HEALTH/NURSE VISIT (OUTPATIENT)
Dept: PHARMACY | Facility: CLINIC | Age: 49
End: 2019-04-17
Payer: COMMERCIAL

## 2019-04-17 DIAGNOSIS — Z72.0 TOBACCO ABUSE: ICD-10-CM

## 2019-04-17 DIAGNOSIS — J44.9 CHRONIC OBSTRUCTIVE PULMONARY DISEASE, UNSPECIFIED COPD TYPE (H): Primary | ICD-10-CM

## 2019-04-17 PROCEDURE — 99606 MTMS BY PHARM EST 15 MIN: CPT | Performed by: PHARMACIST

## 2019-04-17 PROCEDURE — 99607 MTMS BY PHARM ADDL 15 MIN: CPT | Performed by: PHARMACIST

## 2019-04-17 RX ORDER — VARENICLINE TARTRATE 1 MG/1
1 TABLET, FILM COATED ORAL 2 TIMES DAILY
Qty: 180 TABLET | Refills: 3 | Status: SHIPPED | OUTPATIENT
Start: 2019-04-17 | End: 2019-10-10

## 2019-04-17 NOTE — PROGRESS NOTES
SUBJECTIVE/OBJECTIVE:                Kristina Eldridge is a 48 year old female called for a follow-up visit for Medication Therapy Management.  She was referred to me from Mili Fuller.     Chief Complaint: Follow up from our visit on 3/20/19.      Tobacco: 0-1 pack per day - is interested in quittingTobacco Cessation Action Plan: Pharmacotherapies : Chantix  Alcohol: not currently using    Medication Adherence/Access:  no issues reported    COPD: Current medications: Short-Acting Bronchodilator: Ipratropium MDI twice daily, Albuterol HFA (she reports use of albuterol x3).  Last time we visited, her adherence to Dulera was significantly improved. She continues to report good adherence and low use of albuterol.      Pt is not experiencing side effects.   Pt reports the following symptoms: increased need of albuterol.  Pt does not have an COPD Action Plan on file.   Has spirometry been completed: Doesn't know  PIF was completed today: No  Inhaler/device technique reviewed: none    Smoking Cessation: Current medications include Chantix 0.5 mg twice daily and nicotine patch 21 mg/24 hr (started at last visit). She reports still smoking 1 PPD.  She doesn't feel that these are helping to curb her cravings.  The Chantix worked really well when she first started but she is interested in increasing the dose today to see if she can cut back further.      Estimated Creatinine Clearance: 48.8 mL/min (based on SCr of 0.85 mg/dL).    Today's Vitals: LMP 06/11/2012  - telemed      ASSESSMENT:              Current medications were reviewed today as discussed above.      Medication Adherence: good, no issues identified    COPD: Stable.     Smoking cessation: Needs Improvement. Pt continues to use tobacco. Based on patient preferences and history, patient would benefit from a Chantix dose increase.      PLAN:                  1. Increase Chantix to 1 mg twice daily. Monitor for worsened nausea.     I spent 15 minutes with this patient  today. All changes were made via collaborative practice agreement with Mili Fuller. A copy of the visit note was provided to the patient's primary care provider.     Will follow up in 1 month.    The patient declined a summary of these recommendations as an after visit summary.    Karen Reyes, Pharm.D., [facility]  Medication Therapy Management Pharmacist  Page/VM:  [phone]

## 2019-04-26 ENCOUNTER — TELEPHONE (OUTPATIENT)
Dept: INTERNAL MEDICINE | Facility: CLINIC | Age: 49
End: 2019-04-26

## 2019-04-26 NOTE — TELEPHONE ENCOUNTER
[facility] Call Center    Phone Message    May a detailed message be left on voicemail: yes, Pt would like a call when fax has been sent back to [employer], to confirm that the Pt needs Gas to be turned back on due to medical Necessity due to Pt takes 3 to 4 baths a day due to Surgery. Pt states Doctors wont give any pain medication due to Pt smokes and Pt states the hot baths seem to be the only thing that helps.     Reason for Call: Form or Letter   Type or form/letter needing completion:[employer]  Provider: SABRINA MELGAR  Date form needed: ASAP, 4/26/2019  Once completed: Fax form to: Pt states Fax should be on the paper work to send back to them      Action Taken: Message routed to:  [facility] mode

## 2019-04-26 NOTE — TELEPHONE ENCOUNTER
Pt states form were going to be faxed over to clinic today and she needs these sent back ASAP. I did let pt know we have not yet received forms and MD not in clinic today, but would forward to our forms . Elvi Rawls CMA at 2:19 PM on 4/26/2019

## 2019-05-02 ENCOUNTER — DOCUMENTATION ONLY (OUTPATIENT)
Dept: CARE COORDINATION | Facility: CLINIC | Age: 49
End: 2019-05-02

## 2019-05-02 ENCOUNTER — MEDICAL CORRESPONDENCE (OUTPATIENT)
Dept: HEALTH INFORMATION MANAGEMENT | Facility: CLINIC | Age: 49
End: 2019-05-02

## 2019-05-02 NOTE — TELEPHONE ENCOUNTER
[facility] Call Center    Phone Message    May a detailed message be left on voicemail: no    Reason for Call: Other: [employer] called to follow up on a form a medical need, they need a length of time put on the form     Action Taken: Message routed to:  [facility] mode

## 2019-05-13 PROCEDURE — 99285 EMERGENCY DEPT VISIT HI MDM: CPT | Mod: Z6 | Performed by: EMERGENCY MEDICINE

## 2019-05-13 PROCEDURE — 96374 THER/PROPH/DIAG INJ IV PUSH: CPT

## 2019-05-13 PROCEDURE — 96375 TX/PRO/DX INJ NEW DRUG ADDON: CPT

## 2019-05-13 PROCEDURE — 99285 EMERGENCY DEPT VISIT HI MDM: CPT | Mod: 25

## 2019-05-14 ENCOUNTER — HOSPITAL ENCOUNTER (EMERGENCY)
Facility: CLINIC | Age: 49
Discharge: HOME | End: 2019-05-14
Attending: EMERGENCY MEDICINE | Admitting: EMERGENCY MEDICINE
Payer: COMMERCIAL

## 2019-05-14 ENCOUNTER — APPOINTMENT (OUTPATIENT)
Dept: GENERAL RADIOLOGY | Facility: CLINIC | Age: 49
End: 2019-05-14
Attending: EMERGENCY MEDICINE
Payer: COMMERCIAL

## 2019-05-14 ENCOUNTER — TELEPHONE (OUTPATIENT)
Dept: INTERNAL MEDICINE | Facility: CLINIC | Age: 49
End: 2019-05-14

## 2019-05-14 VITALS
RESPIRATION RATE: 18 BRPM | DIASTOLIC BLOOD PRESSURE: 72 MMHG | HEIGHT: 60 IN | SYSTOLIC BLOOD PRESSURE: 132 MMHG | TEMPERATURE: 99.6 F | OXYGEN SATURATION: 98 % | BODY MASS INDEX: 16.8 KG/M2

## 2019-05-14 DIAGNOSIS — M54.50 ACUTE MIDLINE LOW BACK PAIN WITHOUT SCIATICA: ICD-10-CM

## 2019-05-14 PROCEDURE — 96375 TX/PRO/DX INJ NEW DRUG ADDON: CPT

## 2019-05-14 PROCEDURE — 96374 THER/PROPH/DIAG INJ IV PUSH: CPT

## 2019-05-14 PROCEDURE — 25000132 ZZH RX MED GY IP 250 OP 250 PS 637: Performed by: EMERGENCY MEDICINE

## 2019-05-14 PROCEDURE — 72100 X-RAY EXAM L-S SPINE 2/3 VWS: CPT

## 2019-05-14 PROCEDURE — 25000128 H RX IP 250 OP 636: Performed by: EMERGENCY MEDICINE

## 2019-05-14 RX ORDER — KETOROLAC TROMETHAMINE 15 MG/ML
15 INJECTION, SOLUTION INTRAMUSCULAR; INTRAVENOUS ONCE
Status: COMPLETED | OUTPATIENT
Start: 2019-05-14 | End: 2019-05-14

## 2019-05-14 RX ORDER — DIPHENHYDRAMINE HCL 50 MG
50 CAPSULE ORAL ONCE
Status: COMPLETED | OUTPATIENT
Start: 2019-05-14 | End: 2019-05-14

## 2019-05-14 RX ORDER — LIDOCAINE 4 G/G
1 PATCH TOPICAL EVERY 24 HOURS
Qty: 12 PATCH | Refills: 0 | Status: SHIPPED | OUTPATIENT
Start: 2019-05-14 | End: 2019-10-10

## 2019-05-14 RX ORDER — HYDROMORPHONE HYDROCHLORIDE 1 MG/ML
1 INJECTION, SOLUTION INTRAMUSCULAR; INTRAVENOUS; SUBCUTANEOUS ONCE
Status: COMPLETED | OUTPATIENT
Start: 2019-05-14 | End: 2019-05-14

## 2019-05-14 RX ADMIN — KETOROLAC TROMETHAMINE 15 MG: 15 INJECTION, SOLUTION INTRAMUSCULAR; INTRAVENOUS at 00:57

## 2019-05-14 RX ADMIN — Medication 1 MG: at 00:59

## 2019-05-14 RX ADMIN — DIPHENHYDRAMINE HYDROCHLORIDE 50 MG: 50 CAPSULE ORAL at 00:57
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
## 2019-05-14 NOTE — ED AVS SNAPSHOT
[facility], [place], Emergency Department  [address] [place] [id number]  Phone:  [phone]                                    Kristina Eldridge   MRN: [id number]    Department:  [facility], Emergency Department   Date of Visit:  5/13/2019           After Visit Summary Signature Page    I have received my discharge instructions, and my questions have been answered. I have discussed any challenges I see with this plan with the nurse or doctor.    ..........................................................................................................................................  Patient/Patient Representative Signature      ..........................................................................................................................................  Patient Representative Print Name and Relationship to Patient    ..................................................               ................................................  Date                                   Time    ..........................................................................................................................................  Reviewed by Signature/Title    ...................................................              ..............................................  Date                                               Time          22EPIC Rev 08/18

## 2019-05-14 NOTE — TELEPHONE ENCOUNTER
Health Call Center    Phone Message    May a detailed message be left on voicemail: yes    Reason for Call: Other: Pt requesting pain medication for back pain. Was in ER last night and was told to contact her primary care provider for pain medication.  Pt requesting medication be sent to [facility] at [address] [place] [address] location. Please call Pt back to let her know when it has been called in.     Action Taken: Message routed to:  [facility] ([facility] Primary Care Clinic

## 2019-05-14 NOTE — DISCHARGE INSTRUCTIONS
Your x-rays show stable hardware and no fractures.     Please make an appointment to follow up with Your Primary Care Provider in 3-7 days.     Return to the ED if you are having leg weakness, loss of urine or bowel control, worsening symptoms, or any urgent/life-threatening concerns.

## 2019-05-14 NOTE — TELEPHONE ENCOUNTER
Called patient and advised she needed an appointment to be evaluated for administration of pain medications.  Transferred to scheduling. Christy Camacho on 5/14/2019 at 11:05 AM
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
## 2019-05-15 ENCOUNTER — ALLIED HEALTH/NURSE VISIT (OUTPATIENT)
Dept: PHARMACY | Facility: CLINIC | Age: 49
End: 2019-05-15
Payer: COMMERCIAL

## 2019-05-15 DIAGNOSIS — H91.90 HEARING LOSS, UNSPECIFIED HEARING LOSS TYPE, UNSPECIFIED LATERALITY: Primary | ICD-10-CM

## 2019-05-15 DIAGNOSIS — Z72.0 TOBACCO ABUSE: Primary | ICD-10-CM

## 2019-05-15 DIAGNOSIS — M54.10 RADICULAR LEG PAIN: ICD-10-CM

## 2019-05-15 PROCEDURE — 99606 MTMS BY PHARM EST 15 MIN: CPT | Performed by: PHARMACIST

## 2019-05-16 ENCOUNTER — TELEPHONE (OUTPATIENT)
Dept: OTOLARYNGOLOGY | Facility: CLINIC | Age: 49
End: 2019-05-16

## 2019-05-16 ENCOUNTER — OFFICE VISIT (OUTPATIENT)
Dept: AUDIOLOGY | Facility: CLINIC | Age: 49
End: 2019-05-16
Payer: COMMERCIAL

## 2019-05-16 ENCOUNTER — OFFICE VISIT (OUTPATIENT)
Dept: OTOLARYNGOLOGY | Facility: CLINIC | Age: 49
End: 2019-05-16
Payer: COMMERCIAL

## 2019-05-16 VITALS
SYSTOLIC BLOOD PRESSURE: 130 MMHG | DIASTOLIC BLOOD PRESSURE: 84 MMHG | WEIGHT: 83.6 LBS | HEIGHT: 60 IN | HEART RATE: 87 BPM | BODY MASS INDEX: 16.41 KG/M2 | RESPIRATION RATE: 16 BRPM

## 2019-05-16 DIAGNOSIS — H91.90 HEARING LOSS, UNSPECIFIED HEARING LOSS TYPE, UNSPECIFIED LATERALITY: ICD-10-CM

## 2019-05-16 DIAGNOSIS — J30.9 ALLERGIC RHINITIS, UNSPECIFIED SEASONALITY, UNSPECIFIED TRIGGER: Primary | ICD-10-CM

## 2019-05-16 DIAGNOSIS — H90.71 MIXED CONDUCTIVE AND SENSORINEURAL HEARING LOSS OF RIGHT EAR WITH UNRESTRICTED HEARING OF LEFT EAR: Primary | ICD-10-CM

## 2019-05-16 ASSESSMENT — PAIN SCALES - GENERAL: PAINLEVEL: SEVERE PAIN (7)

## 2019-05-16 ASSESSMENT — MIFFLIN-ST. JEOR: SCORE: 920.71

## 2019-05-16 NOTE — PROGRESS NOTES
"The patient presents with a history of eustacian tube dysfunction worse in the right ear than in the left.  The patient is not having difficulty with infections of the ears. She has noticed significant improvement with medical therapy and she is no longer having \"water sounds in the ears.\" The patient reports year round allergies, but she reports that her symptoms are worse during the summer. She reports nasal congestion and rhinitis associated with her allergy symptoms. Her recent Audiogram and Tympanogram are reviewed with her and they demonstrate a mild low frequency conductive hearing loss in the right ear. Her word recognition scores are 100% bilaterally. The tympanograms are type A bilaterally with very mild negative pressure.       All other systems were reviewed and they are either negative or they are not directly pertinent to this Otolaryngology examination.      Past Medical History:    Past Medical History:   Diagnosis Date     Abnormal Pap smear 2/21/2017     said pap was abnormal     Arthritis      Chronic back pain     hx spondylolisthesis     Chronic pain      COPD (chronic obstructive pulmonary disease) (H)     PFT's 2013 FEV1/FVC = 2.68/3.20 = 84%     H/O gastric bypass 2007     Hepatitis C     treated ribaviron & interferon in 2008 for serotype 2 with failed 6 month treatment     Hyperlipidemia     on simvatatin     Insomnia      Positive TB test     has taken INH     RLS (restless legs syndrome)      TB lung, latent 1994    treated     Uncomplicated asthma      Wounds and injuries 2003       Past Surgical History:    Past Surgical History:   Procedure Laterality Date     CHOLECYSTECTOMY       D & C  1987     ESOPHAGOSCOPY, GASTROSCOPY, DUODENOSCOPY (EGD), COMBINED  7/30/2014    Procedure: COMBINED ESOPHAGOSCOPY, GASTROSCOPY, DUODENOSCOPY (EGD);  Surgeon: Ramesh Self MD;  Location: [facility]     FRACTURE TX, ANKLE RT/LT  1991    ORIF rt ankle     FUSION SPINE POSTERIOR ONE LEVEL N/A " 11/21/2014    Procedure: FUSION SPINE POSTERIOR ONE LEVEL;  Surgeon: Amilcar Baldwin MD;  [facility]     FUSION SPINE POSTERIOR ONE LEVEL Right 7/30/2015    Procedure: FUSION SPINE POSTERIOR ONE LEVEL;  Surgeon: Amilcar Baldwin MD;  [facility]     GASTRIC BYPASS  2007    lost 150 lbs     GRAFT BONE FROM ILIAC CREST Left 11/21/2014    Procedure: GRAFT BONE FROM ILIAC CREST;  Surgeon: Amilcar Baldwin MD;  [facility]     OPTICAL TRACKING SYSTEM FUSION POSTERIOR SPINE LUMBAR  11/20/2013    Procedure: OPTICAL TRACKING SYSTEM FUSION SPINE POSTERIOR LUMBAR ONE LEVEL;  Lumbar 4-5 Transforaminal Interbody Fusion;  Surgeon: Amilcar Baldwin MD;  [facility]     ORTHOPEDIC SURGERY      shoulder surgery, removed bone spur     TUBAL LIGATION         Medications:      Current Outpatient Medications:      albuterol (PROVENTIL) (2.5 MG/3ML) 0.083% neb solution, TAKE 1 VIAL BY NEBULIZATION EVERY 6 HOURS AS NEEDED FOR SHORTNESS OF BREATH, Disp: 360 mL, Rfl: 2     albuterol (VENTOLIN HFA) 108 (90 Base) MCG/ACT inhaler, Inhale 2 puffs into the lungs every 4 hours as needed for shortness of breath / dyspnea or wheezing, Disp: 1 Inhaler, Rfl: 11     cetirizine (ZYRTEC) 10 MG tablet, Take 1 tablet (10 mg) by mouth daily, Disp: 30 tablet, Rfl: 11     cyanocobalamin (VITAMIN B12) 1000 MCG/ML injection, ADMINISTER 1 ML(1000 MCG) IN THE MUSCLE EVERY 30 DAYS, Disp: 1 mL, Rfl: 11     diphenhydrAMINE (WAL-DRYL ALLERGY) 25 MG tablet, TAKE 1 TABLET(25 MG) BY MOUTH EVERY 6 HOURS AS NEEDED FOR ITCHING OR ALLERGIES, Disp: 60 tablet, Rfl: 0     fluticasone (FLONASE) 50 MCG/ACT spray, Spray 2 sprays into both nostrils daily, Disp: 1 Bottle, Rfl: 3     hydrocortisone 2.5 % cream, Apply topically 2 times daily, Disp: 30 g, Rfl: 1     ipratropium (ATROVENT HFA) 17 MCG/ACT inhaler, Inhale 2 puffs into the lungs every 6 hours, Disp: 1 Inhaler, Rfl: 11     Lidocaine (LIDOCARE) 4 %  Patch, Place 1 patch onto the skin every 24 hours, Disp: 12 patch, Rfl: 0     loratadine (CLARITIN) 10 MG capsule, Take 10 mg by mouth daily, Disp: 60 capsule, Rfl: 3     mirtazapine (REMERON) 15 MG tablet, Take 1 tablet (15 mg) by mouth At Bedtime, Disp: 90 tablet, Rfl: 3     mometasone-formoterol (DULERA) 100-5 MCG/ACT inhaler, Inhale 2 puffs into the lungs 2 times daily, Disp: 1 Inhaler, Rfl: 11     montelukast (SINGULAIR) 10 MG tablet, Take 1 tablet (10 mg) by mouth At Bedtime, Disp: 90 tablet, Rfl: 3     nicotine (NICODERM CQ) 21 MG/24HR 24 hr patch, Place 1 patch onto the skin every 24 hours, Disp: 30 patch, Rfl: 2     order for DME, Nebulizer, Disp: 1 each, Rfl: 0     pramipexole (MIRAPEX) 0.125 MG tablet, TAKE 1 TO 3 TABLETS BY MOUTH AT BEDTIME, Disp: 180 tablet, Rfl: 3     Spacer/Aero Chamber Mouthpiece MISC, 1 Units daily, Disp: 1 each, Rfl: 0     traZODone (DESYREL) 100 MG tablet, TAKE 2 TABLETS(200 MG) BY MOUTH EVERY NIGHT AS NEEDED FOR SLEEP, Disp: 60 tablet, Rfl: 2     varenicline (CHANTIX CONTINUING MONTH YAW) 1 MG tablet, Take 1 tablet (1 mg) by mouth 2 times daily Take 1 tablet by mouth twice daily., Disp: 180 tablet, Rfl: 3    Allergies:    Bee venom; Opium alkaloids, hcls; and Latex    Physical Examination:    The patient is a well developed, well nourished female in no apparent distress.  She is normocephalic, atraumatic with pupils equally round and reactive to light.    Oral Cavity Examination:  Normal mucosa with no masses or lesions  Nasal Examination:  Normal mucosa with no masses or lesions  Ear Examination: Ear canals clear with no serous or purulent effusions in the middle ear spaces bilaterally.  The  tympanic membranes are normal.  Neurological Examination: Facial nerve function intact and symmetric  Integumentary Examination: No lesions on the skin of the head and neck    Assessment and Plan:    The patient presents with a history of eustacian tube dysfunction and allergic rhinitis   The patient was treated with Claritin and Flonase Nasal Spray and she responded very well to therapy. She will be referred to Dr. Ivan Izquierdo for an allergy evaluation and she will be seen again in July to assess her continued progress.      CC: Dr. Mili Burch

## 2019-05-16 NOTE — PATIENT INSTRUCTIONS
1.  You were seen in the ENT Clinic today by Dr. Milligan.  If you have any questions or concerns after your appointment, please call [phone]. Press option #1 for scheduling related needs. Press option #3 for Nurse advice.    2.  Please schedule an appointment for the following:   - Dr. Izquierdo - Allergy Consultation    3.  Plan is to return to clinic in 2 months.      Michelle Espinal LPN  [facility] Otolaryngology  [phone]    The patient presents with a history of eustacian tube dysfunction and allergic rhinitis  The patient was treated with Claritin and Flonase Nasal Spray and she responded very well to therapy. She will be referred to Dr. Ivan Izquierdo for an allergy evaluation and she will be seen again in July to assess her continued progress.

## 2019-05-16 NOTE — NURSING NOTE
Chief Complaint   Patient presents with     RECHECK     1 month follow up with jose luis Reddy LPN

## 2019-05-16 NOTE — LETTER
"5/16/2019       RE: Kristina Eldridge  [address]  [facility] [address]     Dear Colleague,    Thank you for referring your patient, Kristina Eldridge, to the [facility] EAR NOSE AND THROAT at [facility]. Please see a copy of my visit note below.    The patient presents with a history of eustacian tube dysfunction worse in the right ear than in the left.  The patient is not having difficulty with infections of the ears. She has noticed significant improvement with medical therapy and she is no longer having \"water sounds in the ears.\" The patient reports year round allergies, but she reports that her symptoms are worse during the summer. She reports nasal congestion and rhinitis associated with her allergy symptoms. Her recent Audiogram and Tympanogram are reviewed with her and they demonstrate a mild low frequency conductive hearing loss in the right ear. Her word recognition scores are 100% bilaterally. The tympanograms are type A bilaterally with very mild negative pressure.       All other systems were reviewed and they are either negative or they are not directly pertinent to this Otolaryngology examination.      Past Medical History:    Past Medical History:   Diagnosis Date     Abnormal Pap smear 2/21/2017     said pap was abnormal     Arthritis      Chronic back pain     hx spondylolisthesis     Chronic pain      COPD (chronic obstructive pulmonary disease) (H)     PFT's 2013 FEV1/FVC = 2.68/3.20 = 84%     H/O gastric bypass 2007     Hepatitis C     treated ribaviron & interferon in 2008 for serotype 2 with failed 6 month treatment     Hyperlipidemia     on simvatatin     Insomnia      Positive TB test     has taken INH     RLS (restless legs syndrome)      TB lung, latent 1994    treated     Uncomplicated asthma      Wounds and injuries 2003       Past Surgical History:    Past Surgical History:   Procedure Laterality Date     CHOLECYSTECTOMY       D & C  1987     " ESOPHAGOSCOPY, GASTROSCOPY, DUODENOSCOPY (EGD), COMBINED  7/30/2014    Procedure: COMBINED ESOPHAGOSCOPY, GASTROSCOPY, DUODENOSCOPY (EGD);  Surgeon: Ramesh Self MD;  Location: [facility]     FRACTURE TX, ANKLE RT/LT  1991    ORIF rt ankle     FUSION SPINE POSTERIOR ONE LEVEL N/A 11/21/2014    Procedure: FUSION SPINE POSTERIOR ONE LEVEL;  Surgeon: Amilcar Baldwin MD;  [facility]     FUSION SPINE POSTERIOR ONE LEVEL Right 7/30/2015    Procedure: FUSION SPINE POSTERIOR ONE LEVEL;  Surgeon: Amilcar Baldwin MD;  [facility]     GASTRIC BYPASS  2007    lost 150 lbs     GRAFT BONE FROM ILIAC CREST Left 11/21/2014    Procedure: GRAFT BONE FROM ILIAC CREST;  Surgeon: Amilcar Baldwin MD;  [facility]     OPTICAL TRACKING SYSTEM FUSION POSTERIOR SPINE LUMBAR  11/20/2013    Procedure: OPTICAL TRACKING SYSTEM FUSION SPINE POSTERIOR LUMBAR ONE LEVEL;  Lumbar 4-5 Transforaminal Interbody Fusion;  Surgeon: Amilcar Baldwin MD;  [facility]     ORTHOPEDIC SURGERY      shoulder surgery, removed bone spur     TUBAL LIGATION         Medications:      Current Outpatient Medications:      albuterol (PROVENTIL) (2.5 MG/3ML) 0.083% neb solution, TAKE 1 VIAL BY NEBULIZATION EVERY 6 HOURS AS NEEDED FOR SHORTNESS OF BREATH, Disp: 360 mL, Rfl: 2     albuterol (VENTOLIN HFA) 108 (90 Base) MCG/ACT inhaler, Inhale 2 puffs into the lungs every 4 hours as needed for shortness of breath / dyspnea or wheezing, Disp: 1 Inhaler, Rfl: 11     cetirizine (ZYRTEC) 10 MG tablet, Take 1 tablet (10 mg) by mouth daily, Disp: 30 tablet, Rfl: 11     cyanocobalamin (VITAMIN B12) 1000 MCG/ML injection, ADMINISTER 1 ML(1000 MCG) IN THE MUSCLE EVERY 30 DAYS, Disp: 1 mL, Rfl: 11     diphenhydrAMINE (WAL-DRYL ALLERGY) 25 MG tablet, TAKE 1 TABLET(25 MG) BY MOUTH EVERY 6 HOURS AS NEEDED FOR ITCHING OR ALLERGIES, Disp: 60 tablet, Rfl: 0     fluticasone (FLONASE) 50 MCG/ACT spray, Spray 2  sprays into both nostrils daily, Disp: 1 Bottle, Rfl: 3     hydrocortisone 2.5 % cream, Apply topically 2 times daily, Disp: 30 g, Rfl: 1     ipratropium (ATROVENT HFA) 17 MCG/ACT inhaler, Inhale 2 puffs into the lungs every 6 hours, Disp: 1 Inhaler, Rfl: 11     Lidocaine (LIDOCARE) 4 % Patch, Place 1 patch onto the skin every 24 hours, Disp: 12 patch, Rfl: 0     loratadine (CLARITIN) 10 MG capsule, Take 10 mg by mouth daily, Disp: 60 capsule, Rfl: 3     mirtazapine (REMERON) 15 MG tablet, Take 1 tablet (15 mg) by mouth At Bedtime, Disp: 90 tablet, Rfl: 3     mometasone-formoterol (DULERA) 100-5 MCG/ACT inhaler, Inhale 2 puffs into the lungs 2 times daily, Disp: 1 Inhaler, Rfl: 11     montelukast (SINGULAIR) 10 MG tablet, Take 1 tablet (10 mg) by mouth At Bedtime, Disp: 90 tablet, Rfl: 3     nicotine (NICODERM CQ) 21 MG/24HR 24 hr patch, Place 1 patch onto the skin every 24 hours, Disp: 30 patch, Rfl: 2     order for DME, Nebulizer, Disp: 1 each, Rfl: 0     pramipexole (MIRAPEX) 0.125 MG tablet, TAKE 1 TO 3 TABLETS BY MOUTH AT BEDTIME, Disp: 180 tablet, Rfl: 3     Spacer/Aero Chamber Mouthpiece MISC, 1 Units daily, Disp: 1 each, Rfl: 0     traZODone (DESYREL) 100 MG tablet, TAKE 2 TABLETS(200 MG) BY MOUTH EVERY NIGHT AS NEEDED FOR SLEEP, Disp: 60 tablet, Rfl: 2     varenicline (CHANTIX CONTINUING MONTH YAW) 1 MG tablet, Take 1 tablet (1 mg) by mouth 2 times daily Take 1 tablet by mouth twice daily., Disp: 180 tablet, Rfl: 3    Allergies:    Bee venom; Opium alkaloids, hcls; and Latex    Physical Examination:    The patient is a well developed, well nourished female in no apparent distress.  She is normocephalic, atraumatic with pupils equally round and reactive to light.    Oral Cavity Examination:  Normal mucosa with no masses or lesions  Nasal Examination:  Normal mucosa with no masses or lesions  Ear Examination: Ear canals clear with no serous or purulent effusions in the middle ear spaces bilaterally.  The   tympanic membranes are normal.  Neurological Examination: Facial nerve function intact and symmetric  Integumentary Examination: No lesions on the skin of the head and neck    Assessment and Plan:    The patient presents with a history of eustacian tube dysfunction and allergic rhinitis  The patient was treated with Claritin and Flonase Nasal Spray and she responded very well to therapy. She will be referred to Dr. Ivan Izquierdo for an allergy evaluation and she will be seen again in July to assess her continued progress.      CC: Dr. Mili Burch      Again, thank you for allowing me to participate in the care of your patient.      Sincerely,    Dami Milligan MD

## 2019-05-16 NOTE — PROGRESS NOTES
AUDIOLOGY REPORT    SUMMARY: Audiology visit completed. See audiogram for results.      RECOMMENDATIONS: Follow-up with ENT.      Sury Gongora, CCC-A  Licensed Audiologist  [place] #[id number]

## 2019-05-17 ENCOUNTER — OFFICE VISIT (OUTPATIENT)
Dept: INTERNAL MEDICINE | Facility: CLINIC | Age: 49
End: 2019-05-17
Payer: COMMERCIAL

## 2019-05-17 VITALS
WEIGHT: 83.6 LBS | DIASTOLIC BLOOD PRESSURE: 67 MMHG | BODY MASS INDEX: 16.33 KG/M2 | SYSTOLIC BLOOD PRESSURE: 108 MMHG | HEART RATE: 78 BPM | OXYGEN SATURATION: 97 %

## 2019-05-17 DIAGNOSIS — M54.50 ACUTE BILATERAL LOW BACK PAIN WITHOUT SCIATICA: Primary | ICD-10-CM

## 2019-05-17 DIAGNOSIS — Z98.84 GASTRIC BYPASS STATUS FOR OBESITY: ICD-10-CM

## 2019-05-17 RX ORDER — METHYLPREDNISOLONE 4 MG
TABLET, DOSE PACK ORAL
Qty: 21 TABLET | Refills: 0 | Status: SHIPPED | OUTPATIENT
Start: 2019-05-17 | End: 2019-10-10

## 2019-05-17 RX ORDER — TRAMADOL HYDROCHLORIDE 50 MG/1
TABLET ORAL
Qty: 60 TABLET | Refills: 5 | Status: SHIPPED | OUTPATIENT
Start: 2019-05-17 | End: 2019-10-10

## 2019-05-17 ASSESSMENT — PAIN SCALES - GENERAL: PAINLEVEL: WORST PAIN (10)

## 2019-05-17 NOTE — NURSING NOTE
Chief Complaint   Patient presents with     Pain     Pt is here to discuss back pain. Started on 5/14.      Lachelle Momin LPN at 7:47 AM on 5/17/2019.

## 2019-05-17 NOTE — PATIENT INSTRUCTIONS
[facility] Medication Refill Request Information:  * Please contact your pharmacy regarding ANY request for medication refills.  ** [facility] Prescription Fax = [phone]  * Please allow 3 business days for routine medication refills.  * Please allow 5 business days for controlled substance medication refills.     [facility] Test Result notification information:  *You will be notified with in 7-10 days of your appointment day regarding the results of your test.  If you are on MyChart you will be notified as soon as the provider has reviewed the results and signed off on them.    [facility]: [phone]
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
## 2019-05-28 DIAGNOSIS — L29.9 ITCHING: ICD-10-CM

## 2019-05-29 RX ORDER — DIPHENHYDRAMINE HCL 25 MG
TABLET ORAL
Qty: 60 TABLET | Refills: 0 | Status: SHIPPED | OUTPATIENT
Start: 2019-05-29 | End: 2019-10-23

## 2019-05-29 NOTE — TELEPHONE ENCOUNTER
diphenhydrAMINE (WAL-DRYL ALLERGY) 25 MG tablet    Last Written Prescription Date:  9/16/18  Last Fill Quantity: 60,   # refills: 0  Last Office Visit : 5/17/19  Future Office visit:  none    Med to pharmacy.

## 2019-06-12 DIAGNOSIS — J45.30 MILD PERSISTENT ASTHMA, UNCOMPLICATED: Primary | ICD-10-CM

## 2019-06-12 RX ORDER — INHALER, ASSIST DEVICES
SPACER (EA) MISCELLANEOUS
Qty: 1 EACH | Refills: 0 | Status: SHIPPED | OUTPATIENT
Start: 2019-06-12 | End: 2022-03-29

## 2019-06-12 NOTE — TELEPHONE ENCOUNTER
Spacer/Aero Chamber Mouthpiece MISC.     Last Written Prescription Date:  2/5/19  Last Fill Quantity: 1,   # refills: 0  Last Office Visit : 5/17/19  Future Office visit:  none    Routing refill request to provider for review/approval because:  Not on protocol.

## 2019-06-20 ENCOUNTER — TELEPHONE (OUTPATIENT)
Dept: INTERNAL MEDICINE | Facility: CLINIC | Age: 49
End: 2019-06-20
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
## 2019-06-20 NOTE — TELEPHONE ENCOUNTER
Will route to MD. Christy Camacho on 6/20/2019 at 12:13 PM  Called the patient and assisted with making an appointment the 25 with JULI Camacho on 6/20/2019 at 1:14 PM

## 2019-06-25 ENCOUNTER — OFFICE VISIT (OUTPATIENT)
Dept: INTERNAL MEDICINE | Facility: CLINIC | Age: 49
End: 2019-06-25
Payer: COMMERCIAL

## 2019-06-25 VITALS
HEIGHT: 60 IN | OXYGEN SATURATION: 96 % | HEART RATE: 91 BPM | WEIGHT: 82 LBS | BODY MASS INDEX: 16.1 KG/M2 | SYSTOLIC BLOOD PRESSURE: 125 MMHG | DIASTOLIC BLOOD PRESSURE: 82 MMHG

## 2019-06-25 DIAGNOSIS — M43.10 SPONDYLOLISTHESIS, GRADE 2: ICD-10-CM

## 2019-06-25 DIAGNOSIS — M48.061 SPINAL STENOSIS OF LUMBAR REGION, UNSPECIFIED WHETHER NEUROGENIC CLAUDICATION PRESENT: ICD-10-CM

## 2019-06-25 DIAGNOSIS — Z98.890 PERSONAL HISTORY OF SPINE SURGERY: Primary | ICD-10-CM

## 2019-06-25 DIAGNOSIS — M62.838 MUSCLE SPASM: ICD-10-CM

## 2019-06-25 RX ORDER — TIZANIDINE 2 MG/1
2-4 TABLET ORAL 3 TIMES DAILY PRN
Qty: 30 TABLET | Refills: 1 | Status: SHIPPED | OUTPATIENT
Start: 2019-06-25 | End: 2019-10-10

## 2019-06-25 ASSESSMENT — MIFFLIN-ST. JEOR: SCORE: 913.45

## 2019-06-25 ASSESSMENT — PAIN SCALES - GENERAL: PAINLEVEL: WORST PAIN (10)
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
## 2019-06-25 NOTE — PATIENT INSTRUCTIONS
[facility] Medication Refill Request Information:  * Please contact your pharmacy regarding ANY request for medication refills.  ** [facility] Prescription Fax = [phone]  * Please allow 3 business days for routine medication refills.  * Please allow 5 business days for controlled substance medication refills.     [facility] Test Result notification information:  *You will be notified with in 7-10 days of your appointment day regarding the results of your test.  If you are on MyChart you will be notified as soon as the provider has reviewed the results and signed off on them.    [facility]: [phone]     Imaging Dept.  [phone]  ([facility])  [phone]  [facility] ([place])  [phone]  [facility] [place] ([facility]) and Fatuma ([facility])    Orthopedics Clinic  Clinic 4 D  [phone]

## 2019-06-25 NOTE — NURSING NOTE
Chief Complaint   Patient presents with     Back Pain     returning for back pain per pt-        Sahra Saini EMT at 11:18 AM on 6/25/2019.

## 2019-06-27 NOTE — TELEPHONE ENCOUNTER
RECORDS RECEIVED FROM: Diagnosis: Personal history of spine surgery [Z98.890];Spinal stenosis of lumbar region, unspecified whether neurogenic claudication present [M48.061];Spondylolisthesis, grade 2 [M43.10]   Comments  Former pt of Dr. Amilcar Baldwin, all records internal, MRI scheduled for 06/30/19. Referred by Katlin Steele APRN CNP   DATE RECEIVED: Jul 12, 2019    NOTES STATUS DETAILS   OFFICE NOTE from referring provider Internal [facility] 6/25/19   OFFICE NOTE from other specialist Internal Dr. Baldwin 1/19/16...   DISCHARGE SUMMARY from hospital Internal    DISCHARGE REPORT from the ER Internal 5/14/19   OPERATIVE REPORT Internal 7/30/15  11/21/14  11/20/13   MEDICATION LIST Internal    IMPLANT RECORD/STICKER Internal    LABS     CBC/DIFF Internal 2/19/19   CULTURES N/A    INJECTIONS DONE IN RADIOLOGY N/A    MRI Internal 6/30/19 scheduled  6/6/13   CT SCAN Internal 11/17/15...   XRAYS (IMAGES & REPORTS) Internal 5/14/19...   TUMOR     PATHOLOGY  Slides & report N/A

## 2019-07-02 DIAGNOSIS — M62.838 MUSCLE SPASM: ICD-10-CM

## 2019-07-03 RX ORDER — TIZANIDINE 2 MG/1
2-4 TABLET ORAL 3 TIMES DAILY PRN
Qty: 30 TABLET | Refills: 1 | OUTPATIENT
Start: 2019-07-03

## 2019-07-08 ENCOUNTER — OFFICE VISIT (OUTPATIENT)
Dept: ALLERGY | Facility: CLINIC | Age: 49
End: 2019-07-08
Attending: ALLERGY & IMMUNOLOGY
Payer: COMMERCIAL

## 2019-07-08 VITALS
RESPIRATION RATE: 16 BRPM | DIASTOLIC BLOOD PRESSURE: 82 MMHG | HEIGHT: 62 IN | SYSTOLIC BLOOD PRESSURE: 113 MMHG | HEART RATE: 91 BPM | WEIGHT: 82 LBS | BODY MASS INDEX: 15.09 KG/M2 | OXYGEN SATURATION: 96 %

## 2019-07-08 DIAGNOSIS — J30.9 ALLERGIC RHINITIS, UNSPECIFIED SEASONALITY, UNSPECIFIED TRIGGER: ICD-10-CM

## 2019-07-08 ASSESSMENT — PAIN SCALES - GENERAL: PAINLEVEL: NO PAIN (0)

## 2019-07-08 ASSESSMENT — MIFFLIN-ST. JEOR: SCORE: 945.2

## 2019-07-08 NOTE — LETTER
"    7/8/2019         RE: Kristina Eldridge  [address]  [facility] [address]        Dear Colleague,    Thank you for referring your patient, Kristina Eldridge, to the [facility]. Please see a copy of my visit note below.    Reason for Visit  Kristina Eldridge is a 48 year old female who is referred by Mili Leon for allergies.    Allergy HPI  Yarelis has frequent sneezing and ear problems. She would pull on her ears and would give a water sense.  She started fluticasone and ceterizine and singulair recently.  Also was on prednisone for a cough.  The sound in the ears is gone.    Has seen 6 doctors in the last 90 days.  She will sneeze all day and get tears if she goes off the ceterizine.  Will go from nose running to congestion.  If on the ceterizine she does well.   Has been coughing for about 12 days.  She is on Atrovent Dulera and albuterol.        Social:  2 cats, is smoking    Family Hx: no family hx of allergies.    The patient was seen and examined by Ivan Khalil MD   Current Outpatient Medications   Medication     albuterol (PROVENTIL) (2.5 MG/3ML) 0.083% neb solution     albuterol (VENTOLIN HFA) 108 (90 Base) MCG/ACT inhaler     cetirizine (ZYRTEC) 10 MG tablet     cyanocobalamin (VITAMIN B12) 1000 MCG/ML injection     diphenhydrAMINE (WAL-DRYL ALLERGY) 25 MG tablet     fluticasone (FLONASE) 50 MCG/ACT spray     hydrocortisone 2.5 % cream     ipratropium (ATROVENT HFA) 17 MCG/ACT inhaler     Lidocaine (LIDOCARE) 4 % Patch     methylPREDNISolone (MEDROL DOSEPAK) 4 MG tablet therapy pack     mirtazapine (REMERON) 15 MG tablet     mometasone-formoterol (DULERA) 100-5 MCG/ACT inhaler     montelukast (SINGULAIR) 10 MG tablet     Needle, Disp, 23G X 1\" MISC     nicotine (NICODERM CQ) 21 MG/24HR 24 hr patch     order for DME     pramipexole (MIRAPEX) 0.125 MG tablet     spacer (OPTICHAMBER OTTONIEL) holding chamber     Spacer/Aero Chamber Mouthpiece MISC     tiZANidine " (ZANAFLEX) 2 MG tablet     traMADol (ULTRAM) 50 MG tablet     traZODone (DESYREL) 100 MG tablet     varenicline (CHANTIX CONTINUING MONTH YAW) 1 MG tablet     No current facility-administered medications for this visit.      Allergies   Allergen Reactions     Bee Venom Anaphylaxis, Anxiety, Difficulty breathing, Hives, Itching, Nausea and Vomiting, Palpitations, Shortness Of Breath and Swelling     Opium Alkaloids, Hcls Itching     Can take opiate derived narcotics with Benadryl.     Latex Rash     Social History     Socioeconomic History     Marital status:      Spouse name: Not on file     Number of children: Not on file     Years of education: Not on file     Highest education level: Not on file   Occupational History     Not on file   Social Needs     Financial resource strain: Not on file     Food insecurity:     Worry: Not on file     Inability: Not on file     Transportation needs:     Medical: Not on file     Non-medical: Not on file   Tobacco Use     Smoking status: Current Every Day Smoker     Packs/day: 1.00     Years: 30.00     Pack years: 30.00     Types: Cigarettes     Last attempt to quit: 2014     Years since quittin.0     Smokeless tobacco: Never Used     Tobacco comment: pack a day   Substance and Sexual Activity     Alcohol use: Not Currently     Alcohol/week: 0.0 oz     Comment: x3 drinks per year     Drug use: Yes     Types: Marijuana     Comment: Marijuana---- denies use since 2014       Sexual activity: Not Currently     Partners: Male     Birth control/protection: Post-menopausal   Lifestyle     Physical activity:     Days per week: Not on file     Minutes per session: Not on file     Stress: Not on file   Relationships     Social connections:     Talks on phone: Not on file     Gets together: Not on file     Attends [employer] service: Not on file     Active member of club or organization: Not on file     Attends meetings of clubs or organizations: Not on file      Relationship status: Not on file     Intimate partner violence:     Fear of current or ex partner: Not on file     Emotionally abused: Not on file     Physically abused: Not on file     Forced sexual activity: Not on file   Other Topics Concern     Parent/sibling w/ CABG, MI or angioplasty before 65F 55M? Not Asked   Social History Narrative    Lives in [place] with significant other and 3 cats.  Pt does not work currently, has applied for disability      Past Medical History:   Diagnosis Date     Abnormal Pap smear 2/21/2017    Dr said pap was abnormal     Arthritis      Chronic back pain     hx spondylolisthesis     Chronic pain      COPD (chronic obstructive pulmonary disease) (H)     PFT's 2013 FEV1/FVC = 2.68/3.20 = 84%     H/O gastric bypass 2007     Hepatitis C     treated ribaviron & interferon in 2008 for serotype 2 with failed 6 month treatment     Hyperlipidemia     on simvatatin     Insomnia      Positive TB test     has taken INH     RLS (restless legs syndrome)      TB lung, latent 1994    treated     Uncomplicated asthma      Wounds and injuries 2003     Past Surgical History:   Procedure Laterality Date     CHOLECYSTECTOMY       D & C  1987     ESOPHAGOSCOPY, GASTROSCOPY, DUODENOSCOPY (EGD), COMBINED  7/30/2014    Procedure: COMBINED ESOPHAGOSCOPY, GASTROSCOPY, DUODENOSCOPY (EGD);  Surgeon: Ramesh Self MD;  Location: [facility]     FRACTURE TX, ANKLE RT/LT  1991    ORIF rt ankle     FUSION SPINE POSTERIOR ONE LEVEL N/A 11/21/2014    Procedure: FUSION SPINE POSTERIOR ONE LEVEL;  Surgeon: Amilcar Baldwin MD;  Location: [facility]     FUSION SPINE POSTERIOR ONE LEVEL Right 7/30/2015    Procedure: FUSION SPINE POSTERIOR ONE LEVEL;  Surgeon: Amilcar Baldwin MD;  Location: [facility] OR     GASTRIC BYPASS  2007    lost 150 lbs     GRAFT BONE FROM ILIAC CREST Left 11/21/2014    Procedure: GRAFT BONE FROM ILIAC CREST;  Surgeon: Amilcar Baldwin MD;  Location: [facility] OR      OPTICAL TRACKING SYSTEM FUSION POSTERIOR SPINE LUMBAR  2013    Procedure: OPTICAL TRACKING SYSTEM FUSION SPINE POSTERIOR LUMBAR ONE LEVEL;  Lumbar 4-5 Transforaminal Interbody Fusion;  Surgeon: Amilcar Baldwin MD;  Location: [facility] OR     ORTHOPEDIC SURGERY      shoulder surgery, removed bone spur     TUBAL LIGATION       Family History   Problem Relation Age of Onset     Hypertension Father      Respiratory Father         COPD, smoked     Glaucoma Father      C.A.D. Mother         CHF,  age 59 of MI, smoked     Crohn's Disease Brother      Diabetes Maternal Grandmother         insulin dependent     Diabetes Paternal Grandmother      Glaucoma Paternal Grandmother      Alzheimer Disease Paternal Grandmother      Melanoma No family hx of      Skin Cancer No family hx of          ROS   A complete ROS was otherwise negative except as noted in the HPI and the end of the note.  Resp 16   LMP 2012 (LMP Unknown)   Exam:   GENERAL APPEARANCE: Well developed, well nourished, alert, and in no apparent distress.  EYES: PERRL, EOMI, conjunctiva clear non-injected  HENT: Nasal mucosa with no edema and no discharge. No nasal polyps.  No facial tenderness.  EARS: Canals clear, TMs normal  MOUTH: Oral mucosa is moist, without any lesions, no tonsillar enlargement, no oropharyngeal exudate.  RESP: Good air flow throughout.  No crackles. No rhonchi. No wheezes.  CV: Normal S1, S2, regular rhythm, normal rate. No murmur.  No rub. No gallop. No LE edema.   MS: Extremities normal. No clubbing. No cyanosis.  SKIN: No rashes noted  NEURO: Speech normal, normal strength and tone, normal gait and stance  PSYCH: Normal mentation, orientation to person, place, and time.  Results:      Assessment and plan:   She has done well on ceterizine and fluticasone and has very few symptoms on this.    She has cats but has not had any reactions to the scratches.    She was not able to go off the antihistamines for a week  so we can't test her (on the 4th of July she wanted to take an antihistamine).  We can test her in the future if needed.          Again, thank you for allowing me to participate in the care of your patient.        Sincerely,        Ivan Khalil MD

## 2019-07-08 NOTE — PROGRESS NOTES
"Reason for Visit  Kristina Eldridge is a 48 year old female who is referred by Mili Leon for allergies.    Allergy HPI  Yarelis has frequent sneezing and ear problems. She would pull on her ears and would give a water sense.  She started fluticasone and ceterizine and singulair recently.  Also was on prednisone for a cough.  The sound in the ears is gone.    Has seen 6 doctors in the last 90 days.  She will sneeze all day and get tears if she goes off the ceterizine.  Will go from nose running to congestion.  If on the ceterizine she does well.   Has been coughing for about 12 days.  She is on Atrovent Dulera and albuterol.        Social:  2 cats, is smoking    Family Hx: no family hx of allergies.    The patient was seen and examined by Ivan Khalil MD   Current Outpatient Medications   Medication     albuterol (PROVENTIL) (2.5 MG/3ML) 0.083% neb solution     albuterol (VENTOLIN HFA) 108 (90 Base) MCG/ACT inhaler     cetirizine (ZYRTEC) 10 MG tablet     cyanocobalamin (VITAMIN B12) 1000 MCG/ML injection     diphenhydrAMINE (WAL-DRYL ALLERGY) 25 MG tablet     fluticasone (FLONASE) 50 MCG/ACT spray     hydrocortisone 2.5 % cream     ipratropium (ATROVENT HFA) 17 MCG/ACT inhaler     Lidocaine (LIDOCARE) 4 % Patch     methylPREDNISolone (MEDROL DOSEPAK) 4 MG tablet therapy pack     mirtazapine (REMERON) 15 MG tablet     mometasone-formoterol (DULERA) 100-5 MCG/ACT inhaler     montelukast (SINGULAIR) 10 MG tablet     Needle, Disp, 23G X 1\" MISC     nicotine (NICODERM CQ) 21 MG/24HR 24 hr patch     order for DME     pramipexole (MIRAPEX) 0.125 MG tablet     spacer (OPTICHAMBER OTTONIEL) holding chamber     Spacer/Aero Chamber Mouthpiece MISC     tiZANidine (ZANAFLEX) 2 MG tablet     traMADol (ULTRAM) 50 MG tablet     traZODone (DESYREL) 100 MG tablet     varenicline (CHANTIX CONTINUING MONTH YAW) 1 MG tablet     No current facility-administered medications for this visit.      Allergies   Allergen " Reactions     Bee Venom Anaphylaxis, Anxiety, Difficulty breathing, Hives, Itching, Nausea and Vomiting, Palpitations, Shortness Of Breath and Swelling     Opium Alkaloids, Hcls Itching     Can take opiate derived narcotics with Benadryl.     Latex Rash     Social History     Socioeconomic History     Marital status:      Spouse name: Not on file     Number of children: Not on file     Years of education: Not on file     Highest education level: Not on file   Occupational History     Not on file   Social Needs     Financial resource strain: Not on file     Food insecurity:     Worry: Not on file     Inability: Not on file     Transportation needs:     Medical: Not on file     Non-medical: Not on file   Tobacco Use     Smoking status: Current Every Day Smoker     Packs/day: 1.00     Years: 30.00     Pack years: 30.00     Types: Cigarettes     Last attempt to quit: 2014     Years since quittin.0     Smokeless tobacco: Never Used     Tobacco comment: pack a day   Substance and Sexual Activity     Alcohol use: Not Currently     Alcohol/week: 0.0 oz     Comment: x3 drinks per year     Drug use: Yes     Types: Marijuana     Comment: Marijuana---- denies use since 2014       Sexual activity: Not Currently     Partners: Male     Birth control/protection: Post-menopausal   Lifestyle     Physical activity:     Days per week: Not on file     Minutes per session: Not on file     Stress: Not on file   Relationships     Social connections:     Talks on phone: Not on file     Gets together: Not on file     Attends Rastafarian service: Not on file     Active member of club or organization: Not on file     Attends meetings of clubs or organizations: Not on file     Relationship status: Not on file     Intimate partner violence:     Fear of current or ex partner: Not on file     Emotionally abused: Not on file     Physically abused: Not on file     Forced sexual activity: Not on file   Other Topics Concern      Parent/sibling w/ CABG, MI or angioplasty before 65F 55M? Not Asked   Social History Narrative    Lives in [place] with significant other and 3 cats.  Pt does not work currently, has applied for disability      Past Medical History:   Diagnosis Date     Abnormal Pap smear 2/21/2017    Dr said pap was abnormal     Arthritis      Chronic back pain     hx spondylolisthesis     Chronic pain      COPD (chronic obstructive pulmonary disease) (H)     PFT's 2013 FEV1/FVC = 2.68/3.20 = 84%     H/O gastric bypass 2007     Hepatitis C     treated ribaviron & interferon in 2008 for serotype 2 with failed 6 month treatment     Hyperlipidemia     on simvatatin     Insomnia      Positive TB test     has taken INH     RLS (restless legs syndrome)      TB lung, latent 1994    treated     Uncomplicated asthma      Wounds and injuries 2003     Past Surgical History:   Procedure Laterality Date     CHOLECYSTECTOMY       D & C  1987     ESOPHAGOSCOPY, GASTROSCOPY, DUODENOSCOPY (EGD), COMBINED  7/30/2014    Procedure: COMBINED ESOPHAGOSCOPY, GASTROSCOPY, DUODENOSCOPY (EGD);  Surgeon: Ramesh Self MD;  Location: [facility]     FRACTURE TX, ANKLE RT/LT  1991    ORIF rt ankle     FUSION SPINE POSTERIOR ONE LEVEL N/A 11/21/2014    Procedure: FUSION SPINE POSTERIOR ONE LEVEL;  Surgeon: Amilcar Baldwin MD;  [facility]: [facility]     FUSION SPINE POSTERIOR ONE LEVEL Right 7/30/2015    Procedure: FUSION SPINE POSTERIOR ONE LEVEL;  Surgeon: Amilcar Baldwin MD;  [facility]: [facility]     GASTRIC BYPASS  2007    lost 150 lbs     GRAFT BONE FROM ILIAC CREST Left 11/21/2014    Procedure: GRAFT BONE FROM ILIAC CREST;  Surgeon: Amilcar Baldwin MD;  [facility]: [facility]     OPTICAL TRACKING SYSTEM FUSION POSTERIOR SPINE LUMBAR  11/20/2013    Procedure: OPTICAL TRACKING SYSTEM FUSION SPINE POSTERIOR LUMBAR ONE LEVEL;  Lumbar 4-5 Transforaminal Interbody Fusion;  Surgeon: Amilcar Baldwin MD;  [facility]     ORTHOPEDIC SURGERY      shoulder surgery, removed bone spur     TUBAL LIGATION       Family History   Problem Relation Age of Onset     Hypertension Father      Respiratory Father         COPD, smoked     Glaucoma Father      C.A.D. Mother         CHF,  age 59 of [place], smoked     Crohn's Disease Brother      Diabetes Maternal Grandmother         insulin dependent     Diabetes Paternal Grandmother      Glaucoma Paternal Grandmother      Alzheimer Disease Paternal Grandmother      Melanoma No family hx of      Skin Cancer No family hx of          ROS   A complete ROS was otherwise negative except as noted in the HPI and the end of the note.  Resp 16   LMP 2012 (LMP Unknown)   Exam:   GENERAL APPEARANCE: Well developed, well nourished, alert, and in no apparent distress.  EYES: PERRL, EOMI, conjunctiva clear non-injected  HENT: Nasal mucosa with no edema and no discharge. No nasal polyps.  No facial tenderness.  EARS: Canals clear, TMs normal  MOUTH: Oral mucosa is moist, without any lesions, no tonsillar enlargement, no oropharyngeal exudate.  RESP: Good air flow throughout.  No crackles. No rhonchi. No wheezes.  CV: Normal S1, S2, regular rhythm, normal rate. No murmur.  No rub. No gallop. No LE edema.   MS: Extremities normal. No clubbing. No cyanosis.  SKIN: No rashes noted  NEURO: Speech normal, normal strength and tone, normal gait and stance  PSYCH: Normal mentation, orientation to person, place, and time.  Results:      Assessment and plan:   She has done well on ceterizine and fluticasone and has very few symptoms on this.    She has cats but has not had any reactions to the scratches.    She was not able to go off the antihistamines for a week so we can't test her (on the  she wanted to take an antihistamine).  We can test her in the future if needed.

## 2019-07-08 NOTE — NURSING NOTE
Chief Complaint   Patient presents with     Allergy Consult     allergy    Medications reviewed and vital signs taken.   Sebastián Galvez CMA

## 2019-07-12 ENCOUNTER — PRE VISIT (OUTPATIENT)
Dept: ORTHOPEDICS | Facility: CLINIC | Age: 49
End: 2019-07-12

## 2019-07-24 ENCOUNTER — HOSPITAL ENCOUNTER (OUTPATIENT)
Facility: AMBULATORY SURGERY CENTER | Age: 49
End: 2019-07-24
Attending: INTERNAL MEDICINE
Payer: COMMERCIAL

## 2019-07-24 ENCOUNTER — OFFICE VISIT (OUTPATIENT)
Dept: SURGERY | Facility: CLINIC | Age: 49
End: 2019-07-24
Payer: COMMERCIAL

## 2019-07-24 VITALS
BODY MASS INDEX: 15.09 KG/M2 | SYSTOLIC BLOOD PRESSURE: 110 MMHG | DIASTOLIC BLOOD PRESSURE: 68 MMHG | HEIGHT: 62 IN | HEART RATE: 86 BPM | WEIGHT: 82 LBS | OXYGEN SATURATION: 96 %

## 2019-07-24 DIAGNOSIS — Z98.84 BARIATRIC SURGERY STATUS: Primary | ICD-10-CM

## 2019-07-24 DIAGNOSIS — R63.4 WEIGHT LOSS, NON-INTENTIONAL: ICD-10-CM

## 2019-07-24 DIAGNOSIS — Z98.84 BARIATRIC SURGERY STATUS: ICD-10-CM

## 2019-07-24 LAB
ALBUMIN SERPL-MCNC: 3.6 G/DL (ref 3.4–5)
ALP SERPL-CCNC: 98 U/L (ref 40–150)
ALT SERPL W P-5'-P-CCNC: 55 U/L (ref 0–50)
ANION GAP SERPL CALCULATED.3IONS-SCNC: 4 MMOL/L (ref 3–14)
AST SERPL W P-5'-P-CCNC: 35 U/L (ref 0–45)
BILIRUB SERPL-MCNC: 0.4 MG/DL (ref 0.2–1.3)
BUN SERPL-MCNC: 12 MG/DL (ref 7–30)
CALCIUM SERPL-MCNC: 8.2 MG/DL (ref 8.5–10.1)
CHLORIDE SERPL-SCNC: 104 MMOL/L (ref 94–109)
CO2 SERPL-SCNC: 30 MMOL/L (ref 20–32)
CREAT SERPL-MCNC: 0.94 MG/DL (ref 0.52–1.04)
DEPRECATED CALCIDIOL+CALCIFEROL SERPL-MC: 11 UG/L (ref 20–75)
ERYTHROCYTE [DISTWIDTH] IN BLOOD BY AUTOMATED COUNT: 14.7 % (ref 10–15)
FERRITIN SERPL-MCNC: 13 NG/ML (ref 8–252)
GFR SERPL CREATININE-BSD FRML MDRD: 70 ML/MIN/{1.73_M2}
GLUCOSE SERPL-MCNC: 74 MG/DL (ref 70–99)
HCT VFR BLD AUTO: 39.2 % (ref 35–47)
HGB BLD-MCNC: 12.9 G/DL (ref 11.7–15.7)
MCH RBC QN AUTO: 32.7 PG (ref 26.5–33)
MCHC RBC AUTO-ENTMCNC: 32.9 G/DL (ref 31.5–36.5)
MCV RBC AUTO: 99 FL (ref 78–100)
PLATELET # BLD AUTO: 175 10E9/L (ref 150–450)
POTASSIUM SERPL-SCNC: 3.5 MMOL/L (ref 3.4–5.3)
PROT SERPL-MCNC: 6.6 G/DL (ref 6.8–8.8)
PTH-INTACT SERPL-MCNC: 75 PG/ML (ref 18–80)
RBC # BLD AUTO: 3.95 10E12/L (ref 3.8–5.2)
SODIUM SERPL-SCNC: 138 MMOL/L (ref 133–144)
VIT B12 SERPL-MCNC: 892 PG/ML (ref 193–986)
WBC # BLD AUTO: 9.3 10E9/L (ref 4–11)

## 2019-07-24 RX ORDER — THIAMINE HYDROCHLORIDE 100 MG/ML
100 INJECTION, SOLUTION INTRAMUSCULAR; INTRAVENOUS ONCE
Status: COMPLETED | OUTPATIENT
Start: 2019-07-24 | End: 2019-07-24

## 2019-07-24 RX ORDER — CYANOCOBALAMIN 1000 UG/ML
1 INJECTION, SOLUTION INTRAMUSCULAR; SUBCUTANEOUS ONCE
Qty: 1 ML | Refills: 0 | OUTPATIENT
Start: 2019-07-24 | End: 2019-07-24

## 2019-07-24 RX ORDER — THIAMINE HYDROCHLORIDE 100 MG/ML
100 INJECTION, SOLUTION INTRAMUSCULAR; INTRAVENOUS ONCE
Qty: 1 ML | Refills: 0
Start: 2019-07-24 | End: 2019-10-10

## 2019-07-24 RX ADMIN — THIAMINE HYDROCHLORIDE 100 MG: 100 INJECTION, SOLUTION INTRAMUSCULAR; INTRAVENOUS at 12:02

## 2019-07-24 ASSESSMENT — MIFFLIN-ST. JEOR: SCORE: 945.33

## 2019-07-24 NOTE — PROGRESS NOTES
"New Re-establish Bariatric Surgery Consultation Note    2019    RE: Kristina Eldridge  MR#: [id number]  : 1970      Referring provider: Self referred    Chief Complaint/Reason for visit: evaluation for possible weight loss surgery    Dear Mili Leon MD (General),    I had the pleasure of seeing your patient, Kristina Eldridge, to evaluate her obesity and consider her for possible weight loss surgery. As you know, Kristina Eldridge is 48 year old.  She has a height of 5' 2.008\", a weight of 82 lbs 0 oz, and calculated Body mass index is 14.99 kg/m .    Last visit  with [facility] Felix and weight 92 lbs. Albumin 3.8    Most recent albumin 2.8   3/5/19    2007 Lap gastric bypass Dr Perez    Weight prior to weight loss surgery was 235 lbs  She felt great at 125 lbs  Hep C and treated in  and lost 40 lbs due to no appetite at all and never could regain weight since then  She feels tired all the time, she has been continuing to lose weight. She has back pain due to back injury, MRI on Friday. She feels lightheaded.  She has days she doesn't have appetite at all, if she forces herself to eat she vomits  No alcohol use  No smoking. She quit in  prior to RYGB  She has never needed TPN  +abdominal cramping occasionally  She has had loose stools since her surgery, 2-3 times per day    HISTORY OF PRESENT ILLNESS:  Weight Loss History Reviewed with Patient 2019   How long have you been overweight? Since late teens through early 20's   What is the most that you have ever weighed? 235   What is the most weight you have lost? 75   I have tried the following methods to lose weight Weight Watchers, Slimfast, Weight Loss Surgery   I have tried the following weight loss medications? (Check all that apply) None   Have you ever had weight loss surgery? Yes   Please select the type of weight loss surgery you had (select all that apply): gastric bypass / Alicia-en-Y       CO-MORBIDITIES OF OBESITY " INCLUDE:     7/24/2019   I have the following co-morbidities associated with obesity: High Blood Pressure, High Cholesterol, GERD (Reflux), Asthma, Weight Bearing Joint Pain, Stress Incontinence   Are you taking daily medication for heartburn, acid reflux, or GERD (acid reflux disease)? No       PAST MEDICAL HISTORY:  Past Medical History:   Diagnosis Date     Abnormal Pap smear 2/21/2017    Dr said pap was abnormal     Arthritis      Chronic back pain     hx spondylolisthesis     Chronic pain      COPD (chronic obstructive pulmonary disease) (H)     PFT's 2013 FEV1/FVC = 2.68/3.20 = 84%     H/O gastric bypass 2007     Hepatitis C     treated ribaviron & interferon in 2008 for serotype 2 with failed 6 month treatment     Hyperlipidemia     on simvatatin     Insomnia      Positive TB test     has taken INH     RLS (restless legs syndrome)      TB lung, latent 1994    treated     Uncomplicated asthma      Wounds and injuries 2003       PAST SURGICAL HISTORY:  Past Surgical History:   Procedure Laterality Date     CHOLECYSTECTOMY       D & C  1987     ESOPHAGOSCOPY, GASTROSCOPY, DUODENOSCOPY (EGD), COMBINED  7/30/2014    Procedure: COMBINED ESOPHAGOSCOPY, GASTROSCOPY, DUODENOSCOPY (EGD);  Surgeon: Ramesh Self MD;  Location: [facility]     FRACTURE TX, ANKLE RT/LT  1991    ORIF rt ankle     FUSION SPINE POSTERIOR ONE LEVEL N/A 11/21/2014    Procedure: FUSION SPINE POSTERIOR ONE LEVEL;  Surgeon: Amilcar Baldwin MD;  [facility]     FUSION SPINE POSTERIOR ONE LEVEL Right 7/30/2015    Procedure: FUSION SPINE POSTERIOR ONE LEVEL;  Surgeon: Amilcar Baldwin MD;  [facility]     GASTRIC BYPASS  2007    lost 150 lbs     GRAFT BONE FROM ILIAC CREST Left 11/21/2014    Procedure: GRAFT BONE FROM ILIAC CREST;  Surgeon: Amilcar Baldwin MD;  [facility]     OPTICAL TRACKING SYSTEM FUSION POSTERIOR SPINE LUMBAR  11/20/2013    Procedure: OPTICAL TRACKING SYSTEM FUSION SPINE  POSTERIOR LUMBAR ONE LEVEL;  Lumbar 4-5 Transforaminal Interbody Fusion;  Surgeon: Amilcar Baldwin MD;  [facility]     ORTHOPEDIC SURGERY      shoulder surgery, removed bone spur     TUBAL LIGATION         FAMILY HISTORY:   Family History   Problem Relation Age of Onset     Hypertension Father      Respiratory Father         COPD, smoked     Glaucoma Father      C.A.D. Mother         CHF,  age 59 of [place], smoked     Crohn's Disease Brother      Diabetes Maternal Grandmother         insulin dependent     Diabetes Paternal Grandmother      Glaucoma Paternal Grandmother      Alzheimer Disease Paternal Grandmother      Melanoma No family hx of      Skin Cancer No family hx of        SOCIAL HISTORY:   Social History Questions Reviewed With Patient 2019   Which best describes your employment status (select all that apply) I am disabled   Which best describes your marital status:    Do you have children? Yes   Who do you have in your support network that can be available to help you for the first 2 weeks after surgery? [place]: Stas Eldridge   Who can you count on for support throughout your weight loss surgery journey? Stas   Can you afford 3 meals a day?  Yes   Can you afford 50-60 dollars a month for vitamins? No       HABITS:     2019   How often do you drink alcohol? Monthly or less   If you do drink alcohol, how many drinks might you have in a day? (one drink = 5 oz. wine, 1 can/bottle of beer, 1 shot liquor) 1 or 2   Have you ever used any of the following nicotine products? E-cigarettes   If you previously used any of these products, what year did you quit? 2008   Have you or are you currently using street drugs or prescription strength medication for which you do not have a prescription for? No   Do you have a history of chemical dependency (alcohol or drug abuse)? No       PSYCHOLOGICAL HISTORY:   Psychological History Reviewed With Patient 2019   Have you ever attempted  suicide? Never.   Have you had thoughts of suicide in the past year? No   Have you ever been hospitalized for mental illness or a suicide attempt? Never.   Do you have a history of chronic pain? Yes   Have you ever been diagnosed with fibromyalgia? No   Are you currently seeing a therapist or counselor?  No   Are you currently seeing a psychiatrist? No       ROS:     7/24/2019   Skin:  None of the above   HEENT: Dizziness/lightheadedness   Musculoskeletal: Back pain   Cardiovascular: None of the above   Pulmonary: Awaken from sleep to catch your breath   Gastrointestinal: None of the above   Genitourinary: None of the above   Hematological: Family history of blood clots   Neurological: None of the above   Female only: Post-menopausal       EATING BEHAVIORS:     7/24/2019   Have you or anyone else thought that you had an eating disorder? No   Do you currently binge eat (eat a large amount of food in a short time)? No   Are you an emotional eater? No   Do you get up to eat after falling asleep? No       EXERCISE:     7/24/2019   How often do you exercise? Never   What keeps you from being more active?  Pain, Too tired       MEDICATIONS:  Current Outpatient Medications   Medication Sig Dispense Refill     albuterol (VENTOLIN HFA) 108 (90 Base) MCG/ACT inhaler Inhale 2 puffs into the lungs every 4 hours as needed for shortness of breath / dyspnea or wheezing 1 Inhaler 11     cetirizine (ZYRTEC) 10 MG tablet Take 1 tablet (10 mg) by mouth daily 30 tablet 11     cyanocobalamin (VITAMIN B12) 1000 MCG/ML injection ADMINISTER 1 ML(1000 MCG) IN THE MUSCLE EVERY 30 DAYS 1 mL 11     fluticasone (FLONASE) 50 MCG/ACT spray Spray 2 sprays into both nostrils daily 1 Bottle 3     ipratropium (ATROVENT HFA) 17 MCG/ACT inhaler Inhale 2 puffs into the lungs every 6 hours 1 Inhaler 11     mirtazapine (REMERON) 15 MG tablet Take 1 tablet (15 mg) by mouth At Bedtime 90 tablet 3     mometasone-formoterol (DULERA) 100-5 MCG/ACT inhaler  "Inhale 2 puffs into the lungs 2 times daily 1 Inhaler 11     montelukast (SINGULAIR) 10 MG tablet Take 1 tablet (10 mg) by mouth At Bedtime 90 tablet 3     Needle, Disp, 23G X 1\" MISC 1 Device every 30 days For B12 monthly B12 injection 1 each 11     pramipexole (MIRAPEX) 0.125 MG tablet TAKE 1 TO 3 TABLETS BY MOUTH AT BEDTIME (Patient taking differently: as needed TAKE 1 TO 3 TABLETS BY MOUTH AT BEDTIME) 180 tablet 3     spacer (OPTICHAMBER OTTONIEL) holding chamber USE ONCE DAILY 1 each 0     Spacer/Aero Chamber Mouthpiece MISC 1 Units daily 1 each 0     tiZANidine (ZANAFLEX) 2 MG tablet Take 1-2 tablets (2-4 mg) by mouth 3 times daily as needed for muscle spasms 30 tablet 1     traMADol (ULTRAM) 50 MG tablet 1-2 tablets every 6 hours as needed for back pain 60 tablet 5     traZODone (DESYREL) 100 MG tablet TAKE 2 TABLETS(200 MG) BY MOUTH EVERY NIGHT AS NEEDED FOR SLEEP 60 tablet 2     albuterol (PROVENTIL) (2.5 MG/3ML) 0.083% neb solution TAKE 1 VIAL BY NEBULIZATION EVERY 6 HOURS AS NEEDED FOR SHORTNESS OF BREATH (Patient not taking: Reported on 7/24/2019) 360 mL 2     diphenhydrAMINE (WAL-DRYL ALLERGY) 25 MG tablet TAKE 1 TABLET(25 MG) BY MOUTH EVERY 6 HOURS AS NEEDED FOR ITCHING OR ALLERGIES (Patient not taking: Reported on 7/24/2019) 60 tablet 0     hydrocortisone 2.5 % cream Apply topically 2 times daily (Patient not taking: Reported on 7/24/2019) 30 g 1     Lidocaine (LIDOCARE) 4 % Patch Place 1 patch onto the skin every 24 hours (Patient not taking: Reported on 7/24/2019) 12 patch 0     methylPREDNISolone (MEDROL DOSEPAK) 4 MG tablet therapy pack Follow Package Directions (Patient not taking: Reported on 7/24/2019) 21 tablet 0     nicotine (NICODERM CQ) 21 MG/24HR 24 hr patch Place 1 patch onto the skin every 24 hours (Patient not taking: Reported on 7/24/2019) 30 patch 2     order for DME Nebulizer (Patient not taking: Reported on 7/24/2019) 1 each 0     varenicline (CHANTIX CONTINUING MONTH YAW) 1 MG " "tablet Take 1 tablet (1 mg) by mouth 2 times daily Take 1 tablet by mouth twice daily. (Patient not taking: Reported on 7/24/2019) 180 tablet 3       ALLERGIES:  Allergies   Allergen Reactions     Bee Venom Anaphylaxis, Anxiety, Difficulty breathing, Hives, Itching, Nausea and Vomiting, Palpitations, Shortness Of Breath and Swelling     Opium Alkaloids, Hcls Itching     Can take opiate derived narcotics with Benadryl.     Latex Rash       LABS/IMAGING/MEDICAL RECORDS REVIEW:                                                                    IMPRESSION:  1. Moderate to severe bronchial wall thickening and mucoid impaction.  Findings may represent smoking-related bronchitis although can be seen  in ABPA.  2. Centrilobular groundglass micronodular nodularity likely  representing respiratory bronchiolitis.  3. No definite evidence of reactivation latent TB with old  granulomatous disease.  4. A few scattered nodules at the 3 mm. Optional follow-up in 12  months per Fleischner guidelines.    PHYSICAL EXAM:  /68 (BP Location: Left arm, Patient Position: Sitting, Cuff Size: Adult Small)   Pulse 86   Ht 1.575 m (5' 2.01\")   Wt 37.2 kg (82 lb)   LMP 06/11/2012 (LMP Unknown)   SpO2 96%   BMI 14.99 kg/m    General: No apparent distress  Neuro: A & O x 3  Head: Atraumatic, normocephalic  Eyes: PERRL, EOMI  Skin: warm and dry, no rashes on exposed skin  Respiratory: respirations unlabored  Abdomen: soft NT ND  Extremities: No LE swelling    Assessment/Plan:    48 y.o. Female with hx fo LRYGB with Dr Perez in 2007.  125 lbs [place] weight until treatment for Hep C in 2008 and had more weight loss to  lbs.  Last 2 months she has had unintentional weight loss, increased lack of appetite, fatigue and lightheadness.     CT scan abd/pelvis with contrast, schedule today to be done ASAP  EGD, call endoscopy to schedule  Labs today first floor  May need feeding tube for nutrition, will eval after testing done  See RD " today  B12 and B1 injections today      Sincerely,     Becki Ma PA-C    I spent a total of 30 minutes face to face with the patient during today's office visit. Over 50% of this time was spent counseling the patient and/or coordinating care.

## 2019-07-24 NOTE — PATIENT INSTRUCTIONS
CT scan abd/pelvis with contrast, schedule today to be done ASAP  EGD, call endoscopy to schedule ASAP  Labs today first floor  May need feeding tube for nutrition, will eval after testing done  See RD today      Endoscopy Instructions:    -Call the Endoscopy Department at [phone] to schedule an endoscopy to be done    Date:___________  Time:___________    -Have nothing to eat or drink for 6 hours prior to the check-in time.   -Check-in one hour before the procedure time.  -Please bring a  to drive you home after the procedure.  -You cannot drive for 24 hours after the procedure due to the sedation.  -The Endoscopy Department is located on the 1st floor of the [facility].  -The [facility] is at [address] [place], [place] [id number].    -Ph: [phone] for scheduling, directions or questions.  - parking at the front door is available for the same price as parking in the [facility] [address] visitors ramp.

## 2019-07-24 NOTE — LETTER
"2019       RE: Kristina Eldridge  [address]  [facility] [address]     Dear Colleague,    Thank you for referring your patient, Kristina Eldridge, to the [facility] SURGICAL WEIGHT MANAGEMENT at [facility]. Please see a copy of my visit note below.    New Re-establish Bariatric Surgery Consultation Note    2019    RE: Kristina Eldridge  MR#: [id number]  : 1970      Referring provider: Self referred    Chief Complaint/Reason for visit: evaluation for possible weight loss surgery    Dear Mili Leon MD (General),    I had the pleasure of seeing your patient, Kristina Eldridge, to evaluate her obesity and consider her for possible weight loss surgery. As you know, Kristina Eldridge is 48 year old.  She has a height of 5' 2.008\", a weight of 82 lbs 0 oz, and calculated Body mass index is 14.99 kg/m .    Last visit  with America Felix and weight 92 lbs. Albumin 3.8    Most recent albumin 2.8   3/5/19    2007 Lap gastric bypass Dr Perez    Weight prior to weight loss surgery was 235 lbs  She felt great at 125 lbs  Hep C and treated in  and lost 40 lbs due to no appetite at all and never could regain weight since then  She feels tired all the time, she has been continuing to lose weight. She has back pain due to back injury, MRI on Friday. She feels lightheaded.  She has days she doesn't have appetite at all, if she forces herself to eat she vomits  No alcohol use  No smoking. She quit in  prior to RYGB  She has never needed TPN  +abdominal cramping occasionally  She has had loose stools since her surgery, 2-3 times per day    HISTORY OF PRESENT ILLNESS:  Weight Loss History Reviewed with Patient 2019   How long have you been overweight? Since late teens through early 20's   What is the most that you have ever weighed? 235   What is the most weight you have lost? 75   I have tried the following methods to lose weight Weight " Watchers, Slimfast, Weight Loss Surgery   I have tried the following weight loss medications? (Check all that apply) None   Have you ever had weight loss surgery? Yes   Please select the type of weight loss surgery you had (select all that apply): gastric bypass / Alicia-en-Y       CO-MORBIDITIES OF OBESITY INCLUDE:     7/24/2019   I have the following co-morbidities associated with obesity: High Blood Pressure, High Cholesterol, GERD (Reflux), Asthma, Weight Bearing Joint Pain, Stress Incontinence   Are you taking daily medication for heartburn, acid reflux, or GERD (acid reflux disease)? No       PAST MEDICAL HISTORY:  Past Medical History:   Diagnosis Date     Abnormal Pap smear 2/21/2017    Dr said pap was abnormal     Arthritis      Chronic back pain     hx spondylolisthesis     Chronic pain      COPD (chronic obstructive pulmonary disease) (H)     PFT's 2013 FEV1/FVC = 2.68/3.20 = 84%     H/O gastric bypass 2007     Hepatitis C     treated ribaviron & interferon in 2008 for serotype 2 with failed 6 month treatment     Hyperlipidemia     on simvatatin     Insomnia      Positive TB test     has taken INH     RLS (restless legs syndrome)      TB lung, latent 1994    treated     Uncomplicated asthma      Wounds and injuries 2003       PAST SURGICAL HISTORY:  Past Surgical History:   Procedure Laterality Date     CHOLECYSTECTOMY       D & C  1987     ESOPHAGOSCOPY, GASTROSCOPY, DUODENOSCOPY (EGD), COMBINED  7/30/2014    Procedure: COMBINED ESOPHAGOSCOPY, GASTROSCOPY, DUODENOSCOPY (EGD);  Surgeon: Ramesh Self MD;  Location: [facility]     FRACTURE TX, ANKLE RT/LT  1991    ORIF rt ankle     FUSION SPINE POSTERIOR ONE LEVEL N/A 11/21/2014    Procedure: FUSION SPINE POSTERIOR ONE LEVEL;  Surgeon: Amilcar Baldwin MD;  Location: [facility]     FUSION SPINE POSTERIOR ONE LEVEL Right 7/30/2015    Procedure: FUSION SPINE POSTERIOR ONE LEVEL;  Surgeon: Amilcar Baldwin MD;  Location: [facility]      GASTRIC BYPASS      lost 150 lbs     GRAFT BONE FROM ILIAC CREST Left 2014    Procedure: GRAFT BONE FROM ILIAC CREST;  Surgeon: Amilcar Baldwin MD;  Location: [facility]     OPTICAL TRACKING SYSTEM FUSION POSTERIOR SPINE LUMBAR  2013    Procedure: OPTICAL TRACKING SYSTEM FUSION SPINE POSTERIOR LUMBAR ONE LEVEL;  Lumbar 4-5 Transforaminal Interbody Fusion;  Surgeon: Amilcar Baldwin MD;  Location: [facility]     ORTHOPEDIC SURGERY      shoulder surgery, removed bone spur     TUBAL LIGATION         FAMILY HISTORY:   Family History   Problem Relation Age of Onset     Hypertension Father      Respiratory Father         COPD, smoked     Glaucoma Father      C.A.D. Mother         CHF,  age 59 of [place], smoked     Crohn's Disease Brother      Diabetes Maternal Grandmother         insulin dependent     Diabetes Paternal Grandmother      Glaucoma Paternal Grandmother      Alzheimer Disease Paternal Grandmother      Melanoma No family hx of      Skin Cancer No family hx of        SOCIAL HISTORY:   Social History Questions Reviewed With Patient 2019   Which best describes your employment status (select all that apply) I am disabled   Which best describes your marital status:    Do you have children? Yes   Who do you have in your support network that can be available to help you for the first 2 weeks after surgery? [place]: Stas Eldridge   Who can you count on for support throughout your weight loss surgery journey? Stas   Can you afford 3 meals a day?  Yes   Can you afford 50-60 dollars a month for vitamins? No       HABITS:     2019   How often do you drink alcohol? Monthly or less   If you do drink alcohol, how many drinks might you have in a day? (one drink = 5 oz. wine, 1 can/bottle of beer, 1 shot liquor) 1 or 2   Have you ever used any of the following nicotine products? E-cigarettes   If you previously used any of these products, what year did you quit? 2008   Have you or  are you currently using street drugs or prescription strength medication for which you do not have a prescription for? No   Do you have a history of chemical dependency (alcohol or drug abuse)? No       PSYCHOLOGICAL HISTORY:   Psychological History Reviewed With Patient 7/24/2019   Have you ever attempted suicide? Never.   Have you had thoughts of suicide in the past year? No   Have you ever been hospitalized for mental illness or a suicide attempt? Never.   Do you have a history of chronic pain? Yes   Have you ever been diagnosed with fibromyalgia? No   Are you currently seeing a therapist or counselor?  No   Are you currently seeing a psychiatrist? No       ROS:     7/24/2019   Skin:  None of the above   HEENT: Dizziness/lightheadedness   Musculoskeletal: Back pain   Cardiovascular: None of the above   Pulmonary: Awaken from sleep to catch your breath   Gastrointestinal: None of the above   Genitourinary: None of the above   Hematological: Family history of blood clots   Neurological: None of the above   Female only: Post-menopausal       EATING BEHAVIORS:     7/24/2019   Have you or anyone else thought that you had an eating disorder? No   Do you currently binge eat (eat a large amount of food in a short time)? No   Are you an emotional eater? No   Do you get up to eat after falling asleep? No       EXERCISE:     7/24/2019   How often do you exercise? Never   What keeps you from being more active?  Pain, Too tired       MEDICATIONS:  Current Outpatient Medications   Medication Sig Dispense Refill     albuterol (VENTOLIN HFA) 108 (90 Base) MCG/ACT inhaler Inhale 2 puffs into the lungs every 4 hours as needed for shortness of breath / dyspnea or wheezing 1 Inhaler 11     cetirizine (ZYRTEC) 10 MG tablet Take 1 tablet (10 mg) by mouth daily 30 tablet 11     cyanocobalamin (VITAMIN B12) 1000 MCG/ML injection ADMINISTER 1 ML(1000 MCG) IN THE MUSCLE EVERY 30 DAYS 1 mL 11     fluticasone (FLONASE) 50 MCG/ACT spray  "Spray 2 sprays into both nostrils daily 1 Bottle 3     ipratropium (ATROVENT HFA) 17 MCG/ACT inhaler Inhale 2 puffs into the lungs every 6 hours 1 Inhaler 11     mirtazapine (REMERON) 15 MG tablet Take 1 tablet (15 mg) by mouth At Bedtime 90 tablet 3     mometasone-formoterol (DULERA) 100-5 MCG/ACT inhaler Inhale 2 puffs into the lungs 2 times daily 1 Inhaler 11     montelukast (SINGULAIR) 10 MG tablet Take 1 tablet (10 mg) by mouth At Bedtime 90 tablet 3     Needle, Disp, 23G X 1\" MISC 1 Device every 30 days For B12 monthly B12 injection 1 each 11     pramipexole (MIRAPEX) 0.125 MG tablet TAKE 1 TO 3 TABLETS BY MOUTH AT BEDTIME (Patient taking differently: as needed TAKE 1 TO 3 TABLETS BY MOUTH AT BEDTIME) 180 tablet 3     spacer (OPTICHAMBER OTTONIEL) holding chamber USE ONCE DAILY 1 each 0     Spacer/Aero Chamber Mouthpiece MISC 1 Units daily 1 each 0     tiZANidine (ZANAFLEX) 2 MG tablet Take 1-2 tablets (2-4 mg) by mouth 3 times daily as needed for muscle spasms 30 tablet 1     traMADol (ULTRAM) 50 MG tablet 1-2 tablets every 6 hours as needed for back pain 60 tablet 5     traZODone (DESYREL) 100 MG tablet TAKE 2 TABLETS(200 MG) BY MOUTH EVERY NIGHT AS NEEDED FOR SLEEP 60 tablet 2     albuterol (PROVENTIL) (2.5 MG/3ML) 0.083% neb solution TAKE 1 VIAL BY NEBULIZATION EVERY 6 HOURS AS NEEDED FOR SHORTNESS OF BREATH (Patient not taking: Reported on 7/24/2019) 360 mL 2     diphenhydrAMINE (WAL-DRYL ALLERGY) 25 MG tablet TAKE 1 TABLET(25 MG) BY MOUTH EVERY 6 HOURS AS NEEDED FOR ITCHING OR ALLERGIES (Patient not taking: Reported on 7/24/2019) 60 tablet 0     hydrocortisone 2.5 % cream Apply topically 2 times daily (Patient not taking: Reported on 7/24/2019) 30 g 1     Lidocaine (LIDOCARE) 4 % Patch Place 1 patch onto the skin every 24 hours (Patient not taking: Reported on 7/24/2019) 12 patch 0     methylPREDNISolone (MEDROL DOSEPAK) 4 MG tablet therapy pack Follow Package Directions (Patient not taking: Reported on " "7/24/2019) 21 tablet 0     nicotine (NICODERM CQ) 21 MG/24HR 24 hr patch Place 1 patch onto the skin every 24 hours (Patient not taking: Reported on 7/24/2019) 30 patch 2     order for DME Nebulizer (Patient not taking: Reported on 7/24/2019) 1 each 0     varenicline (CHANTIX CONTINUING MONTH YAW) 1 MG tablet Take 1 tablet (1 mg) by mouth 2 times daily Take 1 tablet by mouth twice daily. (Patient not taking: Reported on 7/24/2019) 180 tablet 3       ALLERGIES:  Allergies   Allergen Reactions     Bee Venom Anaphylaxis, Anxiety, Difficulty breathing, Hives, Itching, Nausea and Vomiting, Palpitations, Shortness Of Breath and Swelling     Opium Alkaloids, Hcls Itching     Can take opiate derived narcotics with Benadryl.     Latex Rash       LABS/IMAGING/MEDICAL RECORDS REVIEW:                                                                    IMPRESSION:  1. Moderate to severe bronchial wall thickening and mucoid impaction.  Findings may represent smoking-related bronchitis although can be seen  in ABPA.  2. Centrilobular groundglass micronodular nodularity likely  representing respiratory bronchiolitis.  3. No definite evidence of reactivation latent TB with old  granulomatous disease.  4. A few scattered nodules at the 3 mm. Optional follow-up in 12  months per Fleischner guidelines.    PHYSICAL EXAM:  /68 (BP Location: Left arm, Patient Position: Sitting, Cuff Size: Adult Small)   Pulse 86   Ht 1.575 m (5' 2.01\")   Wt 37.2 kg (82 lb)   LMP 06/11/2012 (LMP Unknown)   SpO2 96%   BMI 14.99 kg/m     General: No apparent distress  Neuro: A & O x 3  Head: Atraumatic, normocephalic  Eyes: PERRL, EOMI  Skin: warm and dry, no rashes on exposed skin  Respiratory: respirations unlabored  Abdomen: soft NT ND  Extremities: No LE swelling    Assessment/Plan:    48 y.o. Female with hx fo LRYGB with Dr Perez in 2007.  125 lbs [place] weight until treatment for Hep C in 2008 and had more weight loss to  lbs.  Last 2 " months she has had unintentional weight loss, increased lack of appetite, fatigue and lightheadness.     CT scan abd/pelvis with contrast, schedule today to be done ASAP  EGD, call endoscopy to schedule  Labs today first floor  May need feeding tube for nutrition, will eval after testing done  See RD today  B12 and B1 injections today      Sincerely,     Becki Ma PA-C    I spent a total of 30 minutes face to face with the patient during today's office visit. Over 50% of this time was spent counseling the patient and/or coordinating care.

## 2019-07-24 NOTE — NURSING NOTE
"(   Chief Complaint   Patient presents with     Consult     NRB, possible revisional, underweight, S/P RNY 2007 with Dr. Montes.    )    ( Weight: 37.2 kg (82 lb) )  ( Height: 157.5 cm (5' 2.01\") )  ( BMI (Calculated): 14.99 )  ( Initial Weight: 37.2 kg (82 lb) )  ( Cumulative weight loss (lbs): 0 )  (   )  (   )  (   )  (   )    ( BP: 110/68 )  (   )  (   )  (   )  ( Pulse: 86 )  (   )  ( SpO2: 96 % )    (   Patient Active Problem List   Diagnosis     Chronic hepatitis C (H)     Hyperlipidemia     Bariatric surgery status     Radicular leg pain     Spondylolisthesis, grade 2     Marijuana smoker, continuous     Depression     Lumbar stenosis     Personal history of spine surgery     HIV exposure     Pseudoarthrosis of lumbar spine with nonunion     Lumbar pseudoarthrosis     Dermal and epidermal nevus    )  (   Current Outpatient Medications   Medication Sig Dispense Refill     albuterol (VENTOLIN HFA) 108 (90 Base) MCG/ACT inhaler Inhale 2 puffs into the lungs every 4 hours as needed for shortness of breath / dyspnea or wheezing 1 Inhaler 11     cetirizine (ZYRTEC) 10 MG tablet Take 1 tablet (10 mg) by mouth daily 30 tablet 11     cyanocobalamin (VITAMIN B12) 1000 MCG/ML injection ADMINISTER 1 ML(1000 MCG) IN THE MUSCLE EVERY 30 DAYS 1 mL 11     fluticasone (FLONASE) 50 MCG/ACT spray Spray 2 sprays into both nostrils daily 1 Bottle 3     ipratropium (ATROVENT HFA) 17 MCG/ACT inhaler Inhale 2 puffs into the lungs every 6 hours 1 Inhaler 11     mirtazapine (REMERON) 15 MG tablet Take 1 tablet (15 mg) by mouth At Bedtime 90 tablet 3     mometasone-formoterol (DULERA) 100-5 MCG/ACT inhaler Inhale 2 puffs into the lungs 2 times daily 1 Inhaler 11     montelukast (SINGULAIR) 10 MG tablet Take 1 tablet (10 mg) by mouth At Bedtime 90 tablet 3     Needle, Disp, 23G X 1\" MISC 1 Device every 30 days For B12 monthly B12 injection 1 each 11     pramipexole (MIRAPEX) 0.125 MG tablet TAKE 1 TO 3 TABLETS BY MOUTH AT BEDTIME " (Patient taking differently: as needed TAKE 1 TO 3 TABLETS BY MOUTH AT BEDTIME) 180 tablet 3     spacer (OPTICHAMBER OTTONIEL) holding chamber USE ONCE DAILY 1 each 0     Spacer/Aero Chamber Mouthpiece MISC 1 Units daily 1 each 0     tiZANidine (ZANAFLEX) 2 MG tablet Take 1-2 tablets (2-4 mg) by mouth 3 times daily as needed for muscle spasms 30 tablet 1     traMADol (ULTRAM) 50 MG tablet 1-2 tablets every 6 hours as needed for back pain 60 tablet 5     traZODone (DESYREL) 100 MG tablet TAKE 2 TABLETS(200 MG) BY MOUTH EVERY NIGHT AS NEEDED FOR SLEEP 60 tablet 2     albuterol (PROVENTIL) (2.5 MG/3ML) 0.083% neb solution TAKE 1 VIAL BY NEBULIZATION EVERY 6 HOURS AS NEEDED FOR SHORTNESS OF BREATH (Patient not taking: Reported on 7/24/2019) 360 mL 2     diphenhydrAMINE (WAL-DRYL ALLERGY) 25 MG tablet TAKE 1 TABLET(25 MG) BY MOUTH EVERY 6 HOURS AS NEEDED FOR ITCHING OR ALLERGIES (Patient not taking: Reported on 7/24/2019) 60 tablet 0     hydrocortisone 2.5 % cream Apply topically 2 times daily (Patient not taking: Reported on 7/24/2019) 30 g 1     Lidocaine (LIDOCARE) 4 % Patch Place 1 patch onto the skin every 24 hours (Patient not taking: Reported on 7/24/2019) 12 patch 0     methylPREDNISolone (MEDROL DOSEPAK) 4 MG tablet therapy pack Follow Package Directions (Patient not taking: Reported on 7/24/2019) 21 tablet 0     nicotine (NICODERM CQ) 21 MG/24HR 24 hr patch Place 1 patch onto the skin every 24 hours (Patient not taking: Reported on 7/24/2019) 30 patch 2     order for DME Nebulizer (Patient not taking: Reported on 7/24/2019) 1 each 0     varenicline (CHANTIX CONTINUING MONTH YAW) 1 MG tablet Take 1 tablet (1 mg) by mouth 2 times daily Take 1 tablet by mouth twice daily. (Patient not taking: Reported on 7/24/2019) 180 tablet 3    )  ( Diabetes Eval:    )    ( Pain Eval:  Data Unavailable )    ( Wound Eval:       )    (   History   Smoking Status     Current Every Day Smoker     Packs/day: 1.00     Years: 30.00      Types: Cigarettes     Last attempt to quit: 6/16/2014   Smokeless Tobacco     Never Used     Comment: pack a day    )    ( Signed By:  Michelle Perry; July 24, 2019; 10:02 AM )

## 2019-07-24 NOTE — PROGRESS NOTES
"Nutrition Assessment  Reason For Visit:  Kristina Eldridge is a 48 year old female presents today for nutrition follow-up, s/p RNYGB in 2007 with Dr Perez.  Patient referred by Becki DOBBS(7/24/19).     Anthropometrics:  Pre-op Weight: 235 lbs  Current Weight:   Estimated body mass index is 14.99 kg/m  as calculated from the following:    Height as of 7/24/19: 1.575 m (5' 2.01\").    Weight as of 7/24/19: 37.2 kg (82 lb).     Patient reported that she felt best at 125 lbs.    Current Vitamins/Minerals: B12 injection once per month    Nutrition History:  Lactose intolerant  Unable to tolerate greasy foods    Recent food recall:  Breakfast: does not eat within the first 4 hours of waking  Lunch: Lunch meat, soup/stew  Dinner: steak or chicken with vegetable and potato  Snacks: deli turkey(elena brown whole pack in a day) and beef jerkey  Beverages: regular coke 4-5 cans per day, 2 gallons of lactose free milk per month, water, no alcohol    May not eat for three days - will have coke and may snack on beef jerky throughout the day    Patient reported regular bowel movements and if too BM are too soft over a few days she will eat a couple grilled cheese sandwiches and be back to normal.    Physical Activity:  Limited exercise due to fatigue     Nutrition Prescription:  Calories: 1182-1006  Grams Protein: 60 (minimum)  Amount of Fluid: 48-64 oz    MALNUTRITION  % Intake: Decreased intake does not meet criteria, patient may go up to three days without eating but does not note a recent change in eating pattern  % Weight Loss: Up to 7.5% in 3 months (non-severe)  Subcutaneous Fat Loss: Upper arm:  severe and Lower arm:  severe  Muscle Loss: Scapular bone: per patient report able to see all ribs bones and spine on her back, Upper arm (bicep, tricep):  Moderate/severe and Lower arm  (forearm):  severe  Fluid Accumulation/Edema: None noted  Malnutrition Diagnosis: Severe malnutrition in the context of behavioral - " environmental circumstances    Nutrition Diagnosis  Malnutrition related to inadequate consistent intakes of food as evidenced by patient skipping meals up to three days per week, eating two meals per day, BMI of <15 with severe muscle and fat loss.    Intervention  Intervention At Appointment:  Materials/Education provided on consistency with meals and protein needs.  Patient will skip meals and may not eat for multiple days outside of 4-5 cans of regular coke and occasionally piece of beef jerky.  Patient is only taking a B12 injection once monthly, encouraged starting multivitamin, calcium, vitamin D, B complex and iron, labs ordered today by PA.    Goals:  1. Eat consistently throughout the day and week.  Eat at least 60 grams of protein per day.  2. Take the following after a Alicia-en-y Gastric Bypass:    Multivitamin/minerals: adult dose 2 times daily    Iron: 45-60 mg elemental daily (18-36 mg daily if low risk) - may partly or fully be covered in multivitamin     Calcium Citrate containing vitamin D: 500 mg 3 times daily or 600 mg 2 times daily    Vitamin B12: sublingual form of at least 500 mcg daily or injection of 1000 mcg monthly     B-50 Complex daily      Follow-Up:  PRN    Time spent with patient: 30 minutes.  Emmy Barr, NOE, LD

## 2019-07-24 NOTE — PATIENT INSTRUCTIONS
Goals:  1. Eat consistently throughout the day and week.  Eat at least 60 grams of protein per day.  2. Take the following after a Alicia-en-y Gastric Bypass:    Multivitamin/minerals: adult dose 2 times daily    Iron: 45-60 mg elemental daily (18-36 mg daily if low risk) - may partly or fully be covered in multivitamin     Calcium Citrate containing vitamin D: 500 mg 3 times daily or 600 mg 2 times daily    Vitamin B12: sublingual form of at least 500 mcg daily or injection of 1000 mcg monthly     B-50 Complex daily    Emmy Barr, NOE, LD  If you need to schedule or reschedule with a dietitian please call [phone].

## 2019-07-24 NOTE — LETTER
"7/24/2019       RE: Kristina Eldridge  [address]  [facility] [address]     Dear Colleague,    Thank you for referring your patient, Kristina Eldridge, to the [facility] SURGICAL WEIGHT MANAGEMENT at [facility]. Please see a copy of my visit note below.    Nutrition Assessment  Reason For Visit:  Kristina Eldridge is a 48 year old female presents today for nutrition follow-up, s/p RNYGB in 2007 with Dr Perez.  Patient referred by Becki DOBBS(7/24/19).     Anthropometrics:  Pre-op Weight: 235 lbs  Current Weight:   Estimated body mass index is 14.99 kg/m  as calculated from the following:    Height as of 7/24/19: 1.575 m (5' 2.01\").    Weight as of 7/24/19: 37.2 kg (82 lb).     Patient reported that she felt best at 125 lbs.    Current Vitamins/Minerals: B12 injection once per month    Nutrition History:  Lactose intolerant  Unable to tolerate greasy foods    Recent food recall:  Breakfast: does not eat within the first 4 hours of waking  Lunch: Lunch meat, soup/stew  Dinner: steak or chicken with vegetable and potato  Snacks: deli turkey(elena brown whole pack in a day) and beef jerkey  Beverages: regular coke 4-5 cans per day, 2 gallons of lactose free milk per month, water, no alcohol    May not eat for three days - will have coke and may snack on beef jerky throughout the day    Patient reported regular bowel movements and if too BM are too soft over a few days she will eat a couple grilled cheese sandwiches and be back to normal.    Physical Activity:  Limited exercise due to fatigue     Nutrition Prescription:  Calories: 1240-6648  Grams Protein: 60 (minimum)  Amount of Fluid: 48-64 oz    MALNUTRITION  % Intake: Decreased intake does not meet criteria, patient may go up to three days without eating but does not note a recent change in eating pattern  % Weight Loss: Up to 7.5% in 3 months (non-severe)  Subcutaneous Fat Loss: Upper arm:  severe and Lower arm:  " severe  Muscle Loss: Scapular bone: per patient report able to see all ribs bones and spine on her back, Upper arm (bicep, tricep):  Moderate/severe and Lower arm  (forearm):  severe  Fluid Accumulation/Edema: None noted  Malnutrition Diagnosis: Severe malnutrition in the context of behavioral - environmental circumstances    Nutrition Diagnosis  Malnutrition related to inadequate consistent intakes of food as evidenced by patient skipping meals up to three days per week, eating two meals per day, BMI of <15 with severe muscle and fat loss.    Intervention  Intervention At Appointment:  Materials/Education provided on consistency with meals and protein needs.  Patient will skip meals and may not eat for multiple days outside of 4-5 cans of regular coke and occasionally piece of beef jerky.  Patient is only taking a B12 injection once monthly, encouraged starting multivitamin, calcium, vitamin D, B complex and iron, labs ordered today by PA.    Goals:  1. Eat consistently throughout the day and week.  Eat at least 60 grams of protein per day.  2. Take the following after a Alicia-en-y Gastric Bypass:    Multivitamin/minerals: adult dose 2 times daily    Iron: 45-60 mg elemental daily (18-36 mg daily if low risk) - may partly or fully be covered in multivitamin     Calcium Citrate containing vitamin D: 500 mg 3 times daily or 600 mg 2 times daily    Vitamin B12: sublingual form of at least 500 mcg daily or injection of 1000 mcg monthly     B-50 Complex daily      Follow-Up:  PRN    Time spent with patient: 30 minutes.    Emmy Barr, RD, LD

## 2019-07-25 DIAGNOSIS — M54.50 LUMBAR PAIN: Primary | ICD-10-CM

## 2019-07-26 ENCOUNTER — ANCILLARY PROCEDURE (OUTPATIENT)
Dept: CT IMAGING | Facility: CLINIC | Age: 49
End: 2019-07-26
Attending: PHYSICIAN ASSISTANT
Payer: COMMERCIAL

## 2019-07-26 ENCOUNTER — ANCILLARY PROCEDURE (OUTPATIENT)
Dept: MRI IMAGING | Facility: CLINIC | Age: 49
End: 2019-07-26
Attending: NURSE PRACTITIONER
Payer: COMMERCIAL

## 2019-07-26 DIAGNOSIS — M48.061 SPINAL STENOSIS OF LUMBAR REGION, UNSPECIFIED WHETHER NEUROGENIC CLAUDICATION PRESENT: ICD-10-CM

## 2019-07-26 DIAGNOSIS — R63.4 WEIGHT LOSS, NON-INTENTIONAL: ICD-10-CM

## 2019-07-26 DIAGNOSIS — M43.10 SPONDYLOLISTHESIS, GRADE 2: ICD-10-CM

## 2019-07-26 DIAGNOSIS — Z98.84 BARIATRIC SURGERY STATUS: ICD-10-CM

## 2019-07-26 DIAGNOSIS — Z98.890 PERSONAL HISTORY OF SPINE SURGERY: ICD-10-CM

## 2019-07-26 LAB
ANNOTATION COMMENT IMP: ABNORMAL
RETINYL PALMITATE SERPL-MCNC: <0.02 MG/L (ref 0–0.1)
VIT A SERPL-MCNC: 0.18 MG/L (ref 0.3–1.2)

## 2019-07-26 RX ORDER — IOPAMIDOL 755 MG/ML
100 INJECTION, SOLUTION INTRAVASCULAR ONCE
Status: COMPLETED | OUTPATIENT
Start: 2019-07-26 | End: 2019-07-26

## 2019-07-26 RX ORDER — GADOBUTROL 604.72 MG/ML
7.5 INJECTION INTRAVENOUS ONCE
Status: COMPLETED | OUTPATIENT
Start: 2019-07-26 | End: 2019-07-26

## 2019-07-26 RX ADMIN — GADOBUTROL 4 ML: 604.72 INJECTION INTRAVENOUS at 13:58

## 2019-07-26 RX ADMIN — IOPAMIDOL 50 ML: 755 INJECTION, SOLUTION INTRAVASCULAR at 15:17

## 2019-07-26 NOTE — DISCHARGE INSTRUCTIONS
MRI Contrast Discharge Instructions    The IV contrast you received today will pass out of your body in your  urine. This will happen in the next 24 hours. You will not feel this process.  Your urine will not change color.    Drink at least 4 extra glasses of water or juice today (unless your doctor  has restricted your fluids). This reduces the stress on your kidneys.  You may take your regular medicines.    If you are on dialysis: It is best to have dialysis today.    If you have a reaction: Most reactions happen right away. If you have  any new symptoms after leaving the hospital (such as hives or swelling),  call your hospital at the correct number below. Or call your family doctor.  If you have breathing distress or wheezing, call 911.    Special instructions: ***    I have read and understand the above information.    Signature:______________________________________ Date:___________    Staff:__________________________________________ Date:___________     Time:__________    [facility] Departments:    ___[place]: [phone]  ___[facility]: [phone]  ___[place]: [phone] ___[facility]: [phone]  ___[facility]: [phone]  ___[facility]: [phone]  ___[phone]  ___[facility]: [phone]  ___[phone]

## 2019-07-26 NOTE — DISCHARGE INSTRUCTIONS

## 2019-07-30 ENCOUNTER — TELEPHONE (OUTPATIENT)
Dept: SURGERY | Facility: CLINIC | Age: 49
End: 2019-07-30

## 2019-07-30 ENCOUNTER — ANCILLARY PROCEDURE (OUTPATIENT)
Dept: GENERAL RADIOLOGY | Facility: CLINIC | Age: 49
End: 2019-07-30
Attending: ORTHOPAEDIC SURGERY
Payer: COMMERCIAL

## 2019-07-30 ENCOUNTER — OFFICE VISIT (OUTPATIENT)
Dept: ORTHOPEDICS | Facility: CLINIC | Age: 49
End: 2019-07-30
Payer: COMMERCIAL

## 2019-07-30 VITALS — BODY MASS INDEX: 15.09 KG/M2 | HEIGHT: 62 IN | WEIGHT: 82 LBS

## 2019-07-30 DIAGNOSIS — Z98.84 S/P BARIATRIC SURGERY: ICD-10-CM

## 2019-07-30 DIAGNOSIS — S32.010A CLOSED COMPRESSION FRACTURE OF FIRST LUMBAR VERTEBRA, INITIAL ENCOUNTER: ICD-10-CM

## 2019-07-30 DIAGNOSIS — Z98.84 BARIATRIC SURGERY STATUS: Primary | ICD-10-CM

## 2019-07-30 DIAGNOSIS — M54.50 LUMBAR PAIN: Primary | ICD-10-CM

## 2019-07-30 DIAGNOSIS — Z98.84 S/P BARIATRIC SURGERY: Primary | ICD-10-CM

## 2019-07-30 ASSESSMENT — ENCOUNTER SYMPTOMS
POSTURAL DYSPNEA: 0
SINUS CONGESTION: 0
EYE WATERING: 0
HEMOPTYSIS: 0
COUGH: 1
SHORTNESS OF BREATH: 1
NECK MASS: 0
EYE PAIN: 0
CHILLS: 0
EYE REDNESS: 0
SORE THROAT: 0
DECREASED APPETITE: 0
ARTHRALGIAS: 0
HOARSE VOICE: 0
POOR WOUND HEALING: 0
BRUISES/BLEEDS EASILY: 1
POLYDIPSIA: 1
SPUTUM PRODUCTION: 1
TROUBLE SWALLOWING: 1
NIGHT SWEATS: 0
ALTERED TEMPERATURE REGULATION: 0
SINUS PAIN: 0
SKIN CHANGES: 0
MUSCLE CRAMPS: 0
NECK PAIN: 0
STIFFNESS: 0
SNORES LOUDLY: 0
DYSPNEA ON EXERTION: 1
TASTE DISTURBANCE: 0
MYALGIAS: 0
WEIGHT LOSS: 1
HALLUCINATIONS: 0
COUGH DISTURBING SLEEP: 1
FATIGUE: 1
JOINT SWELLING: 0
SMELL DISTURBANCE: 0
WEIGHT GAIN: 0
NAIL CHANGES: 0
POLYPHAGIA: 0
INCREASED ENERGY: 1
FEVER: 0
WHEEZING: 1
BACK PAIN: 1
EYE IRRITATION: 1
SWOLLEN GLANDS: 0
MUSCLE WEAKNESS: 0
DOUBLE VISION: 0

## 2019-07-30 ASSESSMENT — MIFFLIN-ST. JEOR: SCORE: 945.2
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
## 2019-07-30 NOTE — TELEPHONE ENCOUNTER
Contacted patient and relayed all lab results and verified patient is not taking any calcium. Patient was notified of the need to take vitamin A and vitamin D at 10,000 units daily for the next 8 weeks due to the low results of both. Calcium was also low and writer e scribed all three Rx's for these supplements. Patient understands she will take these higher doses of vitamin A and D for 8 weeks only and then her labs will be rechecked. I recommended she take the calcium citrate 500-500 chewables 3 times daily as well. Patient has my direct line for any questions she may have.
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
## 2019-07-31 LAB — DEPRECATED CALCIDIOL+CALCIFEROL SERPL-MC: 10 UG/L (ref 20–75)

## 2019-08-08 ENCOUNTER — TELEPHONE (OUTPATIENT)
Dept: GASTROENTEROLOGY | Facility: CLINIC | Age: 49
End: 2019-08-08

## 2019-08-08 NOTE — TELEPHONE ENCOUNTER
Patient Name: Kristina Eldridge   : 1970  MRN: [id number]       : [x] N/A   [] Yes:  Language /  ID:       Patient scheduled for:  [x] EGD  [] Colonoscopy  [] EUS  [] Flex Sig   [] Other:     Indication for procedure. [] Screening   [x] Bariatric surgery status    Sedation Type: [x] Conscious Sedation   [] MAC   [] None    Procedure Provider:  Yasemin      Referring Provider. Pete Burch (PCP)    Arrival time verified: 19    Facility location verified:   [][facility] - [address] [facility], 1st Floor, Rm 1[address]301    Pt meets medical necessity for outpatient procedure in hospital Endoscopy Unit:     [] N/A for this Payor (non-BCBS)  [x] [facility], 5th floor     [facility] [address] [facility] [address], 3rd Floor Surgery check-in      Prep Type:   []Golytely eRx: ;  [] MoviPrep: , [] MiraLax: , [] Fleet x 2:   [x]NPO /p 0500, No solid food /p 2200 the night before    Anticoagulants or blood thinners: [x]None [] ASA 81mg  - may continue           [] Warfarin   [] Warfarin + Lovenox bridge [] Plavix [] Effient [] Eliquis         [] Xarelto  [] Brilinta [] NSAIDS  [] Other    LAST anticoagulant dose: Date/Time:   INR:     Electronic implanted devices: [x] No  [] IPG  []  ICD  []  LVAD  []     H&P / Pre op physical completed: [x] N/A, [] Complete, Date , [] Scheduled, Date , [] No,     Additional Information: No questions voiced during call.     _______________________________________________      Instructions given: [] Rec'd & Read   [x] Reviewed         Pre procedure teaching completed: [x] Yes - Reviewed, [] No,     [x] No questions regarding Sedation as ordered, []     Transportation from procedure & responsible adult to be with patient following procedure for a minimum of 6 hrs (Conscious Sedation) 24 hrs (MAC): [x] Yes [employer] -  will be with pt - confirmed will have post-procedure companionship as required, [] Pending ,  [] No     Maine Reyes RN  [facility] Endoscopy

## 2019-08-14 ENCOUNTER — HOSPITAL ENCOUNTER (OUTPATIENT)
Facility: AMBULATORY SURGERY CENTER | Age: 49
End: 2019-08-14
Payer: COMMERCIAL

## 2019-08-14 VITALS
DIASTOLIC BLOOD PRESSURE: 64 MMHG | TEMPERATURE: 98.2 F | RESPIRATION RATE: 16 BRPM | HEART RATE: 66 BPM | WEIGHT: 82 LBS | SYSTOLIC BLOOD PRESSURE: 122 MMHG | OXYGEN SATURATION: 99 % | BODY MASS INDEX: 15.48 KG/M2 | HEIGHT: 61 IN

## 2019-08-14 RX ORDER — LIDOCAINE 40 MG/G
CREAM TOPICAL
Status: DISCONTINUED | OUTPATIENT
Start: 2019-08-14 | End: 2019-08-14 | Stop reason: HOSPADM

## 2019-08-14 RX ORDER — ONDANSETRON 2 MG/ML
4 INJECTION INTRAMUSCULAR; INTRAVENOUS EVERY 6 HOURS PRN
Status: DISCONTINUED | OUTPATIENT
Start: 2019-08-14 | End: 2019-08-15 | Stop reason: HOSPADM

## 2019-08-14 RX ORDER — FLUMAZENIL 0.1 MG/ML
0.2 INJECTION, SOLUTION INTRAVENOUS
Status: ACTIVE | OUTPATIENT
Start: 2019-08-14 | End: 2019-08-14

## 2019-08-14 RX ORDER — ONDANSETRON 2 MG/ML
4 INJECTION INTRAMUSCULAR; INTRAVENOUS
Status: DISCONTINUED | OUTPATIENT
Start: 2019-08-14 | End: 2019-08-14 | Stop reason: HOSPADM

## 2019-08-14 RX ORDER — ONDANSETRON 4 MG/1
4 TABLET, ORALLY DISINTEGRATING ORAL EVERY 6 HOURS PRN
Status: DISCONTINUED | OUTPATIENT
Start: 2019-08-14 | End: 2019-08-15 | Stop reason: HOSPADM

## 2019-08-14 RX ORDER — FENTANYL CITRATE 50 UG/ML
INJECTION, SOLUTION INTRAMUSCULAR; INTRAVENOUS PRN
Status: DISCONTINUED | OUTPATIENT
Start: 2019-08-14 | End: 2019-08-14 | Stop reason: HOSPADM

## 2019-08-14 RX ORDER — NALOXONE HYDROCHLORIDE 0.4 MG/ML
.1-.4 INJECTION, SOLUTION INTRAMUSCULAR; INTRAVENOUS; SUBCUTANEOUS
Status: DISCONTINUED | OUTPATIENT
Start: 2019-08-14 | End: 2019-08-15 | Stop reason: HOSPADM

## 2019-08-14 ASSESSMENT — MIFFLIN-ST. JEOR: SCORE: 929.33

## 2019-08-14 NOTE — DISCHARGE INSTRUCTIONS
Discharge Instructions after  Upper Endoscopy (EGD)    Activity and Diet  You were given medicine for pain. You may be dizzy or sleepy.  For 24 hours:    Do not drive or use heavy equipment.    Do not make important decisions.    Do not drink any alcohol.  You may return to your regular diet.    Discomfort  You may have a sore throat for 2 to 3 days. It may help to:    Avoid hot liquids for 24 hours.    Use sore throat lozenges.    Gargle as needed with salt water up to 4 times a day. Mix 1 cup of warm water  with 1 teaspoon of salt. Do not swallow.    You may take Tylenol (acetaminophen) for pain unless your doctor has told you not to.    Do not take aspirin or ibuprofen (Advil, Motrin) or other NSAIDS  (anti-inflammatory drugs).    Follow-up  We took small tissue samples for study. If you do not have a follow-up visit scheduled,  call your provider s office in 2 weeks for the results.    Other instructions________________________________________________________    When to call us:  Problems are rare. Call right away if you have:    Unusual throat pain or trouble swallowing    Unusual pain in belly or chest that is not relieved by belching or passing air    Black stools (tar-like looking bowel movement)    Temperature above 100.6  F. (37.5  C).    If you vomit blood or have severe pain, go to an emergency room.    If you have questions, call:  Monday to Friday, 7 a.m. to 4:30 p.m.: Endoscopy: [phone] (We may have to call you back)    After hours: Hospital: [phone] (Ask for the GI fellow on call)

## 2019-08-15 LAB — COPATH REPORT: NORMAL

## 2019-08-16 LAB — UPPER GI ENDOSCOPY: NORMAL

## 2019-09-03 DIAGNOSIS — J45.30 MILD PERSISTENT ASTHMA, UNCOMPLICATED: Primary | ICD-10-CM

## 2019-09-09 RX ORDER — INHALER, ASSIST DEVICES
SPACER (EA) MISCELLANEOUS
Qty: 1 EACH | Refills: 0 | Status: SHIPPED | OUTPATIENT
Start: 2019-09-09 | End: 2020-10-26

## 2019-09-09 NOTE — TELEPHONE ENCOUNTER
spacer (OPTICHAMBER OTTONIEL) holding chamber      Last Written Prescription Date:  6/12/19  Last Fill Quantity: 1,   # refills: 0  Last Office Visit : 6/25/19  Future Office visit:  none

## 2019-09-18 ENCOUNTER — OFFICE VISIT (OUTPATIENT)
Dept: ANESTHESIOLOGY | Facility: CLINIC | Age: 49
End: 2019-09-18
Attending: PHYSICIAN ASSISTANT
Payer: COMMERCIAL

## 2019-09-18 VITALS — WEIGHT: 80 LBS | BODY MASS INDEX: 15.11 KG/M2 | RESPIRATION RATE: 16 BRPM | HEIGHT: 61 IN

## 2019-09-18 DIAGNOSIS — M47.816 LUMBAR SPONDYLOSIS: ICD-10-CM

## 2019-09-18 DIAGNOSIS — M79.18 MYOFASCIAL PAIN: Primary | ICD-10-CM

## 2019-09-18 RX ORDER — GABAPENTIN 300 MG/1
300 CAPSULE ORAL 3 TIMES DAILY
Qty: 90 CAPSULE | Refills: 1 | Status: SHIPPED | OUTPATIENT
Start: 2019-09-18 | End: 2019-11-15

## 2019-09-18 RX ORDER — BACLOFEN 10 MG/1
5 TABLET ORAL 3 TIMES DAILY
Qty: 45 TABLET | Refills: 1 | Status: SHIPPED | OUTPATIENT
Start: 2019-09-18 | End: 2019-11-15

## 2019-09-18 ASSESSMENT — ENCOUNTER SYMPTOMS
FATIGUE: 1
SWOLLEN GLANDS: 0
EYE WATERING: 1
SHORTNESS OF BREATH: 1
POSTURAL DYSPNEA: 0
DECREASED APPETITE: 0
TASTE DISTURBANCE: 0
JOINT SWELLING: 0
EYE REDNESS: 0
EYE IRRITATION: 1
NECK PAIN: 0
MUSCLE WEAKNESS: 0
BACK PAIN: 1
NIGHT SWEATS: 0
STIFFNESS: 0
SPUTUM PRODUCTION: 0
WHEEZING: 1
COUGH DISTURBING SLEEP: 1
EYE PAIN: 0
COUGH: 1
INCREASED ENERGY: 1
CHILLS: 0
SORE THROAT: 0
SNORES LOUDLY: 0
DYSPNEA ON EXERTION: 1
BRUISES/BLEEDS EASILY: 1
ARTHRALGIAS: 0
DOUBLE VISION: 0
WEIGHT LOSS: 1
MYALGIAS: 0
SMELL DISTURBANCE: 0
FEVER: 0
POLYDIPSIA: 1
HALLUCINATIONS: 0
SINUS PAIN: 0
HEMOPTYSIS: 0
ALTERED TEMPERATURE REGULATION: 0
WEIGHT GAIN: 0
TROUBLE SWALLOWING: 1
HOARSE VOICE: 0
POLYPHAGIA: 0
NECK MASS: 0
SINUS CONGESTION: 0
MUSCLE CRAMPS: 0

## 2019-09-18 ASSESSMENT — ANXIETY QUESTIONNAIRES
2. NOT BEING ABLE TO STOP OR CONTROL WORRYING: NOT AT ALL
GAD7 TOTAL SCORE: 2
3. WORRYING TOO MUCH ABOUT DIFFERENT THINGS: NOT AT ALL
7. FEELING AFRAID AS IF SOMETHING AWFUL MIGHT HAPPEN: NOT AT ALL
6. BECOMING EASILY ANNOYED OR IRRITABLE: NOT AT ALL
7. FEELING AFRAID AS IF SOMETHING AWFUL MIGHT HAPPEN: NOT AT ALL
GAD7 TOTAL SCORE: 2
4. TROUBLE RELAXING: SEVERAL DAYS
1. FEELING NERVOUS, ANXIOUS, OR ON EDGE: NOT AT ALL
5. BEING SO RESTLESS THAT IT IS HARD TO SIT STILL: SEVERAL DAYS

## 2019-09-18 ASSESSMENT — MIFFLIN-ST. JEOR: SCORE: 915.26

## 2019-09-18 ASSESSMENT — PAIN SCALES - GENERAL: PAINLEVEL: MODERATE PAIN (5)

## 2019-09-18 ASSESSMENT — PATIENT HEALTH QUESTIONNAIRE - PHQ9: SUM OF ALL RESPONSES TO PHQ QUESTIONS 1-9: 0
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
## 2019-09-18 NOTE — PATIENT INSTRUCTIONS
1. Start Gabapentin 300 mg.     AM   PM   Bedtime  0   0   300mg (1 tab).  After 1-3 days, increase as tolerated to the next line  300mg (1 tab)  0   300 (1 tab).  After 3-4 days, increase as tolerated to the next line  300mg (1 tab)  300mg (1 tab)  300 (1 tab).  Continue at this dose.     Caution for sedation.    Do not drive until you know how the medication affects you.   You can go slower if you need to or increasing only one dose at a time.  Do not stop abruptly once at higher doses.  This medication must be tapered off.      2. Start Baclofen 10 mg, Take 5 mg (0.5 tablet) up to three times daily as needed.     3. Call to schedule your procedure at your convenience.     4. To make an appointment for health psychology, call any of these providers to make an appointment:    PAIN PSYCHOLOGY Requested- Biofeedback, Coping, CBT and Relaxation Therapy.     Dr. Poncho Hebert [phone]  Dr. Charity Keenan [phone]  Dr. Royer Bartholomew [phone]  Dr. Janet Sanchez [phone]  Dr. Hilda Crisostomo [phone]      Follow up: As indicated after injection.    Please call [phone] to schedule, reschedule, or cancel your procedure appointment.   Phones are answered Monday - Friday from 7:30 - 4:00pm.  Leave a voicemail with your name, birth date, and phone number if no one is available to take your call.     Your procedure: Bilateral Lumbar Medial Branch Blocks.     On the day of the procedure  1. Arrive 1 hour earlier than your scheduled time, to [facility]  Address: [address] [place] [id number]  2. Check in on the 5th floor for your procedure    For your diagnostic procedure, please fax in your Pain Diary.  Our fax number is [phone]    If you must reschedule your procedure more than two times, you must follow up in clinic before rescheduling again.    Preparing for your procedure    CAUTION - FAILURE TO FOLLOW THESE PRE-PROCEDURE INSTRUCTIONS WILL RESULT IN YOUR PROCEDURE  BEING RESCHEDULED.      Pregnancy  If you are pregnant, or think you may be pregnant, please notify our staff. This may or may not affect the ability to perform the procedure.       You must have a  take you home after your procedure. Transportation by taxi or para-transit is okay as long as you have a responsible adult accompany you. You must provide your 's full name and contact number at time of check in.     Fasting Protocol You may have NOTHING SOLID TO EAT 8 HOURS prior to arrival at the procedure area.     You may have CLEAR LIQUIDS UP TO 2 HOURS prior to arrival.    Broth and candy are considered solid food and require an eight hour fast.     Clear liquids include water, clear fruit juice (no pulp), carbonated beverages, ice, black coffee, black tea, clear jello. No alcohol containing beverages.   Medications If you take any medications, DO NOT STOP. Take your medications as usual the day of your procedure with a sip of water AT LEAST 2 HOURS PRIOR TO ARRIVAL.    Antibiotics If you are currently taking antibiotics, you must complete the entire dose 7 days prior to your scheduled procedure. You must be clear of any signs or symptoms of infection. If you begin antibiotics, please contact our clinic for instructions.     Fever, Chills, or Rash If you experience a fever of higher than 100 degrees, chills, rash, or open wounds during the one week before your procedure, please call the clinic to see if you may proceed with your procedure.      Medication Hold List  **Patients under Cardiology/Neurology care should consult their provider prior to the pain procedure to verify pre-procedure medication instructions. The information below contains general guidelines.**    Blood Thinners If you are taking daily ASPIRIN, PLAVIX, OR OTHER BLOOD THINNERS SUCH AS COUMADIN/WARFARIN, we will need your prescribing doctor to sign a release permitting you to stop these medications. Once approved by your  prescribing doctor - STOP ALL BLOOD THINNERS BASED ON THE TIME TABLE BELOW PRIOR TO YOUR PROCEDURE. If you have been instructed to stop WARFARIN(COUMADIN), you must have an INR lab drawn the day before your procedure. . Your INR must be within normal limits before we can perform your injection. MEDICATIONS CAN BE RESTARTED AFTER YOUR PROCEDURE.    14 DAY HOLD  Ticlid (ticlopidine)    10 DAY HOLD  Effient (Prasugel)    3 DAY HOLD  Xarelto (rivaroxaban) 7 DAY HOLD  Anacin, Bufferin, Ecotrin, Excedrin, Aggrenox (Aspirin)  Brilinta (ticagrelor)  Coumadin (Warfarin)  Pradexa (Dabigatran)  Elmiron (Pentosan)  Plavix (Clopidogrel Bisulfate)  Pletal (Cilostazol)    24 HOUR HOLD  Lovenox (enoxaparin)  Agrylin (Anagrelide)        Non-steroidal Anti-inflammatories (NSAIDs) DO NOT TAKE any non-steroidal anti-inflammatory medications (NSAIDs) listed on the table below. MEDICATIONS CAN BE RESTARTED AFTER YOUR PROCEDURE. Celebrex is OK to take and does not need to be discontinued.     Medications to stop:  3 DAY HOLD  Advil, Motrin (Ibuprofen)  Arthrotec (diciofenac sodium/misoprostol)  Clinoril (Sulindac)  Indocin (Indomethacin)  Lodine (Etodolac)  Toradol (Ketorolac)  Vicoprofen (Hydrocodone and Ibuprofen)  Voltaren (Diclotenac)    14 DAY HOLD  Daypro (Oxaprozin)  Feldene (Piroxicam)   7 DAY HOLD  Aleve (Naproxen sodium)  Darvon compound (contains aspirin)  Naprosyn (Naproxen)  Norgesic Forte (contains aspirin)  Mobic (Meloxicam)  Oruvall (Ketoprofen)  Percodan (contains aspirin)  Relafen (Nabumetone)  Salsalate  Trilisate  Vitamin E (more than 400 mg per day)  Any medication containing aspirin                To speak with a nurse, schedule/reschedule/cancel a clinic appointment, or request a medication refill call: [phone]     You can also reach us by [employer]: [email or web address]

## 2019-09-19 ASSESSMENT — ANXIETY QUESTIONNAIRES: GAD7 TOTAL SCORE: 2

## 2019-09-20 ENCOUNTER — TELEPHONE (OUTPATIENT)
Dept: ANESTHESIOLOGY | Facility: CLINIC | Age: 49
End: 2019-09-20

## 2019-09-20 NOTE — TELEPHONE ENCOUNTER
Spoke with: Kristina     Date Scheduled: 9/30/19    Procedure Time:  11:30    Arrival Time: 10:30     Provider: Dr. Rodríguez     : yes     F/U Appointment: n/a     Patient was educated on pre-op nurse call: Yes      Direct procedure:  no

## 2019-09-25 ENCOUNTER — OFFICE VISIT (OUTPATIENT)
Dept: SURGERY | Facility: CLINIC | Age: 49
End: 2019-09-25
Payer: COMMERCIAL

## 2019-09-25 VITALS
BODY MASS INDEX: 18.56 KG/M2 | SYSTOLIC BLOOD PRESSURE: 138 MMHG | OXYGEN SATURATION: 98 % | TEMPERATURE: 98.5 F | HEART RATE: 56 BPM | DIASTOLIC BLOOD PRESSURE: 75 MMHG | HEIGHT: 61 IN | WEIGHT: 98.3 LBS

## 2019-09-25 DIAGNOSIS — Z98.84 S/P BARIATRIC SURGERY: Primary | ICD-10-CM

## 2019-09-25 ASSESSMENT — MIFFLIN-ST. JEOR: SCORE: 998.27

## 2019-09-25 ASSESSMENT — PATIENT HEALTH QUESTIONNAIRE - PHQ9
SUM OF ALL RESPONSES TO PHQ QUESTIONS 1-9: 4
SUM OF ALL RESPONSES TO PHQ QUESTIONS 1-9: 4

## 2019-09-25 ASSESSMENT — PAIN SCALES - GENERAL: PAINLEVEL: NO PAIN (0)

## 2019-09-25 NOTE — PROGRESS NOTES
Return Bariatric Surgery Note    RE: Kristina Eldridge  MR#: [id number]  : 1970  VISIT DATE: Sep 25, 2019    Dear Mili Leon,    I had the pleasure of seeing your patient, Kristina Eldridge, in my post-bariatric surgery assessment clinic.    CHIEF COMPLAINT: concern regarding weight loss post Alicia en Y gastric bypass 2007 by Dr. Montes    HISTORY OF PRESENT ILLNESS:  Ms. Kristina Eldridge is a 49 year old woman with a history of Alicia-en-y gastric bypass in  by Dr. Perez and a history of hepatitis C treated in  who presents to the clinic today for further evaluation regarding continued weight loss. Following her gastric bypass in , she went from 235 lbs to 125 lbs, where she felt great and was very satisfied with the results of her surgery. However, in , she was diagnosed with hepatitis C and began treatment. After initiating treatment for hepatitis C, she began to lose weight very easily and she lost her appetite as well. Her treatment was stopped once she dropped beneath 100 lbs, but she still lost weight. She does not always have a strong appetite, and she may feel nauseous or vomit if she forces herself to eat without an appetite. She usually tolerates liquids well and has been trying to drink a 6 pack of Coke every 1-1.5 days, but she still fails to gain weight.    She has been undergoing an extensive workup regarding her nutritional status and possible causes of her weight loss and decreased appetite. CT on 2019 demonstrated normal post-surgical anatomy without concerns for malignancies. EGD on 2019 demonstrated some esophagitis but otherwise normal mucosal surfaces and post-surgical anatomy.    She is not satisfied with her low energy and low weight and is interested in exploring treatment options that help her gain weight, even if those options reverse the gastric bypass.    Of note, she does smoke cigarettes, but says she does not smoke much and only does so  occasionally.    Questions Regarding Prior Weight Loss Surgery Reviewed With Patient 9/25/2019   I had the following weight loss procedure: Alicia-en-y Gastric Bypass   What year was your surgery? 2007   How has your weight changed since your last visit? I have stayed about the same   Are you currently taking any weight loss medications? No   Do you currently have any of the following: None of the above   Have you been to the Emergency room since your last visit with us? No   Were you in the hospital since your last visit with us? No   Do you have any concerns today? I just want this surgery reversed so I can gain weight to 125 then I want to do the lap band       Weight History:     9/25/2019   What is your highest lifetime weight? 315   What is your lowest weight since surgery? (In pounds) 80        Current Weight: Weight: 44.6 kg (98 lb 4.8 oz)          Questions Regarding Co-Morbidities and Health Concerns Reviewed With Patient 9/25/2019   Pre-diabetes: Never   Diabetes II: Never   High Blood Pressure: Gone Away   High cholesterol: Gone away   Heartburn/Reflux: Gone away   Are you taking daily medication for heartburn, acid reflux, or GERD (acid reflux disease)? -   Sleep apnea: Never   PCOS: Never   Back pain: Stayed the same   Joint pain: Stayed the same   Lower leg swelling: Never       Eating Habits 9/25/2019   How many meals do you eat per day? 3   Do you snack between meals? Sometimes   How much food are you eating at each meal? 1/2 cup to 1 cup   Are you able to separate your meals and liquids by at least 30 minutes? No   Are you able to avoid liquid calories? No       Exercise Questions Reviewed With Patient 9/25/2019   How often do you exercise? Never   What keeps you from being more active?  Pain       Social History:      9/25/2019   Are you smoking? No   Are you drinking alcohol? No       Medications:  Current Outpatient Medications   Medication     baclofen (LIORESAL) 10 MG tablet     cetirizine  "(ZYRTEC) 10 MG tablet     cyanocobalamin (VITAMIN B12) 1000 MCG/ML injection     fluticasone (FLONASE) 50 MCG/ACT spray     gabapentin (NEURONTIN) 300 MG capsule     hydrocortisone 2.5 % cream     mirtazapine (REMERON) 15 MG tablet     mometasone-formoterol (DULERA) 100-5 MCG/ACT inhaler     montelukast (SINGULAIR) 10 MG tablet     pramipexole (MIRAPEX) 0.125 MG tablet     traZODone (DESYREL) 100 MG tablet     albuterol (PROVENTIL) (2.5 MG/3ML) 0.083% neb solution     albuterol (VENTOLIN HFA) 108 (90 Base) MCG/ACT inhaler     Calcium Citrate-Vitamin D (CALCIUM CITRATE CHEWY BITE) 500-500 MG-UNIT CHEW     diphenhydrAMINE (WAL-DRYL ALLERGY) 25 MG tablet     ipratropium (ATROVENT HFA) 17 MCG/ACT inhaler     Lidocaine (LIDOCARE) 4 % Patch     methylPREDNISolone (MEDROL DOSEPAK) 4 MG tablet therapy pack     Needle, Disp, 23G X 1\" MISC     nicotine (NICODERM CQ) 21 MG/24HR 24 hr patch     order for DME     spacer (OPTICHAMBER OTTONIEL) holding chamber     spacer (OPTICHAMBER OTTONIEL) holding chamber     Spacer/Aero Chamber Mouthpiece MISC     tiZANidine (ZANAFLEX) 2 MG tablet     traMADol (ULTRAM) 50 MG tablet     varenicline (CHANTIX CONTINUING MONTH YAW) 1 MG tablet     vitamin A 66648 units TABS     vitamin D3 (CHOLECALCIFEROL) 94273 units (250 mcg) capsule     No current facility-administered medications for this visit.          9/25/2019   Do you avoid NSAIDs such as (Ibuprofen, Aleve, Naproxen, Advil)?   Yes       ROS:  GI:      9/25/2019   Vomiting: Yes   Diarrhea: Yes   Constipation: Yes   Swallowing trouble: Yes   Abdominal pain: Yes   Heartburn: No   Rash in skin folds: No   Depression: No   Stress urinary incontinence No     Skin:   BAR RBS ROS - SKIN 9/25/2019   Rash in skin folds: No     Psych:      9/25/2019   Depression: No   Anxiety: No     Female Only:   BAR RBS ROS - FEMALE ONLY 9/25/2019   Female only: Post-menopausal       LABS/IMAGING/MEDICAL RECORDS REVIEW: Previous medical records including dietician " "consultations, primary care provider consultations, labs, and imaging related to her current concerns were reviewed today in clinic. EGD on 8/14/2019 and CT on 7/26/2019 were reviewed.    PHYSICAL EXAMINATION:  /75 (BP Location: Left arm, Patient Position: Sitting, Cuff Size: Adult Large)   Pulse 56   Temp 98.5  F (36.9  C) (Oral)   Ht 1.549 m (5' 1\")   Wt 44.6 kg (98 lb 4.8 oz)   LMP 06/11/2012 (LMP Unknown)   SpO2 98%   BMI 18.57 kg/m     Constitutional: She is sitting comfortably in the room in no acute distress. She is pleasant and interactive during interview.  HEENT: Normocephalic and atraumatic. Sclerae nonicteric. Moist mucus membranes.  Lungs: Unlabored breathing.  Cardiovascular: Cap refill <3 seconds. Nail beds pink and well perfused.  Abdominal: Soft, nontender, nondistended. 3 well healed surgical scars consistent with PSH observed. No masses.    ASSESSMENT AND PLAN:      -Due to lack of identifiable anatomical or physiological causes of Ms. Eldridge's symptoms, discussion was turned means of correcting her nutritional status and weight loss.    -Recommended interventions at this time include possible tube insertion for feeding or stent placement to allow communication between the gastric pouch and gastric remnant. Surgical risks were reviewed with her, including bleeding, blood clots, and infection, and particular emphasis was placed on the likelihood of her regaining weight. She was very interested in surgery, and was recommended to consult with Dr. Holder.    -In the meantime, nutrition labs were ordered to continue to monitor her overall health and nutritional status.    -She was in agreement with this plan and elected to proceed.      Melody Chavez, MS3    I spent a total of 10 minutes face to face with Kristina during today's office visit. Over 50% of this time was spent counseling the patient and/or coordinating care.  Answers for HPI/ROS submitted by the patient on 9/25/2019   PHQ9 " TOTAL SCORE: 4    Patient seen and examined by me.  The note above reflects the history and physical performed and the plan formulated in conjunction with the patient.      Abdulkadir Benites MD

## 2019-09-25 NOTE — PATIENT INSTRUCTIONS
Goals:  1. Eat consistently throughout the day and week.  Eat at least 60 grams of protein per day, include carbohydrates and vegetables as able with meals.  2. Take the following after a Alicia-en-y Gastric Bypass:    Multivitamin/minerals: adult dose 2 times daily    Iron: 45-60 mg elemental daily (18-36 mg daily if low risk) - may partly or fully be covered in multivitamin     Calcium Citrate containing vitamin D: 500 mg 3 times daily or 600 mg 2 times daily    Vitamin B12: sublingual form of at least 500 mcg daily or injection of 1000 mcg monthly     B-50 Complex daily    3. A least one snack per day - include a protein source     Follow up with RD in one-two months    Emmy Barr RD, LD  If you need to schedule or reschedule with a dietitian please call [phone].

## 2019-09-25 NOTE — LETTER
2019       RE: Kristina Eldridge  [address]  [facility] [id number]     Dear Colleague,    Thank you for referring your patient, Kristina Eldridge, to the [facility] at [facility]. Please see a copy of my visit note below.    Return Bariatric Surgery Note    RE: Kristina Eldridge  MR#: [id number]  : 1970  VISIT DATE: Sep 25, 2019    Dear Mili Leon,    I had the pleasure of seeing your patient, Kristina Eldridge, in my post-bariatric surgery assessment clinic.    CHIEF COMPLAINT: concern regarding weight loss post Alicia en Y gastric bypass 2007 by Dr. Montes    HISTORY OF PRESENT ILLNESS:  Ms. Kristina Eldridge is a 49 year old woman with a history of Alicia-en-y gastric bypass in  by Dr. Perez and a history of hepatitis C treated in  who presents to the clinic today for further evaluation regarding continued weight loss. Following her gastric bypass in , she went from 235 lbs to 125 lbs, where she felt great and was very satisfied with the results of her surgery. However, in , she was diagnosed with hepatitis C and began treatment. After initiating treatment for hepatitis C, she began to lose weight very easily and she lost her appetite as well. Her treatment was stopped once she dropped beneath 100 lbs, but she still lost weight. She does not always have a strong appetite, and she may feel nauseous or vomit if she forces herself to eat without an appetite. She usually tolerates liquids well and has been trying to drink a 6 pack of Coke every 1-1.5 days, but she still fails to gain weight.    She has been undergoing an extensive workup regarding her nutritional status and possible causes of her weight loss and decreased appetite. CT on 2019 demonstrated normal post-surgical anatomy without concerns for malignancies. EGD on 2019 demonstrated some esophagitis but otherwise normal mucosal surfaces and  post-surgical anatomy.    She is not satisfied with her low energy and low weight and is interested in exploring treatment options that help her gain weight, even if those options reverse the gastric bypass.    Of note, she does smoke cigarettes, but says she does not smoke much and only does so occasionally.    Questions Regarding Prior Weight Loss Surgery Reviewed With Patient 9/25/2019   I had the following weight loss procedure: Alicia-en-y Gastric Bypass   What year was your surgery? 2007   How has your weight changed since your last visit? I have stayed about the same   Are you currently taking any weight loss medications? No   Do you currently have any of the following: None of the above   Have you been to the Emergency room since your last visit with us? No   Were you in the hospital since your last visit with us? No   Do you have any concerns today? I just want this surgery reversed so I can gain weight to 125 then I want to do the lap band       Weight History:     9/25/2019   What is your highest lifetime weight? 315   What is your lowest weight since surgery? (In pounds) 80        Current Weight: Weight: 44.6 kg (98 lb 4.8 oz)          Questions Regarding Co-Morbidities and Health Concerns Reviewed With Patient 9/25/2019   Pre-diabetes: Never   Diabetes II: Never   High Blood Pressure: Gone Away   High cholesterol: Gone away   Heartburn/Reflux: Gone away   Are you taking daily medication for heartburn, acid reflux, or GERD (acid reflux disease)? -   Sleep apnea: Never   PCOS: Never   Back pain: Stayed the same   Joint pain: Stayed the same   Lower leg swelling: Never       Eating Habits 9/25/2019   How many meals do you eat per day? 3   Do you snack between meals? Sometimes   How much food are you eating at each meal? 1/2 cup to 1 cup   Are you able to separate your meals and liquids by at least 30 minutes? No   Are you able to avoid liquid calories? No       Exercise Questions Reviewed With Patient  "9/25/2019   How often do you exercise? Never   What keeps you from being more active?  Pain       Social History:      9/25/2019   Are you smoking? No   Are you drinking alcohol? No       Medications:  Current Outpatient Medications   Medication     baclofen (LIORESAL) 10 MG tablet     cetirizine (ZYRTEC) 10 MG tablet     cyanocobalamin (VITAMIN B12) 1000 MCG/ML injection     fluticasone (FLONASE) 50 MCG/ACT spray     gabapentin (NEURONTIN) 300 MG capsule     hydrocortisone 2.5 % cream     mirtazapine (REMERON) 15 MG tablet     mometasone-formoterol (DULERA) 100-5 MCG/ACT inhaler     montelukast (SINGULAIR) 10 MG tablet     pramipexole (MIRAPEX) 0.125 MG tablet     traZODone (DESYREL) 100 MG tablet     albuterol (PROVENTIL) (2.5 MG/3ML) 0.083% neb solution     albuterol (VENTOLIN HFA) 108 (90 Base) MCG/ACT inhaler     Calcium Citrate-Vitamin D (CALCIUM CITRATE CHEWY BITE) 500-500 MG-UNIT CHEW     diphenhydrAMINE (WAL-DRYL ALLERGY) 25 MG tablet     ipratropium (ATROVENT HFA) 17 MCG/ACT inhaler     Lidocaine (LIDOCARE) 4 % Patch     methylPREDNISolone (MEDROL DOSEPAK) 4 MG tablet therapy pack     Needle, Disp, 23G X 1\" MISC     nicotine (NICODERM CQ) 21 MG/24HR 24 hr patch     order for DME     spacer (OPTICHAMBER OTTONIEL) holding chamber     spacer (OPTICHAMBER OTTONIEL) holding chamber     Spacer/Aero Chamber Mouthpiece MISC     tiZANidine (ZANAFLEX) 2 MG tablet     traMADol (ULTRAM) 50 MG tablet     varenicline (CHANTIX CONTINUING MONTH YAW) 1 MG tablet     vitamin A 05872 units TABS     vitamin D3 (CHOLECALCIFEROL) 48531 units (250 mcg) capsule     No current facility-administered medications for this visit.          9/25/2019   Do you avoid NSAIDs such as (Ibuprofen, Aleve, Naproxen, Advil)?   Yes       ROS:  GI:      9/25/2019   Vomiting: Yes   Diarrhea: Yes   Constipation: Yes   Swallowing trouble: Yes   Abdominal pain: Yes   Heartburn: No   Rash in skin folds: No   Depression: No   Stress urinary incontinence " "No     Skin:   GIANNA SANTIAGO - SKIN 9/25/2019   Rash in skin folds: No     Psych:      9/25/2019   Depression: No   Anxiety: No     Female Only:   GIANNA SANTIAGO - FEMALE ONLY 9/25/2019   Female only: Post-menopausal       LABS/IMAGING/MEDICAL RECORDS REVIEW: Previous medical records including dietician consultations, primary care provider consultations, labs, and imaging related to her current concerns were reviewed today in clinic. EGD on 8/14/2019 and CT on 7/26/2019 were reviewed.    PHYSICAL EXAMINATION:  /75 (BP Location: Left arm, Patient Position: Sitting, Cuff Size: Adult Large)   Pulse 56   Temp 98.5  F (36.9  C) (Oral)   Ht 1.549 m (5' 1\")   Wt 44.6 kg (98 lb 4.8 oz)   LMP 06/11/2012 (LMP Unknown)   SpO2 98%   BMI 18.57 kg/m      Constitutional: She is sitting comfortably in the room in no acute distress. She is pleasant and interactive during interview.  HEENT: Normocephalic and atraumatic. Sclerae nonicteric. Moist mucus membranes.  Lungs: Unlabored breathing.  Cardiovascular: Cap refill <3 seconds. Nail beds pink and well perfused.  Abdominal: Soft, nontender, nondistended. 3 well healed surgical scars consistent with PSH observed. No masses.    ASSESSMENT AND PLAN:      -Due to lack of identifiable anatomical or physiological causes of Ms. Eldridge's symptoms, discussion was turned means of correcting her nutritional status and weight loss.    -Recommended interventions at this time include possible tube insertion for feeding or stent placement to allow communication between the gastric pouch and gastric remnant. Surgical risks were reviewed with her, including bleeding, blood clots, and infection, and particular emphasis was placed on the likelihood of her regaining weight. She was very interested in surgery, and was recommended to consult with Dr. Holder.    -In the meantime, nutrition labs were ordered to continue to monitor her overall health and nutritional status.    -She was in agreement " with this plan and elected to proceed.    Melody Chavez, MS3    I spent a total of 10 minutes face to face with Kristina during today's office visit. Over 50% of this time was spent counseling the patient and/or coordinating care.    Patient seen and examined by me.  The note above reflects the history and physical performed and the plan formulated in conjunction with the patient.      Abdulkadir Benites MD

## 2019-09-25 NOTE — LETTER
9/25/2019       RE: Kristina Eldridge  [address]  [facility] [address]     Dear Colleague,    Thank you for referring your patient, Kristina Eldridge, to the [facility] at [facility]. Please see a copy of my visit note below.    Nutrition Assessment  Reason For Visit:  Kristina Eldridge is a 49 year old female presents today for nutrition follow-up, s/p RNYGB in 2007 with Dr Perez.  Patient referred by Becki DOBBS(7/24/19) and Dr Benites(9/25/19).     Anthropometrics:  Pre-op Weight: 235 lbs  7/24/19 Weight: 82 lbs with BMI of 14.99 kg/m    Current weight: 98.3 lbs (+16.3 lbs in the past 2 months)       Patient reported that she felt best at 125 lbs.    Current Vitamins/Minerals: B12 injection once per month    Nutrition History:  Lactose intolerant  Unable to tolerate greasy foods    Initial visit : May not eat for three days - will have coke and may snack on beef jerky throughout the day  -Patient reported regular bowel movements and if too BM are too soft over a few days she will eat a couple grilled cheese sandwiches and be back to normal.    Recent food recall:  Breakfast: skips  Lunch: spaghetti ~1.5 cups or the past three weeks with a tomato sauce and sugar - no meat  Dinner: spaghetti or chicken 1/2 breast and vegetable(corn or green beans)  Snacks: yogurt(yoplait) 2-3 per day, rarely lunch meat and bread, 1/2 pound of beef jerky(three days per week)  Beverages: coke(~8 per day cans), V8 juice two ~6 oz glasses per day.    Progress with previous goals:  1. Eat consistently throughout the day and week.  Eat at least 60 grams of protein per day. Not met - patient reported recently only eating spaghetti and this week has started eating chicken more   2. Take the following after a Aliica-en-y Gastric Bypass: B12 injection once monthly - unable to tolerate calcium(can't chew - no teeth), took vit D rx until it ran out --- no MVI or B  complex    Multivitamin/minerals: adult dose 2 times daily    Iron: 45-60 mg elemental daily (18-36 mg daily if low risk) - may partly or fully be covered in multivitamin     Calcium Citrate containing vitamin D: 500 mg 3 times daily or 600 mg 2 times daily    Vitamin B12: sublingual form of at least 500 mcg daily or injection of 1000 mcg monthly     B-50 Complex daily      Physical Activity:  Limited exercise due to fatigue     Nutrition Prescription:  Calories: 8581-3751  Grams Protein: 60 (minimum)  Amount of Fluid: 48-64 oz    MALNUTRITION  % Intake: No decreased intake noted  % Weight Loss: None noted  Subcutaneous Fat Loss: Upper arm:  severe and Lower arm:  severe  Muscle Loss: Scapular bone: per patient report able to see all ribs bones and spine on her back, Upper arm (bicep, tricep):  Moderate/severe and Lower arm  (forearm):  severe  Fluid Accumulation/Edema: None noted  Malnutrition Diagnosis: Severe malnutrition in the context of behavioral - environmental circumstances    Nutrition Diagnosis  Malnutrition related to inadequate consistent intakes of food as evidenced by patient skipping meals up to three days per week, eating two meals per day, BMI of <15 with severe muscle and fat loss. - improving    Intervention  Intervention At Appointment:  Materials/Education provided on consistency with meals and protein needs.  Encouraged consistent meals with protein and carbohydrates, discussed snacks daily to promote weight gain.  Patient continues to drink regular coke on a daily basis.  Per MD possible feeding tube placement or stent between gastric pouch and remnant stomach for weight gain.  Reviewed labs from previous visit, patient stated that she took vitamin D prescription but never got refills and lab was rechecked a week later.  Discussed labs with NP, plan to have vitamin D rechecked at next visit and encouraged patient to continue to take vitamin D prescription.  Patient stated that she did not  tolerate hard chewable calcium, discussed other calcium supplement options.    Goals:  1. Eat consistently throughout the day and week.  Eat at least 60 grams of protein per day, include carbohydrates and vegetables as able with meals.  2. Take the following after a Alicia-en-y Gastric Bypass:    Multivitamin/minerals: adult dose 2 times daily    Iron: 45-60 mg elemental daily (18-36 mg daily if low risk) - may partly or fully be covered in multivitamin     Calcium Citrate containing vitamin D: 500 mg 3 times daily or 600 mg 2 times daily    Vitamin B12: sublingual form of at least 500 mcg daily or injection of 1000 mcg monthly     B-50 Complex daily    3. A least one snack per day - include a protein source     Follow-Up:  PRN    Time spent with patient: 30 minutes.  Emmy Barr RD, LD

## 2019-09-25 NOTE — NURSING NOTE
"Chief Complaint   Patient presents with     RECHECK     Bariatric follow up.       Vitals:    09/25/19 1114   BP: 138/75   BP Location: Left arm   Patient Position: Sitting   Cuff Size: Adult Large   Pulse: 56   Temp: 98.5  F (36.9  C)   TempSrc: Oral   SpO2: 98%   Weight: 44.6 kg (98 lb 4.8 oz)   Height: 1.549 m (5' 1\")       Body mass index is 18.57 kg/m .             Jessie Keita CMA    "

## 2019-09-25 NOTE — PROGRESS NOTES
Nutrition Assessment  Reason For Visit:  Kristina Eldridge is a 49 year old female presents today for nutrition follow-up, s/p RNYGB in 2007 with Dr Perez.  Patient referred by Becki DOBBS(7/24/19) and Dr Benites(9/25/19).     Anthropometrics:  Pre-op Weight: 235 lbs  7/24/19 Weight: 82 lbs with BMI of 14.99 kg/m    Current weight: 98.3 lbs (+16.3 lbs in the past 2 months)       Patient reported that she felt best at 125 lbs.    Current Vitamins/Minerals: B12 injection once per month    Nutrition History:  Lactose intolerant  Unable to tolerate greasy foods    Initial visit : May not eat for three days - will have coke and may snack on beef jerky throughout the day  -Patient reported regular bowel movements and if too BM are too soft over a few days she will eat a couple grilled cheese sandwiches and be back to normal.    Recent food recall:  Breakfast: skips  Lunch: spaghetti ~1.5 cups or the past three weeks with a tomato sauce and sugar - no meat  Dinner: spaghetti or chicken 1/2 breast and vegetable(corn or green beans)  Snacks: yogurt(yoplait) 2-3 per day, rarely lunch meat and bread, 1/2 pound of beef jerky(three days per week)  Beverages: coke(~8 per day cans), V8 juice two ~6 oz glasses per day.    Progress with previous goals:  1. Eat consistently throughout the day and week.  Eat at least 60 grams of protein per day. Not met - patient reported recently only eating spaghetti and this week has started eating chicken more   2. Take the following after a Alicia-en-y Gastric Bypass: B12 injection once monthly - unable to tolerate calcium(can't chew - no teeth), took vit D rx until it ran out --- no MVI or B complex    Multivitamin/minerals: adult dose 2 times daily    Iron: 45-60 mg elemental daily (18-36 mg daily if low risk) - may partly or fully be covered in multivitamin     Calcium Citrate containing vitamin D: 500 mg 3 times daily or 600 mg 2 times daily    Vitamin B12: sublingual form of at least  500 mcg daily or injection of 1000 mcg monthly     B-50 Complex daily      Physical Activity:  Limited exercise due to fatigue     Nutrition Prescription:  Calories: 4246-4932  Grams Protein: 60 (minimum)  Amount of Fluid: 48-64 oz    MALNUTRITION  % Intake: No decreased intake noted  % Weight Loss: None noted  Subcutaneous Fat Loss: Upper arm:  severe and Lower arm:  severe  Muscle Loss: Scapular bone: per patient report able to see all ribs bones and spine on her back, Upper arm (bicep, tricep):  Moderate/severe and Lower arm  (forearm):  severe  Fluid Accumulation/Edema: None noted  Malnutrition Diagnosis: Severe malnutrition in the context of behavioral - environmental circumstances    Nutrition Diagnosis  Malnutrition related to inadequate consistent intakes of food as evidenced by patient skipping meals up to three days per week, eating two meals per day, BMI of <15 with severe muscle and fat loss. - improving    Intervention  Intervention At Appointment:  Materials/Education provided on consistency with meals and protein needs.  Encouraged consistent meals with protein and carbohydrates, discussed snacks daily to promote weight gain.  Patient continues to drink regular coke on a daily basis.  Per MD possible feeding tube placement or stent between gastric pouch and remnant stomach for weight gain.  Reviewed labs from previous visit, patient stated that she took vitamin D prescription but never got refills and lab was rechecked a week later.  Discussed labs with NP, plan to have vitamin D rechecked at next visit and encouraged patient to continue to take vitamin D prescription.  Patient stated that she did not tolerate hard chewable calcium, discussed other calcium supplement options.    Goals:  1. Eat consistently throughout the day and week.  Eat at least 60 grams of protein per day, include carbohydrates and vegetables as able with meals.  2. Take the following after a Alicia-en-y Gastric Bypass:     Multivitamin/minerals: adult dose 2 times daily    Iron: 45-60 mg elemental daily (18-36 mg daily if low risk) - may partly or fully be covered in multivitamin     Calcium Citrate containing vitamin D: 500 mg 3 times daily or 600 mg 2 times daily    Vitamin B12: sublingual form of at least 500 mcg daily or injection of 1000 mcg monthly     B-50 Complex daily    3. A least one snack per day - include a protein source     Follow-Up:  PRN    Time spent with patient: 30 minutes.  Emmy Barr RD, LD

## 2019-09-26 ENCOUNTER — PATIENT OUTREACH (OUTPATIENT)
Dept: GASTROENTEROLOGY | Facility: CLINIC | Age: 49
End: 2019-09-26

## 2019-09-26 NOTE — PROGRESS NOTES
Per Dr. Self    Procedure/Imaging/Clinic: EUS with gastrogastrostomy and Axios placement   Physician: Aramis   Timing: Next available   Dx: Malnutrition post RY     Orders placed under separate encounter    Can expect a call for date and time for procedure.   Will need a , someone to stay with them for 24 hours and stay in town for 24 hours (within 45 min of Hospital) post procedure, rational explained.   Will get pre-op physical done locally and will fax a copy to us along with bringing a hard copy with them. Fax number given. [phone]    Blood thinner -  no  ASA - no  Diabetic no-   NSAIDS: no    A pre-op nurse will call 1-2 days prior to the procedure.   Is advised to be NPO/no solid food 8 hours before the procedure. Ok to drink clear liquids (Water, Apple Juice or Gatorade) up to 2 hours prior to procedure.     Post Procedure: start with clear liquids and then advance as tolerated.     Verbalized understanding of all instructions. All questions answered.     Hilda Navarro RN Care Coordinator

## 2019-09-30 ENCOUNTER — HOSPITAL ENCOUNTER (OUTPATIENT)
Facility: AMBULATORY SURGERY CENTER | Age: 49
End: 2019-09-30
Payer: COMMERCIAL

## 2019-09-30 ENCOUNTER — ANCILLARY PROCEDURE (OUTPATIENT)
Dept: RADIOLOGY | Facility: AMBULATORY SURGERY CENTER | Age: 49
End: 2019-09-30
Payer: COMMERCIAL

## 2019-09-30 VITALS
OXYGEN SATURATION: 98 % | HEART RATE: 66 BPM | WEIGHT: 92 LBS | SYSTOLIC BLOOD PRESSURE: 141 MMHG | HEIGHT: 60 IN | RESPIRATION RATE: 16 BRPM | TEMPERATURE: 98.5 F | BODY MASS INDEX: 18.06 KG/M2 | DIASTOLIC BLOOD PRESSURE: 80 MMHG

## 2019-09-30 DIAGNOSIS — R52 PAIN: ICD-10-CM

## 2019-09-30 DIAGNOSIS — J45.30 MILD PERSISTENT ASTHMA, UNCOMPLICATED: ICD-10-CM

## 2019-09-30 RX ORDER — LIDOCAINE HYDROCHLORIDE 10 MG/ML
INJECTION, SOLUTION EPIDURAL; INFILTRATION; INTRACAUDAL; PERINEURAL PRN
Status: DISCONTINUED | OUTPATIENT
Start: 2019-09-30 | End: 2019-09-30 | Stop reason: HOSPADM

## 2019-09-30 RX ORDER — BUPIVACAINE HYDROCHLORIDE 2.5 MG/ML
INJECTION, SOLUTION EPIDURAL; INFILTRATION; INTRACAUDAL PRN
Status: DISCONTINUED | OUTPATIENT
Start: 2019-09-30 | End: 2019-09-30 | Stop reason: HOSPADM

## 2019-09-30 RX ORDER — ALBUTEROL SULFATE 90 UG/1
AEROSOL, METERED RESPIRATORY (INHALATION)
Qty: 18 G | Refills: 0 | OUTPATIENT
Start: 2019-09-30

## 2019-09-30 ASSESSMENT — MIFFLIN-ST. JEOR: SCORE: 953.81

## 2019-09-30 NOTE — DISCHARGE INSTRUCTIONS
Home Care Instructions after a Medial Branch Block      In a medial branch block, a local anesthetic (numbing medicine) is injected near the medial branch nerve. This stops the transmission of pain signals from the facet joint. If this reduces your pain and improves your mobility, it may tell the doctor which facet joint is causing the pain. This procedure is a diagnostic procedure and is typically short lasting. With this injection, a steroid to increase the longevity of the blocks effect may or may not be used.    Activity  -You may resume most normal activity levels with the exception of strenuous activity. It is important for us to know if your pain with normal activity is relieved after this injection.  -DO NOT shower for 24 hours  -DO NOT remove bandaid for 24 hours    Pain  -You may experience soreness at the injection site for one or two days  -You may use an ice pack for 20 minutes every 2 hours for the first 24 hours  -You may use a heating pad after the first 24 hours  -You may use Tylenol (acetaminophen) every 4 hours or other pain medicines as     directed by your physician    You may experience numbness radiating into your legs or arms (depending on the procedure location). This numbness may last several hours. Until sensation returns to normal; please use caution in walking, climbing stairs, and stepping out of your vehicle, etc.    DID YOU RECEIVE SEDATION TODAY?  No    PLEASE KEEP TRACK OF YOUR SYMPTOMS AND NOTE YOUR IMPROVEMENT FOR YOUR DOCTOR.     Please contact us if you have:  -Severe pain  -Fever more than 101.5 degrees Fahrenheit  -Signs of infection at the injection site (redness, swelling, or drainage)    If you have questions, please contact our office at [phone] between the hours of 7:00 am and 3:00 pm Monday through Friday. After office hours you can contact the on call provider by dialing [phone]. If you need immediate attention, we recommend that you go to a hospital emergency  room or dial 911.

## 2019-09-30 NOTE — TELEPHONE ENCOUNTER
VENTOLIN HFA INH W/DOS CTR [id number]     Last Written Prescription Date:  2/19/2019  Last Fill Quantity: 1,   # refills: 11  Last Office Visit : 6/25/2019  Future Office visit:  0      Called pharmacy,  Pt still has 4 Refills remaining from the order 2/19/2019.    No further action needed.    Simona Quintanilla RN  Central Triage Red Flags/Med Refills

## 2019-10-03 ENCOUNTER — PATIENT OUTREACH (OUTPATIENT)
Dept: GASTROENTEROLOGY | Facility: CLINIC | Age: 49
End: 2019-10-03

## 2019-10-06 NOTE — PROCEDURES
Diagnostic Medial Branch Block    The patient s identity, the procedure to be performed and the specific site of the procedure was verified in accordance with [facility] Protocol.  Diagnosis: Facet Arthropathy  Pre-procedure Pain Score: 7/10           Procedure Note:  Informed consent was obtained.  The patient was positioned comfortably in the prone position.  There was no evidence of infection at the sites of needle insertion.  The patient was prepped and draped in a sterile fashion.  Skeletal landmarks were identified with the fluoroscope guidance.  25 gauge spinal needles were then placed at the junction of the superior articulating process and the transverse process for lumbar and thoracic levels and centrally on the lateral mass in the cervical region.  Medication was then injected after negative aspiration. The patient was then observed for 30 minutes.  No complications were noted.      Levels:Bilateral   L3/L4/ALA  Medication (per site): 0.5cc   0.25% Bupivacaine      Post Procedure Pain Score: 0/10    The patient was given discharge instructions and verbalizes understanding, including understanding of those signs and symptoms that would require emergency care.     Plan:   The patient will fill out a pain diary for the remainder of the day and will either fax, email or bring it to the pain clinic.      Counseling: Greater than 50% of this patient visit was spent in counseling the patient regarding the treatment of their pain, coordinating their overall treatment plan and assessing their progress.

## 2019-10-09 ENCOUNTER — PREP FOR PROCEDURE (OUTPATIENT)
Dept: GASTROENTEROLOGY | Facility: CLINIC | Age: 49
End: 2019-10-09

## 2019-10-09 ENCOUNTER — PATIENT OUTREACH (OUTPATIENT)
Dept: GASTROENTEROLOGY | Facility: CLINIC | Age: 49
End: 2019-10-09

## 2019-10-09 DIAGNOSIS — E46 MALNUTRITION (H): Primary | ICD-10-CM

## 2019-10-09 NOTE — PROGRESS NOTES
Per Dr. Self     Procedure/Imaging/Clinic: EUS with gastrogastrostomy and Axios placement   Physician: Aramsi   Timing: Next available   Dx: Malnutrition post RY

## 2019-10-10 ENCOUNTER — ANESTHESIA EVENT (OUTPATIENT)
Dept: SURGERY | Facility: CLINIC | Age: 49
End: 2019-10-10
Payer: COMMERCIAL

## 2019-10-10 ENCOUNTER — PRE VISIT (OUTPATIENT)
Dept: SURGERY | Facility: CLINIC | Age: 49
End: 2019-10-10

## 2019-10-10 ENCOUNTER — TELEPHONE (OUTPATIENT)
Dept: GASTROENTEROLOGY | Facility: CLINIC | Age: 49
End: 2019-10-10

## 2019-10-10 ENCOUNTER — OFFICE VISIT (OUTPATIENT)
Dept: SURGERY | Facility: CLINIC | Age: 49
End: 2019-10-10
Payer: COMMERCIAL

## 2019-10-10 VITALS
HEART RATE: 62 BPM | DIASTOLIC BLOOD PRESSURE: 72 MMHG | TEMPERATURE: 98.6 F | SYSTOLIC BLOOD PRESSURE: 129 MMHG | HEIGHT: 60 IN | WEIGHT: 93.3 LBS | BODY MASS INDEX: 18.32 KG/M2 | RESPIRATION RATE: 19 BRPM | OXYGEN SATURATION: 99 %

## 2019-10-10 DIAGNOSIS — Z01.818 PRE-OP EXAMINATION: Primary | ICD-10-CM

## 2019-10-10 PROBLEM — E46 MALNUTRITION (H): Status: ACTIVE | Noted: 2019-10-10

## 2019-10-10 RX ORDER — IPRATROPIUM BROMIDE AND ALBUTEROL SULFATE 2.5; .5 MG/3ML; MG/3ML
3 SOLUTION RESPIRATORY (INHALATION) ONCE
Status: CANCELLED | OUTPATIENT
Start: 2019-10-10 | End: 2019-10-10

## 2019-10-10 ASSESSMENT — MIFFLIN-ST. JEOR: SCORE: 959.71

## 2019-10-10 ASSESSMENT — COPD QUESTIONNAIRES
CAT_SEVERITY: MILD
COPD: 1

## 2019-10-10 ASSESSMENT — LIFESTYLE VARIABLES: TOBACCO_USE: 1

## 2019-10-10 ASSESSMENT — PAIN SCALES - GENERAL: PAINLEVEL: NO PAIN (0)

## 2019-10-10 NOTE — TELEPHONE ENCOUNTER
FUTURE VISIT INFORMATION      SURGERY INFORMATION:    pre-op for feeding tube with Dr. Self - PT did not have DOS yet.    RECORDS REQUESTED FROM:       Primary Care Provider: Mili Ortiz- [facility]    Most recent EKG+ Tracing: 3/11/19    Most recent ECHO: 2/5/19    Most recent PFT's: 2/15/19    Most recent Sleep Study: 7/28/16

## 2019-10-10 NOTE — PATIENT INSTRUCTIONS
Preparing for Your Surgery      Name:  Kristina Eldridge   MRN:  [id number]   :  1970   Today's Date:  10/10/2019     Arriving for surgery:  Surgery date:  10/21/19  Arrival time:  11:20 am    Please come to:    [facility] [address]  [address] [place]  [address]    - ? parking is available in front of the hospital      -    Please proceed to [address] on the 3rd floor. [phone]?     - ?If you are in need of directions, wheelchair or escort please stop at the Information Desk in the lobby.  Inform the information person that you are here for surgery; a wheelchair and escort to [address] will be provided.?     -  Bring your ID and insurance card.    - If you are scheduled to go home the Same Day as surgery you must have a responsible adult as a  and to stay with you overnight the first 24 hours after surgery.     What can I eat or drink?  -  You may have solid food or milk products until 8 hours prior to your surgery. (Until 5:20 am )  -  You may have water, apple juice or 7up/Sprite until 2 hours prior to your surgery. (Until 7:20 am- Endoscopic USN )    Which medicines can I take?       -  Do not bring your own medications to the hospital, except for inhalers.        -  Follow Gastroenterology Clinic instructions regarding Ibuprofen. If no instructions given, NO Ibuprofen the day prior to surgery.          -  Hold Naproxen (Aleve) 2 days prior to surgery.    -  Do NOT take these medications in the morning, the day of surgery:  Vitamin A, Vitamin D3, Hydrocortisone cream    -  Please take these medications the morning of surgery:  Baclofen, Cetirizine (Zyrtec), Flonase nasal spray, Gabapentin, Atrovent inhaler, Dulera inhaler,   Albuterol inhaler/neb if needed  Acetaminophen (Tylenol) if needed    How do I prepare myself?  -  Take two showers: one the night before surgery; and one the morning of surgery.         Use Scrubcare or Hibiclens to wash from neck  down.  You may use your own shampoo and conditioner. No other hair products.   -  Do NOT use lotion, powder, colognes, deodorant, or antiperspirant the day of your surgery.  -  Do NOT wear any makeup, fingernail polish or jewelry.    Questions or Concerns:  If you have questions or concerns prior to your surgery, call [phone]. (Mon - Fri   8 am- 5:30 pm)  Questions about surgery, contact your Surgeons office.      AFTER YOUR SURGERY  Breathing exercises   Breathing exercises help you recover faster. Take deep breaths and let the air out slowly. This will:     Help you wake up after surgery.    Help prevent complications like pneumonia.  Preventing complications will help you go home sooner.   We may give you a breathing device (incentive spirometer) to encourage you to breathe deeply.   Nausea and vomiting   You may feel sick to your stomach after surgery; if so, let your nurse know.    Pain control:  After surgery, you may have pain. Our goal is to help you manage your pain. Pain medicine will help you feel comfortable enough to do activities that will help you heal.  These activities may include breathing exercises, walking and physical therapy.   To help your health care team treat your pain we will ask: 1) If you have pain  2) where it is located 3) describe your pain in your words  Methods of pain control include medications given by mouth, vein or by nerve block for some surgeries.  Sequential Compression Device (SCD) or Pneumo Boots:  You may need to wear SCD S on your legs or feet. These are wraps connected to a machine that pumps in air and releases it. The repeated pumping helps prevent blood clots from forming.

## 2019-10-10 NOTE — ANESTHESIA PREPROCEDURE EVALUATION
Anesthesia Pre-Procedure Evaluation    Patient: Kristina Eldridge   MRN:     [id number] Gender:   female   Age:    49 year old :      1970        Preoperative Diagnosis: Malnutrition (H) [E46]   Procedure(s):  ENDOSCOPIC ULTRASOUND, ESOPHAGOSCOPY / UPPER GASTROINTESTINAL TRACT (GI)     Past Medical History:   Diagnosis Date     Abnormal Pap smear 2017    Dr said pap was abnormal     Arthritis      Chronic back pain     hx spondylolisthesis     Chronic pain      COPD (chronic obstructive pulmonary disease) (H)     PFT's  FEV1/FVC = 2.68/3.20 = 84%     H/O gastric bypass      Hepatitis C     treated ribaviron & interferon in  for serotype 2 with failed 6 month treatment     Hyperlipidemia     on simvatatin     Insomnia      Positive TB test     has taken INH     RLS (restless legs syndrome)      TB lung, latent     treated     Uncomplicated asthma      Wounds and injuries       Past Surgical History:   Procedure Laterality Date     CHOLECYSTECTOMY       D & C       ESOPHAGOSCOPY, GASTROSCOPY, DUODENOSCOPY (EGD), COMBINED  2014    Procedure: COMBINED ESOPHAGOSCOPY, GASTROSCOPY, DUODENOSCOPY (EGD);  Surgeon: Ramesh Self MD;  Location:  GI     ESOPHAGOSCOPY, GASTROSCOPY, DUODENOSCOPY (EGD), COMBINED N/A 2019    Procedure: ESOPHAGOGASTRODUODENOSCOPY, WITH BIOPSY;  Surgeon: Johann Hazel MD;  Location: [facility]     FRACTURE TX, ANKLE RT/LT      ORIF rt ankle     FUSION SPINE POSTERIOR ONE LEVEL N/A 2014    Procedure: FUSION SPINE POSTERIOR ONE LEVEL;  Surgeon: Amilcar Baldwin MD;  Location: [facility]     FUSION SPINE POSTERIOR ONE LEVEL Right 2015    Procedure: FUSION SPINE POSTERIOR ONE LEVEL;  Surgeon: Amilcar Baldwin MD;  Location: [facility]     GASTRIC BYPASS      lost 150 lbs     GRAFT BONE FROM ILIAC CREST Left 2014    Procedure: GRAFT BONE FROM ILIAC CREST;  Surgeon: Amilcar Baldwin MD;  Location:  [facility]     INJECT PARAVERTEBRAL FACET JOINT LUMBAR / SACRAL FIRST Bilateral 9/30/2019    Procedure: Lumbar Medial Branch Nerve Block Injection Bilateral;  Surgeon: Hira Rodríguez MD;  Location: [facility]     OPTICAL TRACKING SYSTEM FUSION POSTERIOR SPINE LUMBAR  11/20/2013    Procedure: OPTICAL TRACKING SYSTEM FUSION SPINE POSTERIOR LUMBAR ONE LEVEL;  Lumbar 4-5 Transforaminal Interbody Fusion;  Surgeon: Amilcar Baldwin MD;  Location: [facility]     ORTHOPEDIC SURGERY      shoulder surgery, removed bone spur     TUBAL LIGATION            Anesthesia Evaluation     . Pt has had prior anesthetic. Type: General and MAC    No history of anesthetic complications          ROS/MED HX    ENT/Pulmonary:     (+)tobacco use (quit in 2014, 30 year pack history ), Past use 1 packs/day  Mild Persistent asthma Treatment: Oral steroids, Inhaler prn, Nebulizer prn, Inhaler daily and Inhaled steroids,  mild COPD, , . .    Neurologic:     (+)other neuro RLS    Cardiovascular:     (+) ----. : . . . :. . Previous cardiac testing Echodate:2/5/19results:Interpretation Summary  Global and regional left ventricular function is hyperkinetic with an EF of  65-70%.  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  Aortic valve with fusion of left and right coronary cusp without significant  stenosis.  ______________________________________________date: results:ECG reviewed date:3/11/19 results:SR date: results:          METS/Exercise Tolerance: Comment: Patient reports she is limited secondary to back pain. She does go up and down the 4 flights of stairs at her apartment building but then will have a lot of pain. She denies any cardiac symptoms.  4 - Raking leaves, gardening   Hematologic:  - neg hematologic  ROS      (-) history of blood clots, anemia and History of Transfusion   Musculoskeletal:   (+) arthritis,  other musculoskeletal- Compression fracture L1, x5 prior spinal surgeries       GI/Hepatic: Comment: S/p  Alicia-en-Y    Weight loss/malnutrition    (+) hepatitis type C, liver disease,       Renal/Genitourinary:  - ROS Renal section negative   (+) Pt has no history of transplant,       Endo:  - neg endo ROS       Psychiatric:     (+) psychiatric history other (comment) (insomnia)      Infectious Disease:   (+) TB, Other Infectious Disease (HCV)       Malignancy:      - no malignancy   Other:    (+) H/O Chronic Pain,no other significant disability                        PHYSICAL EXAM:   Mental Status/Neuro: A/A/O; Age Appropriate   Airway: Facies: Feasible  Mallampati: I  Mouth/Opening: Full  TM distance: > 6 cm  Neck ROM: Full   Respiratory: Auscultation: Wheezing     Resp. Rate: Normal     Resp. Effort: Normal      CV: Rhythm: Regular  Heart: Normal Sounds  Edema: None  Pulses: Normal   Comments: Peak inspiratory wheezing at pre-op clinic.  DOS:  Minimal wheezing     Dental: Endentulous                LABS:  CBC:   Lab Results   Component Value Date    WBC 9.3 07/24/2019    WBC 7.0 02/19/2019    HGB 12.9 07/24/2019    HGB 12.1 02/19/2019    HCT 39.2 07/24/2019    HCT 37.3 02/19/2019     07/24/2019     (L) 02/19/2019     BMP:   Lab Results   Component Value Date     07/24/2019     03/05/2019    POTASSIUM 3.5 07/24/2019    POTASSIUM 3.2 (L) 03/05/2019    CHLORIDE 104 07/24/2019    CHLORIDE 101 03/05/2019    CO2 30 07/24/2019    CO2 31 03/05/2019    BUN 12 07/24/2019    BUN 7 03/05/2019    CR 0.94 07/24/2019    CR 0.85 03/05/2019    GLC 74 07/24/2019    GLC 85 03/05/2019     COAGS:   Lab Results   Component Value Date    PTT 30 04/16/2015    INR 1.05 04/16/2015     POC:   Lab Results   Component Value Date     (H) 11/23/2014    HCG Negative 11/21/2014    HCGS Negative 04/16/2015     OTHER:   Lab Results   Component Value Date    A1C 5.5 02/19/2019    MARIA FERNANDA 8.2 (L) 07/24/2019    PHOS 3.1 09/12/2012    MAG 2.0 09/16/2015    ALBUMIN 3.6 07/24/2019    PROTTOTAL 6.6 (L) 07/24/2019    ALT 55 (H)  07/24/2019    AST 35 07/24/2019    ALKPHOS 98 07/24/2019    BILITOTAL 0.4 07/24/2019    TSH 3.12 03/11/2019    CRP 4.1 03/11/2019    SED 16 01/31/2013        Preop Vitals    BP Readings from Last 3 Encounters:   10/10/19 129/72   09/30/19 (!) 141/80   09/25/19 138/75    Pulse Readings from Last 3 Encounters:   10/10/19 62   09/30/19 66   09/25/19 56      Resp Readings from Last 3 Encounters:   10/10/19 19   09/30/19 16   09/18/19 16    SpO2 Readings from Last 3 Encounters:   10/10/19 99%   09/30/19 98%   09/25/19 98%      Temp Readings from Last 1 Encounters:   10/10/19 98.6  F (37  C) (Oral)    Ht Readings from Last 1 Encounters:   10/10/19 1.524 m (5')      Wt Readings from Last 1 Encounters:   10/10/19 42.3 kg (93 lb 4.8 oz)    Estimated body mass index is 18.22 kg/m  as calculated from the following:    Height as of this encounter: 1.524 m (5').    Weight as of this encounter: 42.3 kg (93 lb 4.8 oz).     LDA:  Peripheral IV 11/22/14 Left Hand (Active)   Number of days: 1783       Closed/Suction Drain 1 Left Back Accordion 10 [place] (Active)   Number of days: 1784        Assessment:   ASA SCORE: 3    H&P: History and physical reviewed and following examination; no interval change.   Smoking Status:  Non-Smoker/Unknown   NPO Status: NPO Appropriate     Plan:   Anes. Type:  General   Pre-Medication: None   Induction:  IV (Standard)   Airway: ETT; Oral   Access/Monitoring: PIV   Maintenance: Balanced     Postop Plan:   Postop Pain: Opioids  Postop Sedation/Airway: Not planned  Disposition: Outpatient     PONV Management:   Adult Risk Factors: Female, Non-Smoker, Postop Opioids   Prevention: Ondansetron, Propofol     CONSENT: Direct conversation   Plan and risks discussed with: Patient   Blood Products: Consent Deferred (Minimal Blood Loss)       Comments for Plan/Consent:  Discussed anesthetic plan and procedure.  Patient refuses to remove ear piercings....and accepts risk that she may experience some minor skin  trauma as a result in those areas.   Verbalized understanding and agrees to proceed.              PAC Discussion and Assessment    ASA Classification: 3  Case is suitable for: [place]  Anesthetic techniques and relevant risks discussed: GA  Invasive monitoring and risk discussed:   Types:   Possibility and Risk of blood transfusion discussed:   NPO instructions given:   Additional anesthetic preparation and risks discussed:   Needs early admission to pre-op area:   Other:     PAC Resident/NP Anesthesia Assessment:  Mery is a 49 year old woman who is scheduled for ENDOSCOPIC ULTRASOUND, ESOPHAGOSCOPY / UPPER GASTROINTESTINAL TRACT  on 10/21/19 by Dr. Self in treatment of malnutrition.  PAC referral for risk assessment and optimization for anesthesia with comorbid conditions of HLD, former smoker, COPD/asthma, lung nodules, RLS, history of Alicia-en-y, history of Hep C and latent TB, chronic pain, arthritis:    Pre-operative considerations:  1.  Cardiac:  Functional status- METS 4. The patient reports she can go up the 4 flights of stairs at her apartment to do laundry without cardiac symptoms. She will then however have a lot of back pain so she limits how much she does this. Low risk surgery with 0.9% (RCRI #) risk of major adverse cardiac event. The patient had recent cardiac testing with EKG on 3/11/19 showing sinus rhythm and echo on 2/5/19 showing EF 65-70% and myxomatous mitral valve without regurgitation. No further cardiac testing indicated.   ~ HLD - the patient refused to take medications and reports she lowered her cholesterol with diet.     2.  Pulm:  Airway feasible.  DORIS Low  ~ COPD/asthma - seen by Dr. Orosco on 3/5/19 and recent PFTs on 2/5/19 were reviewed. FEV1 2.22L and FEV1/FVC 84%. She hasn't need nebulizer since 4/2019 and last had excerebration in July 2019 at which time she did prednisone. She continues with Singulair, dulera and albuterol. Will place order for duoneb DOS as has peak  inspiratory wheezing one exam.   ~ Lung nodules, patient with 30 year pack history and quit in 2014. Dr. Orosco recommended annual screening follow up.   ~ Allergies - patient will continue Zyrtec and Flonase.     3. Neuro: Restless leg syndrome - continue Mirapex.     4. GI:  Risk of PONV score = 2.  If > 2, anti-emetic intervention recommended.  ~ S/p Alicia-en-y gastric bypass - now with weight loss and malnutrition - procedure as above. Consideration for careful positioning and padding as patient is very thin.   ~ History of Hep C treated in 2008. And most recently labs in 3/2019 show in remission.     5. ID: The patient was treated for latent TB in 1994. She denies any symptoms.     6. Psych: Insomnia - continue trazodone    7. Musculoskeletal: Chronic back pain, lumbar stenosis with radiculopathy, compression fracture L1 - Due to chronic pain the patient takes baclofen, neurontin and remeron. Consideration for careful positioning to minimize discomfort.     VTE risk: 0.26%    Patient is optimized and is acceptable candidate for the proposed procedure.  No further diagnostic evaluation is needed.     Patient discussed with Dr Ochoa.     For further details of assessment, testing, and physical exam please see H and P completed on same date.    Gail Childs PA-C          Mid-Level Provider/Resident: Gail Childs PA-C  Date: 10/10/19  Time:     Attending Anesthesiologist Anesthesia Assessment:        Anesthesiologist:   Date:   Time:   Pass/Fail:   Disposition:     PAC Pharmacist Assessment:        Pharmacist:   Date:   Time:    Gail Childs PA-C

## 2019-10-10 NOTE — TELEPHONE ENCOUNTER
Spoke to patient in regards to scheduled procedure. Informed patient she is scheduled with Dr. Self on 10/21/19. Informed patient she will need an updated pre-op physical within 30 days of he procedure. Patient stated she is going to have this done locally. Informed patient she will need a  and someone to monitor her for 24 hours after the procedure. Informed patient all scheduling details will be sent to the address listed on Epic. Address confirmed on this call.     10/10/19 1235pm

## 2019-10-10 NOTE — H&P
Pre-Operative H & P     CC:  Preoperative exam to assess for increased cardiopulmonary risk while undergoing surgery and anesthesia.    Date of Encounter: 10/10/2019  Primary Care Physician:  Mili Leon     Reason for visit: pre operative examination, malnutrition    HPI  Kristina Eldridge is a 49 year old female who presents for pre-operative H & P in preparation for ENDOSCOPIC ULTRASOUND, ESOPHAGOSCOPY / UPPER GASTROINTESTINAL TRACT (GI)  with Dr. Self on 10/21/19 at [facility].     The patient is a 49 year old woman who has a history of a daniel-en-Y gastric bypass in 2007. She lost 110 pounds following the bypass. Then she was diagnosed with Hep C and started treatments. She then began to lose weight very easily and lost her appetite. She got down under 100 lbs. She has to force herself to eat but still fails to gain weight. She is working with a nutritionist and has been able to gain 16 lbs in the past 2 months. She had a CT scan on 7/26/19 which was unremarkable. She had an EGD on 8/14/19 with some esophagitis but otherwise normal. She met with Delmis on 9/25/19 to discuss her treatment options. She was referred to Dr. Self for possible feeding tube placement. She is now scheduled for the procedure as above.     History is obtained from the patient and chart review    Past Medical History  Past Medical History:   Diagnosis Date     Abnormal Pap smear 2/21/2017     said pap was abnormal     Arthritis      Chronic back pain     hx spondylolisthesis     Chronic pain      COPD (chronic obstructive pulmonary disease) (H)     PFT's 2013 FEV1/FVC = 2.68/3.20 = 84%     H/O gastric bypass 2007     Hepatitis C     treated ribaviron & interferon in 2008 for serotype 2 with failed 6 month treatment     Hyperlipidemia     on simvatatin     Insomnia      Positive TB test     has taken INH     RLS (restless legs syndrome)      TB lung, latent 1994     treated     Uncomplicated asthma      Wounds and injuries 2003       Past Surgical History  Past Surgical History:   Procedure Laterality Date     CHOLECYSTECTOMY       D & C  1987     ESOPHAGOSCOPY, GASTROSCOPY, DUODENOSCOPY (EGD), COMBINED  7/30/2014    Procedure: COMBINED ESOPHAGOSCOPY, GASTROSCOPY, DUODENOSCOPY (EGD);  Surgeon: Ramesh Self MD;  Location: [facility] GI     ESOPHAGOSCOPY, GASTROSCOPY, DUODENOSCOPY (EGD), COMBINED N/A 8/14/2019    Procedure: ESOPHAGOGASTRODUODENOSCOPY, WITH BIOPSY;  Surgeon: Johann Hazel MD;  Location: [facility] OR     FRACTURE TX, ANKLE RT/LT  1991    ORIF rt ankle     FUSION SPINE POSTERIOR ONE LEVEL N/A 11/21/2014    Procedure: FUSION SPINE POSTERIOR ONE LEVEL;  Surgeon: Amilcar Baldwin MD;  Location: [facility] OR     FUSION SPINE POSTERIOR ONE LEVEL Right 7/30/2015    Procedure: FUSION SPINE POSTERIOR ONE LEVEL;  Surgeon: Amilcar Baldwin MD;  Location: [facility] OR     GASTRIC BYPASS  2007    lost 150 lbs     GRAFT BONE FROM ILIAC CREST Left 11/21/2014    Procedure: GRAFT BONE FROM ILIAC CREST;  Surgeon: Amilcar Baldwin MD;  Location: [facility] OR     INJECT PARAVERTEBRAL FACET JOINT LUMBAR / SACRAL FIRST Bilateral 9/30/2019    Procedure: Lumbar Medial Branch Nerve Block Injection Bilateral;  Surgeon: Hira Rodríguez MD;  Location: [facility] OR     OPTICAL TRACKING SYSTEM FUSION POSTERIOR SPINE LUMBAR  11/20/2013    Procedure: OPTICAL TRACKING SYSTEM FUSION SPINE POSTERIOR LUMBAR ONE LEVEL;  Lumbar 4-5 Transforaminal Interbody Fusion;  Surgeon: Amilcar Baldwin MD;  Location: [facility] OR     ORTHOPEDIC SURGERY      shoulder surgery, removed bone spur     TUBAL LIGATION         Hx of Blood transfusions/reactions: none     Hx of abnormal bleeding or anti-platelet use: none    Menstrual history: Patient's last menstrual period was 06/11/2012 (lmp unknown).: s/p tubal    Steroid use in the last year: prednisone dose pack 7/2019    Personal or FH with  "difficulty with Anesthesia:  denies    Prior to Admission Medications  Current Outpatient Medications   Medication Sig Dispense Refill     baclofen (LIORESAL) 10 MG tablet Take 0.5 tablets (5 mg) by mouth 3 times daily 45 tablet 1     cetirizine (ZYRTEC) 10 MG tablet Take 1 tablet (10 mg) by mouth daily (Patient taking differently: Take 10 mg by mouth daily (with lunch) ) 30 tablet 11     cyanocobalamin (VITAMIN B12) 1000 MCG/ML injection ADMINISTER 1 ML(1000 MCG) IN THE MUSCLE EVERY 30 DAYS (Patient taking differently: Last injection was 09/20/19, next injection set for 10/15/19) 1 mL 11     fluticasone (FLONASE) 50 MCG/ACT spray Spray 2 sprays into both nostrils daily (Patient taking differently: Spray 2 sprays into both nostrils 2 times daily ) 1 Bottle 3     gabapentin (NEURONTIN) 300 MG capsule Take 1 capsule (300 mg) by mouth 3 times daily Titrate to this dose as in your discharge paperwork 90 capsule 1     ipratropium (ATROVENT HFA) 17 MCG/ACT inhaler Inhale 2 puffs into the lungs every 6 hours 1 Inhaler 11     mirtazapine (REMERON) 15 MG tablet Take 1 tablet (15 mg) by mouth At Bedtime 90 tablet 3     mometasone-formoterol (DULERA) 100-5 MCG/ACT inhaler Inhale 2 puffs into the lungs 2 times daily 1 Inhaler 11     montelukast (SINGULAIR) 10 MG tablet Take 1 tablet (10 mg) by mouth At Bedtime 90 tablet 3     Needle, Disp, 23G X 1\" MISC 1 Device every 30 days For B12 monthly B12 injection 1 each 11     order for DME Nebulizer 1 each 0     pramipexole (MIRAPEX) 0.125 MG tablet TAKE 1 TO 3 TABLETS BY MOUTH AT BEDTIME (Patient taking differently: as needed TAKE 1 TO 3 TABLETS BY MOUTH AT BEDTIME     Pt. Has not used in past 2 weeks 10/10/2019) 180 tablet 3     spacer (OPTICHAMBER OTTONIEL) holding chamber USE ONCE DAILY 1 each 0     spacer (OPTICHAMBER OTTONIEL) holding chamber USE ONCE DAILY 1 each 0     Spacer/Aero Chamber Mouthpiece MISC 1 Units daily 1 each 0     traZODone (DESYREL) 100 MG tablet TAKE 2 " TABLETS(200 MG) BY MOUTH EVERY NIGHT AS NEEDED FOR SLEEP 60 tablet 2     vitamin A 23793 units TABS Take 10,000 Units by mouth daily (Patient taking differently: Take 10,000 Units by mouth daily (with lunch) ) 30 tablet 1     vitamin D3 (CHOLECALCIFEROL) 83033 units (250 mcg) capsule Take 1 capsule (10,000 Units) by mouth daily (Patient taking differently: Take 1 capsule by mouth daily (with lunch) ) 30 capsule 1     albuterol (PROVENTIL) (2.5 MG/3ML) 0.083% neb solution TAKE 1 VIAL BY NEBULIZATION EVERY 6 HOURS AS NEEDED FOR SHORTNESS OF BREATH (Patient taking differently: Take 2.5 mg by nebulization as needed (Pt. has not used in approximately 5 months 10/10/2019) TAKE 1 VIAL BY NEBULIZATION EVERY 6 HOURS AS NEEDED FOR SHORTNESS OF BREATH) 360 mL 2     albuterol (VENTOLIN HFA) 108 (90 Base) MCG/ACT inhaler Inhale 2 puffs into the lungs every 4 hours as needed for shortness of breath / dyspnea or wheezing (Patient taking differently: Inhale 2 puffs into the lungs every 4 hours as needed for shortness of breath / dyspnea or wheezing (Pt. has not used in last 2 weeks 10/10/2019) ) 1 Inhaler 11     diphenhydrAMINE (WAL-DRYL ALLERGY) 25 MG tablet TAKE 1 TABLET(25 MG) BY MOUTH EVERY 6 HOURS AS NEEDED FOR ITCHING OR ALLERGIES (Patient not taking: Reported on 7/24/2019) 60 tablet 0     hydrocortisone 2.5 % cream Apply topically 2 times daily (Patient taking differently: Apply topically as needed (Pt. has not used in past 2 weeks 10/10/2019) ) 30 g 1       Allergies  Allergies   Allergen Reactions     Bee Venom Anaphylaxis, Anxiety, Difficulty breathing, Hives, Itching, Nausea and Vomiting, Palpitations, Shortness Of Breath and Swelling     Opium Alkaloids, Hcls Itching     Can take opiate derived narcotics with Benadryl.     Latex Rash       Social History  Social History     Socioeconomic History     Marital status:      Spouse name: Not on file     Number of children: Not on file     Years of education: Not on  file     Highest education level: Not on file   Occupational History     Not on file   Social Needs     Financial resource strain: Not on file     Food insecurity:     Worry: Not on file     Inability: Not on file     Transportation needs:     Medical: Not on file     Non-medical: Not on file   Tobacco Use     Smoking status: Current Every Day Smoker     Packs/day: 1.00     Years: 30.00     Pack years: 30.00     Types: Cigarettes     Last attempt to quit: 2014     Years since quittin.3     Smokeless tobacco: Never Used     Tobacco comment: pack a day   Substance and Sexual Activity     Alcohol use: Not Currently     Alcohol/week: 0.0 standard drinks     Comment: x3 drinks per year     Drug use: Yes     Types: Marijuana     Comment: Marijuana---- denies use since 2014       Sexual activity: Not Currently     Partners: Male     Birth control/protection: Post-menopausal   Lifestyle     Physical activity:     Days per week: Not on file     Minutes per session: Not on file     Stress: Not on file   Relationships     Social connections:     Talks on phone: Not on file     Gets together: Not on file     Attends Church service: Not on file     Active member of club or organization: Not on file     Attends meetings of clubs or organizations: Not on file     Relationship status: Not on file     Intimate partner violence:     Fear of current or ex partner: Not on file     Emotionally abused: Not on file     Physically abused: Not on file     Forced sexual activity: Not on file   Other Topics Concern     Parent/sibling w/ CABG, MI or angioplasty before 65F 55M? Not Asked   Social History Narrative    Lives in [place] with significant other and 3 cats.  Pt does not work currently, has applied for disability        Family History  Family History   Problem Relation Age of Onset     Hypertension Father      Respiratory Father         COPD, smoked     Glaucoma Father      C.A.D. Mother         CHF,  age 59  "of MI, smoked     Crohn's Disease Brother      Diabetes Maternal Grandmother         insulin dependent     Diabetes Paternal Grandmother      Glaucoma Paternal Grandmother      Alzheimer Disease Paternal Grandmother      Melanoma No family hx of      Skin Cancer No family hx of        Review of Systems    ROS/MED HX    ENT/Pulmonary:     (+)tobacco use, Mild Persistent asthma Treatment: Oral steroids, Inhaler prn, Nebulizer prn, Inhaler daily and Inhaled steroids,  mild COPD, , . .    Neurologic:     (+)other neuro RLS    Cardiovascular:     (+) Dyslipidemia, ----. : . . . :. . Previous cardiac testing Echodate:2/5/19results:Interpretation Summary  Global and regional left ventricular function is hyperkinetic with an EF of  65-70%.  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  Aortic valve with fusion of left and right coronary cusp without significant  stenosis.  ______________________________________________date: results:ECG reviewed date:3/11/19 results:SR date: results:          METS/Exercise Tolerance:     Hematologic:  - neg hematologic  ROS      (-) history of blood clots, anemia and History of Transfusion   Musculoskeletal:   (+) arthritis,  other musculoskeletal- Compression fracture of back, x5 prior spinal surgeries       GI/Hepatic: Comment: S/p Alicia-en-Y    Weight loss    (+) hepatitis type C, liver disease,       Renal/Genitourinary:  - ROS Renal section negative       Endo:  - neg endo ROS       Psychiatric:     (+) psychiatric history other (comment) (insomnia)      Infectious Disease:   (+) TB, Other Infectious Disease (HCV)       Malignancy:      - no malignancy   Other:    (+) H/O Chronic Pain,no other significant disability          The complete review of systems is negative other than noted in the HPI or here.   Temp: 98.6  F (37  C) Temp src: Oral BP: 129/72 Pulse: 62   Resp: 19 SpO2: 99 %         93 lbs 4.8 oz  5' 0\"   Body mass index is 18.22 kg/m .       Physical " Exam  Constitutional: Awake, alert, cooperative, no apparent distress, and appears stated age. Thin  Eyes: Pupils equal, round and reactive to light, extra ocular muscles intact, sclera clear, conjunctiva normal.  HENT: Normocephalic, oral pharynx with moist mucus membranes, no dentition. No goiter appreciated.   Respiratory: Clear to auscultation bilaterally, no crackles or wheezing.  Cardiovascular: Regular rate and rhythm, normal S1 and S2, and no murmur noted.  Carotids +2, no bruits. No edema. Palpable pulses to radial  DP and PT arteries.   GI: Normal bowel sounds, soft, non-distended, non-tender, no masses palpated, no hepatosplenomegaly.  Surgical scars: well healed  Lymph/Hematologic: No cervical lymphadenopathy and no supraclavicular lymphadenopathy.  Genitourinary:  defer  Skin: Warm and dry.  No rashes at anticipated surgical site.   Musculoskeletal: Full ROM of neck. There is no redness, warmth, or swelling of the joints. Gross motor strength is normal.    Neurologic: Awake, alert, oriented to name, place and time. Cranial nerves II-XII are grossly intact. Gait is normal.   Neuropsychiatric: Calm, cooperative. Normal affect.     Labs: (personally reviewed)  LABS:  CBC:   Lab Results   Component Value Date    WBC 9.3 07/24/2019    WBC 7.0 02/19/2019    HGB 12.9 07/24/2019    HGB 12.1 02/19/2019    HCT 39.2 07/24/2019    HCT 37.3 02/19/2019     07/24/2019     (L) 02/19/2019     BMP:   Lab Results   Component Value Date     07/24/2019     03/05/2019    POTASSIUM 3.5 07/24/2019    POTASSIUM 3.2 (L) 03/05/2019    CHLORIDE 104 07/24/2019    CHLORIDE 101 03/05/2019    CO2 30 07/24/2019    CO2 31 03/05/2019    BUN 12 07/24/2019    BUN 7 03/05/2019    CR 0.94 07/24/2019    CR 0.85 03/05/2019    GLC 74 07/24/2019    GLC 85 03/05/2019     COAGS:   Lab Results   Component Value Date    PTT 30 04/16/2015    INR 1.05 04/16/2015     POC:   Lab Results   Component Value Date     (H)  11/23/2014    HCG Negative 11/21/2014    HCGS Negative 04/16/2015     OTHER:   Lab Results   Component Value Date    A1C 5.5 02/19/2019    MARIA FERNANDA 8.2 (L) 07/24/2019    PHOS 3.1 09/12/2012    MAG 2.0 09/16/2015    ALBUMIN 3.6 07/24/2019    PROTTOTAL 6.6 (L) 07/24/2019    ALT 55 (H) 07/24/2019    AST 35 07/24/2019    ALKPHOS 98 07/24/2019    BILITOTAL 0.4 07/24/2019    TSH 3.12 03/11/2019    CRP 4.1 03/11/2019    SED 16 01/31/2013        EKG 3/11/19  Sinus rhythm    Echo 2/5/19  Interpretation Summary  Global and regional left ventricular function is hyperkinetic with an EF of  65-70%.  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  Aortic valve with fusion of left and right coronary cusp without significant  stenosis.    EXAMINATION: CT ABDOMEN PELVIS W CONTRAST, 7/26/2019 3:21 PM  IMPRESSION:   1. No CT findings to explain the patient's recent weight loss.  2. Grade 2 anterolisthesis of L4-L5. The hardware itself appears  intact.       Upper GI endoscopy 8/14/19  Impression:          - LA Grade A esophagitis.                        - Alicia-en-Y gastrojejunostomy with gastrojejunal                        anastomosis characterized by healthy appearing mucosa.                        Biopsied.    The patient's records and results personally reviewed by this provider.     Outside records reviewed from: care everywhere    ASSESSMENT and PLAN  Mery is a 49 year old woman who is scheduled for ENDOSCOPIC ULTRASOUND, ESOPHAGOSCOPY / UPPER GASTROINTESTINAL TRACT  on 10/21/19 by Dr. Self in treatment of malnutrition.  PAC referral for risk assessment and optimization for anesthesia with comorbid conditions of HLD, former smoker, COPD/asthma, lung nodules, RLS, history of Alicia-en-y, history of Hep C and latent TB, chronic pain, arthritis:    Pre-operative considerations:  1.  Cardiac:  Functional status- METS 4. The patient reports she can go up the 4 flights of stairs at her apartment to do laundry without cardiac  symptoms. She will then however have a lot of back pain so she limits how much she does this. Low risk surgery with 0.9% (RCRI #) risk of major adverse cardiac event. The patient had recent cardiac testing with EKG on 3/11/19 showing sinus rhythm and echo on 2/5/19 showing EF 65-70% and myxomatous mitral valve without regurgitation. No further cardiac testing indicated.   ~ HLD - the patient refused to take medications and reports she lowered her cholesterol with diet.     2.  Pulm:  Airway feasible.  DORIS Low  ~ COPD/asthma - seen by Dr. Orosco on 3/5/19 and recent PFTs on 2/5/19 were reviewed. FEV1 2.22L and FEV1/FVC 84%. She hasn't need nebulizer since 4/2019 and last had excerebration in July 2019 at which time she did prednisone. She continues with Singulair, dulera and albuterol. Will place order for duoneb DOS as has peak inspiratory wheezing one exam.   ~ Lung nodules, patient with 30 year pack history and quit in 2014. Dr. Orosco recommended annual screening follow up.   ~ Allergies - patient will continue Zyrtec and Flonase.     3. Neuro: Restless leg syndrome - continue Mirapex.     4. GI:  Risk of PONV score = 2.  If > 2, anti-emetic intervention recommended.  ~ S/p Alicia-en-y gastric bypass - now with weight loss and malnutrition - procedure as above. Consideration for careful positioning and padding as patient is very thin.   ~ History of Hep C treated in 2008. And most recently labs in 3/2019 show in remission.     5. ID: The patient was treated for latent TB in 1994. She denies any symptoms.     6. Psych: Insomnia - continue trazodone    7. Musculoskeletal: Chronic back pain, lumbar stenosis with radiculopathy, compression fracture L1 - Due to chronic pain the patient takes baclofen, neurontin and remeron. Consideration for careful positioning to minimize discomfort.     VTE risk: 0.26%    Patient was discussed with Dr Ochoa.    The patient is optimized for their procedure. AVS with information on surgery  time/arrival time, meds and NPO status given by nursing staff.        Gail Childs PA-C  Preoperative Assessment Center  [facility]  Phone: [phone]  Fax: [phone]

## 2019-10-11 DIAGNOSIS — J45.30 MILD PERSISTENT ASTHMA, UNCOMPLICATED: ICD-10-CM

## 2019-10-11 RX ORDER — ALBUTEROL SULFATE 90 UG/1
AEROSOL, METERED RESPIRATORY (INHALATION)
Qty: 18 G | Refills: 0 | OUTPATIENT
Start: 2019-10-11

## 2019-10-11 NOTE — TELEPHONE ENCOUNTER
ALBUTEROL HFA INH (200 PUFFS) 18GM    I called pharmacy to verify they had this order for Pt care.    There are 11 refills for Pt care at the pharmacy.      No further action needed.     10/11/2019    Simona Quintanilla RN  Central Triage Red Flags/Med Refills        Outpatient Medication Detail      Disp Refills Start End CHARLIE   albuterol (VENTOLIN HFA) 108 (90 Base) MCG/ACT inhaler 1 Inhaler 11 2/19/2019  No   Sig - Route: Inhale 2 puffs into the lungs every 4 hours as needed for shortness of breath / dyspnea or wheezing - Inhalation   Patient taking differently: Inhale 2 puffs into the lungs every 4 hours as needed for shortness of breath / dyspnea or wheezing (Pt. has not used in last 2 weeks 10/10/2019)         Sent to pharmacy as: albuterol (VENTOLIN HFA) 108 (90 Base) MCG/ACT inhaler   Class: E-Prescribe   Order: [id number]   E-Prescribing Status: Receipt confirmed by pharmacy (2/19/2019  3:13 PM CST)   Renewals     Renewal provider: Mili Leon MD

## 2019-10-21 ENCOUNTER — APPOINTMENT (OUTPATIENT)
Dept: GENERAL RADIOLOGY | Facility: CLINIC | Age: 49
End: 2019-10-21
Attending: INTERNAL MEDICINE
Payer: COMMERCIAL

## 2019-10-21 ENCOUNTER — ANESTHESIA (OUTPATIENT)
Dept: SURGERY | Facility: CLINIC | Age: 49
End: 2019-10-21
Payer: COMMERCIAL

## 2019-10-21 ENCOUNTER — HOSPITAL ENCOUNTER (OUTPATIENT)
Facility: CLINIC | Age: 49
Discharge: HOME | End: 2019-10-21
Attending: INTERNAL MEDICINE | Admitting: INTERNAL MEDICINE
Payer: COMMERCIAL

## 2019-10-21 VITALS
TEMPERATURE: 98.1 F | WEIGHT: 90.39 LBS | BODY MASS INDEX: 17.75 KG/M2 | RESPIRATION RATE: 16 BRPM | SYSTOLIC BLOOD PRESSURE: 119 MMHG | HEIGHT: 60 IN | HEART RATE: 72 BPM | DIASTOLIC BLOOD PRESSURE: 92 MMHG | OXYGEN SATURATION: 100 %

## 2019-10-21 DIAGNOSIS — E46 MALNUTRITION (H): ICD-10-CM

## 2019-10-21 DIAGNOSIS — G89.18 POST-OP PAIN: Primary | ICD-10-CM

## 2019-10-21 LAB
BUN SERPL-MCNC: 12 MG/DL (ref 7–30)
ERYTHROCYTE [DISTWIDTH] IN BLOOD BY AUTOMATED COUNT: 12.8 % (ref 10–15)
GLUCOSE BLDC GLUCOMTR-MCNC: 73 MG/DL (ref 70–99)
HCT VFR BLD AUTO: 38.6 % (ref 35–47)
HGB BLD-MCNC: 12.7 G/DL (ref 11.7–15.7)
INR PPP: 1.15 (ref 0.86–1.14)
MCH RBC QN AUTO: 32.6 PG (ref 26.5–33)
MCHC RBC AUTO-ENTMCNC: 32.9 G/DL (ref 31.5–36.5)
MCV RBC AUTO: 99 FL (ref 78–100)
PLATELET # BLD AUTO: 249 10E9/L (ref 150–450)
RBC # BLD AUTO: 3.89 10E12/L (ref 3.8–5.2)
UPPER EUS: NORMAL
WBC # BLD AUTO: 7.9 10E9/L (ref 4–11)

## 2019-10-21 PROCEDURE — 71000027 ZZH RECOVERY PHASE 2 EACH 15 MINS: Performed by: INTERNAL MEDICINE

## 2019-10-21 PROCEDURE — 71000014 ZZH RECOVERY PHASE 1 LEVEL 2 FIRST HR: Performed by: INTERNAL MEDICINE

## 2019-10-21 PROCEDURE — 25800030 ZZH RX IP 258 OP 636: Performed by: NURSE ANESTHETIST, CERTIFIED REGISTERED

## 2019-10-21 PROCEDURE — 36000053 ZZH SURGERY LEVEL 2 EA 15 ADDTL MIN - UMMC: Performed by: INTERNAL MEDICINE

## 2019-10-21 PROCEDURE — 85027 COMPLETE CBC AUTOMATED: CPT | Performed by: INTERNAL MEDICINE

## 2019-10-21 PROCEDURE — 37000008 ZZH ANESTHESIA TECHNICAL FEE, 1ST 30 MIN: Performed by: INTERNAL MEDICINE

## 2019-10-21 PROCEDURE — 25000128 H RX IP 250 OP 636: Performed by: ANESTHESIOLOGY

## 2019-10-21 PROCEDURE — 36000051 ZZH SURGERY LEVEL 2 1ST 30 MIN - UMMC: Performed by: INTERNAL MEDICINE

## 2019-10-21 PROCEDURE — 37000009 ZZH ANESTHESIA TECHNICAL FEE, EACH ADDTL 15 MIN: Performed by: INTERNAL MEDICINE

## 2019-10-21 PROCEDURE — C1726 CATH, BAL DIL, NON-VASCULAR: HCPCS | Performed by: INTERNAL MEDICINE

## 2019-10-21 PROCEDURE — 82962 GLUCOSE BLOOD TEST: CPT

## 2019-10-21 PROCEDURE — 25000125 ZZHC RX 250: Performed by: INTERNAL MEDICINE

## 2019-10-21 PROCEDURE — 71000015 ZZH RECOVERY PHASE 1 LEVEL 2 EA ADDTL HR: Performed by: INTERNAL MEDICINE

## 2019-10-21 PROCEDURE — 85610 PROTHROMBIN TIME: CPT | Performed by: INTERNAL MEDICINE

## 2019-10-21 PROCEDURE — 84520 ASSAY OF UREA NITROGEN: CPT | Performed by: INTERNAL MEDICINE

## 2019-10-21 PROCEDURE — C1769 GUIDE WIRE: HCPCS | Performed by: INTERNAL MEDICINE

## 2019-10-21 PROCEDURE — 40000277 XR SURGERY CARM FLUORO LESS THAN 5 MIN W STILLS: Mod: TC

## 2019-10-21 PROCEDURE — 25000125 ZZHC RX 250: Performed by: NURSE ANESTHETIST, CERTIFIED REGISTERED

## 2019-10-21 PROCEDURE — 25000566 ZZH SEVOFLURANE, EA 15 MIN: Performed by: INTERNAL MEDICINE

## 2019-10-21 PROCEDURE — 25500064 ZZH RX 255 OP 636: Performed by: INTERNAL MEDICINE

## 2019-10-21 PROCEDURE — 27210794 ZZH OR GENERAL SUPPLY STERILE: Performed by: INTERNAL MEDICINE

## 2019-10-21 PROCEDURE — 40000170 ZZH STATISTIC PRE-PROCEDURE ASSESSMENT II: Performed by: INTERNAL MEDICINE

## 2019-10-21 PROCEDURE — C1876 STENT, NON-COA/NON-COV W/DEL: HCPCS | Performed by: INTERNAL MEDICINE

## 2019-10-21 PROCEDURE — 25000128 H RX IP 250 OP 636: Performed by: NURSE ANESTHETIST, CERTIFIED REGISTERED

## 2019-10-21 PROCEDURE — 36415 COLL VENOUS BLD VENIPUNCTURE: CPT | Performed by: INTERNAL MEDICINE

## 2019-10-21 PROCEDURE — C1725 CATH, TRANSLUMIN NON-LASER: HCPCS | Performed by: INTERNAL MEDICINE

## 2019-10-21 PROCEDURE — 25000125 ZZHC RX 250: Performed by: PHYSICIAN ASSISTANT

## 2019-10-21 DEVICE — STENT SOLUS BILIARY 10FRX01CM DBL PIGTAIL W/INTRO G26829
Type: IMPLANTABLE DEVICE | Site: STOMACH | Status: NON-FUNCTIONAL
Removed: 2020-01-08

## 2019-10-21 DEVICE — STENT ESU AXIOS W/DEL SYS 15MMX10MM 10.8FR 138CM M00553650
Type: IMPLANTABLE DEVICE | Site: STOMACH | Status: NON-FUNCTIONAL
Removed: 2020-01-08

## 2019-10-21 RX ORDER — SODIUM CHLORIDE, SODIUM LACTATE, POTASSIUM CHLORIDE, CALCIUM CHLORIDE 600; 310; 30; 20 MG/100ML; MG/100ML; MG/100ML; MG/100ML
INJECTION, SOLUTION INTRAVENOUS CONTINUOUS PRN
Status: DISCONTINUED | OUTPATIENT
Start: 2019-10-21 | End: 2019-10-21

## 2019-10-21 RX ORDER — NALOXONE HYDROCHLORIDE 0.4 MG/ML
.1-.4 INJECTION, SOLUTION INTRAMUSCULAR; INTRAVENOUS; SUBCUTANEOUS
Status: DISCONTINUED | OUTPATIENT
Start: 2019-10-21 | End: 2019-10-21 | Stop reason: HOSPADM

## 2019-10-21 RX ORDER — LABETALOL 20 MG/4 ML (5 MG/ML) INTRAVENOUS SYRINGE
5 ONCE
Status: DISCONTINUED | OUTPATIENT
Start: 2019-10-21 | End: 2019-10-21 | Stop reason: HOSPADM

## 2019-10-21 RX ORDER — LIDOCAINE HYDROCHLORIDE 20 MG/ML
INJECTION, SOLUTION INFILTRATION; PERINEURAL PRN
Status: DISCONTINUED | OUTPATIENT
Start: 2019-10-21 | End: 2019-10-21

## 2019-10-21 RX ORDER — ONDANSETRON 2 MG/ML
INJECTION INTRAMUSCULAR; INTRAVENOUS PRN
Status: DISCONTINUED | OUTPATIENT
Start: 2019-10-21 | End: 2019-10-21

## 2019-10-21 RX ORDER — ONDANSETRON 2 MG/ML
4 INJECTION INTRAMUSCULAR; INTRAVENOUS EVERY 30 MIN PRN
Status: DISCONTINUED | OUTPATIENT
Start: 2019-10-21 | End: 2019-10-21 | Stop reason: HOSPADM

## 2019-10-21 RX ORDER — SODIUM CHLORIDE, SODIUM LACTATE, POTASSIUM CHLORIDE, CALCIUM CHLORIDE 600; 310; 30; 20 MG/100ML; MG/100ML; MG/100ML; MG/100ML
INJECTION, SOLUTION INTRAVENOUS CONTINUOUS
Status: DISCONTINUED | OUTPATIENT
Start: 2019-10-21 | End: 2019-10-21 | Stop reason: HOSPADM

## 2019-10-21 RX ORDER — HYDRALAZINE HYDROCHLORIDE 20 MG/ML
5 INJECTION INTRAMUSCULAR; INTRAVENOUS ONCE
Status: COMPLETED | OUTPATIENT
Start: 2019-10-21 | End: 2019-10-21

## 2019-10-21 RX ORDER — DIPHENHYDRAMINE HYDROCHLORIDE 50 MG/ML
INJECTION INTRAMUSCULAR; INTRAVENOUS PRN
Status: DISCONTINUED | OUTPATIENT
Start: 2019-10-21 | End: 2019-10-21

## 2019-10-21 RX ORDER — FENTANYL CITRATE 50 UG/ML
INJECTION, SOLUTION INTRAMUSCULAR; INTRAVENOUS PRN
Status: DISCONTINUED | OUTPATIENT
Start: 2019-10-21 | End: 2019-10-21

## 2019-10-21 RX ORDER — PROPOFOL 10 MG/ML
INJECTION, EMULSION INTRAVENOUS PRN
Status: DISCONTINUED | OUTPATIENT
Start: 2019-10-21 | End: 2019-10-21

## 2019-10-21 RX ORDER — DEXAMETHASONE SODIUM PHOSPHATE 4 MG/ML
INJECTION, SOLUTION INTRA-ARTICULAR; INTRALESIONAL; INTRAMUSCULAR; INTRAVENOUS; SOFT TISSUE PRN
Status: DISCONTINUED | OUTPATIENT
Start: 2019-10-21 | End: 2019-10-21

## 2019-10-21 RX ORDER — FENTANYL CITRATE 50 UG/ML
25-50 INJECTION, SOLUTION INTRAMUSCULAR; INTRAVENOUS EVERY 5 MIN PRN
Status: DISCONTINUED | OUTPATIENT
Start: 2019-10-21 | End: 2019-10-21 | Stop reason: HOSPADM

## 2019-10-21 RX ORDER — IOPAMIDOL 510 MG/ML
INJECTION, SOLUTION INTRAVASCULAR PRN
Status: DISCONTINUED | OUTPATIENT
Start: 2019-10-21 | End: 2019-10-21 | Stop reason: HOSPADM

## 2019-10-21 RX ORDER — OXYCODONE AND ACETAMINOPHEN 5; 325 MG/1; MG/1
1 TABLET ORAL EVERY 6 HOURS PRN
Qty: 10 TABLET | Refills: 0 | Status: SHIPPED | OUTPATIENT
Start: 2019-10-21 | End: 2020-05-27

## 2019-10-21 RX ORDER — LIDOCAINE 40 MG/G
CREAM TOPICAL
Status: DISCONTINUED | OUTPATIENT
Start: 2019-10-21 | End: 2019-10-21 | Stop reason: HOSPADM

## 2019-10-21 RX ORDER — DIMENHYDRINATE 50 MG/ML
25 INJECTION, SOLUTION INTRAMUSCULAR; INTRAVENOUS
Status: DISCONTINUED | OUTPATIENT
Start: 2019-10-21 | End: 2019-10-21 | Stop reason: HOSPADM

## 2019-10-21 RX ORDER — CIPROFLOXACIN 2 MG/ML
INJECTION, SOLUTION INTRAVENOUS PRN
Status: DISCONTINUED | OUTPATIENT
Start: 2019-10-21 | End: 2019-10-21

## 2019-10-21 RX ORDER — FENTANYL CITRATE 50 UG/ML
25-50 INJECTION, SOLUTION INTRAMUSCULAR; INTRAVENOUS
Status: DISCONTINUED | OUTPATIENT
Start: 2019-10-21 | End: 2019-10-21 | Stop reason: HOSPADM

## 2019-10-21 RX ORDER — IPRATROPIUM BROMIDE AND ALBUTEROL SULFATE 2.5; .5 MG/3ML; MG/3ML
3 SOLUTION RESPIRATORY (INHALATION) ONCE
Status: COMPLETED | OUTPATIENT
Start: 2019-10-21 | End: 2019-10-21

## 2019-10-21 RX ORDER — ONDANSETRON 4 MG/1
4 TABLET, ORALLY DISINTEGRATING ORAL EVERY 30 MIN PRN
Status: DISCONTINUED | OUTPATIENT
Start: 2019-10-21 | End: 2019-10-21 | Stop reason: HOSPADM

## 2019-10-21 RX ORDER — MEPERIDINE HYDROCHLORIDE 25 MG/ML
12.5 INJECTION INTRAMUSCULAR; INTRAVENOUS; SUBCUTANEOUS
Status: DISCONTINUED | OUTPATIENT
Start: 2019-10-21 | End: 2019-10-21 | Stop reason: HOSPADM

## 2019-10-21 RX ADMIN — ONDANSETRON 4 MG: 2 INJECTION INTRAMUSCULAR; INTRAVENOUS at 13:00

## 2019-10-21 RX ADMIN — LIDOCAINE HYDROCHLORIDE 80 MG: 20 INJECTION, SOLUTION INFILTRATION; PERINEURAL at 12:24

## 2019-10-21 RX ADMIN — PHENYLEPHRINE HYDROCHLORIDE 100 MCG: 10 INJECTION INTRAVENOUS at 12:44

## 2019-10-21 RX ADMIN — MIDAZOLAM 2 MG: 1 INJECTION INTRAMUSCULAR; INTRAVENOUS at 12:16

## 2019-10-21 RX ADMIN — FENTANYL CITRATE 50 MCG: 50 INJECTION, SOLUTION INTRAMUSCULAR; INTRAVENOUS at 15:02

## 2019-10-21 RX ADMIN — SUGAMMADEX 80 MG: 100 INJECTION, SOLUTION INTRAVENOUS at 13:12

## 2019-10-21 RX ADMIN — FENTANYL CITRATE 25 MCG: 50 INJECTION, SOLUTION INTRAMUSCULAR; INTRAVENOUS at 14:53

## 2019-10-21 RX ADMIN — DEXAMETHASONE SODIUM PHOSPHATE 4 MG: 4 INJECTION, SOLUTION INTRA-ARTICULAR; INTRALESIONAL; INTRAMUSCULAR; INTRAVENOUS; SOFT TISSUE at 13:00

## 2019-10-21 RX ADMIN — DIPHENHYDRAMINE HYDROCHLORIDE 12.5 MG: 50 INJECTION, SOLUTION INTRAMUSCULAR; INTRAVENOUS at 12:54

## 2019-10-21 RX ADMIN — HYDRALAZINE HYDROCHLORIDE 5 MG: 20 INJECTION INTRAMUSCULAR; INTRAVENOUS at 14:10

## 2019-10-21 RX ADMIN — SODIUM CHLORIDE, POTASSIUM CHLORIDE, SODIUM LACTATE AND CALCIUM CHLORIDE: 600; 310; 30; 20 INJECTION, SOLUTION INTRAVENOUS at 12:16

## 2019-10-21 RX ADMIN — FENTANYL CITRATE 100 MCG: 50 INJECTION, SOLUTION INTRAMUSCULAR; INTRAVENOUS at 12:24

## 2019-10-21 RX ADMIN — FENTANYL CITRATE 25 MCG: 50 INJECTION, SOLUTION INTRAMUSCULAR; INTRAVENOUS at 14:47

## 2019-10-21 RX ADMIN — PROPOFOL 110 MG: 10 INJECTION, EMULSION INTRAVENOUS at 12:24

## 2019-10-21 RX ADMIN — CIPROFLOXACIN 400 MG: 2 INJECTION INTRAVENOUS at 12:31

## 2019-10-21 RX ADMIN — IPRATROPIUM BROMIDE AND ALBUTEROL SULFATE 3 ML: .5; 3 SOLUTION RESPIRATORY (INHALATION) at 11:14

## 2019-10-21 RX ADMIN — ROCURONIUM BROMIDE 30 MG: 10 INJECTION INTRAVENOUS at 12:24

## 2019-10-21 RX ADMIN — HYDRALAZINE HYDROCHLORIDE 5 MG: 20 INJECTION INTRAMUSCULAR; INTRAVENOUS at 13:54

## 2019-10-21 ASSESSMENT — MIFFLIN-ST. JEOR: SCORE: 946.5

## 2019-10-21 NOTE — DISCHARGE INSTRUCTIONS
[facility], [place]  Same-Day EUS Procedure  Adult Discharge Orders & Instructions     You had a procedure known as an Endoscopic Ultrasound (EUS) of either the upper gastrointestinal (GI) tract or the lower GI tract. An UPPER EUS will place a camera into either your mouth or nose to examine your esophagus, stomach, pancreas, liver and/or small intestines. A LOWER EUS will place a camera into your anus to examine your rectum, and portions of your large intestines. Biopsies, small samples of tissue, are often taken to help diagnose and/or classify stages of disease growth. The EUS is also used to help locate areas of the GI tract that may require further treatment (dilation, stenting, clipping, removal, etc.) or medical interventions (medication specific).      After your procedure   1. Make sure to clarify with your healthcare provider any diet restrictions (For example, clear liquid, low fat, no caffeine, etc.)   2. Do NOT take aspirin containing medications or any other blood-thinning medicines (anticoagulants) until your healthcare provider says it's OK.   3. You MAY be prescribed antibiotics, depending on what was done and/or found during your EUS, make sure to take antibiotics as prescribed by your healthcare provider    For 24 hours after surgery  1. Get plenty of rest.  A responsible adult must stay with you for at least 24 hours after you leave the hospital.   2. Do not drive or use heavy equipment.  If you have weakness or tingling, don't drive or use heavy equipment until this feeling goes away.  3. Do not drink alcohol.  4. Avoid strenuous or risky activities (gym, yoga, cycling, etc.).  Ask for help when climbing stairs.   5. You may feel lightheaded.  IF so, sit for a few minutes before standing.  Have someone help you get up.   6. If you have nausea (feel sick to your stomach): Drink only clear liquids such as apple juice, ginger ale, broth or 7-Up.  Rest may also help.  Be  sure to drink enough fluids.  Move to a regular diet as you feel able.  7. If you feel bloated or have too much gas, use a heating pad on your belly to help reduce the discomfort. This should help you feel better.   8. You may have a slight fever. This is normal for the first 24 hours.   9. You may have a dry mouth, a sore throat, muscle aches or trouble sleeping.  These are normal and will go away after 24 hours. A sore throat is most common. Use lozenges or gargle with salt water to ease the discomfort.   10. Do not make important or legal decisions.      Call your doctor for any of the followin. Chest pain, and/or shortness of breath  2. Abdominal  pain, bloating or cramping that has not improved or does not respond to pain relieving medications (Tylenol or narcotics if prescribed)   3. Difficulty swallowing or feeling as though food or liquids are stuck in your throat   4. Sore throat lasting more than 2 days or pain that has worsened over time   5. Black or tarry stools   6. Nausea and/or vomiting that is not resolving or has not responded to anti-nausea medications prescribed to you   7. It has been over 8 to 10 hours since surgery and you are still not able to urinate (pass water)   8. Headache for over 24 hours   9. Fever over 100.5 F (38 C) lasting more than 24 hours after the procedure   10. Signs of infection (fever, growing tenderness at the surgery site, a large amount of drainage or bleeding, severe pain, foul-smelling drainage, redness, swelling).    To contact a doctor, call:   [ ] Dr. Self's clinic at #[phone] (Monday thru Friday 8:00am to 4:30pm)   [ ]If after hours call #[phone] and ask for the surgery/gastroenterology resident on call (answered 24 hours a day)   [ ] Emergency Department: [facility]: [phone]   Take it easy when you get home.  Remember, same day surgery DOES NOT MEAN SAME DAY RECOVERY!  Healing is a gradual process.  You will need some time to recover  - you may be more tired than you realize at first.  Rest and relax for at least the first 24 hours at home.  You'll feel better and heal faster if you take good care of yourself.

## 2019-10-21 NOTE — ANESTHESIA CARE TRANSFER NOTE
Patient: Kristina Eldridge    Procedure(s):  ENDOSCOPIC ULTRASOUND, ESOPHAGOSCOPY / UPPER GASTROINTESTINAL TRACT (GI) with jejunalgastrostomy with stent placement x 2, dilation tract    Diagnosis: Malnutrition (H) [E46]  Diagnosis Additional Information: No value filed.    Anesthesia Type:   General     Note:  Airway :Nasal Cannula  Patient transferred to:PACU  Handoff Report: Identifed the Patient, Identified the Reponsible Provider, Reviewed the pertinent medical history, Discussed the surgical course, Reviewed Intra-OP anesthesia mangement and issues during anesthesia, Set expectations for post-procedure period and Allowed opportunity for questions and acknowledgement of understanding      Vitals: (Last set prior to Anesthesia Care Transfer)    CRNA VITALS  10/21/2019 1252 - 10/21/2019 1331      10/21/2019             Pulse:  83    SpO2:  100 %    Resp Rate (observed):  (!) 5                Electronically Signed By: MILEY Madera CRNA  October 21, 2019  1:31 PM

## 2019-10-21 NOTE — OP NOTE
Upper EUS 10/21/2019 11:51 AM [facility], [address] [place] [address] [phone]     Endoscopy Department   _______________________________________________________________________________   Patient Name: Kristina Eldridge            Procedure Date: 10/21/2019 11:51 AM   MRN: [id number]                       Account Number: [id number]   YOB: 1970              Admit Type: Outpatient   Age: 49                               Room: OR   Gender: Female                        Note Status: Finalized   Attending MD: Ramesh Self MD   Total Sedation Time:   _______________________________________________________________________________       Procedure:           Upper EUS   Indications:         Failure to thrive   Providers:           Ramesh Self MD   Patient Profile:     Ms Eldridge is a 48yo woman with a 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   Referring MD:        Abdulkadir Benites MD   Requesting Provider: Marcin Fuller MD   Medicines:           General Anesthesia, Cipro 400 mg IV   Complications:       No immediate complications.   _______________________________________________________________________________   Procedure:           Pre-Anesthesia Assessment:                        - Prior to the procedure, a History and Physical was                        performed, and patient medications and allergies were                        reviewed. The patient is competent. The risks and                        benefits of the procedure and the sedation options and                        risks were discussed with the patient. All questions                        were answered and informed consent was obtained. Patient                        identification and proposed procedure were verified by                        the nurse in  the pre-procedure area. Mental Status                        Examination: alert and oriented. Airway Examination:                         Mallampati Class II (the uvula but not tonsillar pillars                        visualized). Respiratory Examination: poor air movement.                        CV Examination: systolic murmur. ASA Grade Assessment:                        III - A patient with severe systemic disease. After                        reviewing the risks and benefits, the patient was deemed                        in satisfactory condition to undergo the procedure. The                        anesthesia plan was to use general anesthesia.                        Immediately prior to administration of medications, the                        patient was re-assessed for adequacy to receive                        sedatives. The heart rate, respiratory rate, oxygen                        saturations, blood pressure, adequacy of pulmonary                        ventilation, and response to care were monitored                        throughout the procedure. The physical status of the                        patient was re-assessed after the procedure. After                        obtaining informed consent, the endoscope was passed                        under direct vision. Throughout the procedure, the                        patient's blood pressure, pulse, and oxygen saturations                        were monitored continuously. The echoendoscope was                        introduced through the mouth, and advanced to the second                        part of duodenum. The upper EUS was accomplished without                        difficulty. The patient tolerated the procedure well.                                                                                     Findings:        White light and endosonographic findings :        The patient was supine and fluoroscopy was used for adjunt imaging.          "film demonstrated spine hardware and numerous abdomnal clips. A        curved linear was utilized throughout. Oblique views of the esophagus        were unremarkable as were those of the a modest sized pouch. The        surgical end to side gastrojejunostomy was widely patent without        associated erosions or ulcerations. There was a single exposed suture.        The remannt was not visable by ultrasound across the pouch. The short        blind limb of the jejunum allowed views of the remnant fundus and body.        The remnant antrum was seen across the feeding Alicia. A window was found        to minimize the distance between the Alicia and the remnant and the        remannt was accessed using a 19g Flex needle through which a long 0.025\"        Visiglide wire was passed. The curled in the antrum and back to the        fundus by fluoroscopy. Over this wire a jejunogastrostomy was created        using 100W E5 ERBE and the tract secured with a 15mm x 10mm Axios. The        Axios was then postdilated over the wire using a 12-5mm Elation, only to        12mm. Here, we saw healthy stomach wall beyond the Axios lumen. Given        this was a tract across the jejunum a 10F 1cm Solus was deployed across        the Axios in axial fashion.                                                                                     Impression:          - Grossly healthy appearing Alicia en Y gastric bypass                        anatomy                        - No window for gastrogastrostomy                        - Uncomplicated creation of a jejunogastrostomy secured                        by a 15mm Axios post dilated to 12mm with subsequent                        stent in stent Solus placement   Recommendation:      - General anesthesia recovery with probable discharge                        home this afternoon                        - Continue attempts at maintaining nutrition per os                        utilizing supplemental " high protein shakes as needed;                        however a slow, well chewed oral solid diet reasonable                        - Follow up with Dr Benites's team as scheduled                        - Repeat upper endsocopy with exchange to a 20mm Axios                        (and removal of both stents) in 8-10w                        - Avoid antithrombotics for at least three days                        - The findings and recommendations were discussed with                        the patient and their family                                                                                       electronically signed by CHER Self

## 2019-10-21 NOTE — ANESTHESIA POSTPROCEDURE EVALUATION
Anesthesia POST Procedure Evaluation    Patient: Kristina Eldridge   MRN:     [id number] Gender:   female   Age:    49 year old :      1970        Preoperative Diagnosis: Malnutrition (H) [E46]   Procedure(s):  ENDOSCOPIC ULTRASOUND, ESOPHAGOSCOPY / UPPER GASTROINTESTINAL TRACT (GI) with jejunalgastrostomy with stent placement x 2, dilation tract   Postop Comments: No value filed.       Anesthesia Type:  Not documented  General    Reportable Event: NO     PAIN: Uncomplicated   Sign Out status: Comfortable, Well controlled pain     PONV: No PONV   Sign Out status:  No Nausea or Vomiting     Neuro/Psych: Uneventful perioperative course   Sign Out Status: Preoperative baseline; Age appropriate mentation     Airway/Resp.: Uneventful perioperative course   Sign Out Status: Non labored breathing, age appropriate RR; Resp. Status within EXPECTED Parameters     CV: Uneventful perioperative course   Sign Out status: Appropriate BP and perfusion indices; Appropriate HR/Rhythm     Disposition:   Sign Out in:  PACU  Disposition:  Phase II; Home  Recovery Course: Uneventful  Follow-Up: Not required           Last Anesthesia Record Vitals:  CRNA VITALS  10/21/2019 1252 - 10/21/2019 1352      10/21/2019             Resp Rate (observed):  14    EKG:  Sinus rhythm          Last PACU Vitals:  Vitals Value Taken Time   /100 10/21/2019  2:50 PM   Temp 36.7  C (98  F) 10/21/2019  2:30 PM   Pulse 85 10/21/2019  2:50 PM   Resp 16 10/21/2019  2:15 PM   SpO2 98 % 10/21/2019  2:50 PM   Temp src Available 10/21/2019  1:30 PM   NIBP     Pulse     SpO2     Resp     Temp     Ht Rate     Temp 2     Vitals shown include unvalidated device data.      Electronically Signed By: Eneida Christian MD, 2019, 2:51 PM

## 2019-10-23 DIAGNOSIS — L29.9 ITCHING: ICD-10-CM

## 2019-10-24 RX ORDER — DIPHENHYDRAMINE HCL 25 MG
TABLET ORAL
Qty: 60 TABLET | Refills: 4 | Status: SHIPPED | OUTPATIENT
Start: 2019-10-24 | End: 2020-05-27

## 2019-10-24 NOTE — TELEPHONE ENCOUNTER
WAL-DRYL 25MG ALLERGY MINITABS 100S     Last Written Prescription Date:  5/29/2019  Last Fill Quantity: 60,   # refills: 0  Last Office Visit : 6/25/2019  Future Office visit:  None  Last office visit 6/25/19    Refilled script for Pt care.  No further action needed.  #60 Tabs, 4 Refills sent to perla Quintanilla RN  Central Triage Red Flags/Med Refills

## 2019-10-25 ENCOUNTER — PREP FOR PROCEDURE (OUTPATIENT)
Dept: GASTROENTEROLOGY | Facility: CLINIC | Age: 49
End: 2019-10-25

## 2019-10-25 ENCOUNTER — CARE COORDINATION (OUTPATIENT)
Dept: GASTROENTEROLOGY | Facility: CLINIC | Age: 49
End: 2019-10-25

## 2019-10-25 DIAGNOSIS — Z46.59 ENCOUNTER FOR PANCREATIC DUCT STENT EXCHANGE: Primary | ICD-10-CM

## 2019-10-25 NOTE — PROGRESS NOTES
Post EUS (10/21/19) with Dr. Self: Follow-up     Post procedure recommendations:   -Follow up with Dr Benites's team as scheduled   -Repeat upper endsocopy with exchange to a 20mm Axios (and removal of both stents) in 8-10w.    Orders placed: Upper Endoscopy DUE 12/23/19      Left message for patient to return call for follow up and advise of recommendations.  Patient returned call, is feeling good, is eating and drinking without difficulty.  Nausea: No  Vomiting: No   Fever: No  Abdominal pain: No    Clinic contact and scheduling numbers verified for future questions/concerns.     JOSE [address] Dr. Self, & Dr. Boone  Advanced Endoscopy  [phone]

## 2019-10-26 PROBLEM — Z46.59 ENCOUNTER FOR PANCREATIC DUCT STENT EXCHANGE: Status: ACTIVE | Noted: 2019-10-26

## 2019-11-07 ENCOUNTER — HEALTH MAINTENANCE LETTER (OUTPATIENT)
Age: 49
End: 2019-11-07

## 2019-11-11 ENCOUNTER — TELEPHONE (OUTPATIENT)
Dept: GASTROENTEROLOGY | Facility: CLINIC | Age: 49
End: 2019-11-11

## 2019-11-11 NOTE — TELEPHONE ENCOUNTER
Spoke to patient in regards to scheduled procedure. Informed patient she is scheduled with Dr. Self on 12/17/19. Patient stated she wants to push it until 1/8/19 per her schedule. Informed patient she will need an updated pre-op physical within 30 days of her procedure. Patient stated she is going to have this done locally. Informed patient she will need a  and someone to monitor her for 24 hours after the procedure. Informed patient all [facility]     11/11/19 1019am

## 2019-11-15 DIAGNOSIS — M79.18 MYOFASCIAL PAIN: ICD-10-CM

## 2019-11-15 RX ORDER — BACLOFEN 10 MG/1
5 TABLET ORAL 3 TIMES DAILY
Qty: 45 TABLET | Refills: 1 | Status: SHIPPED | OUTPATIENT
Start: 2019-11-15 | End: 2020-01-17

## 2019-11-15 RX ORDER — GABAPENTIN 300 MG/1
300 CAPSULE ORAL 3 TIMES DAILY
Qty: 90 CAPSULE | Refills: 1 | Status: SHIPPED | OUTPATIENT
Start: 2019-11-15 | End: 2020-01-17

## 2019-11-18 ENCOUNTER — TELEPHONE (OUTPATIENT)
Dept: INTERNAL MEDICINE | Facility: CLINIC | Age: 49
End: 2019-11-18

## 2019-11-18 DIAGNOSIS — J30.1 CHRONIC SEASONAL ALLERGIC RHINITIS DUE TO POLLEN: ICD-10-CM

## 2019-11-19 RX ORDER — CETIRIZINE HYDROCHLORIDE 10 MG/1
10 TABLET ORAL DAILY
Qty: 30 TABLET | Refills: 0 | Status: SHIPPED | OUTPATIENT
Start: 2019-11-19 | End: 2019-11-20

## 2019-11-19 NOTE — TELEPHONE ENCOUNTER
CETIRIZINE 10MG TABLETS     Last Written Prescription Date:  10/17/2017  Last Fill Quantity: 30,   # refills: 11  Last Office Visit : 6/25/2019  Future Office visit:  None    Routing refill request to provider for review/approval because:  Med has not been ordered in two years.   Need new order from Provider.     Who is the Pt's PCP for continued refills and continuity of Care??    Simona Quintanilla RN  Central Triage Red Flags/Med Refills

## 2019-11-20 RX ORDER — CETIRIZINE HYDROCHLORIDE 10 MG/1
10 TABLET ORAL DAILY
Qty: 30 TABLET | Refills: 3 | Status: SHIPPED | OUTPATIENT
Start: 2019-11-20 | End: 2020-04-16

## 2019-11-20 NOTE — TELEPHONE ENCOUNTER
Called and spoke with patient for scheduling re-establish care with New Provider as Dr. Fuller is no longer with the [facility]. Schedule pre-op appointment for 12-16-19 with Dr. Abebe and Re-Establish Care with Dr. Crespo on 1-24-20. Appointments confirmed by patient.

## 2019-11-25 DIAGNOSIS — Z98.84 BARIATRIC SURGERY STATUS: ICD-10-CM

## 2019-11-26 RX ORDER — CYANOCOBALAMIN 1000 UG/ML
1 INJECTION, SOLUTION INTRAMUSCULAR; SUBCUTANEOUS
Qty: 1 ML | Refills: 11 | Status: SHIPPED | OUTPATIENT
Start: 2019-11-26 | End: 2020-12-30

## 2019-12-19 ENCOUNTER — OFFICE VISIT (OUTPATIENT)
Dept: INTERNAL MEDICINE | Facility: CLINIC | Age: 49
End: 2019-12-19
Payer: COMMERCIAL

## 2019-12-19 VITALS
BODY MASS INDEX: 19.04 KG/M2 | SYSTOLIC BLOOD PRESSURE: 115 MMHG | DIASTOLIC BLOOD PRESSURE: 76 MMHG | WEIGHT: 97.5 LBS | OXYGEN SATURATION: 96 % | HEART RATE: 86 BPM

## 2019-12-19 DIAGNOSIS — Z01.818 PRE-OP EXAM: Primary | ICD-10-CM

## 2019-12-19 ASSESSMENT — ENCOUNTER SYMPTOMS
SMELL DISTURBANCE: 0
EYE REDNESS: 0
NECK PAIN: 0
HEMATURIA: 0
STIFFNESS: 0
BACK PAIN: 0
FLANK PAIN: 0
EYE PAIN: 0
HOARSE VOICE: 0
SINUS PAIN: 0
JOINT SWELLING: 0
COUGH DISTURBING SLEEP: 1
DIFFICULTY URINATING: 0
DYSURIA: 0
MUSCLE WEAKNESS: 0
NECK MASS: 0
SNORES LOUDLY: 0
SPUTUM PRODUCTION: 0
DYSPNEA ON EXERTION: 1
EYE IRRITATION: 1
SHORTNESS OF BREATH: 1
POSTURAL DYSPNEA: 0
HEMOPTYSIS: 0
TASTE DISTURBANCE: 0
DOUBLE VISION: 1
COUGH: 1
MYALGIAS: 0
MUSCLE CRAMPS: 0
ARTHRALGIAS: 1
SINUS CONGESTION: 0
SORE THROAT: 0
TROUBLE SWALLOWING: 1
EYE WATERING: 0
WHEEZING: 1

## 2019-12-19 ASSESSMENT — PAIN SCALES - GENERAL: PAINLEVEL: NO PAIN (0)

## 2019-12-19 NOTE — PROGRESS NOTES
PRIMARY CARE CENTER       SUBJECTIVE:  Kristina Eldridge is a 49 year old female with a PMHx of arthritis, chronic back pain, COPD, gastric bypass (2007), Hepatitis C s/p treatment in 2011 and she presents today for pre-op clearance before a feeding tube exchange.     Ms Eldridge has a history of a daniel-en-Y gastric bypass in 2007. She lost 110 pounds following the bypass. Then she was diagnosed with Hep C and started treatments. She then began to lose weight very easily and lost her appetite once she was on Hep C treatment and had difficulty maintaining weight. She reports the last time that she weighed over 100 lbs was not since 2009 and she was joyfully tearful that she is 97 lbs today. She was referred to Dr. Self for feeding tube placement on 10/21/19. She is now scheduled for the procedure to exchange her feeding tube for a larger one scheduled for 1/8/20.     Denies difficulty or complications with anesthesia prior. Denies post-op nausea/vomiting    Cardiac hx: No history of any heart disease of her knowledge, denies any exertional chest pain or shortness of breath and reports she can climb 4 flights of stairs before becoming short of breath     Pulmonary hx:  COPD/asthma - seen by Dr. Orosco on 3/5/19 and recent PFTs on 2/5/19 were reviewed. FEV1 2.22L and FEV1/FVC 84%. She hasn't need nebulizer since 4/2019 and last had excerebration in July 2019 at which time she did prednisone. She continues with Singulair, dulera and albuterol.  She has been having a persistent dry cough but denies any sputum production.     Medications and allergies reviewed by me today.     ROS:   Constitutional, HEENT, cardiovascular, pulmonary, gi and gu systems are negative, except as otherwise noted.    OBJECTIVE:    /76   Pulse 86   Wt 44.2 kg (97 lb 8 oz)   LMP 06/11/2012 (LMP Unknown)   SpO2 96%   BMI 19.04 kg/m     Wt Readings from Last 1 Encounters:   12/19/19 44.2 kg (97 lb 8 oz)       GENERAL  APPEARANCE: healthy, alert and no distress     EYES: EOMI, PERRL     NECK: no adenopathy, no asymmetry, masses, or scars and thyroid normal to palpation     RESP: lungs clear to auscultation - no rales, rhonchi or wheezes     CV: regular rates and rhythm, normal S1 S2, no S3 or S4 and no murmur, click or rub     ABDOMEN:  soft, nontender, no HSM or masses and bowel sounds normal     MS: extremities normal- no gross deformities noted, no evidence of inflammation in joints, FROM in all extremities.     NEURO: Normal strength and tone, sensory exam grossly normal, mentation intact and speech normal     PSYCH: mentation appears normal. and affect normal/bright     ASSESSMENT/PLAN:  Kristina Eldridge is a 49 year old female with a PMHx of arthritis, chronic back pain, COPD, gastric bypass (2007), Hepatitis C s/p treatment in 2011 and she presents today for pre-op clearance before a feeding tube exchange.     Pre-operative clearance   Pt is undergoing feeding tube exchange on 1/8/20. She had this same procedure and tolerated it well 2 months ago and has been doing well and gaining weight since then. Her COPD is under good control and there is no wheezing on physical exam today and O2 sats are 96% on RA. She has no concerns in terms of a cardio-pulmonary standpoint and feels like if anything she is improving since her last visit.   Cardiac hx: No history of any heart disease of her knowledge, denies any exertional chest pain or shortness of breath and reports she can climb 4 flights of stairs before becoming short of breath   Pulmonary hx:  COPD/asthma - seen by Dr. Orosco on 3/5/19 and recent PFTs on 2/5/19 were reviewed. FEV1 2.22L and FEV1/FVC 84%. She hasn't need nebulizer since 4/2019 and last had excerebration in July 2019 at which time she did prednisone. She continues with Singulair, dulera and albuterol.    -She is low risk for this procedure.    Pt should return to clinic for f/u with me as needed.    Mirit Mohsen  MD Hugh  Dec 19, 2019    Pt was seen and plan of care discussed with Dr Streeter.     I was present during the visit and the patient was seen and examined by me in conjunction with the resident.  I discussed the pertinent history, exam, results and plan with the resident and agree with the documentation above with the following exceptions: none.      Nereida Streeter MD

## 2019-12-19 NOTE — NURSING NOTE
Chief Complaint   Patient presents with     Pre-Op Exam     DOS: 1-8-2020 ENDOSCOPIC ULTRASOUND, ESOPHAGOSCOPY / UPPER GASTROINTESTINAL TRACT (GI) with Dr. Ramesh Self @  OR     Lachelle Momin LPN at 2:11 PM on 12/19/2019.

## 2019-12-19 NOTE — PATIENT INSTRUCTIONS
[facility] Medication Refill Request Information:  * Please contact your pharmacy regarding ANY request for medication refills.  ** [facility] Prescription Fax = [phone]  * Please allow 3 business days for routine medication refills.  * Please allow 5 business days for controlled substance medication refills.     [facility] Test Result notification information:  *You will be notified with in 7-10 days of your appointment day regarding the results of your test.  If you are on MyChart you will be notified as soon as the provider has reviewed the results and signed off on them.    [facility]: [phone]          [facility]         Internal Medicine Resident                   Continuity Clinic    Who We Are    Resident Continuity Clinic is a part of the [facility] Primary Care Clinic.  Resident physicians see patients independently and establish a relationship with them over the course of their three-year residency program.  As with the Primary Care Clinic, our Resident Continuity Clinic models a group practice.  If your doctor is not available, you will be able to see another resident physician.  At the end of a resident s training, patients will be transitioned to a new resident physician for ongoing care.     We treat patients with a wide array of medical needs from routine physicals, to acute illnesses, to diabetes and blood pressure management, to complex medical illness.  What is a Resident Physician?    Resident physicians hold medical degrees and are doctors. They are training to become specialists in Internal Medicine. They work under the supervision of board-certified faculty physicians.  Expectations for Your Care    We strive to provide accessible, quality care at all times.    In order to provide this care, it is best to see your primary care resident doctor consistently rather switching between providers.  In the event you do see another physician, you should schedule  a follow-up visit with your usual primary care doctor.    If you are transitioning your care from another clinic, it is helpful to have your records available for your doctor to review.    We do not prescribe controlled substances, such as ADD medications or narcotic pain medications, on your first visit.  We will review your health records and concerns prior to devising a treatment plan with you in order to provide the best care.      Clinic Services     Extended clinic hours; patient  to help navigate your visit;  parking; laboratory and imaging services with evening and weekend hours    Multiple medical and surgical specialties in one building    Complementary services, including Nutrition, Integrative Medicine, Pharmacy consultations, Mental and Behavioral Health, Sports Medicine and Physical Therapy    Thank You    We would like to thank you for choosing the [facility] Internal Medicine Resident Continuity Clinic for your primary care. You are making a priceless contribution to the training of the next generation of health care practitioners.     Contact us at [phone] for appointments or questions.    Resident Clinic Hours are Tuesdays and Thursdays, 7:30am-5:00pm    Residents   Brennan Nelson MD  (Male)   Srinivas Cruz MD   (Male)   Shae Pepper MD  (Female)  Elvi Ng MD   (Female)   Anne Butler MD   (Female)    Gary Piper MD    (Female)   Richard Fournier MD  (Male)   Ganesh Negro MD  (Male)    Cristiana Villareal MD  (Female)   Lalo St MD  (Female)   Tam Luciano MD    (Female)   Juice Shelley MD  (Male)   Jesus Manuel Hearn MD  (Male)   Stas Sutherland MD  (Male)   PAXTON Rojas MD   (Male)   Sheldon Swan MD  (Male)    Sruthi Alvarado MD (Female)   Tuan Mabry MD  (Male)   Idalia Marie MD  (Male)   Tucker Penny MD  (Male)   Rosalina Mcnally MD    (Female)   Kristina Reese MD  (Female)     Supervising Physicians   MD Bruce Almonte,  MD Mili Burch, MD Bran Horta, MD Milton Brothers, MD Homa Krishnan, MD Nereida Streeter, MD Markell Chirinos, MD Nidhi Knutson MD

## 2019-12-19 NOTE — NURSING NOTE
Patient received flu and 2nd Shingrix vaccine.  See immunization list for administration details.  Patient tolerated injection well.     Lachelle Lee LPN at 2:25 PM on 12/19/2019.

## 2019-12-19 NOTE — LETTER
12/19/2019      RE: Kristina Eldridge  [address]  [facility] [id number]                            [facility]ENTER       SUBJECTIVE:  Kristina Eldridge is a 49 year old female with a PMHx of arthritis, chronic back pain, COPD, gastric bypass (2007), Hepatitis C s/p treatment in 2011 and she presents today for pre-op clearance before a feeding tube exchange.     Ms Eldridge has a history of a daniel-en-Y gastric bypass in 2007. She lost 110 pounds following the bypass. Then she was diagnosed with Hep C and started treatments. She then began to lose weight very easily and lost her appetite once she was on Hep C treatment and had difficulty maintaining weight. She reports the last time that she weighed over 100 lbs was not since 2009 and she was joyfully tearful that she is 97 lbs today. She was referred to Dr. Self for feeding tube placement on 10/21/19. She is now scheduled for the procedure to exchange her feeding tube for a larger one scheduled for 1/8/20.     Denies difficulty or complications with anesthesia prior. Denies post-op nausea/vomiting    Cardiac hx: No history of any heart disease of her knowledge, denies any exertional chest pain or shortness of breath and reports she can climb 4 flights of stairs before becoming short of breath     Pulmonary hx:  COPD/asthma - seen by Dr. Orosco on 3/5/19 and recent PFTs on 2/5/19 were reviewed. FEV1 2.22L and FEV1/FVC 84%. She hasn't need nebulizer since 4/2019 and last had excerebration in July 2019 at which time she did prednisone. She continues with Singulair, dulera and albuterol.  She has been having a persistent dry cough but denies any sputum production.     Medications and allergies reviewed by me today.     ROS:   Constitutional, HEENT, cardiovascular, pulmonary, gi and gu systems are negative, except as otherwise noted.    OBJECTIVE:    /76   Pulse 86   Wt 44.2 kg (97 lb 8 oz)   LMP 06/11/2012 (LMP Unknown)   SpO2 96%   BMI 19.04 kg/m       Wt Readings from Last 1 Encounters:   12/19/19 44.2 kg (97 lb 8 oz)       GENERAL APPEARANCE: healthy, alert and no distress     EYES: EOMI, PERRL     NECK: no adenopathy, no asymmetry, masses, or scars and thyroid normal to palpation     RESP: lungs clear to auscultation - no rales, rhonchi or wheezes     CV: regular rates and rhythm, normal S1 S2, no S3 or S4 and no murmur, click or rub     ABDOMEN:  soft, nontender, no HSM or masses and bowel sounds normal     MS: extremities normal- no gross deformities noted, no evidence of inflammation in joints, FROM in all extremities.     NEURO: Normal strength and tone, sensory exam grossly normal, mentation intact and speech normal     PSYCH: mentation appears normal. and affect normal/bright     ASSESSMENT/PLAN:  Kristina Eldridge is a 49 year old female with a PMHx of arthritis, chronic back pain, COPD, gastric bypass (2007), Hepatitis C s/p treatment in 2011 and she presents today for pre-op clearance before a feeding tube exchange.     Pre-operative clearance   Pt is undergoing feeding tube exchange on 1/8/20. She had this same procedure and tolerated it well 2 months ago and has been doing well and gaining weight since then. Her COPD is under good control and there is no wheezing on physical exam today and O2 sats are 96% on RA. She has no concerns in terms of a cardio-pulmonary standpoint and feels like if anything she is improving since her last visit.   Cardiac hx: No history of any heart disease of her knowledge, denies any exertional chest pain or shortness of breath and reports she can climb 4 flights of stairs before becoming short of breath   Pulmonary hx:  COPD/asthma - seen by Dr. Orosco on 3/5/19 and recent PFTs on 2/5/19 were reviewed. FEV1 2.22L and FEV1/FVC 84%. She hasn't need nebulizer since 4/2019 and last had excerebration in July 2019 at which time she did prednisone. She continues with Singulair, dulera and albuterol.    -She is low risk for this  procedure.    Pt should return to clinic for f/u with me as needed.    Mirit Mohsen Yacoup, MD  Dec 19, 2019    Pt was seen and plan of care discussed with Dr Streeter.     I was present during the visit and the patient was seen and examined by me in conjunction with the resident.  I discussed the pertinent history, exam, results and plan with the resident and agree with the documentation above with the following exceptions: none.      Nereida Streeter MD

## 2019-12-20 ASSESSMENT — ASTHMA QUESTIONNAIRES: ACT_TOTALSCORE: 13

## 2020-01-08 ENCOUNTER — ANESTHESIA (OUTPATIENT)
Dept: SURGERY | Facility: CLINIC | Age: 50
End: 2020-01-08
Payer: COMMERCIAL

## 2020-01-08 ENCOUNTER — HOSPITAL ENCOUNTER (OUTPATIENT)
Facility: CLINIC | Age: 50
Discharge: HOME | End: 2020-01-08
Attending: INTERNAL MEDICINE | Admitting: INTERNAL MEDICINE
Payer: COMMERCIAL

## 2020-01-08 ENCOUNTER — ANESTHESIA EVENT (OUTPATIENT)
Dept: SURGERY | Facility: CLINIC | Age: 50
End: 2020-01-08
Payer: COMMERCIAL

## 2020-01-08 ENCOUNTER — APPOINTMENT (OUTPATIENT)
Dept: GENERAL RADIOLOGY | Facility: CLINIC | Age: 50
End: 2020-01-08
Attending: INTERNAL MEDICINE
Payer: COMMERCIAL

## 2020-01-08 VITALS
BODY MASS INDEX: 18.94 KG/M2 | SYSTOLIC BLOOD PRESSURE: 120 MMHG | OXYGEN SATURATION: 97 % | HEART RATE: 76 BPM | RESPIRATION RATE: 20 BRPM | DIASTOLIC BLOOD PRESSURE: 80 MMHG | WEIGHT: 97 LBS | TEMPERATURE: 98.9 F

## 2020-01-08 DIAGNOSIS — Z46.59 ENCOUNTER FOR PANCREATIC DUCT STENT EXCHANGE: ICD-10-CM

## 2020-01-08 LAB
BUN SERPL-MCNC: 8 MG/DL (ref 7–30)
ERYTHROCYTE [DISTWIDTH] IN BLOOD BY AUTOMATED COUNT: 14.1 % (ref 10–15)
GLUCOSE BLDC GLUCOMTR-MCNC: 102 MG/DL (ref 70–99)
HCT VFR BLD AUTO: 43.6 % (ref 35–47)
HGB BLD-MCNC: 14.1 G/DL (ref 11.7–15.7)
INR PPP: 1.03 (ref 0.86–1.14)
MCH RBC QN AUTO: 30.9 PG (ref 26.5–33)
MCHC RBC AUTO-ENTMCNC: 32.3 G/DL (ref 31.5–36.5)
MCV RBC AUTO: 95 FL (ref 78–100)
PLATELET # BLD AUTO: 333 10E9/L (ref 150–450)
RBC # BLD AUTO: 4.57 10E12/L (ref 3.8–5.2)
UPPER GI ENDOSCOPY: NORMAL
WBC # BLD AUTO: 13.1 10E9/L (ref 4–11)

## 2020-01-08 PROCEDURE — 36000051 ZZH SURGERY LEVEL 2 1ST 30 MIN - UMMC: Performed by: INTERNAL MEDICINE

## 2020-01-08 PROCEDURE — 25000125 ZZHC RX 250: Performed by: NURSE ANESTHETIST, CERTIFIED REGISTERED

## 2020-01-08 PROCEDURE — C1769 GUIDE WIRE: HCPCS | Performed by: INTERNAL MEDICINE

## 2020-01-08 PROCEDURE — C1876 STENT, NON-COA/NON-COV W/DEL: HCPCS | Performed by: INTERNAL MEDICINE

## 2020-01-08 PROCEDURE — 82962 GLUCOSE BLOOD TEST: CPT

## 2020-01-08 PROCEDURE — 85027 COMPLETE CBC AUTOMATED: CPT | Performed by: INTERNAL MEDICINE

## 2020-01-08 PROCEDURE — 71000014 ZZH RECOVERY PHASE 1 LEVEL 2 FIRST HR: Performed by: INTERNAL MEDICINE

## 2020-01-08 PROCEDURE — 25000566 ZZH SEVOFLURANE, EA 15 MIN: Performed by: INTERNAL MEDICINE

## 2020-01-08 PROCEDURE — 85610 PROTHROMBIN TIME: CPT | Performed by: INTERNAL MEDICINE

## 2020-01-08 PROCEDURE — 25800030 ZZH RX IP 258 OP 636: Performed by: NURSE ANESTHETIST, CERTIFIED REGISTERED

## 2020-01-08 PROCEDURE — 36000053 ZZH SURGERY LEVEL 2 EA 15 ADDTL MIN - UMMC: Performed by: INTERNAL MEDICINE

## 2020-01-08 PROCEDURE — 37000008 ZZH ANESTHESIA TECHNICAL FEE, 1ST 30 MIN: Performed by: INTERNAL MEDICINE

## 2020-01-08 PROCEDURE — 71000027 ZZH RECOVERY PHASE 2 EACH 15 MINS: Performed by: INTERNAL MEDICINE

## 2020-01-08 PROCEDURE — 37000009 ZZH ANESTHESIA TECHNICAL FEE, EACH ADDTL 15 MIN: Performed by: INTERNAL MEDICINE

## 2020-01-08 PROCEDURE — 25000125 ZZHC RX 250: Performed by: INTERNAL MEDICINE

## 2020-01-08 PROCEDURE — 40000277 XR SURGERY CARM FLUORO LESS THAN 5 MIN W STILLS: Mod: TC

## 2020-01-08 PROCEDURE — 36415 COLL VENOUS BLD VENIPUNCTURE: CPT | Performed by: INTERNAL MEDICINE

## 2020-01-08 PROCEDURE — 84520 ASSAY OF UREA NITROGEN: CPT | Performed by: INTERNAL MEDICINE

## 2020-01-08 PROCEDURE — 25000125 ZZHC RX 250: Performed by: ANESTHESIOLOGY

## 2020-01-08 PROCEDURE — 25000128 H RX IP 250 OP 636: Performed by: NURSE ANESTHETIST, CERTIFIED REGISTERED

## 2020-01-08 PROCEDURE — 40000170 ZZH STATISTIC PRE-PROCEDURE ASSESSMENT II: Performed by: INTERNAL MEDICINE

## 2020-01-08 PROCEDURE — 27210794 ZZH OR GENERAL SUPPLY STERILE: Performed by: INTERNAL MEDICINE

## 2020-01-08 DEVICE — STENT ESU AXIOS W/DEL SYS 20X10MM 10.8FR 138CM M00553660
Type: IMPLANTABLE DEVICE | Site: STOMACH | Status: NON-FUNCTIONAL
Removed: 2020-06-02

## 2020-01-08 RX ORDER — FENTANYL CITRATE 50 UG/ML
25-50 INJECTION, SOLUTION INTRAMUSCULAR; INTRAVENOUS
Status: DISCONTINUED | OUTPATIENT
Start: 2020-01-08 | End: 2020-01-08 | Stop reason: HOSPADM

## 2020-01-08 RX ORDER — IPRATROPIUM BROMIDE AND ALBUTEROL SULFATE 2.5; .5 MG/3ML; MG/3ML
3 SOLUTION RESPIRATORY (INHALATION)
Status: COMPLETED | OUTPATIENT
Start: 2020-01-08 | End: 2020-01-08

## 2020-01-08 RX ORDER — DEXAMETHASONE SODIUM PHOSPHATE 4 MG/ML
INJECTION, SOLUTION INTRA-ARTICULAR; INTRALESIONAL; INTRAMUSCULAR; INTRAVENOUS; SOFT TISSUE PRN
Status: DISCONTINUED | OUTPATIENT
Start: 2020-01-08 | End: 2020-01-08

## 2020-01-08 RX ORDER — SODIUM CHLORIDE, SODIUM LACTATE, POTASSIUM CHLORIDE, CALCIUM CHLORIDE 600; 310; 30; 20 MG/100ML; MG/100ML; MG/100ML; MG/100ML
INJECTION, SOLUTION INTRAVENOUS CONTINUOUS PRN
Status: DISCONTINUED | OUTPATIENT
Start: 2020-01-08 | End: 2020-01-08

## 2020-01-08 RX ORDER — HYDROMORPHONE HYDROCHLORIDE 1 MG/ML
.3-.5 INJECTION, SOLUTION INTRAMUSCULAR; INTRAVENOUS; SUBCUTANEOUS EVERY 10 MIN PRN
Status: DISCONTINUED | OUTPATIENT
Start: 2020-01-08 | End: 2020-01-08 | Stop reason: HOSPADM

## 2020-01-08 RX ORDER — MEPERIDINE HYDROCHLORIDE 25 MG/ML
12.5 INJECTION INTRAMUSCULAR; INTRAVENOUS; SUBCUTANEOUS
Status: DISCONTINUED | OUTPATIENT
Start: 2020-01-08 | End: 2020-01-08 | Stop reason: HOSPADM

## 2020-01-08 RX ORDER — FENTANYL CITRATE 50 UG/ML
INJECTION, SOLUTION INTRAMUSCULAR; INTRAVENOUS PRN
Status: DISCONTINUED | OUTPATIENT
Start: 2020-01-08 | End: 2020-01-08

## 2020-01-08 RX ORDER — HYDRALAZINE HYDROCHLORIDE 20 MG/ML
2.5-5 INJECTION INTRAMUSCULAR; INTRAVENOUS EVERY 10 MIN PRN
Status: DISCONTINUED | OUTPATIENT
Start: 2020-01-08 | End: 2020-01-08 | Stop reason: HOSPADM

## 2020-01-08 RX ORDER — ALBUTEROL SULFATE 0.83 MG/ML
2.5 SOLUTION RESPIRATORY (INHALATION) EVERY 4 HOURS PRN
Status: DISCONTINUED | OUTPATIENT
Start: 2020-01-08 | End: 2020-01-08 | Stop reason: HOSPADM

## 2020-01-08 RX ORDER — LIDOCAINE 40 MG/G
CREAM TOPICAL
Status: DISCONTINUED | OUTPATIENT
Start: 2020-01-08 | End: 2020-01-08 | Stop reason: HOSPADM

## 2020-01-08 RX ORDER — SODIUM CHLORIDE, SODIUM LACTATE, POTASSIUM CHLORIDE, CALCIUM CHLORIDE 600; 310; 30; 20 MG/100ML; MG/100ML; MG/100ML; MG/100ML
INJECTION, SOLUTION INTRAVENOUS CONTINUOUS
Status: DISCONTINUED | OUTPATIENT
Start: 2020-01-08 | End: 2020-01-08 | Stop reason: HOSPADM

## 2020-01-08 RX ORDER — METOPROLOL TARTRATE 1 MG/ML
1-2 INJECTION, SOLUTION INTRAVENOUS EVERY 5 MIN PRN
Status: DISCONTINUED | OUTPATIENT
Start: 2020-01-08 | End: 2020-01-08 | Stop reason: HOSPADM

## 2020-01-08 RX ORDER — ONDANSETRON 2 MG/ML
4 INJECTION INTRAMUSCULAR; INTRAVENOUS EVERY 30 MIN PRN
Status: DISCONTINUED | OUTPATIENT
Start: 2020-01-08 | End: 2020-01-08 | Stop reason: HOSPADM

## 2020-01-08 RX ORDER — DIMENHYDRINATE 50 MG/ML
25 INJECTION, SOLUTION INTRAMUSCULAR; INTRAVENOUS
Status: DISCONTINUED | OUTPATIENT
Start: 2020-01-08 | End: 2020-01-08 | Stop reason: HOSPADM

## 2020-01-08 RX ORDER — ONDANSETRON 4 MG/1
4 TABLET, ORALLY DISINTEGRATING ORAL EVERY 30 MIN PRN
Status: DISCONTINUED | OUTPATIENT
Start: 2020-01-08 | End: 2020-01-08 | Stop reason: HOSPADM

## 2020-01-08 RX ORDER — NALOXONE HYDROCHLORIDE 0.4 MG/ML
.1-.4 INJECTION, SOLUTION INTRAMUSCULAR; INTRAVENOUS; SUBCUTANEOUS
Status: DISCONTINUED | OUTPATIENT
Start: 2020-01-08 | End: 2020-01-08 | Stop reason: HOSPADM

## 2020-01-08 RX ORDER — PROPOFOL 10 MG/ML
INJECTION, EMULSION INTRAVENOUS PRN
Status: DISCONTINUED | OUTPATIENT
Start: 2020-01-08 | End: 2020-01-08

## 2020-01-08 RX ORDER — LIDOCAINE HYDROCHLORIDE 20 MG/ML
INJECTION, SOLUTION INFILTRATION; PERINEURAL PRN
Status: DISCONTINUED | OUTPATIENT
Start: 2020-01-08 | End: 2020-01-08

## 2020-01-08 RX ORDER — ONDANSETRON 2 MG/ML
INJECTION INTRAMUSCULAR; INTRAVENOUS PRN
Status: DISCONTINUED | OUTPATIENT
Start: 2020-01-08 | End: 2020-01-08

## 2020-01-08 RX ADMIN — FENTANYL CITRATE 50 MCG: 50 INJECTION, SOLUTION INTRAMUSCULAR; INTRAVENOUS at 15:21

## 2020-01-08 RX ADMIN — PHENYLEPHRINE HYDROCHLORIDE 100 MCG: 10 INJECTION INTRAVENOUS at 15:14

## 2020-01-08 RX ADMIN — SODIUM CHLORIDE, POTASSIUM CHLORIDE, SODIUM LACTATE AND CALCIUM CHLORIDE: 600; 310; 30; 20 INJECTION, SOLUTION INTRAVENOUS at 14:49

## 2020-01-08 RX ADMIN — ONDANSETRON 4 MG: 2 INJECTION INTRAMUSCULAR; INTRAVENOUS at 15:28

## 2020-01-08 RX ADMIN — SUGAMMADEX 200 MG: 100 INJECTION, SOLUTION INTRAVENOUS at 15:28

## 2020-01-08 RX ADMIN — PHENYLEPHRINE HYDROCHLORIDE 100 MCG: 10 INJECTION INTRAVENOUS at 15:11

## 2020-01-08 RX ADMIN — Medication: at 18:09

## 2020-01-08 RX ADMIN — PROPOFOL 160 MG: 10 INJECTION, EMULSION INTRAVENOUS at 14:56

## 2020-01-08 RX ADMIN — MIDAZOLAM 2 MG: 1 INJECTION INTRAMUSCULAR; INTRAVENOUS at 14:49

## 2020-01-08 RX ADMIN — IPRATROPIUM BROMIDE AND ALBUTEROL SULFATE 3 ML: .5; 3 SOLUTION RESPIRATORY (INHALATION) at 14:31

## 2020-01-08 RX ADMIN — PHENYLEPHRINE HYDROCHLORIDE 100 MCG: 10 INJECTION INTRAVENOUS at 15:08

## 2020-01-08 RX ADMIN — ROCURONIUM BROMIDE 40 MG: 10 INJECTION INTRAVENOUS at 14:57

## 2020-01-08 RX ADMIN — FENTANYL CITRATE 50 MCG: 50 INJECTION, SOLUTION INTRAMUSCULAR; INTRAVENOUS at 14:56

## 2020-01-08 RX ADMIN — DEXAMETHASONE SODIUM PHOSPHATE 6 MG: 4 INJECTION, SOLUTION INTRA-ARTICULAR; INTRALESIONAL; INTRAMUSCULAR; INTRAVENOUS; SOFT TISSUE at 15:01

## 2020-01-08 RX ADMIN — LIDOCAINE HYDROCHLORIDE 50 MG: 20 INJECTION, SOLUTION INFILTRATION; PERINEURAL at 14:56

## 2020-01-08 RX ADMIN — PHENYLEPHRINE HYDROCHLORIDE 50 MCG: 10 INJECTION INTRAVENOUS at 15:03

## 2020-01-08 ASSESSMENT — LIFESTYLE VARIABLES: TOBACCO_USE: 1

## 2020-01-08 NOTE — ANESTHESIA CARE TRANSFER NOTE
Patient: Kristina Eldridge    Procedure(s):  ENDOSCOPIC ULTRASOUND, ESOPHAGOSCOPY / UPPER GASTROINTESTINAL TRACT (GI)  with stent exchange    Diagnosis: Encounter for pancreatic duct stent exchange [Z46.59]  Diagnosis Additional Information: No value filed.    Anesthesia Type:   General     Note:  Airway :Room Air  Patient transferred to:PACU  Comments: To PACU, VSS, patent airway, report to RN.Handoff Report: Identifed the Patient, Identified the Reponsible Provider, Reviewed the pertinent medical history, Discussed the surgical course, Reviewed Intra-OP anesthesia mangement and issues during anesthesia, Set expectations for post-procedure period and Allowed opportunity for questions and acknowledgement of understanding      Vitals: (Last set prior to Anesthesia Care Transfer)    CRNA VITALS  1/8/2020 1504 - 1/8/2020 1542      1/8/2020             Resp Rate (observed):  (!) 7                Electronically Signed By: MILEY Cameron CRNA  January 8, 2020  3:42 PM

## 2020-01-08 NOTE — ANESTHESIA POSTPROCEDURE EVALUATION
Anesthesia POST Procedure Evaluation    Patient: Kristina Eldridge   MRN:     [id number] Gender:   female   Age:    49 year old :      1970        Preoperative Diagnosis: Encounter for pancreatic duct stent exchange [Z46.59]   Procedure(s):  ENDOSCOPIC ULTRASOUND, ESOPHAGOSCOPY / UPPER GASTROINTESTINAL TRACT (GI)  with stent exchange   Postop Comments: No value filed.       Anesthesia Type:  Not documented  General    Reportable Event: NO     PAIN: Uncomplicated   Sign Out status: Comfortable, Well controlled pain     PONV: No PONV   Sign Out status:  No Nausea or Vomiting     Neuro/Psych: Uneventful perioperative course   Sign Out Status: Preoperative baseline; Age appropriate mentation     Airway/Resp.: Uneventful perioperative course   Sign Out Status: Non labored breathing, age appropriate RR; Resp. Status within EXPECTED Parameters     CV: Uneventful perioperative course   Sign Out status: Appropriate BP and perfusion indices; Appropriate HR/Rhythm     Disposition:   Sign Out in:  PACU  Disposition:  Phase II; Home  Recovery Course: Uneventful  Follow-Up: Not required           Last Anesthesia Record Vitals:  CRNA VITALS  2020 1504 - 2020 1545      2020             Resp Rate (observed):  (!) 7          Last PACU Vitals:  Vitals Value Taken Time   /88 2020  3:42 PM   Temp 36.6  C (97.9  F) 2020  3:42 PM   Pulse 87 2020  3:40 PM   Resp 20 2020  3:42 PM   SpO2 99 % 2020  3:45 PM   Temp src     NIBP     Pulse     SpO2     Resp     Temp     Ht Rate     Temp 2     Vitals shown include unvalidated device data.      Electronically Signed By: Viraj Baugh MD, 2020, 3:45 PM

## 2020-01-08 NOTE — ANESTHESIA PREPROCEDURE EVALUATION
Anesthesia Pre-Procedure Evaluation    Patient: Kristina Eldridge   MRN:     [id number] Gender:   female   Age:    49 year old :      1970        Preoperative Diagnosis: Encounter for pancreatic duct stent exchange [Z46.59]   Procedure(s):  ENDOSCOPIC ULTRASOUND, ESOPHAGOSCOPY / UPPER GASTROINTESTINAL TRACT (GI)  **Latex Allergy**     Past Medical History:   Diagnosis Date     Abnormal Pap smear 2017    Dr said pap was abnormal     Arthritis      Chronic back pain     hx spondylolisthesis     Chronic pain      COPD (chronic obstructive pulmonary disease) (H)     PFT's  FEV1/FVC = 2.68/3.20 = 84%     H/O gastric bypass      Hepatitis C     treated ribaviron & interferon in  for serotype 2 with failed 6 month treatment     Hyperlipidemia     on simvatatin     Insomnia      Other chronic pain      Positive TB test     has taken INH     RLS (restless legs syndrome)      Seasonal allergies      TB lung, latent     treated     Uncomplicated asthma      Wounds and injuries       Past Surgical History:   Procedure Laterality Date     CHOLECYSTECTOMY       D & C       ENDOSCOPIC ULTRASOUND UPPER GASTROINTESTINAL TRACT (GI) N/A 10/21/2019    Procedure: ENDOSCOPIC ULTRASOUND, ESOPHAGOSCOPY / UPPER GASTROINTESTINAL TRACT (GI) with jejunalgastrostomy with stent placement x 2, dilation tract;  Surgeon: Ramesh Self MD;  Location: [facility] OR     ESOPHAGOSCOPY, GASTROSCOPY, DUODENOSCOPY (EGD), COMBINED  2014    Procedure: COMBINED ESOPHAGOSCOPY, GASTROSCOPY, DUODENOSCOPY (EGD);  Surgeon: Ramesh Self MD;  Location: [facility] GI     ESOPHAGOSCOPY, GASTROSCOPY, DUODENOSCOPY (EGD), COMBINED N/A 2019    Procedure: ESOPHAGOGASTRODUODENOSCOPY, WITH BIOPSY;  Surgeon: Johann Hazel MD;  Location: [facility] OR     FRACTURE TX, ANKLE RT/LT      ORIF rt ankle     FUSION SPINE POSTERIOR ONE LEVEL N/A 2014    Procedure: FUSION SPINE POSTERIOR ONE LEVEL;  Surgeon: Amilcar Baldwin  MD Lorena;  Location: [facility] OR     FUSION SPINE POSTERIOR ONE LEVEL Right 7/30/2015    Procedure: FUSION SPINE POSTERIOR ONE LEVEL;  Surgeon: Amilcar Baldwin MD;  Location: [facility] OR     GASTRIC BYPASS  2007    lost 150 lbs     GRAFT BONE FROM ILIAC CREST Left 11/21/2014    Procedure: GRAFT BONE FROM ILIAC CREST;  Surgeon: Amilcar Baldwin MD;  Location: [facility] OR     hardware removal       INJECT PARAVERTEBRAL FACET JOINT LUMBAR / SACRAL FIRST Bilateral 9/30/2019    Procedure: Lumbar Medial Branch Nerve Block Injection Bilateral;  Surgeon: Hira Rodríguez MD;  Location: [facility] OR     OPTICAL TRACKING SYSTEM FUSION POSTERIOR SPINE LUMBAR  11/20/2013    Procedure: OPTICAL TRACKING SYSTEM FUSION SPINE POSTERIOR LUMBAR ONE LEVEL;  Lumbar 4-5 Transforaminal Interbody Fusion;  Surgeon: Amilcar Baldwin MD;  Location: [facility] OR     ORTHOPEDIC SURGERY      shoulder surgery, removed bone spur     TUBAL LIGATION            Anesthesia Evaluation     . Pt has had prior anesthetic. Type: General and MAC    No history of anesthetic complications          ROS/MED HX    ENT/Pulmonary:     (+)tobacco use, Current use 1 packs/day  Mild Persistent asthma , . .    Neurologic:  - neg neurologic ROS     Cardiovascular:  - neg cardiovascular ROS       METS/Exercise Tolerance:  >4 METS   Hematologic:  - neg hematologic  ROS       Musculoskeletal: Comment: FBSS  (+) arthritis,  -       GI/Hepatic: Comment: Gastric bypass       (+) hepatitis (treated 2011) type C,       Renal/Genitourinary:  - ROS Renal section negative       Endo:  - neg endo ROS       Psychiatric:     (+) psychiatric history depression      Infectious Disease:   (+) TB (latent),       Malignancy:      - no malignancy   Other: Comment: Malnutrition    (+) H/O Chronic Pain,                       PHYSICAL EXAM:   Mental Status/Neuro: A/A/O   Airway: Facies: Feasible  Mallampati: I  Mouth/Opening: Full  TM distance: > 6 cm  Neck ROM: Full    Respiratory: Auscultation: Wheezing     Resp. Rate: Normal     Resp. Effort: Normal      CV: Rhythm: Regular  Rate: Age appropriate  Heart: Normal Sounds  Edema: None   Comments:      Dental: Endentulous                LABS:  CBC:   Lab Results   Component Value Date    WBC 7.9 10/21/2019    WBC 9.3 07/24/2019    HGB 12.7 10/21/2019    HGB 12.9 07/24/2019    HCT 38.6 10/21/2019    HCT 39.2 07/24/2019     10/21/2019     07/24/2019     BMP:   Lab Results   Component Value Date     07/24/2019     03/05/2019    POTASSIUM 3.5 07/24/2019    POTASSIUM 3.2 (L) 03/05/2019    CHLORIDE 104 07/24/2019    CHLORIDE 101 03/05/2019    CO2 30 07/24/2019    CO2 31 03/05/2019    BUN 12 10/21/2019    BUN 12 07/24/2019    CR 0.94 07/24/2019    CR 0.85 03/05/2019    GLC 74 07/24/2019    GLC 85 03/05/2019     COAGS:   Lab Results   Component Value Date    PTT 30 04/16/2015    INR 1.15 (H) 10/21/2019     POC:   Lab Results   Component Value Date    BGM 73 10/21/2019    HCG Negative 11/21/2014    HCGS Negative 04/16/2015     OTHER:   Lab Results   Component Value Date    A1C 5.5 02/19/2019    MARIA FERNANDA 8.2 (L) 07/24/2019    PHOS 3.1 09/12/2012    MAG 2.0 09/16/2015    ALBUMIN 3.6 07/24/2019    PROTTOTAL 6.6 (L) 07/24/2019    ALT 55 (H) 07/24/2019    AST 35 07/24/2019    ALKPHOS 98 07/24/2019    BILITOTAL 0.4 07/24/2019    TSH 3.12 03/11/2019    CRP 4.1 03/11/2019    SED 16 01/31/2013        Preop Vitals    BP Readings from Last 3 Encounters:   12/19/19 115/76   10/21/19 (!) 119/92   10/10/19 129/72    Pulse Readings from Last 3 Encounters:   12/19/19 86   10/21/19 72   10/10/19 62      Resp Readings from Last 3 Encounters:   10/21/19 16   10/10/19 19   09/30/19 16    SpO2 Readings from Last 3 Encounters:   12/19/19 96%   10/21/19 100%   10/10/19 99%      Temp Readings from Last 1 Encounters:   10/21/19 36.7  C (98.1  F) (Oral)    Ht Readings from Last 1 Encounters:   10/21/19 1.524 m (5')      Wt Readings from Last 1  Encounters:   12/19/19 44.2 kg (97 lb 8 oz)    Estimated body mass index is 19.04 kg/m  as calculated from the following:    Height as of 10/21/19: 1.524 m (5').    Weight as of 12/19/19: 44.2 kg (97 lb 8 oz).     LDA:  Peripheral IV 11/22/14 Left Hand (Active)   Number of days: 1873       Closed/Suction Drain 1 Left Back Accordion 10 [place] (Active)   Number of days: 1874        Assessment:   ASA SCORE: 3    H&P: History and physical reviewed and following examination; no interval change.   Smoking Status:  Non-Smoker/Unknown   NPO Status: NPO Appropriate     Plan:   Anes. Type:  General   Pre-Induction: duoneb.   Induction:  IV (Standard)   Airway: ETT; Oral   Access/Monitoring: PIV   Maintenance: Balanced     Postop Plan:   Postop Pain: Opioids  Postop Sedation/Airway: Not planned  Disposition: Outpatient     PONV Management:   Adult Risk Factors: Female, Non-Smoker, Postop Opioids   Prevention: Ondansetron, Dexamethasone     CONSENT: Direct conversation   Plan and risks discussed with: Patient   Blood Products: Consent Deferred (Minimal Blood Loss)                   Mariposa Dickson MD

## 2020-01-08 NOTE — OR NURSING
Paged Dr. Self: Pt requesting Percocet to discharge with. States she does not have anymore at home.     Response: Ok to take 1 dose of home pain med prior to discharge, instruct patient to speak with her pain management team regarding pain meds.

## 2020-01-08 NOTE — OR NURSING
Patient is wearing a necklace with a pendant that holds her daughter's ashes. She has a yellow metal ring on each hand with large clear stones. She has five earrings in each ear. They are all taped, except the chain is loose around her neck. She states she is unable to remove it.

## 2020-01-08 NOTE — OP NOTE
Upper GI Endoscopy 01/08/2020  1:43 PM [facility], [address] [place] [address] [phone]     Endoscopy Department   _______________________________________________________________________________   Patient Name: Kristina Eldridge            Procedure Date: 1/8/2020 1:43 PM   MRN: [id number]                       Account Number: [id number]   YOB: 1970              Admit Type: Outpatient   Age: 49                               Room: OR   Gender: Female                        Note Status: Finalized   Attending MD: Ramesh Self MD   Total Sedation Time:   _______________________________________________________________________________       Procedure:           Upper GI endoscopy   Indications:         Failure to thrive   Providers:           Ramesh Self MD   Patient Profile:     Ms Eldridge is a 48yo woman with a distant history of RYGB                        who had been failing to thrive and underwent EUS guided                        jejunogastrostomy to reaccess the foregut who has done                        quite well so far as improving weight gain. She now                        returns for scheduled stent upsize by upper endoscopy.   Referring MD:        Abdulkadir Benites MD   Medicines:           General Anesthesia   Complications:       No immediate complications.   _______________________________________________________________________________   Procedure:           Pre-Anesthesia Assessment:                        - Prior to the procedure, a History and Physical was                        performed, and patient medications and allergies were                        reviewed. The patient is competent. The risks and                        benefits of the procedure and the sedation options and                        risks were discussed with the patient. All questions                        were answered and informed consent was obtained. Patient                         identification and proposed procedure were verified by                        the nurse in the pre-procedure area. Mental Status                        Examination: alert and oriented. Airway Examination:                        Mallampati Class II (the uvula but not tonsillar pillars                        visualized). Respiratory Examination: clear to                        auscultation. CV Examination: normal. ASA Grade                        Assessment: III - A patient with severe systemic                        disease. After reviewing the risks and benefits, the                        patient was deemed in satisfactory condition to undergo                        the procedure. The anesthesia plan was to use general                        anesthesia. Immediately prior to administration of                        medications, the patient was re-assessed for adequacy to                        receive sedatives. The heart rate, respiratory rate,                        oxygen saturations, blood pressure, adequacy of                        pulmonary ventilation, and response to care were                        monitored throughout the procedure. The physical status                        of the patient was re-assessed after the procedure.                        After obtaining informed consent, the endoscope was                        passed under direct vision. Throughout the procedure,                        the patient's blood pressure, pulse, and oxygen                        saturations were monitored continuously. The 1T                        gastroscope was introduced through the mouth, and                        advanced to the jejunum. The upper GI endoscopy was                        accomplished without difficulty. The patient tolerated                        the procedure well.                                                                                     Findings:        The  proximal, mid and distal esophagus were unremarkable without        inflammation or lesion. The squamocolumnar line was found at the        gastroesophageal junction without significant irregularity. The pouch        was modestly sized and the gastrojejunostomy generously patent without        inflammation or lesion. This is an end to side anastomosis. A few        centimeters into the Alicia a widely patent Axios was found and traversed        leading to the remnant and duodenal sweep. The existing stent was        removed in toto with a rat tooth and a [id number] lumen apposing covered        metal stent was deployed across what appeared to be a matured tract. The        stent seated well and expanded as anticipated.                                                                                     Impression:          - RYGB anatomy with well postioned 15mm Axios                        maintaining a now mature jejunogastrostomy                        - Uncomplicated exchange of the 15mm Axios with a 20mm                        Axios using cold, endoscopically guided technique                        - Spontaneous ejection of the stent in stent Solus   Recommendation:      - General anesthesia recovery with probable discharge                        home this afternoon                        - Previous diet may be resumed immediately upon discharge                        - [facility] with [facility] as scheduled                        - Return for stent removal/exchange in 5-6m                        - The findings and recommendations were discussed with                        the patient and their family                                                                                       electronically signed by CHER Self   _____________________   Ramesh Self MD   1/8/2020 3:41:25 PM   I was physically present for the entire viewing portion of the exam.

## 2020-01-08 NOTE — DISCHARGE INSTRUCTIONS
ENDOSCOPIC ULTRASOUND    Here is what to expect after the test:    You may feel tired from the sedative. This should wear off by the end of the day.    If you had an upper digestive endoscopy, your throat may feel sore for a day or two. Over-the-counter sore throat lozenges and spray should help.    You can eat and drink normally as soon as the test is done.  When to call the healthcare provider  Call your healthcare provider if you notice any of the following:    Fever of 100.4 F (38.0 C) or higher, or as advised by your healthcare provider    Shortness of breath    Vomiting blood, blood in stool, or black stools    Coughing or hoarse voice that won t go away   Date Last Reviewed: 7/1/2016      PANCREATIC STENT EXCHANGE  .am    Your healthcare provider may discuss the test results right away or a return visit may be scheduled. Depending on your medical condition and the test results, you may go home the same day or spend the night in the hospital. If you are discharged home, follow these guidelines:    Unless told otherwise by your healthcare provider, you can return to a normal routine the day after the ERCP.    If a cut was made in the duct, avoid blood-thinning medicines such as aspirin for 5 to 7 days, or as advised by your healthcare provider.    Call your healthcare provider right away if you have a fever or abdominal pain. These may be signs of an infection or torn bowel.    [facility], [place]  Same-Day Surgery   Adult Discharge Orders & Instructions     For 24 hours after surgery    1. Get plenty of rest.  A responsible adult must stay with you for at least 24 hours after you leave the hospital.   2. Do not drive or use heavy equipment.  If you have weakness or tingling, don't drive or use heavy equipment until this feeling goes away.  3. Do not drink alcohol.  4. Avoid strenuous or risky activities.  Ask for help when climbing stairs.   5. You may feel lightheaded.  IF so,  sit for a few minutes before standing.  Have someone help you get up.   6. If you have nausea (feel sick to your stomach): Drink only clear liquids such as apple juice, ginger ale, broth or 7-Up.  Rest may also help.  Be sure to drink enough fluids.  Move to a regular diet as you feel able.  7. You may have a slight fever. Call the doctor if your fever is over 100 F (37.7 C) (taken under the tongue) or lasts longer than 24 hours.  8. You may have a dry mouth, a sore throat, muscle aches or trouble sleeping.  These should go away after 24 hours.  9. Do not make important or legal decisions.   Call your doctor for any of the followin.  Signs of infection (fever, growing tenderness at the surgery site, a large amount of drainage or bleeding, severe pain, foul-smelling drainage, redness, swelling).    2. It has been over 8 to 10 hours since surgery and you are still not able to urinate (pass water).    3.  Headache for over 24 hours.    4.  Numbness, tingling or weakness the day after surgery (if you had spinal anesthesia).  To contact a doctor, call ________________________________________ or:        [phone] and ask for the resident on call for   ______________________________________________ (answered 24 hours a day)      Emergency Department:    [facility]: [phone]       (TTY for hearing impaired: [phone])    [facility]: [phone]       (TTY for hearing impaired: [phone])

## 2020-01-09 NOTE — OR NURSING
"Patient became upset and verbally abusive when this writer explained Dr. Self was not going to order PO narcotic pain meds for discharge and she needed to follow up with her PCP. Pt stated she did not have a PCP or an order for Percocet at home and this place was \"fucking bullshit,\" and \"didn't give a fuck about my [her] pain.\" The patient continued to raise her voice and swear at this writer. This writer stated she understood this was frustrating and requested the patient to not speak to her in that manner. This further angered the patient. This writer gave the patient 1 dose of ordered PO narcotic and offered to put in transport for discharge. Pt refused and walked out. This writer ensured pt left the periop area without issue.   "

## 2020-01-13 ENCOUNTER — CARE COORDINATION (OUTPATIENT)
Dept: GASTROENTEROLOGY | Facility: CLINIC | Age: 50
End: 2020-01-13

## 2020-01-13 NOTE — PROGRESS NOTES
Post EGD (1/8/20) with Dr. Self: Follow-up     Post procedure recommendations: Return for stent removal/exchange in 5-6m     Orders placed: Not within 3 months- will send a reminder to eulalia/JOSE [address] Dr. Self, & Dr. Boone  [facility]  [phone]

## 2020-01-17 DIAGNOSIS — M79.18 MYOFASCIAL PAIN: ICD-10-CM

## 2020-01-17 RX ORDER — GABAPENTIN 300 MG/1
300 CAPSULE ORAL 3 TIMES DAILY
Qty: 90 CAPSULE | Refills: 1 | Status: SHIPPED | OUTPATIENT
Start: 2020-01-17 | End: 2020-04-10

## 2020-01-17 RX ORDER — BACLOFEN 10 MG/1
5 TABLET ORAL 3 TIMES DAILY
Qty: 45 TABLET | Refills: 1 | Status: SHIPPED | OUTPATIENT
Start: 2020-01-17 | End: 2020-03-13

## 2020-01-24 ENCOUNTER — OFFICE VISIT (OUTPATIENT)
Dept: FAMILY MEDICINE | Facility: CLINIC | Age: 50
End: 2020-01-24
Payer: COMMERCIAL

## 2020-01-24 VITALS
DIASTOLIC BLOOD PRESSURE: 77 MMHG | TEMPERATURE: 98.9 F | WEIGHT: 101 LBS | HEART RATE: 62 BPM | HEIGHT: 60 IN | SYSTOLIC BLOOD PRESSURE: 139 MMHG | RESPIRATION RATE: 18 BRPM | BODY MASS INDEX: 19.83 KG/M2 | OXYGEN SATURATION: 98 %

## 2020-01-24 DIAGNOSIS — Z98.84 S/P BARIATRIC SURGERY: ICD-10-CM

## 2020-01-24 DIAGNOSIS — J44.9 CHRONIC OBSTRUCTIVE PULMONARY DISEASE, UNSPECIFIED COPD TYPE (H): ICD-10-CM

## 2020-01-24 DIAGNOSIS — L84 CALLUS OF FOOT: ICD-10-CM

## 2020-01-24 DIAGNOSIS — Z00.00 HEALTH CARE MAINTENANCE: ICD-10-CM

## 2020-01-24 DIAGNOSIS — Z71.6 ENCOUNTER FOR SMOKING CESSATION COUNSELING: Primary | ICD-10-CM

## 2020-01-24 RX ORDER — NICOTINE 21 MG/24HR
1 PATCH, TRANSDERMAL 24 HOURS TRANSDERMAL EVERY 24 HOURS
Qty: 30 PATCH | Refills: 2 | Status: SHIPPED | OUTPATIENT
Start: 2020-01-24 | End: 2020-05-27

## 2020-01-24 ASSESSMENT — ENCOUNTER SYMPTOMS
JOINT SWELLING: 0
EYE IRRITATION: 0
DOUBLE VISION: 1
SINUS PAIN: 0
MYALGIAS: 0
NECK PAIN: 0
NECK MASS: 0
HOARSE VOICE: 0
EYE PAIN: 0
SMELL DISTURBANCE: 0
TROUBLE SWALLOWING: 1
SINUS CONGESTION: 0
EYE REDNESS: 0
EYE WATERING: 1
MUSCLE CRAMPS: 0
MUSCLE WEAKNESS: 0
ARTHRALGIAS: 0
BACK PAIN: 1
STIFFNESS: 0
SORE THROAT: 0
TASTE DISTURBANCE: 0

## 2020-01-24 ASSESSMENT — PAIN SCALES - GENERAL: PAINLEVEL: NO PAIN (0)

## 2020-01-24 ASSESSMENT — MIFFLIN-ST. JEOR: SCORE: 994.63

## 2020-01-24 NOTE — NURSING NOTE
Chief Complaint   Patient presents with     Establish Care     pt. here to establish care with a new pcp       Sahra Campos, EMT

## 2020-01-24 NOTE — PROGRESS NOTES
"[facility]  Primary Care Center   Varinder Crespo MD  01/24/2020      Chief Complaint:   Establish Care     History of Present Illness:   Kristina Eldridge is a 49 year old female with a history of chronic pain, COPD, chronic hepatitis C s/p treatment in 2011 and gastric bypass in 2007 who presents to establish care.     Smoking: Reports trying to quit smoking. She is taking Chantix and is wearing a patch on her arm. She still smokes 1 pack per day. If she did not wear the patch she would smoke 2 packs per day. She did not notice any difference in amount she smokes on or off Chantix.     Insomnia: She has a history of using Trazodone for sleep but was only able to sleep 4 hours on this and discontinued. She has not slept for the past 2 days.      History of gastric bypass: She has a feeding in place currently and had it changed last on 01/08/2020. She is 101 pounds today. First time in 5 years that she has been over 100 pounds. She follows with GI.     Callus: She reports a callus on the \"ball of her foot\" which causes her significant pain and effect her walking.     Calcium deficiency: Per patient, calcium deficient on her last labs. She was given a trial of Calcium chews from her previous provider. She has been lactose intolerant since her gastric bypass.     Other:  1. Due for mammogram.   2. No history of hysterectomy. She had an abnormal pap in the past so she has her pap's completed at the cancer clinic. Last pap was in March of 2019.   3. Defers colonoscopy.   4. Cholesterol was last done 1 year ago. HDL was 30 and LDL was 65. She does not exercise.   5. History of 5 back surgeries.   6. Visits with both a pulmonologist and back doctor.      Review of Systems:   Pertinent items are noted in HPI or as in patient entered ROS below, remainder of complete ROS is negative.     Active Medications:      albuterol (PROVENTIL) (2.5 MG/3ML) 0.083% neb solution, TAKE 1 VIAL BY NEBULIZATION EVERY 6 HOURS AS NEEDED FOR " SHORTNESS OF BREATH (Patient taking differently: Take 2.5 mg by nebulization as needed (Pt. has not used in approximately 5 months 10/10/2019) TAKE 1 VIAL BY NEBULIZATION EVERY 6 HOURS AS NEEDED FOR SHORTNESS OF BREATH), Disp: 360 mL, Rfl: 2     albuterol (VENTOLIN HFA) 108 (90 Base) MCG/ACT inhaler, Inhale 2 puffs into the lungs every 4 hours as needed for shortness of breath / dyspnea or wheezing (Patient taking differently: Inhale 2 puffs into the lungs every 4 hours as needed for shortness of breath / dyspnea or wheezing (Pt. has not used in last 2 weeks 10/10/2019) ), Disp: 1 Inhaler, Rfl: 11     baclofen (LIORESAL) 10 MG tablet, Take 0.5 tablets (5 mg) by mouth 3 times daily, Disp: 45 tablet, Rfl: 1     cetirizine (ZYRTEC) 10 MG tablet, Take 1 tablet (10 mg) by mouth daily, Disp: 30 tablet, Rfl: 3     cyanocobalamin (CYANOCOBALAMIN) 1000 MCG/ML injection, Inject 1 mL (1,000 mcg) into the muscle every 30 days, Disp: 1 mL, Rfl: 11     diphenhydrAMINE (WAL-DRYL ALLERGY) 25 MG tablet, TAKE 1 TABLET(25 MG) BY MOUTH EVERY 6 HOURS AS NEEDED FOR ITCHING OR ALLERGIES, Disp: 60 tablet, Rfl: 4     fluticasone (FLONASE) 50 MCG/ACT spray, Spray 2 sprays into both nostrils daily (Patient taking differently: Spray 2 sprays into both nostrils 2 times daily ), Disp: 1 Bottle, Rfl: 3     gabapentin (NEURONTIN) 300 MG capsule, Take 1 capsule (300 mg) by mouth 3 times daily Titrate to this dose as in your discharge paperwork, Disp: 90 capsule, Rfl: 1     hydrocortisone 2.5 % cream, Apply topically 2 times daily (Patient taking differently: Apply topically as needed (Pt. has not used in past 2 weeks 10/10/2019) ), Disp: 30 g, Rfl: 1     ipratropium (ATROVENT HFA) 17 MCG/ACT inhaler, Inhale 2 puffs into the lungs every 6 hours, Disp: 1 Inhaler, Rfl: 11     mometasone-formoterol (DULERA) 100-5 MCG/ACT inhaler, Inhale 2 puffs into the lungs 2 times daily, Disp: 1 Inhaler, Rfl: 11     montelukast (SINGULAIR) 10 MG tablet, Take 1  tablet (10 mg) by mouth At Bedtime, Disp: 90 tablet, Rfl: 3     oxyCODONE-acetaminophen (PERCOCET) 5-325 MG tablet, Take 1 tablet by mouth every 6 hours as needed for pain, Disp: 10 tablet, Rfl: 0     pramipexole (MIRAPEX) 0.125 MG tablet, TAKE 1 TO 3 TABLETS BY MOUTH AT BEDTIME (Patient taking differently: as needed TAKE 1 TO 3 TABLETS BY MOUTH AT BEDTIME   Pt. Has not used in past 2 weeks 10/10/2019), Disp: 180 tablet, Rfl: 3     spacer (OPTICHAMBER OTTONIEL) holding chamber, USE ONCE DAILY, Disp: 1 each, Rfl: 0     spacer (OPTICHAMBER OTTONIEL) holding chamber, USE ONCE DAILY, Disp: 1 each, Rfl: 0     Spacer/Aero Chamber Mouthpiece MISC, 1 Units daily, Disp: 1 each, Rfl: 0     traZODone (DESYREL) 100 MG tablet, TAKE 2 TABLETS(200 MG) BY MOUTH EVERY NIGHT AS NEEDED FOR SLEEP, Disp: 60 tablet, Rfl: 2      Allergies:   Bee venom; Opium alkaloids, hcls; and Latex      Past Medical History:  Abnormal pap   Arthritis   Chronic pain   COPD   Chronic hepatitis C  HLD   Insomnia   Latent TB (treated)   Asthma   Depression   HIV exposure   Malnutrition        Past Surgical History:  Cholecystectomy   D&C-   Open reduction and internal fixation; right ankle-   One level posterior spinal fusion-   Right spinal fusion-   Gastric bypass-   Iliac bone graft; left-2014   Lumbar 4-5 transforaminal interbody fusion-   Shoulder surgery (removed bone spur)  Tubal ligation     Family History:   Father: HTN, COPD, glaucoma, DORIS  Mother: CHF,  @59 of MI  Brother: chron's disease   Maternal grandmother: diabetes (insulin dependent)  Paternal grandmother: diabetes, glaucoma, alzheimer's disease   Other: lung, rectal, colon, breast cancer is several family members.       Social History:   Smoking Status: former; 1 pack per day for 30 years; quit in    Smokeless Tobacco: never    Alcohol Use: former; 3 drinks per year    Marital Status: yes; Marijuana (denies use sine )       Physical Exam:   /77 (BP  Location: Right arm, Patient Position: Chair, Cuff Size: Adult Regular)   Pulse 62   Temp 98.9  F (37.2  C) (Oral)   Resp 18   Ht 1.524 m (5')   Wt 45.8 kg (101 lb)   LMP 06/11/2012 (LMP Unknown)   SpO2 98%   Breastfeeding No   BMI 19.73 kg/m     Constitutional: Alert. In no distress.  Head: Normocephalic. No masses, lesions, tenderness or abnormalities.  Psychiatric: Mentation appears normal. Normal affect.      Assessment and Plan:  Chronic obstructive pulmonary disease, unspecified COPD type (H) and Encounter for smoking cessation counseling  Per patient,she smokes 2 packs per day with Nicoderm and Chantix. She found Chantix does not make a difference in frequency she smokes. Though she smokes 1/2 as much with Nicoderm.    - nicotine (NICODERM CQ) 21 MG/24HR 24 hr patch  Dispense: 30 patch; Refill: 2  - PHARMACY ([facility]) REFERRAL    Callus of foot  - PODIATRY/FOOT & ANKLE SURGERY REFERRAL    S/P bariatric surgery  Patient has a very limited dairy intake.   - Calcium Citrate-Vitamin D (CALCIUM CITRATE CHEWY BITE) 500-500 MG-UNIT CHEW  Dispense: 90 tablet; Refill: 1    Health care maintenance  - Mammogram, routine screening    Declines colonoscopy. Last pap in spring 2019. She does have a history of ASCUS. Will consider lipid panel later this year. Shots are up to date        Follow-up:  Follow up in 3 months.        Scribe Disclosure:  I, Day Fernández, am serving as a scribe to document services personally performed by Varinder Crespo MD at this visit, based upon the provider's statements to me. All documentation has been reviewed by the aforementioned provider prior to being entered into the official medical record.     Portions of this medical record were completed by a scribe. UPON MY REVIEW AND AUTHENTICATION BY ELECTRONIC SIGNATURE, this confirms (a) I performed the applicable clinical services, and (b) the record is accurate.     Answers for HPI/ROS submitted by the patient on 1/24/2020    General Symptoms: No  Skin Symptoms: No  HENT Symptoms: Yes  EYE SYMPTOMS: Yes  HEART SYMPTOMS: No  LUNG SYMPTOMS: No  INTESTINAL SYMPTOMS: No  URINARY SYMPTOMS: No  GYNECOLOGIC SYMPTOMS: No  BREAST SYMPTOMS: No  SKELETAL SYMPTOMS: Yes  BLOOD SYMPTOMS: No  NERVOUS SYSTEM SYMPTOMS: No  MENTAL HEALTH SYMPTOMS: No  Ear pain: Yes  Ear discharge: No  Hearing loss: No  Tinnitus: Yes  Nosebleeds: No  Congestion: No  Sinus pain: No  Trouble swallowing: Yes   Voice hoarseness: No  Mouth sores: No  Sore throat: No  Tooth pain: No  Gum tenderness: No  Bleeding gums: No  Change in taste: No  Change in sense of smell: No  Dry mouth: Yes  Hearing aid used: No  Neck lump: No  Eye pain: No  Vision loss: No  Dry eyes: Yes  Watery eyes: Yes  Eye bulging: No  Double vision: Yes  Flashing of lights: No  Spots: No  Floaters: Yes  Redness: No  Crossed eyes: No  Tunnel Vision: No  Yellowing of eyes: No  Eye irritation: No  Back pain: Yes  Muscle aches: No  Neck pain: No  Swollen joints: No  Joint pain: No  Bone pain: Yes  Muscle cramps: No  Muscle weakness: No  Joint stiffness: No  Bone fracture: No  Varinder Crespo MD MD

## 2020-01-27 DIAGNOSIS — Z98.84 S/P BARIATRIC SURGERY: Primary | ICD-10-CM

## 2020-01-27 NOTE — TELEPHONE ENCOUNTER
Fax from pharmacy received requesting to change calcium-vitamin D chewable from 500-500 mg-unit dose to 500-400 mg-unit dose.    Per the pharmacy, the 500-500 mg-unit dose is not available. Request is to replace with an available product.    Kim Solano) KESHAV Rose

## 2020-01-28 ENCOUNTER — TELEPHONE (OUTPATIENT)
Dept: FAMILY MEDICINE | Facility: CLINIC | Age: 50
End: 2020-01-28

## 2020-01-28 ENCOUNTER — ANCILLARY PROCEDURE (OUTPATIENT)
Dept: MAMMOGRAPHY | Facility: CLINIC | Age: 50
End: 2020-01-28
Attending: FAMILY MEDICINE
Payer: COMMERCIAL

## 2020-01-28 DIAGNOSIS — Z12.31 VISIT FOR SCREENING MAMMOGRAM: ICD-10-CM

## 2020-01-28 DIAGNOSIS — Z98.84 S/P BARIATRIC SURGERY: ICD-10-CM

## 2020-01-28 DIAGNOSIS — Z00.00 HEALTH CARE MAINTENANCE: ICD-10-CM

## 2020-01-29 NOTE — TELEPHONE ENCOUNTER
Spoke to patient who states that she would like an Rx for the chocolate chewables, as she does not have any teeth and can not chew anything. The Rx that she was given she can not chew, she has to suck on it and it takes a long time to get it to dissolve and leaves a nasty taste in her mouth. Will put in new Rx for patient. Lisa Hilton LPN 1/29/2020 11:26 AM

## 2020-01-29 NOTE — TELEPHONE ENCOUNTER
Spoke to pharmacy who states that they dispensed the tablet which is chewable. Pharmacy advised that if they dispensed the wrong medication for the patient, they would change it for the correct medication. Pharmacy stated that they did not have the 500-500 and needed an Rx for 500-400, which is why a new Rx was sent over.     Spoke to patients  to call clinic back. Lisa Hilton LPN 1/29/2020 9:40 AM

## 2020-01-29 NOTE — TELEPHONE ENCOUNTER
[facility] Call [facility]    Phone Message    May a detailed message be left on voicemail: no    Reason for Call: Other: Pt is returning Lisa's call to her  yesterday regarding the Pt's calcium med. Please call her back.     Action Taken: Message routed to:  [facility]

## 2020-01-29 NOTE — TELEPHONE ENCOUNTER
" [facility] Call Center    Phone Message    May a detailed message be left on voicemail: yes    Reason for Call: Medication Question or concern regarding medication   Prescription Clarification  Name of Medication: Calcium Carb-Cholecalciferol (CALCIUM 500/D) 500-400 MG-UNIT CHEW  Prescribing Provider: Dr. Crespo      Pharmacy: [id number] [place], [place] - [address] AT [address] [facility] [address]     What on the order needs clarification? Pharmacy states the RX says \"tablet\" not chew, I advised to the pt that the RX absolutely says chew and she states the pharmacy insists the RX says tablet. Please contact pharmacy or resend to pharmacy           Action Taken: Message routed to:  [facility]    "

## 2020-02-03 DIAGNOSIS — J45.30 MILD PERSISTENT ASTHMA, UNCOMPLICATED: ICD-10-CM

## 2020-02-04 ENCOUNTER — TELEPHONE (OUTPATIENT)
Dept: FAMILY MEDICINE | Facility: CLINIC | Age: 50
End: 2020-02-04

## 2020-02-04 DIAGNOSIS — R79.89 LOW VITAMIN D LEVEL: Primary | ICD-10-CM

## 2020-02-04 NOTE — TELEPHONE ENCOUNTER
Prior Authorization Retail Medication Request    Medication/Dose: Calcium Soft Chews Chocolate  ICD code (if different than what is on RX):  S/P bariatric surgery [Z98.84]   Previously Tried and Failed:  Unknown   Rationale:  none    Insurance Name:     Insurance ID: [id number]

## 2020-02-05 ENCOUNTER — TELEPHONE (OUTPATIENT)
Dept: ANESTHESIOLOGY | Facility: CLINIC | Age: 50
End: 2020-02-05

## 2020-02-05 RX ORDER — IPRATROPIUM BROMIDE 17 UG/1
AEROSOL, METERED RESPIRATORY (INHALATION)
Qty: 12.9 G | Refills: 11 | Status: SHIPPED | OUTPATIENT
Start: 2020-02-05 | End: 2021-03-01

## 2020-02-05 NOTE — TELEPHONE ENCOUNTER
ATROVENT HFA ORAL INHALER (200 INH)    Last Written Prescription Date:  2/19/2019  Last Fill Quantity: 1,   # refills: 11  Last Office Visit : 1/24/2020  Future Office visit:  None  1 Inhaler, 11 Refills sent to pharm for Pt care.    2/5/2020    Simona Quintanilla RN  Central Triage Red Flags/Med Refills

## 2020-02-05 NOTE — TELEPHONE ENCOUNTER
Received voicemail from patient requesting to schedule injection. Patient states that she told at last injection to just call to schedule when ready. Returned phone call to patient to let her know that I have received her voicemail and that I am currently waiting on the doctors to place injection orders. Patient stated understanding and will await my call.

## 2020-02-06 DIAGNOSIS — M47.816 LUMBAR SPONDYLOSIS: Primary | ICD-10-CM

## 2020-02-07 NOTE — TELEPHONE ENCOUNTER
Patient would like chocolate, as she states the other kinds to do not taste very good and she has a hard time to get them to disolve.     Options that are covered by insurance:    Chew 600-800 Yaron Truffle [place]: [address]-67     Chew 500-200-40   (Calc/Vit D/Vit K)         Chocolate              NDC: [address]-38    Lisa Hilton LPN 2/7/2020 9:25 AM

## 2020-02-07 NOTE — TELEPHONE ENCOUNTER
Reached out to patient to discuss scheduling injection with Dr. Bagley. Patient was offered 3/13/20 at 7:40. Patient states that she will not come in for a morning injection. Patient was informed that I will have to go back to the pain clinic and see what our options are for getting new orders. Patient stated understanding and will await my phone call.

## 2020-02-07 NOTE — TELEPHONE ENCOUNTER
Central Prior Authorization Team   Phone: [phone]    Tried calling insurance to do Prior Auth. Spoke to rep Chavira, who states because it is OTC they will cover what DHS (Medicaid) will cover. Went onto Medicaid's formulary and below is what they will cover.    Chew 500-400  Lemon Lime  NDC: [address]-41  Chew 500-400  P/F Non-Flavor NDC: [address]-27  Chew 500-400  S/F Lemon  NDC: [address]-28    Chew 500-200-40  (Calc/Vit D/Vit K)         Chocolate                NDC: [address]-04    Chew 600-800  Yaron Truffle  NDC: [address]-77  Chew 600-800  Vanilla Creme  NDC: [address]-20        Medication: Calcium 500/D 500-400-unit Yaron Chewable  Insurance Company: [employer] - Phone [phone] Fax [phone]  Pharmacy Filling the Rx: [id number] - [place], [place] - [address] [place] [address] [address] [facility] [address]  Filling Pharmacy Phone: [phone]  Filling Pharmacy Fax:    Start Date: 2/7/2020

## 2020-02-10 ENCOUNTER — TELEPHONE (OUTPATIENT)
Dept: FAMILY MEDICINE | Facility: CLINIC | Age: 50
End: 2020-02-10

## 2020-02-10 NOTE — TELEPHONE ENCOUNTER
She actually no-showed me last week. I will attempt to call her again next week if she doesn't reschedule. If I can't reach her, I will wait until she sees PCP next.

## 2020-02-10 NOTE — TELEPHONE ENCOUNTER
[facility] referral from: [facility] visit (referral by provider)    [facility] referral outreach attempt #2 on February 10, 2020 at 9:04 AM      Outcome: Patient not reachable after several attempts, will route to [facility] Pharmacist/Provider as an FYI. Thank you for the referral.    Marla Parsons, M[facility] Coordinator

## 2020-02-11 ENCOUNTER — OFFICE VISIT (OUTPATIENT)
Dept: PODIATRY | Facility: CLINIC | Age: 50
End: 2020-02-11
Attending: FAMILY MEDICINE
Payer: COMMERCIAL

## 2020-02-11 VITALS — SYSTOLIC BLOOD PRESSURE: 136 MMHG | OXYGEN SATURATION: 96 % | DIASTOLIC BLOOD PRESSURE: 88 MMHG | HEART RATE: 69 BPM

## 2020-02-11 DIAGNOSIS — M79.671 RIGHT FOOT PAIN: ICD-10-CM

## 2020-02-11 DIAGNOSIS — L84 TYLOMA: Primary | ICD-10-CM

## 2020-02-11 PROCEDURE — 99202 OFFICE O/P NEW SF 15 MIN: CPT | Performed by: PODIATRIST

## 2020-02-11 RX ORDER — UREA 40 G/100G
LOTION TOPICAL DAILY
Qty: 325 ML | Refills: 2 | Status: SHIPPED | OUTPATIENT
Start: 2020-02-11 | End: 2020-05-27

## 2020-02-11 NOTE — LETTER
2/11/2020         RE: Kristina Eldridge  [address]  [place] [address]        Dear Colleague,    Thank you for referring your patient, Kristina Eldridge, to the [facility]. Please see a copy of my visit note below.    Past Medical History:   Diagnosis Date     Abnormal Pap smear 2/21/2017    Dr said pap was abnormal     Arthritis      Chronic back pain     hx spondylolisthesis     Chronic pain      COPD (chronic obstructive pulmonary disease) (H)     PFT's 2013 FEV1/FVC = 2.68/3.20 = 84%     H/O gastric bypass 2007     Hepatitis C     treated ribaviron & interferon in 2008 for serotype 2 with failed 6 month treatment     Hyperlipidemia     on simvatatin     Insomnia      Other chronic pain      Positive TB test     has taken INH     RLS (restless legs syndrome)      Seasonal allergies      TB lung, latent 1994    treated     Uncomplicated asthma      Wounds and injuries 2003     Patient Active Problem List   Diagnosis     Chronic hepatitis C (H)     Hyperlipidemia     Bariatric surgery status     Radicular leg pain     Spondylolisthesis, grade 2     Marijuana smoker, continuous     Depression     Lumbar stenosis     Personal history of spine surgery     HIV exposure     Pseudoarthrosis of lumbar spine with nonunion     Lumbar pseudoarthrosis     Dermal and epidermal nevus     Weight loss     COPD (chronic obstructive pulmonary disease) (H)     Malnutrition (H)     Encounter for pancreatic duct stent exchange     Past Surgical History:   Procedure Laterality Date     CHOLECYSTECTOMY       D & C  1987     ENDOSCOPIC ULTRASOUND UPPER GASTROINTESTINAL TRACT (GI) N/A 10/21/2019    Procedure: ENDOSCOPIC ULTRASOUND, ESOPHAGOSCOPY / UPPER GASTROINTESTINAL TRACT (GI) with jejunalgastrostomy with stent placement x 2, dilation tract;  Surgeon: Ramesh Self MD;  Location: [facility] OR     ENDOSCOPIC ULTRASOUND UPPER GASTROINTESTINAL TRACT (GI) N/A 1/8/2020    Procedure: ENDOSCOPIC ULTRASOUND,  ESOPHAGOSCOPY / UPPER GASTROINTESTINAL TRACT (GI)  with stent exchange;  Surgeon: Ramesh Self MD;  Location: [facility] OR     ESOPHAGOSCOPY, GASTROSCOPY, DUODENOSCOPY (EGD), COMBINED  7/30/2014    Procedure: COMBINED ESOPHAGOSCOPY, GASTROSCOPY, DUODENOSCOPY (EGD);  Surgeon: Ramesh Self MD;  Location: [facility] GI     ESOPHAGOSCOPY, GASTROSCOPY, DUODENOSCOPY (EGD), COMBINED N/A 8/14/2019    Procedure: ESOPHAGOGASTRODUODENOSCOPY, WITH BIOPSY;  Surgeon: Johann Hazel MD;  [facility] OR     FRACTURE TX, ANKLE RT/LT  1991    ORIF rt ankle     FUSION SPINE POSTERIOR ONE LEVEL N/A 11/21/2014    Procedure: FUSION SPINE POSTERIOR ONE LEVEL;  Surgeon: Amilcar Baldwin MD;  Location: [facility] OR     FUSION SPINE POSTERIOR ONE LEVEL Right 7/30/2015    Procedure: FUSION SPINE POSTERIOR ONE LEVEL;  Surgeon: Amilcar Baldwin MD;  Location: [facility] OR     GASTRIC BYPASS  2007    lost 150 lbs     GRAFT BONE FROM ILIAC CREST Left 11/21/2014    Procedure: GRAFT BONE FROM ILIAC CREST;  Surgeon: Amilcar Baldwin MD;  Location: [facility] OR     hardware removal       INJECT PARAVERTEBRAL FACET JOINT LUMBAR / SACRAL FIRST Bilateral 9/30/2019    Procedure: Lumbar Medial Branch Nerve Block Injection Bilateral;  Surgeon: Hira Rodríguez MD;  Location: [facility] OR     OPTICAL TRACKING SYSTEM FUSION POSTERIOR SPINE LUMBAR  11/20/2013    Procedure: OPTICAL TRACKING SYSTEM FUSION SPINE POSTERIOR LUMBAR ONE LEVEL;  Lumbar 4-5 Transforaminal Interbody Fusion;  Surgeon: Amilcar Baldwin MD;  Location: [facility] OR     ORTHOPEDIC SURGERY      shoulder surgery, removed bone spur     TUBAL LIGATION       Social History     Socioeconomic History     Marital status:      Spouse name: Not on file     Number of children: Not on file     Years of education: Not on file     Highest education level: Not on file   Occupational History     Not on file   Social Needs     Financial resource strain: Not on file      Food insecurity:     Worry: Not on file     Inability: Not on file     Transportation needs:     Medical: Not on file     Non-medical: Not on file   Tobacco Use     Smoking status: Former Smoker     Packs/day: 1.00     Years: 30.00     Pack years: 30.00     Types: Cigarettes     Last attempt to quit: 2014     Years since quittin.6     Smokeless tobacco: Never Used     Tobacco comment: pack a day   Substance and Sexual Activity     Alcohol use: Not Currently     Alcohol/week: 0.0 standard drinks     Comment: x3 drinks per year     Drug use: Yes     Types: Marijuana     Comment: Marijuana---- denies use since 2014       Sexual activity: Not Currently     Partners: Male     Birth control/protection: Post-menopausal   Lifestyle     Physical activity:     Days per week: Not on file     Minutes per session: Not on file     Stress: Not on file   Relationships     Social connections:     Talks on phone: Not on file     Gets together: Not on file     Attends Sabianist service: Not on file     Active member of club or organization: Not on file     Attends meetings of clubs or organizations: Not on file     Relationship status: Not on file     Intimate partner violence:     Fear of current or ex partner: Not on file     Emotionally abused: Not on file     Physically abused: Not on file     Forced sexual activity: Not on file   Other Topics Concern     Parent/sibling w/ CABG, MI or angioplasty before 65F 55M? Not Asked   Social History Narrative    Lives in [place] with significant other and 3 cats.  Pt does not work currently, has applied for disability      Family History   Problem Relation Age of Onset     Hypertension Father      Respiratory Father         COPD, smoked     Glaucoma Father      Sleep Apnea Father      C.A.D. Mother         CHF,  age 59 of MI, smoked     Crohn's Disease Brother      Diabetes Maternal Grandmother         insulin dependent     Diabetes Paternal Grandmother      Glaucoma  Paternal Grandmother      Alzheimer Disease Paternal Grandmother      Melanoma No family hx of      Skin Cancer No family hx of      SUBJECTIVE FINDINGS:  A 49-year-old female presents for right heel callus.  She relates this has been going on for 3-4 months.  She relates it did hurt really bad before and she could not even walk on it.  It is better now.  She relates no treatment and no injuries.  She relates it feels better with just not walking on it.  She relates she does not wear her shoes in the house.        OBJECTIVE FINDINGS:  DP and PT 2/4 on the right.  She has a right plantar heel hyperkeratotic tissue buildup.  There is no erythema, no drainage, no odor, no calor.        ASSESSMENT/PLAN:  Tyloma causing pain on the right heel.  Diagnosis and treatment options discussed with the patient.  She relates she is a heel walker.  She walks heavily on the heel.  The tyloma on the right heel was sharp debrided with a #15 blade upon consent.  Prescription for Carmol lotion given and use discussed with her.  Prescription for custom foot orthotics given.  She was given the phone number and address for the orthotics and prosthetics lab to pick those up.  She will return to clinic and see me in about 1 month.  I advised her on pumice stone use as well.   Also, x-rays ordered and use discussed with her.           Again, thank you for allowing me to participate in the care of your patient.        Sincerely,        Varinder Grace DPM

## 2020-02-11 NOTE — PATIENT INSTRUCTIONS
Thanks for coming today.  [facility]  [address] [place] [address]    To schedule future appointments in Ortho Clinic, you may call [phone].    To schedule ordered imaging by your provider:   Call Central Imaging [phone]    To schedule an injection ordered by your provider:  Call Central Imaging Injection scheduling line: [phone]  [facility] available online at:  [email or web address]    Please call if any further questions or concerns ([phone]).  Clinic hours 8 am to 5 pm.    Return to clinic (call) if symptoms worsen or fail to improve.

## 2020-02-11 NOTE — NURSING NOTE
Kristina Eldridge's chief complaint for this visit includes:  Chief Complaint   Patient presents with     Consult For     callus check      PCP: Varinder Crespo    Referring Provider:  Varinder Crespo MD  [address] [place] [id number]    /88   Pulse 69   LMP 06/11/2012 (LMP Unknown)   SpO2 96%   Data Unavailable     Do you need any medication refills at today's visit? no

## 2020-02-11 NOTE — PROGRESS NOTES
Past Medical History:   Diagnosis Date     Abnormal Pap smear 2/21/2017    Dr said pap was abnormal     Arthritis      Chronic back pain     hx spondylolisthesis     Chronic pain      COPD (chronic obstructive pulmonary disease) (H)     PFT's 2013 FEV1/FVC = 2.68/3.20 = 84%     H/O gastric bypass 2007     Hepatitis C     treated ribaviron & interferon in 2008 for serotype 2 with failed 6 month treatment     Hyperlipidemia     on simvatatin     Insomnia      Other chronic pain      Positive TB test     has taken INH     RLS (restless legs syndrome)      Seasonal allergies      TB lung, latent 1994    treated     Uncomplicated asthma      Wounds and injuries 2003     Patient Active Problem List   Diagnosis     Chronic hepatitis C (H)     Hyperlipidemia     Bariatric surgery status     Radicular leg pain     Spondylolisthesis, grade 2     Marijuana smoker, continuous     Depression     Lumbar stenosis     Personal history of spine surgery     HIV exposure     Pseudoarthrosis of lumbar spine with nonunion     Lumbar pseudoarthrosis     Dermal and epidermal nevus     Weight loss     COPD (chronic obstructive pulmonary disease) (H)     Malnutrition (H)     Encounter for pancreatic duct stent exchange     Past Surgical History:   Procedure Laterality Date     CHOLECYSTECTOMY       D & C  1987     ENDOSCOPIC ULTRASOUND UPPER GASTROINTESTINAL TRACT (GI) N/A 10/21/2019    Procedure: ENDOSCOPIC ULTRASOUND, ESOPHAGOSCOPY / UPPER GASTROINTESTINAL TRACT (GI) with jejunalgastrostomy with stent placement x 2, dilation tract;  Surgeon: Ramesh Self MD;  Location: [facility]     ENDOSCOPIC ULTRASOUND UPPER GASTROINTESTINAL TRACT (GI) N/A 1/8/2020    Procedure: ENDOSCOPIC ULTRASOUND, ESOPHAGOSCOPY / UPPER GASTROINTESTINAL TRACT (GI)  with stent exchange;  Surgeon: Ramesh Self MD;  Location: [facility] OR     ESOPHAGOSCOPY, GASTROSCOPY, DUODENOSCOPY (EGD), COMBINED  7/30/2014    Procedure: COMBINED ESOPHAGOSCOPY, GASTROSCOPY,  DUODENOSCOPY (EGD);  Surgeon: Ramesh Self MD;  Location: [facility] GI     ESOPHAGOSCOPY, GASTROSCOPY, DUODENOSCOPY (EGD), COMBINED N/A 8/14/2019    Procedure: ESOPHAGOGASTRODUODENOSCOPY, WITH BIOPSY;  Surgeon: Johann Hazel MD;  [facility]     FRACTURE TX, ANKLE RT/LT  1991    ORIF rt ankle     FUSION SPINE POSTERIOR ONE LEVEL N/A 11/21/2014    Procedure: FUSION SPINE POSTERIOR ONE LEVEL;  Surgeon: Amilcar Baldwin MD;  [facility]: [facility]     FUSION SPINE POSTERIOR ONE LEVEL Right 7/30/2015    Procedure: FUSION SPINE POSTERIOR ONE LEVEL;  Surgeon: Amilcar Baldwin MD;  [facility]: [facility] OR     GASTRIC BYPASS  2007    lost 150 lbs     GRAFT BONE FROM ILIAC CREST Left 11/21/2014    Procedure: GRAFT BONE FROM ILIAC CREST;  Surgeon: Amilcar Baldwin MD;  Location: [facility] OR     hardware removal       INJECT PARAVERTEBRAL FACET JOINT LUMBAR / SACRAL FIRST Bilateral 9/30/2019    Procedure: Lumbar Medial Branch Nerve Block Injection Bilateral;  Surgeon: Hira Rodríguez MD;  Location[facility]     OPTICAL TRACKING SYSTEM FUSION POSTERIOR SPINE LUMBAR  11/20/2013    Procedure: OPTICAL TRACKING SYSTEM FUSION SPINE POSTERIOR LUMBAR ONE LEVEL;  Lumbar 4-5 Transforaminal Interbody Fusion;  Surgeon: Amilcar Baldwin MD;  Location[facility]     ORTHOPEDIC SURGERY      shoulder surgery, removed bone spur     TUBAL LIGATION       Social History     Socioeconomic History     Marital status:      Spouse name: Not on file     Number of children: Not on file     Years of education: Not on file     Highest education level: Not on file   Occupational History     Not on file   Social Needs     Financial resource strain: Not on file     Food insecurity:     Worry: Not on file     Inability: Not on file     Transportation needs:     Medical: Not on file     Non-medical: Not on file   Tobacco Use     Smoking status: Former Smoker     Packs/day: 1.00     Years: 30.00     Pack years:  30.00     Types: Cigarettes     Last attempt to quit: 2014     Years since quittin.6     Smokeless tobacco: Never Used     Tobacco comment: pack a day   Substance and Sexual Activity     Alcohol use: Not Currently     Alcohol/week: 0.0 standard drinks     Comment: x3 drinks per year     Drug use: Yes     Types: Marijuana     Comment: Marijuana---- denies use since 2014       Sexual activity: Not Currently     Partners: Male     Birth control/protection: Post-menopausal   Lifestyle     Physical activity:     Days per week: Not on file     Minutes per session: Not on file     Stress: Not on file   Relationships     Social connections:     Talks on phone: Not on file     Gets together: Not on file     Attends Zoroastrian service: Not on file     Active member of club or organization: Not on file     Attends meetings of clubs or organizations: Not on file     Relationship status: Not on file     Intimate partner violence:     Fear of current or ex partner: Not on file     Emotionally abused: Not on file     Physically abused: Not on file     Forced sexual activity: Not on file   Other Topics Concern     Parent/sibling w/ CABG, MI or angioplasty before 65F 55M? Not Asked   Social History Narrative    Lives in [place] with significant other and 3 cats.  Pt does not work currently, has applied for disability      Family History   Problem Relation Age of Onset     Hypertension Father      Respiratory Father         COPD, smoked     Glaucoma Father      Sleep Apnea Father      C.A.D. Mother         CHF,  age 59 of [place], smoked     Crohn's Disease Brother      Diabetes Maternal Grandmother         insulin dependent     Diabetes Paternal Grandmother      Glaucoma Paternal Grandmother      Alzheimer Disease Paternal Grandmother      Melanoma No family hx of      Skin Cancer No family hx of      SUBJECTIVE FINDINGS:  A 49-year-old female presents for right heel callus.  She relates this has been going on for  3-4 months.  She relates it did hurt really bad before and she could not even walk on it.  It is better now.  She relates no treatment and no injuries.  She relates it feels better with just not walking on it.  She relates she does not wear her shoes in the house.        OBJECTIVE FINDINGS:  DP and PT 2/4 on the right.  She has a right plantar heel hyperkeratotic tissue buildup.  There is no erythema, no drainage, no odor, no calor.        ASSESSMENT/PLAN:  Tyloma causing pain on the right heel.  Diagnosis and treatment options discussed with the patient.  She relates she is a heel walker.  She walks heavily on the heel.  The tyloma on the right heel was sharp debrided with a #15 blade upon consent.  Prescription for Carmol lotion given and use discussed with her.  Prescription for custom foot orthotics given.  She was given the phone number and address for the orthotics and prosthetics lab to pick those up.  She will return to clinic and see me in about 1 month.  I advised her on pumice stone use as well.   Also, x-rays ordered and use discussed with her.

## 2020-02-12 ENCOUNTER — TELEPHONE (OUTPATIENT)
Dept: ANESTHESIOLOGY | Facility: CLINIC | Age: 50
End: 2020-02-12

## 2020-02-12 NOTE — TELEPHONE ENCOUNTER
[facility] Call Center    Phone Message    May a detailed message be left on voicemail: yes     Reason for Call: Other: Patient called in and is wanting to schedule a nerve block injection if someone can please call the patient to schedule this. Thank you.     Action Taken: Message routed to:  [facility] ([facility]): pain    Travel Screening: Not Applicable

## 2020-02-12 NOTE — TELEPHONE ENCOUNTER
Returned phone call to patient. Patient and I discussed how she dose not want anyone with morning appointments. Patient is requesting the clinic to review chart to see which provider that dose injections in the afternoon can do her injection. Patient understanding the clinic is working on this. Patient will await my call

## 2020-02-20 ENCOUNTER — OFFICE VISIT (OUTPATIENT)
Dept: FAMILY MEDICINE | Facility: CLINIC | Age: 50
End: 2020-02-20
Payer: COMMERCIAL

## 2020-02-20 VITALS
RESPIRATION RATE: 16 BRPM | SYSTOLIC BLOOD PRESSURE: 118 MMHG | DIASTOLIC BLOOD PRESSURE: 78 MMHG | WEIGHT: 96.8 LBS | HEART RATE: 108 BPM | BODY MASS INDEX: 19.01 KG/M2 | OXYGEN SATURATION: 98 % | TEMPERATURE: 97.8 F | HEIGHT: 60 IN

## 2020-02-20 DIAGNOSIS — H53.9 VISUAL DISTURBANCE: ICD-10-CM

## 2020-02-20 DIAGNOSIS — R73.09 ELEVATED GLUCOSE: Primary | ICD-10-CM

## 2020-02-20 LAB — HBA1C MFR BLD: 5.3 % (ref 0–5.6)

## 2020-02-20 ASSESSMENT — MIFFLIN-ST. JEOR: SCORE: 975.58

## 2020-02-20 ASSESSMENT — PAIN SCALES - GENERAL: PAINLEVEL: NO PAIN (0)

## 2020-02-20 NOTE — NURSING NOTE
Chief Complaint   Patient presents with     Eye Problem     Pt complains of decreased vision for the past 6 months. Concerned of abnormal blood sugars.         Leonardo Huff, EMT on 2/20/2020 at 2:14 PM

## 2020-02-20 NOTE — PROGRESS NOTES
[facility]  Primary Care Center   Varinder Crespo MD  02/20/2020      Chief Complaint:   Eye Problem     History of Present Illness:   Kristina Eldridge is a 49 year old female with a history of chronic back pain, COPD, hyperlipidemia, and restless legs syndrome who presents for evaluation of vision loss. She reports she has noticed sharp decrease in vision in the past 6 months. Her optometrist at [facility] said she had such a drastic change in eyesight in the last 6 months that they were concerned about her blood sugar. This affects both eyes and is an issue with the entire eye, not just spots in her vision. She denies recent medication changes. She has no history of diabetes but has had low blood sugar before. She is s/p gastric bypass and was at one point 300 pounds. She reports she drinks about 8 cans of coke per day, but does not consume other sugary foods. She rarely drinks water.     Review of Systems:   Pertinent items are noted in HPI or as in patient entered ROS below, remainder of complete ROS is negative.     Active Medications:      albuterol (PROVENTIL) (2.5 MG/3ML) 0.083% neb solution, TAKE 1 VIAL BY NEBULIZATION EVERY 6 HOURS AS NEEDED FOR SHORTNESS OF BREATH (Patient taking differently: Take 2.5 mg by nebulization as needed (Pt. has not used in approximately 5 months 10/10/2019) TAKE 1 VIAL BY NEBULIZATION EVERY 6 HOURS AS NEEDED FOR SHORTNESS OF BREATH), Disp: 360 mL, Rfl: 2     albuterol (VENTOLIN HFA) 108 (90 Base) MCG/ACT inhaler, Inhale 2 puffs into the lungs every 4 hours as needed for shortness of breath / dyspnea or wheezing (Patient taking differently: Inhale 2 puffs into the lungs every 4 hours as needed for shortness of breath / dyspnea or wheezing (Pt. has not used in last 2 weeks 10/10/2019) ), Disp: 1 Inhaler, Rfl: 11     ATROVENT HFA 17 MCG/ACT inhaler, INHALE 2 PUFFS BY MOUTH INTO LUNGS EVERY 6 HOURS, Disp: 12.9 g, Rfl: 11     baclofen (LIORESAL) 10 MG tablet, Take 0.5 tablets (5  mg) by mouth 3 times daily, Disp: 45 tablet, Rfl: 1     CALCIUM 500/D 500-400 MG-UNIT CHEW, Take 1 chew tab by mouth 3 times daily ,chocolate chewable., Disp: 90 tablet, Rfl: 11     Calcium Carbonate-Vit D-Min (CALCIUM-VITAMIN D-MINERALS) 600-800 MG-UNIT CHEW, Take 1 chew tab by mouth daily, Disp: 180 tablet, Rfl: 3     cetirizine (ZYRTEC) 10 MG tablet, Take 1 tablet (10 mg) by mouth daily, Disp: 30 tablet, Rfl: 3     cyanocobalamin (CYANOCOBALAMIN) 1000 MCG/ML injection, Inject 1 mL (1,000 mcg) into the muscle every 30 days, Disp: 1 mL, Rfl: 11     diphenhydrAMINE (WAL-DRYL ALLERGY) 25 MG tablet, TAKE 1 TABLET(25 MG) BY MOUTH EVERY 6 HOURS AS NEEDED FOR ITCHING OR ALLERGIES, Disp: 60 tablet, Rfl: 4     fluticasone (FLONASE) 50 MCG/ACT spray, Spray 2 sprays into both nostrils daily (Patient taking differently: Spray 2 sprays into both nostrils 2 times daily ), Disp: 1 Bottle, Rfl: 3     gabapentin (NEURONTIN) 300 MG capsule, Take 1 capsule (300 mg) by mouth 3 times daily Titrate to this dose as in your discharge paperwork, Disp: 90 capsule, Rfl: 1     hydrocortisone 2.5 % cream, Apply topically 2 times daily (Patient taking differently: Apply topically as needed (Pt. has not used in past 2 weeks 10/10/2019) ), Disp: 30 g, Rfl: 1     mometasone-formoterol (DULERA) 100-5 MCG/ACT inhaler, Inhale 2 puffs into the lungs 2 times daily, Disp: 1 Inhaler, Rfl: 11     nicotine (NICODERM CQ) 21 MG/24HR 24 hr patch, Place 1 patch onto the skin every 24 hours, Disp: 30 patch, Rfl: 2     traZODone (DESYREL) 100 MG tablet, TAKE 2 TABLETS(200 MG) BY MOUTH EVERY NIGHT AS NEEDED FOR SLEEP, Disp: 60 tablet, Rfl: 2     Urea 40 % LOTN, Externally apply topically daily To right foot callus, Disp: 325 mL, Rfl: 2     montelukast (SINGULAIR) 10 MG tablet, Take 1 tablet (10 mg) by mouth At Bedtime, Disp: 90 tablet, Rfl: 3     oxyCODONE-acetaminophen (PERCOCET) 5-325 MG tablet, Take 1 tablet by mouth every 6 hours as needed for pain (Patient  not taking: Reported on 2020), Disp: 10 tablet, Rfl: 0     Allergies:   Bee venom; Opium alkaloids, hcls; and Latex      Past Medical History:  Abnormal pap smear  Arthritis  Chronic back pain  Chronic obstructive pulmonary disease (COPD)  Hepatitis C  Hyperlipidemia  Insomnia  Latent TB  Restless legs syndrome  Seasonal allergies   Asthma   Radicular leg pain  Grade 2 spondylolisthesis  Lumbar stenosis  Pseudoarthrosis of lumbar spine with nonunion  Dermal and epidermal nevus  Malnutrition     Past Surgical History:  Cholecystectomy  D&C -   Endoscopic ultrasound upper GI - 10/21/2019, 2020  EGD (x2) - last 2019  ORIF ankle, right  Fusion spine posterior one level - 2014, 2015  Gastric bypass -   Graft bone from iliac crest, left - 2014  Inject paravertebral facet joint lumbar/facet - 2019  OTS fusion posterior spine lumbar - 2013  Orthopedic surgery (shoulder, removed bone spur)  Tubal ligation    Family History:   Hypertension - father  COPD - father (smoker)  Glaucoma - father  Sleep apnea - father  Coronary artery disease - mother (also CHF,  age 59 of [place], smoker)  Crohn's disease - brother  Diabetes - maternal grandmother, paternal grandmother   Alzheimer's disease - paternal grandmother       Social History:   The patient is , a current every day smoker (1 ppd for 30 years), and does occasionally consume alcohol.       Physical Exam:   /78 (BP Location: Right arm, Patient Position: Sitting, Cuff Size: Adult Regular)   Pulse 108   Temp 97.8  F (36.6  C) (Oral)   Resp 16   Ht 1.524 m (5')   Wt 43.9 kg (96 lb 12.8 oz)   LMP 2012 (LMP Unknown)   SpO2 98%   BMI 18.90 kg/m     Constitutional: Alert. In no distress.  Head: Normocephalic.   ENT: Mucous membranes are moist.   Respiratory: Normal rate and effort.  Musculoskeletal: No gross deformities noted.   Psychiatric: Mentation appears normal. Normal affect.     Labs:  Component       Latest Ref Rng & Units 2/20/2020   Hemoglobin A1C      0 - 5.6 % 5.3        Assessment and Plan:  Elevated glucose  A1c is 5.3%, normal range.  - Hemoglobin A1c POCT    Visual disturbance  She has had diffulty with vision changes and has had to change eyeglass prescriptions several times. She sees an optometrist, will have her see ophthalmology.  - OPHTHALMOLOGY ADULT REFERRAL     She does have a referral to our pharmacy-lead smoking cessation program and she knows to do this.       Scribe Disclosure:  I, Justina Vences, am serving as a scribe to document services personally performed by Varinder Crespo MD at this visit, based upon the provider's statements to me. All documentation has been reviewed by the aforementioned provider prior to being entered into the official medical record.    Portions of this medical record were completed by a scribe. UPON MY REVIEW AND AUTHENTICATION BY ELECTRONIC SIGNATURE, this confirms (a) I performed the applicable clinical services, and (b) the record is accurate.

## 2020-02-20 NOTE — PATIENT INSTRUCTIONS
[facility] Medication Refill Request Information:  * Please contact your pharmacy regarding ANY request for medication refills.  ** [facility] Prescription Fax = [phone]  * Please allow 3 business days for routine medication refills.  * Please allow 5 business days for controlled substance medication refills.     [facility] Test Result notification information:  *You will be notified with in 7-10 days of your appointment day regarding the results of your test.  If you are on MyChart you will be notified as soon as the provider has reviewed the results and signed off on them.    [facility]: [phone]

## 2020-02-21 NOTE — TELEPHONE ENCOUNTER
Attempted to reach out to Mery to assist with scheduling injection with Dr. Noel. A male answered her phone stating that she was not available but that he would have her call me back. Left best call back number of [phone]

## 2020-02-24 NOTE — TELEPHONE ENCOUNTER
Received voicemail from patient requesting to schedule injection with Dr. Noel. Patient left best call back number of [phone]

## 2020-02-25 ENCOUNTER — OFFICE VISIT (OUTPATIENT)
Dept: OPHTHALMOLOGY | Facility: CLINIC | Age: 50
End: 2020-02-25
Attending: FAMILY MEDICINE
Payer: COMMERCIAL

## 2020-02-25 DIAGNOSIS — H52.4 MYOPIA WITH ASTIGMATISM AND PRESBYOPIA, BILATERAL: ICD-10-CM

## 2020-02-25 DIAGNOSIS — H52.203 MYOPIA WITH ASTIGMATISM AND PRESBYOPIA, BILATERAL: ICD-10-CM

## 2020-02-25 DIAGNOSIS — H25.13 NUCLEAR SCLEROSIS OF BOTH EYES: Primary | ICD-10-CM

## 2020-02-25 DIAGNOSIS — H52.13 MYOPIA WITH ASTIGMATISM AND PRESBYOPIA, BILATERAL: ICD-10-CM

## 2020-02-25 DIAGNOSIS — H53.9 VISUAL DISTURBANCE: ICD-10-CM

## 2020-02-25 PROCEDURE — G0463 HOSPITAL OUTPT CLINIC VISIT: HCPCS | Mod: ZF

## 2020-02-25 PROCEDURE — 76519 ECHO EXAM OF EYE: CPT | Mod: ZF | Performed by: STUDENT IN AN ORGANIZED HEALTH CARE EDUCATION/TRAINING PROGRAM

## 2020-02-25 ASSESSMENT — REFRACTION_WEARINGRX
OD_AXIS: 008
OS_ADD: +2.25
OS_SPHERE: -0.75
OD_ADD: +2.25
OD_SPHERE: -1.75
OS_CYLINDER: +0.50
SPECS_TYPE: TRIFOCAL
OS_AXIS: 003
OD_CYLINDER: +2.00

## 2020-02-25 ASSESSMENT — TONOMETRY
OD_IOP_MMHG: 16
OS_IOP_MMHG: 17
IOP_METHOD: TONOPEN

## 2020-02-25 ASSESSMENT — REFRACTION_MANIFEST
OD_SPHERE: -5.25
OS_AXIS: 012
OS_CYLINDER: +0.25
OD_CYLINDER: +2.00
OS_SPHERE: -3.00
OD_AXIS: 010

## 2020-02-25 ASSESSMENT — CONF VISUAL FIELD
OD_NORMAL: 1
OS_NORMAL: 1
METHOD: COUNTING FINGERS

## 2020-02-25 ASSESSMENT — VISUAL ACUITY
OS_PH_CC: 20/25
METHOD: SNELLEN - LINEAR
OS_PH_CC+: -1
CORRECTION_TYPE: GLASSES
OD_CC: 20/60
OD_CC+: +2
OD_PH_CC: 20/25
OS_CC: 20/60

## 2020-02-25 ASSESSMENT — CUP TO DISC RATIO
OD_RATIO: 0.20
OS_RATIO: 0.20

## 2020-02-25 ASSESSMENT — SLIT LAMP EXAM - LIDS
COMMENTS: MGD, MILD BLEPHARITIS
COMMENTS: MGD, MILD BLEPHARITIS

## 2020-02-25 ASSESSMENT — EXTERNAL EXAM - RIGHT EYE: OD_EXAM: NORMAL

## 2020-02-25 ASSESSMENT — EXTERNAL EXAM - LEFT EYE: OS_EXAM: NORMAL

## 2020-02-25 NOTE — NURSING NOTE
"Chief Complaints and History of Present Illnesses   Patient presents with     Decreased Vision Evaluation     Chief Complaint(s) and History of Present Illness(es)     Decreased Vision Evaluation     Laterality: both eyes    Quality: blurred vision    Course: gradually worsening    Associated symptoms: floaters and dryness.  Negative for eye pain, flashes, tearing and discharge    Treatments tried: no treatments    Pain scale: 0/10              Comments     Pt notes over the last year she has noticed a decrease in vision BE.  Notes she sits 6 feet from her tv and can't read the guide with her glasses on.  Complains of BE feeling dry all the time - \"when I wipe my eyes, I feel like I'm pulling grit out of my eyes\"  Hx of floaters- unchanged.  Denies any flashes, pain, pressure, discharge, and tearing.  Does not use any eye medications at this time.    Misti Rangel OT 1:24 PM February 25, 2020                 "

## 2020-02-25 NOTE — PROGRESS NOTES
"I have confirmed the patient's and reviewed Past Medical History, Past Surgical History, Social History, Family History, Problem List, Medication List and agree with Tech note.    CC: blurry vision each eye     HPI: Kristina Eldridge is a 49 year old female who presents with blurry vision for past few months of both eyes. Reports this as constant. She does note dryness and \"grit\" of both eyes. Some days she has tearing. She endorses stable floaters each eye. No flashes or curtain. No use of gtts.    POHx: myopia with astigmatism and presbyopia. Cataracts.   PMHx:  Chronic back pain, COPD, insomnia, B12 deficiency s/p gastric bypass. Seasonal allergies. No HTN or DM.  Family Hx: no known glaucoma or AMD  Social Hx: cigarettes, no drugs or etoh  Medications: bacolfen, gabapentin, montelukast, Zyrtec, flonase, dulera, atrovent, B12 injection [id number], trazodone    Kristina Eldridge is a 49 year old female with the following diagnoses:   1. Nuclear sclerosis of both eyes    2. Visual disturbance    3. Myopia with astigmatism and presbyopia, bilateral         Mild dryness each eye.  - Start AT 2-4 x daily with WC BID each eye     Moderate NS right eye > left eye with myopic shift of each eye vs glasses from last spring.  - Patient is interested in cataract surgery, starting with the right eye. She is planning to  her new glasses from [employer] that are on hold for her, but does want to have cataract surgery scheduled within the next couple of months, beginning with the right eye. Risks, benefits, alternative treatment options, and possible complications were discussed in detail. We also reviewed lens options. She desires emmetropia each eye. Will obtain calcs today.    Anesthesia: Topical  Able to lie flat: Yes  Target: emmetropia  PFX: No  Anticoagulation: No  Dilation: 5.5 mm  Tamsulosin: No  Possible iris hooks/ring    Will need pre-op H&P with PCP.            Cameron Woods MD  Department of Ophthalmology - " PGY3    Attending Physician Attestation:  Complete documentation of historical and exam elements from today's encounter can be found in the full encounter summary report (not reduplicated in this progress note).  I personally obtained the chief complaint(s) and history of present illness.  I confirmed and edited as necessary the review of systems, past medical/surgical history, family history, social history, and examination findings as documented by others; and I examined the patient myself.  I personally reviewed the relevant tests, images, and reports as documented above.  I formulated and edited as necessary the assessment and plan and discussed the findings and management plan with the patient and family. Attending Physician Image/Tesing Attestation: I personally reviewed the ophthalmic test(s) associated with this encounter, agree with the interpretation(s) as documented by the resident/fellow, and have edited the corresponding report(s) as necessary.  . - Scar Strickland MD

## 2020-02-27 ENCOUNTER — HOSPITAL ENCOUNTER (OUTPATIENT)
Facility: AMBULATORY SURGERY CENTER | Age: 50
End: 2020-02-27
Attending: ANESTHESIOLOGY
Payer: COMMERCIAL

## 2020-02-27 ENCOUNTER — TELEPHONE (OUTPATIENT)
Dept: ANESTHESIOLOGY | Facility: CLINIC | Age: 50
End: 2020-02-27

## 2020-02-27 DIAGNOSIS — M47.816 LUMBAR SPONDYLOSIS: ICD-10-CM

## 2020-02-27 NOTE — TELEPHONE ENCOUNTER
LPN called and left a VM to the pt.   Pt was made aware the LPN was calling to assist them to schedule procedure with Dr. Noel.     Pt was asked to call the clinic back when available, and request to speak with Nurse Negro.   Perspective date- 3/19/20    Becky Mcknight LPN

## 2020-03-04 ENCOUNTER — TELEPHONE (OUTPATIENT)
Dept: OPHTHALMOLOGY | Facility: CLINIC | Age: 50
End: 2020-03-04

## 2020-03-04 NOTE — TELEPHONE ENCOUNTER
Spoke with Mery, told her I was working with the providers to find out when they would both be available for her surgery. Once I have a few available dates I will be calling to schedule. Mery was very understanding.

## 2020-03-10 ENCOUNTER — PREP FOR PROCEDURE (OUTPATIENT)
Dept: OPHTHALMOLOGY | Facility: CLINIC | Age: 50
End: 2020-03-10

## 2020-03-10 ENCOUNTER — OFFICE VISIT (OUTPATIENT)
Dept: PODIATRY | Facility: CLINIC | Age: 50
End: 2020-03-10
Payer: COMMERCIAL

## 2020-03-10 ENCOUNTER — HOSPITAL ENCOUNTER (OUTPATIENT)
Facility: AMBULATORY SURGERY CENTER | Age: 50
End: 2020-03-10
Attending: OPHTHALMOLOGY
Payer: COMMERCIAL

## 2020-03-10 ENCOUNTER — TELEPHONE (OUTPATIENT)
Dept: OPHTHALMOLOGY | Facility: CLINIC | Age: 50
End: 2020-03-10

## 2020-03-10 VITALS — HEART RATE: 65 BPM | OXYGEN SATURATION: 100 % | SYSTOLIC BLOOD PRESSURE: 125 MMHG | DIASTOLIC BLOOD PRESSURE: 79 MMHG

## 2020-03-10 DIAGNOSIS — L84 TYLOMA: Primary | ICD-10-CM

## 2020-03-10 DIAGNOSIS — M79.671 RIGHT FOOT PAIN: ICD-10-CM

## 2020-03-10 DIAGNOSIS — H25.13 NUCLEAR SCLEROSIS OF BOTH EYES: ICD-10-CM

## 2020-03-10 PROCEDURE — 99212 OFFICE O/P EST SF 10 MIN: CPT | Performed by: PODIATRIST

## 2020-03-10 ASSESSMENT — PAIN SCALES - GENERAL: PAINLEVEL: NO PAIN (0)

## 2020-03-10 NOTE — LETTER
3/10/2020         RE: Kristina Eldridge  [address]  [facility] [address]        Dear Colleague,    Thank you for referring your patient, Kristina Eldridge, to the [facility]. Please see a copy of my visit note below.    Past Medical History:   Diagnosis Date     Abnormal Pap smear 2/21/2017    Dr said pap was abnormal     Arthritis      Chronic back pain     hx spondylolisthesis     Chronic pain      COPD (chronic obstructive pulmonary disease) (H)     PFT's 2013 FEV1/FVC = 2.68/3.20 = 84%     H/O gastric bypass 2007     Hepatitis C     treated ribaviron & interferon in 2008 for serotype 2 with failed 6 month treatment     Hyperlipidemia     on simvatatin     Insomnia      Other chronic pain      Positive TB test     has taken INH     RLS (restless legs syndrome)      Seasonal allergies      TB lung, latent 1994    treated     Uncomplicated asthma      Wounds and injuries 2003     Patient Active Problem List   Diagnosis     Chronic hepatitis C (H)     Hyperlipidemia     Bariatric surgery status     Radicular leg pain     Spondylolisthesis, grade 2     Marijuana smoker, continuous     Depression     Lumbar stenosis     Personal history of spine surgery     HIV exposure     Pseudoarthrosis of lumbar spine with nonunion     Lumbar pseudoarthrosis     Dermal and epidermal nevus     Weight loss     COPD (chronic obstructive pulmonary disease) (H)     Malnutrition (H)     Encounter for pancreatic duct stent exchange     Lumbar spondylosis     Nuclear sclerosis of both eyes     Past Surgical History:   Procedure Laterality Date     CHOLECYSTECTOMY       D & C  1987     ENDOSCOPIC ULTRASOUND UPPER GASTROINTESTINAL TRACT (GI) N/A 10/21/2019    Procedure: ENDOSCOPIC ULTRASOUND, ESOPHAGOSCOPY / UPPER GASTROINTESTINAL TRACT (GI) with jejunalgastrostomy with stent placement x 2, dilation tract;  Surgeon: Ramesh Self MD;  [facility]     ENDOSCOPIC ULTRASOUND UPPER GASTROINTESTINAL  TRACT (GI) N/A 1/8/2020    Procedure: ENDOSCOPIC ULTRASOUND, ESOPHAGOSCOPY / UPPER GASTROINTESTINAL TRACT (GI)  with stent exchange;  Surgeon: Ramesh Self MD;  [facility]     ESOPHAGOSCOPY, GASTROSCOPY, DUODENOSCOPY (EGD), COMBINED  7/30/2014    Procedure: COMBINED ESOPHAGOSCOPY, GASTROSCOPY, DUODENOSCOPY (EGD);  Surgeon: Ramesh Self MD;  [facility]     ESOPHAGOSCOPY, GASTROSCOPY, DUODENOSCOPY (EGD), COMBINED N/A 8/14/2019    Procedure: ESOPHAGOGASTRODUODENOSCOPY, WITH BIOPSY;  Surgeon: Johann Hazel MD;  [facility]     FRACTURE TX, ANKLE RT/LT  1991    ORIF rt ankle     FUSION SPINE POSTERIOR ONE LEVEL N/A 11/21/2014    Procedure: FUSION SPINE POSTERIOR ONE LEVEL;  Surgeon: Amilcar Baldwin MD;  [facility]     FUSION SPINE POSTERIOR ONE LEVEL Right 7/30/2015    Procedure: FUSION SPINE POSTERIOR ONE LEVEL;  Surgeon: Amilcar Baldwin MD;  [facility]     GASTRIC BYPASS  2007    lost 150 lbs     GRAFT BONE FROM ILIAC CREST Left 11/21/2014    Procedure: GRAFT BONE FROM ILIAC CREST;  Surgeon: Amilcar Baldwin MD;  [facility]     hardware removal       INJECT PARAVERTEBRAL FACET JOINT LUMBAR / SACRAL FIRST Bilateral 9/30/2019    Procedure: Lumbar Medial Branch Nerve Block Injection Bilateral;  Surgeon: Hira Rodríguez MD;  [facility]     OPTICAL TRACKING SYSTEM FUSION POSTERIOR SPINE LUMBAR  11/20/2013    Procedure: OPTICAL TRACKING SYSTEM FUSION SPINE POSTERIOR LUMBAR ONE LEVEL;  Lumbar 4-5 Transforaminal Interbody Fusion;  Surgeon: Amilcar Baldwin MD;  [facility]     ORTHOPEDIC SURGERY      shoulder surgery, removed bone spur     TUBAL LIGATION       Social History     Socioeconomic History     Marital status:      Spouse name: Not on file     Number of children: Not on file     Years of education: Not on file     Highest education level: Not on file   Occupational History     Not on file    Social Needs     Financial resource strain: Not on file     Food insecurity     Worry: Not on file     Inability: Not on file     Transportation needs     Medical: Not on file     Non-medical: Not on file   Tobacco Use     Smoking status: Former Smoker     Packs/day: 1.00     Years: 30.00     Pack years: 30.00     Types: Cigarettes     Last attempt to quit: 2014     Years since quittin.7     Smokeless tobacco: Never Used     Tobacco comment: pack a day   Substance and Sexual Activity     Alcohol use: Not Currently     Alcohol/week: 0.0 standard drinks     Comment: x3 drinks per year     Drug use: Yes     Types: Marijuana     Comment: Marijuana---- denies use since 2014       Sexual activity: Not Currently     Partners: Male     Birth control/protection: Post-menopausal   Lifestyle     Physical activity     Days per week: Not on file     Minutes per session: Not on file     Stress: Not on file   Relationships     Social connections     Talks on phone: Not on file     Gets together: Not on file     Attends Bahai service: Not on file     Active member of club or organization: Not on file     Attends meetings of clubs or organizations: Not on file     Relationship status: Not on file     Intimate partner violence     Fear of current or ex partner: Not on file     Emotionally abused: Not on file     Physically abused: Not on file     Forced sexual activity: Not on file   Other Topics Concern     Parent/sibling w/ CABG, MI or angioplasty before 65F 55M? Not Asked   Social History Narrative    Lives in [place] with significant other and 3 cats.  Pt does not work currently, has applied for disability      Family History   Problem Relation Age of Onset     Hypertension Father      Respiratory Father         COPD, smoked     Glaucoma Father      Sleep Apnea Father      C.A.D. Mother         CHF,  age 59 of [place], smoked     Crohn's Disease Brother      Diabetes Maternal Grandmother         insulin  dependent     Diabetes Paternal Grandmother      Glaucoma Paternal Grandmother      Alzheimer Disease Paternal Grandmother      Melanoma No family hx of      Skin Cancer No family hx of      Macular Degeneration No family hx of      SUBJECTIVE FINDINGS:  A 49-year-old female returns to clinic for tyloma causing pain on the right heel.  She relates it is much better with the debridement.  She relates she is waiting to get fitted for some foot orthotics.  No new problems.        OBJECTIVE FINDINGS:  DP and PT 2/4 on the right.  She has right plantar heel hyperkeratotic tissue buildup.  There is no erythema, no drainage, no odor, no calor.       ASSESSMENT/PLAN:  Tyloma causing pain on the right heel.  This is doing better.  Diagnosis and treatment options discussed with the patient.  Tyloma was sharp debrided with a #15 blade upon consent.  She relates she is using the urea cream and that is working as well.  She will continue that and get set up for an appointment with [facility] and see me as needed.         Again, thank you for allowing me to participate in the care of your patient.        Sincerely,        Varinder Grace DPM

## 2020-03-10 NOTE — TELEPHONE ENCOUNTER
Patient is scheduled for surgery with Dr. Lyla Mahoney     Spoke with: Mery     Date of Surgery: 03/20/20     Location: [facility]     Informed patient they will need an adult : Yes     [place] [facility]     [place] Op scheduled on Same day and 04/08     Surgery packet was mailed 03/10     Additional comments: Advised RN will call 1 - 2 business days prior with arrival time and instructions.

## 2020-03-10 NOTE — PROGRESS NOTES
Past Medical History:   Diagnosis Date     Abnormal Pap smear 2/21/2017    Dr said pap was abnormal     Arthritis      Chronic back pain     hx spondylolisthesis     Chronic pain      COPD (chronic obstructive pulmonary disease) (H)     PFT's 2013 FEV1/FVC = 2.68/3.20 = 84%     H/O gastric bypass 2007     Hepatitis C     treated ribaviron & interferon in 2008 for serotype 2 with failed 6 month treatment     Hyperlipidemia     on simvatatin     Insomnia      Other chronic pain      Positive TB test     has taken INH     RLS (restless legs syndrome)      Seasonal allergies      TB lung, latent 1994    treated     Uncomplicated asthma      Wounds and injuries 2003     Patient Active Problem List   Diagnosis     Chronic hepatitis C (H)     Hyperlipidemia     Bariatric surgery status     Radicular leg pain     Spondylolisthesis, grade 2     Marijuana smoker, continuous     Depression     Lumbar stenosis     Personal history of spine surgery     HIV exposure     Pseudoarthrosis of lumbar spine with nonunion     Lumbar pseudoarthrosis     Dermal and epidermal nevus     Weight loss     COPD (chronic obstructive pulmonary disease) (H)     Malnutrition (H)     Encounter for pancreatic duct stent exchange     Lumbar spondylosis     Nuclear sclerosis of both eyes     Past Surgical History:   Procedure Laterality Date     CHOLECYSTECTOMY       D & C  1987     ENDOSCOPIC ULTRASOUND UPPER GASTROINTESTINAL TRACT (GI) N/A 10/21/2019    Procedure: ENDOSCOPIC ULTRASOUND, ESOPHAGOSCOPY / UPPER GASTROINTESTINAL TRACT (GI) with jejunalgastrostomy with stent placement x 2, dilation tract;  Surgeon: Ramesh Self MD;  [facility]     ENDOSCOPIC ULTRASOUND UPPER GASTROINTESTINAL TRACT (GI) N/A 1/8/2020    Procedure: ENDOSCOPIC ULTRASOUND, ESOPHAGOSCOPY / UPPER GASTROINTESTINAL TRACT (GI)  with stent exchange;  Surgeon: Ramesh Self MD;  [facility]     ESOPHAGOSCOPY, GASTROSCOPY, DUODENOSCOPY (EGD), COMBINED   7/30/2014    Procedure: COMBINED ESOPHAGOSCOPY, GASTROSCOPY, DUODENOSCOPY (EGD);  Surgeon: Ramesh Self MD;  [facility]     ESOPHAGOSCOPY, GASTROSCOPY, DUODENOSCOPY (EGD), COMBINED N/A 8/14/2019    Procedure: ESOPHAGOGASTRODUODENOSCOPY, WITH BIOPSY;  Surgeon: Johann Hazel MD;  [facility]     FRACTURE TX, ANKLE RT/LT  1991    ORIF rt ankle     FUSION SPINE POSTERIOR ONE LEVEL N/A 11/21/2014    Procedure: FUSION SPINE POSTERIOR ONE LEVEL;  Surgeon: Amilcar Baldwin MD;  [facility]     FUSION SPINE POSTERIOR ONE LEVEL Right 7/30/2015    Procedure: FUSION SPINE POSTERIOR ONE LEVEL;  Surgeon: Amilcar Baldwin MD;  [facility]     GASTRIC BYPASS  2007    lost 150 lbs     GRAFT BONE FROM ILIAC CREST Left 11/21/2014    Procedure: GRAFT BONE FROM ILIAC CREST;  Surgeon: Amilcar Baldwin MD;  [facility]     hardware removal       INJECT PARAVERTEBRAL FACET JOINT LUMBAR / SACRAL FIRST Bilateral 9/30/2019    Procedure: Lumbar Medial Branch Nerve Block Injection Bilateral;  Surgeon: Hira Rodríguez MD;  [facility]     OPTICAL TRACKING SYSTEM FUSION POSTERIOR SPINE LUMBAR  11/20/2013    Procedure: OPTICAL TRACKING SYSTEM FUSION SPINE POSTERIOR LUMBAR ONE LEVEL;  Lumbar 4-5 Transforaminal Interbody Fusion;  Surgeon: Amilcar Baldwin MD;  [facility]     ORTHOPEDIC SURGERY      shoulder surgery, removed bone spur     TUBAL LIGATION       Social History     Socioeconomic History     Marital status:      Spouse name: Not on file     Number of children: Not on file     Years of education: Not on file     Highest education level: Not on file   Occupational History     Not on file   Social Needs     Financial resource strain: Not on file     Food insecurity     Worry: Not on file     Inability: Not on file     Transportation needs     Medical: Not on file     Non-medical: Not on file   Tobacco Use     Smoking status: Former  Smoker     Packs/day: 1.00     Years: 30.00     Pack years: 30.00     Types: Cigarettes     Last attempt to quit: 2014     Years since quittin.7     Smokeless tobacco: Never Used     Tobacco comment: pack a day   Substance and Sexual Activity     Alcohol use: Not Currently     Alcohol/week: 0.0 standard drinks     Comment: x3 drinks per year     Drug use: Yes     Types: Marijuana     Comment: Marijuana---- denies use since 2014       Sexual activity: Not Currently     Partners: Male     Birth control/protection: Post-menopausal   Lifestyle     Physical activity     Days per week: Not on file     Minutes per session: Not on file     Stress: Not on file   Relationships     Social connections     Talks on phone: Not on file     Gets together: Not on file     Attends Oriental orthodox service: Not on file     Active member of club or organization: Not on file     Attends meetings of clubs or organizations: Not on file     Relationship status: Not on file     Intimate partner violence     Fear of current or ex partner: Not on file     Emotionally abused: Not on file     Physically abused: Not on file     Forced sexual activity: Not on file   Other Topics Concern     Parent/sibling w/ CABG, MI or angioplasty before 65F 55M? Not Asked   Social History Narrative    Lives in [place] with significant other and 3 cats.  Pt does not work currently, has applied for disability      Family History   Problem Relation Age of Onset     Hypertension Father      Respiratory Father         COPD, smoked     Glaucoma Father      Sleep Apnea Father      C.A.D. Mother         CHF,  age 59 of [place], smoked     Crohn's Disease Brother      Diabetes Maternal Grandmother         insulin dependent     Diabetes Paternal Grandmother      Glaucoma Paternal Grandmother      Alzheimer Disease Paternal Grandmother      Melanoma No family hx of      Skin Cancer No family hx of      Macular Degeneration No family hx of      SUBJECTIVE  FINDINGS:  A 49-year-old female returns to clinic for tyloma causing pain on the right heel.  She relates it is much better with the debridement.  She relates she is waiting to get fitted for some foot orthotics.  No new problems.        OBJECTIVE FINDINGS:  DP and PT 2/4 on the right.  She has right plantar heel hyperkeratotic tissue buildup.  There is no erythema, no drainage, no odor, no calor.       ASSESSMENT/PLAN:  Tyloma causing pain on the right heel.  This is doing better.  Diagnosis and treatment options discussed with the patient.  Tyloma was sharp debrided with a #15 blade upon consent.  She relates she is using the urea cream and that is working as well.  She will continue that and get set up for an appointment with Orth[facility] Pro[facility] and see me as needed.

## 2020-03-10 NOTE — PATIENT INSTRUCTIONS
Thanks for coming today.  [facility]  [address] [place] [address]    To schedule future appointments in Ortho Clinic, you may call [phone].    To schedule ordered imaging by your provider:   Call Central Imaging [phone]    To schedule an injection ordered by your provider:  Call Central Imaging Injection scheduling line: [phone]  Bulbhart available online at:  [email or web address]    Please call if any further questions or concerns ([phone]).  Clinic hours 8 am to 5 pm.    Return to clinic (call) if symptoms worsen or fail to improve.

## 2020-03-10 NOTE — NURSING NOTE
Kristina Eldridge's goals for this visit include:   Chief Complaint   Patient presents with     RECHECK     Follow up left foot callus       She requests these members of her care team be copied on today's visit information: yes    PCP: Varinder Crespo    Referring Provider:  Varinder Crespo MD  [address] [place] [id number]    /79 (BP Location: Left arm, Patient Position: Sitting, Cuff Size: Adult Small)   Pulse 65   LMP 06/11/2012 (LMP Unknown)   SpO2 100%     Do you need any medication refills at today's visit? No  Enrique Alcantara student MA

## 2020-03-13 DIAGNOSIS — M79.18 MYOFASCIAL PAIN: ICD-10-CM

## 2020-03-13 RX ORDER — BACLOFEN 10 MG/1
5 TABLET ORAL 3 TIMES DAILY
Qty: 45 TABLET | Refills: 0 | Status: SHIPPED | OUTPATIENT
Start: 2020-03-13 | End: 2020-04-10

## 2020-03-16 ENCOUNTER — TELEPHONE (OUTPATIENT)
Dept: OPHTHALMOLOGY | Facility: CLINIC | Age: 50
End: 2020-03-16

## 2020-03-18 ENCOUNTER — TELEPHONE (OUTPATIENT)
Dept: ANESTHESIOLOGY | Facility: CLINIC | Age: 50
End: 2020-03-18

## 2020-03-18 NOTE — TELEPHONE ENCOUNTER
I called and spoke with the pt and informed her the below message:    We re taking every precaution to prevent the spread of COVID-19. Our top priority is to protect and care  for our patients. After discussing your medical history with your provider, he/she has recommended  that we reschedule your upcoming procedure.    We re choosing to do this because patients recovering from surgery are more susceptible to illness than  they would be otherwise. Rescheduling your procedure helps us protect you from illness.    As we continue to monitor the impact of COVID-19, we will reach out to you in the next couple weeks to  reschedule your procedure. If you have questions between now and when we connect with you, please  do not hesitate to call us.     Pt got very upset because she waited a month to get this procedure and now this is cancelled.     I informed the pt this is just an unfortunate situation and will try to get you in as soon as possible for her next procedure.    Erum Santillan, CMA

## 2020-04-08 DIAGNOSIS — J45.30 MILD PERSISTENT ASTHMA, UNCOMPLICATED: ICD-10-CM

## 2020-04-08 RX ORDER — MONTELUKAST SODIUM 10 MG/1
10 TABLET ORAL AT BEDTIME
Qty: 90 TABLET | Refills: 3 | Status: SHIPPED | OUTPATIENT
Start: 2020-04-08 | End: 2021-04-09

## 2020-04-10 DIAGNOSIS — M79.18 MYOFASCIAL PAIN: ICD-10-CM

## 2020-04-10 RX ORDER — GABAPENTIN 300 MG/1
300 CAPSULE ORAL 3 TIMES DAILY
Qty: 90 CAPSULE | Refills: 0 | Status: SHIPPED | OUTPATIENT
Start: 2020-04-10 | End: 2020-05-15

## 2020-04-10 RX ORDER — BACLOFEN 10 MG/1
5 TABLET ORAL 3 TIMES DAILY
Qty: 45 TABLET | Refills: 0 | Status: SHIPPED | OUTPATIENT
Start: 2020-04-10 | End: 2020-05-15

## 2020-04-14 DIAGNOSIS — J30.1 CHRONIC SEASONAL ALLERGIC RHINITIS DUE TO POLLEN: ICD-10-CM

## 2020-04-14 RX ORDER — CETIRIZINE HYDROCHLORIDE 10 MG/1
TABLET ORAL
Qty: 30 TABLET | Refills: 3 | Status: CANCELLED | OUTPATIENT
Start: 2020-04-14

## 2020-04-16 RX ORDER — CETIRIZINE HYDROCHLORIDE 10 MG/1
10 TABLET ORAL DAILY
Qty: 30 TABLET | Refills: 11 | Status: SHIPPED | OUTPATIENT
Start: 2020-04-16 | End: 2021-04-26

## 2020-04-16 NOTE — TELEPHONE ENCOUNTER
CETIRIZINE 10MG TABLETS     Last Written Prescription Date:  11/20/2019  Last Fill Quantity: 30,   # refills: 3  Last Office Visit : 2/20/2020  Future Office visit:  None  30 Tabs, 11 Refills  Sent to pharm 4/16/2020      Simona Quintanilla RN  Central Triage Red Flags/Med Refills

## 2020-04-16 NOTE — TELEPHONE ENCOUNTER
LPN called and spoke to pt.  RNCC noted that pt was due for a follow up as the clinic is prescribing Gabapentin and Baclofen. Pt was last seen in Sept-19.  Pt had a procedure scheduled for 3/19/20, but it was canceled due to Pandemic.     Pt was offered a virtual visit with MARK, on 4/17/20.    Becky Mcknight LPN

## 2020-04-17 ENCOUNTER — VIRTUAL VISIT (OUTPATIENT)
Dept: ANESTHESIOLOGY | Facility: CLINIC | Age: 50
End: 2020-04-17
Payer: COMMERCIAL

## 2020-04-17 DIAGNOSIS — M47.816 LUMBAR SPONDYLOSIS: Primary | ICD-10-CM

## 2020-04-17 RX ORDER — GUANFACINE 1 MG/1
1 TABLET ORAL AT BEDTIME
Qty: 30 TABLET | Refills: 0 | Status: SHIPPED | OUTPATIENT
Start: 2020-04-17 | End: 2020-05-27

## 2020-04-17 ASSESSMENT — PAIN SCALES - GENERAL: PAINLEVEL: EXTREME PAIN (8)
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
## 2020-04-21 NOTE — PATIENT INSTRUCTIONS
Medications:    Continue Gabapentin 300 mg up to 3 times daily.     Continue Baclofen 10 mg, Three times daily.     Trial of Voltaren gel 1%, apply to painful area up to 4 times daily     Trial guanfacine 1 mg at bedtime for anxiety, sleep, and pain.    When calling in for refills for your opioid medication- You MUST call (Or MyChart) the clinic DIRECTLY and at least 7 days before you are needing your medication refilled.    Referrals:    Psychology Referral placed-     To make an appointment call any of these providers:     Pain psychology Therapies Requested- Biofeedback, Mindfulness training, CBT, Coping and relaxation therapy.     Dr. Poncho Hebert [phone]  Dr. Charity Keenan [phone]  Dr. Royer Bartholomew [phone]  Dr. Janet Sanchez [phone]  Dr. Hilda Crisostomo [phone]      Procedures:    We will contact you to schedule your procedure when our procedure center re-opens.   Please call out office if you have questions in the interim.       Recommended Follow up:  8 weeks or sooner if indicated.         To speak with a nurse, schedule/reschedule/cancel a clinic appointment, or request a medication refill call: [phone]    You can also reach us by [email or web address]: [email or web address]    Please provide the clinic with a minium of 1 week notice, on all prescription refills.

## 2020-04-24 ENCOUNTER — PREP FOR PROCEDURE (OUTPATIENT)
Dept: GASTROENTEROLOGY | Facility: CLINIC | Age: 50
End: 2020-04-24

## 2020-04-24 ENCOUNTER — PATIENT OUTREACH (OUTPATIENT)
Dept: GASTROENTEROLOGY | Facility: CLINIC | Age: 50
End: 2020-04-24

## 2020-04-24 DIAGNOSIS — R62.7 FAILURE TO THRIVE IN ADULT: Primary | ICD-10-CM

## 2020-04-24 NOTE — TELEPHONE ENCOUNTER
Per Dr. Self  yes late May or early Manjula for upper endoscopy with stent removal/exchange     Please assist in scheduling:     Procedure/Imaging/Clinic: Upper GI  Physician: Dr. Self  Timing: late May, early June 2020  Procedure length:50 minutes  Anesthesia:general  Dx: FFT  Location: [facility]     Called to discuss with patient. Reminder given about need for preop.    Hilda Navarro, RN Care Coordinator

## 2020-05-01 ENCOUNTER — TELEPHONE (OUTPATIENT)
Dept: PSYCHOLOGY | Facility: CLINIC | Age: 50
End: 2020-05-01

## 2020-05-01 NOTE — TELEPHONE ENCOUNTER
Called patient on Health Psychology referral list. She was not interested in scheduling an appointment at this time. I gave her my contact information and she will reach out if/when she is interested.   Debra Robison, PhD,   Health Psychology  [phone]

## 2020-05-11 ENCOUNTER — TELEPHONE (OUTPATIENT)
Dept: OPHTHALMOLOGY | Facility: CLINIC | Age: 50
End: 2020-05-11

## 2020-05-11 NOTE — TELEPHONE ENCOUNTER
[facility] Call Center    Phone Message    May a detailed message be left on voicemail: yes     Reason for Call: Other: Pt is requesting to reschedule her cataract surgery with Dr. Mahoney; she states it was initially cancelled in March due to Covid concerns. Please advise on next steps.     Action Taken: Message routed to:  [facility] Ophthalmology    Travel Screening: Not Applicable

## 2020-05-15 DIAGNOSIS — M79.18 MYOFASCIAL PAIN: ICD-10-CM

## 2020-05-15 RX ORDER — GABAPENTIN 300 MG/1
300 CAPSULE ORAL 3 TIMES DAILY
Qty: 90 CAPSULE | Refills: 1 | Status: SHIPPED | OUTPATIENT
Start: 2020-05-15 | End: 2020-07-17

## 2020-05-15 RX ORDER — BACLOFEN 10 MG/1
10 TABLET ORAL 3 TIMES DAILY
Qty: 90 TABLET | Refills: 1 | Status: SHIPPED | OUTPATIENT
Start: 2020-05-15 | End: 2021-01-07

## 2020-05-15 NOTE — TELEPHONE ENCOUNTER
Baclofen refilled at 10mg TID, per last clinic note 4/17/20 with Dianne Glass.     Gabapentin refilled, no changes.     Sumaya Cevallos, RN, BSN

## 2020-05-21 ENCOUNTER — TELEPHONE (OUTPATIENT)
Dept: OPHTHALMOLOGY | Facility: CLINIC | Age: 50
End: 2020-05-21

## 2020-05-21 DIAGNOSIS — Z11.59 ENCOUNTER FOR SCREENING FOR OTHER VIRAL DISEASES: Primary | ICD-10-CM

## 2020-05-21 NOTE — TELEPHONE ENCOUNTER
Patient is scheduled for surgery with Dr. Lyla Mahoney     Spoke with: Mery     Date of Surgery: 05/29/20     Location: [facility]:  [address] [place] [address]     Informed patient they will need an adult : Yes     H&P will be completed at: PAC CLINIC 5/27      Post Op scheduled on Same day and 6/15     Surgery packet was mailed 5/21     Additional comments: Advised RN will call 1 - 2 business days prior with arrival time and instructions.

## 2020-05-21 NOTE — TELEPHONE ENCOUNTER
Spoke with Stas and left my direct number [phone] to have Mery call back to schedule surgery with Dr. Lyla Mahoney.

## 2020-05-22 ENCOUNTER — TELEPHONE (OUTPATIENT)
Dept: GASTROENTEROLOGY | Facility: CLINIC | Age: 50
End: 2020-05-22

## 2020-05-22 DIAGNOSIS — Z11.59 ENCOUNTER FOR SCREENING FOR OTHER VIRAL DISEASES: Primary | ICD-10-CM

## 2020-05-22 PROBLEM — R62.7 FAILURE TO THRIVE IN ADULT: Status: ACTIVE | Noted: 2020-05-22

## 2020-05-22 NOTE — TELEPHONE ENCOUNTER
FUTURE VISIT INFORMATION      SURGERY INFORMATION:    Date: 5/29/20    Location:  or    Surgeon:  Lyla Mahoney MD    Anesthesia Type:  Combined MAC with Topical     Procedure: RIGHT PHACOEMULSIFICATION, CATARACT, WITH INTRAOCULAR LENS IMPLANT     Consult: ov 2/25    RECORDS REQUESTED FROM:       Primary Care Provider: Varinder Crespo MD- [facility]    Most recent EKG+ Tracing: 3/11/19    Most recent ECHO: 2/5/19    Most recent PFT's: 2/15/19    Most recent Sleep Study: 7/28/16

## 2020-05-22 NOTE — TELEPHONE ENCOUNTER
Spoke to patient in regards to scheduled procedure. Informed patient she is scheduled with Dr. Self on 6/2/2020. Informed patient she will need an updated pre-op physical within 30 days of her procedure. Informed patient she will need a  and someone to monitor her for 24 hours after the procedure. Informed patient all scheduling details will be sent to the her mychart per her preference.

## 2020-05-26 ENCOUNTER — APPOINTMENT (OUTPATIENT)
Dept: LAB | Facility: CLINIC | Age: 50
End: 2020-05-26
Payer: COMMERCIAL

## 2020-05-27 ENCOUNTER — PRE VISIT (OUTPATIENT)
Dept: SURGERY | Facility: CLINIC | Age: 50
End: 2020-05-27

## 2020-05-27 ENCOUNTER — OFFICE VISIT (OUTPATIENT)
Dept: SURGERY | Facility: CLINIC | Age: 50
End: 2020-05-27
Payer: COMMERCIAL

## 2020-05-27 ENCOUNTER — ANESTHESIA EVENT (OUTPATIENT)
Dept: SURGERY | Facility: AMBULATORY SURGERY CENTER | Age: 50
End: 2020-05-27

## 2020-05-27 VITALS
BODY MASS INDEX: 19.24 KG/M2 | TEMPERATURE: 99 F | RESPIRATION RATE: 16 BRPM | HEART RATE: 82 BPM | OXYGEN SATURATION: 97 % | SYSTOLIC BLOOD PRESSURE: 105 MMHG | DIASTOLIC BLOOD PRESSURE: 75 MMHG | HEIGHT: 60 IN | WEIGHT: 98 LBS

## 2020-05-27 DIAGNOSIS — H25.811 MIXED TYPE AGE-RELATED CATARACT, RIGHT EYE: Primary | ICD-10-CM

## 2020-05-27 DIAGNOSIS — Z01.818 PREOP EXAMINATION: Primary | ICD-10-CM

## 2020-05-27 RX ORDER — IPRATROPIUM BROMIDE AND ALBUTEROL SULFATE 2.5; .5 MG/3ML; MG/3ML
3 SOLUTION RESPIRATORY (INHALATION) ONCE
Status: CANCELLED | OUTPATIENT
Start: 2020-05-27 | End: 2020-05-27

## 2020-05-27 RX ORDER — PREDNISOLONE ACETATE 10 MG/ML
SUSPENSION/ DROPS OPHTHALMIC
Qty: 1 BOTTLE | Refills: 1 | Status: SHIPPED | OUTPATIENT
Start: 2020-05-27 | End: 2020-05-27

## 2020-05-27 RX ORDER — OFLOXACIN 3 MG/ML
SOLUTION/ DROPS OPHTHALMIC
Qty: 1 BOTTLE | Refills: 0 | Status: SHIPPED | OUTPATIENT
Start: 2020-05-27 | End: 2020-05-27

## 2020-05-27 ASSESSMENT — ENCOUNTER SYMPTOMS: SEIZURES: 0

## 2020-05-27 ASSESSMENT — MIFFLIN-ST. JEOR: SCORE: 981.03

## 2020-05-27 ASSESSMENT — PATIENT HEALTH QUESTIONNAIRE - PHQ9: SUM OF ALL RESPONSES TO PHQ QUESTIONS 1-9: 6

## 2020-05-27 ASSESSMENT — COPD QUESTIONNAIRES
CAT_SEVERITY: MILD
COPD: 1

## 2020-05-27 ASSESSMENT — LIFESTYLE VARIABLES: TOBACCO_USE: 1

## 2020-05-27 ASSESSMENT — PAIN SCALES - GENERAL: PAINLEVEL: NO PAIN (0)

## 2020-05-27 NOTE — ANESTHESIA PREPROCEDURE EVALUATION
Anesthesia Pre-Procedure Evaluation    Patient: Kristina Eldridge   MRN:     [id number] Gender:   female   Age:    49 year old :      1970        Preoperative Diagnosis: Nuclear sclerosis of both eyes [H25.13]   Procedure(s):  RIGHT PHACOEMULSIFICATION, CATARACT, WITH INTRAOCULAR LENS IMPLANT     LABS:  CBC:   Lab Results   Component Value Date    WBC 13.1 (H) 2020    WBC 7.9 10/21/2019    HGB 14.1 2020    HGB 12.7 10/21/2019    HCT 43.6 2020    HCT 38.6 10/21/2019     2020     10/21/2019     BMP:   Lab Results   Component Value Date     2019     2019    POTASSIUM 3.5 2019    POTASSIUM 3.2 (L) 2019    CHLORIDE 104 2019    CHLORIDE 101 2019    CO2 30 2019    CO2 31 2019    BUN 8 2020    BUN 12 10/21/2019    CR 0.94 2019    CR 0.85 2019    GLC 74 2019    GLC 85 2019     COAGS:   Lab Results   Component Value Date    PTT 30 2015    INR 1.03 2020     POC:   Lab Results   Component Value Date     (H) 2020    HCG Negative 2014    HCGS Negative 2015     OTHER:   Lab Results   Component Value Date    A1C 5.3 2020    MARIA FERNANDA 8.2 (L) 2019    PHOS 3.1 2012    MAG 2.0 2015    ALBUMIN 3.6 2019    PROTTOTAL 6.6 (L) 2019    ALT 55 (H) 2019    AST 35 2019    ALKPHOS 98 2019    BILITOTAL 0.4 2019    TSH 3.12 2019    CRP 4.1 2019    SED 16 2013        Preop Vitals    BP Readings from Last 3 Encounters:   20 105/75   03/10/20 125/79   20 118/78    Pulse Readings from Last 3 Encounters:   20 82   03/10/20 65   20 108      Resp Readings from Last 3 Encounters:   20 16   20 16   20 18    SpO2 Readings from Last 3 Encounters:   20 97%   03/10/20 100%   20 98%      Temp Readings from Last 1 Encounters:   20 99  F (37.2  C) (Oral)    Ht  Readings from Last 1 Encounters:   05/27/20 1.524 m (5')      Wt Readings from Last 1 Encounters:   05/27/20 44.5 kg (98 lb)    Estimated body mass index is 19.14 kg/m  as calculated from the following:    Height as of this encounter: 1.524 m (5').    Weight as of this encounter: 44.5 kg (98 lb).     LDA:  Peripheral IV 11/22/14 Left Hand (Active)   Number of days: 2013       Closed/Suction Drain 1 Left Back Accordion 10 Pashto (Active)   Number of days: 2014        Past Medical History:   Diagnosis Date     Abnormal Pap smear 2/21/2017     said pap was abnormal     Arthritis      Chronic back pain     hx spondylolisthesis     Chronic pain      COPD (chronic obstructive pulmonary disease) (H)     PFT's 2013 FEV1/FVC = 2.68/3.20 = 84%     H/O gastric bypass 2007     Hepatitis C     treated ribaviron & interferon in 2008 for serotype 2 with failed 6 month treatment     Hyperlipidemia     on simvatatin     Insomnia      Other chronic pain      Positive TB test     has taken INH     RLS (restless legs syndrome)      Seasonal allergies      TB lung, latent 1994    treated     Uncomplicated asthma      Wounds and injuries 2003      Past Surgical History:   Procedure Laterality Date     CHOLECYSTECTOMY       D & C  1987     ENDOSCOPIC ULTRASOUND UPPER GASTROINTESTINAL TRACT (GI) N/A 10/21/2019    Procedure: ENDOSCOPIC ULTRASOUND, ESOPHAGOSCOPY / UPPER GASTROINTESTINAL TRACT (GI) with jejunalgastrostomy with stent placement x 2, dilation tract;  Surgeon: Ramesh Self MD;  Location:  OR     ENDOSCOPIC ULTRASOUND UPPER GASTROINTESTINAL TRACT (GI) N/A 1/8/2020    Procedure: ENDOSCOPIC ULTRASOUND, ESOPHAGOSCOPY / UPPER GASTROINTESTINAL TRACT (GI)  with stent exchange;  Surgeon: Ramesh Self MD;  Location:  OR     ESOPHAGOSCOPY, GASTROSCOPY, DUODENOSCOPY (EGD), COMBINED  7/30/2014    Procedure: COMBINED ESOPHAGOSCOPY, GASTROSCOPY, DUODENOSCOPY (EGD);  Surgeon: Ramesh Self MD;  Location:  GI      ESOPHAGOSCOPY, GASTROSCOPY, DUODENOSCOPY (EGD), COMBINED N/A 8/14/2019    Procedure: ESOPHAGOGASTRODUODENOSCOPY, WITH BIOPSY;  Surgeon: Johann Hazel MD;  [facility]: [facility]     FRACTURE TX, ANKLE RT/LT  1991    ORIF rt ankle     FUSION SPINE POSTERIOR ONE LEVEL N/A 11/21/2014    Procedure: FUSION SPINE POSTERIOR ONE LEVEL;  Surgeon: Amilcar Baldwin MD;  Location: [facility]     FUSION SPINE POSTERIOR ONE LEVEL Right 7/30/2015    Procedure: FUSION SPINE POSTERIOR ONE LEVEL;  Surgeon: Amilcar Baldwin MD;  [facility]     GASTRIC BYPASS  2007    lost 150 lbs     GRAFT BONE FROM ILIAC CREST Left 11/21/2014    Procedure: GRAFT BONE FROM ILIAC CREST;  Surgeon: Amilcar Baldwin MD;  Location: [facility]     hardware removal       INJECT PARAVERTEBRAL FACET JOINT LUMBAR / SACRAL FIRST Bilateral 9/30/2019    Procedure: Lumbar Medial Branch Nerve Block Injection Bilateral;  Surgeon: Hira Rodríguez MD;  Location: [facility]     OPTICAL TRACKING SYSTEM FUSION POSTERIOR SPINE LUMBAR  11/20/2013    Procedure: OPTICAL TRACKING SYSTEM FUSION SPINE POSTERIOR LUMBAR ONE LEVEL;  Lumbar 4-5 Transforaminal Interbody Fusion;  Surgeon: Amilcar Baldwin MD;  Location[facility]     ORTHOPEDIC SURGERY      shoulder surgery, removed bone spur     TUBAL LIGATION        Allergies   Allergen Reactions     Bee Venom Anaphylaxis, Anxiety, Difficulty breathing, Hives, Itching, Nausea and Vomiting, Palpitations, Shortness Of Breath and Swelling     Opium Alkaloids, Hcls Itching     Can take opiate derived narcotics with Benadryl.     Latex Rash        Anesthesia Evaluation     . Pt has had prior anesthetic. Type: General, MAC and Regional    No history of anesthetic complications          ROS/MED HX    ENT/Pulmonary: Comment: Uses Dulera & Atrovent bid. Last used albuterol >1 yr ago (uses more during summer months)    PFT 2/2019: normal function, mild air trapping    (+)tobacco use, Past use 30  pk yr hx, quit 2014 packs/day  Moderate Persistent asthma Treatment: Inhaler daily,  mild COPD, , . .    Neurologic:  - neg neurologic ROS    (-) seizures, CVA and Neuropathy   Cardiovascular:  - neg cardiovascular ROS   (+) ----. : . . . :. . Previous cardiac testing Echodate:2/5/19results:date: results:ECG reviewed date:3/11/19 results: date: results:          METS/Exercise Tolerance: Comment: Ascends 4 flights of stairs to apartment daily. Denies SOB & CP. >4 METS   Hematologic:  - neg hematologic  ROS      (-) history of blood clots and History of Transfusion   Musculoskeletal: Comment: Chronic knee & back pain.   S/P 5 prior lumbar surgeries, s/p fusion.  (+) arthritis,  -       GI/Hepatic: Comment: S/P gastric bypass 2007, hx malnutrition w/ weight loss    S/P hepatitis treatment    (+) hepatitis type C,       Renal/Genitourinary:  - ROS Renal section negative       Endo:  - neg endo ROS    (-) Type II DM   Psychiatric:  - neg psychiatric ROS       Infectious Disease: Comment: S/p TB treatment 1994  (+) TB,       Malignancy:      - no malignancy   Other:    (+) H/O Chronic Pain,                       PHYSICAL EXAM:   Mental Status/Neuro: A/A/O; Age Appropriate   Airway: Facies: Feasible  Mallampati: I  Mouth/Opening: Full  TM distance: > 6 cm  Neck ROM: Full   Respiratory: Auscultation: CTAB (Single expiratory wheeze noted)     Resp. Rate: Normal     Resp. Effort: Normal      CV: Rhythm: Regular  Rate: Age appropriate  Heart: Normal Sounds  Edema: None   Comments:      Dental: Endentulous                Assessment:   ASA SCORE: 3    H&P: History and physical reviewed and following examination; no interval change.     Smoking Status:  Active Smoker       - patient did not smoke on day of surgery       - instructed to abstain from smoking on day of procedure   NPO Status: NPO Appropriate     Plan:   Anes. Type:  MAC   Pre-Medication: None   Induction:  N/a   Airway: Native Airway   Access/Monitoring: PIV    Maintenance: N/a     Postop Plan:   Postop Pain: None  Postop Sedation/Airway: Not planned  Disposition: Outpatient     PONV Management: Adult Risk Factors: Female   Prevention: Ondansetron, Dexamethasone     CONSENT: Direct conversation   Plan and risks discussed with: Patient                   PAC Discussion and Assessment    ASA Classification: 3  Case is suitable for: ASC  Anesthetic techniques and relevant risks discussed: MAC with GA as backup and Regional  Invasive monitoring and risk discussed: No  Types:   Possibility and Risk of blood transfusion discussed: No  NPO instructions given:   Additional anesthetic preparation and risks discussed:   Needs early admission to pre-op area:   Other:     PAC Resident/NP Anesthesia Assessment:  Kristina Eldridge is a 49 year old female scheduled to undergo RIGHT PHACOEMULSIFICATION, CATARACT, WITH INTRAOCULAR LENS IMPLANT  with Lyla Mahoney MD on 5/29/20 at [facility] for treatment of Nuclear sclerosis of both eyes.     Pt has had prior anesthetic. Type: General, MAC and Regional    No history of anesthetic complications    She has the following specific operative considerations:   # DORIS 1/8 = low risk  # VTE risk: 0.26%  # Risk of PONV score = 2.  If > 2, anti-emetic intervention recommended.  # Anesthesia considerations:  Refer to PAC assessment in anesthesia records      CARDIAC: METS 4,  Ascends 4 flights of stairs to apartment daily. Denies SOB & CP.     # RCRI : No serious cardiac risks.  0.4% risk of major adverse cardiac event.     # EKG 3/11/19: normal         # Echo 2/5/19:  EF 65-70%, Myxematous mitral valve w/o significant regurgitation. Aortic  valve w/ fusion of left and right coronary cusp w/o significant stenosis.    PULMONARY:     # Former smoker, 30 pk yr hx, quit 2014    # Asthma, COPD.  Uses Dulera & Atrovent bid. Last used albuterol 1 yr ago (uses more during summer months). Will order Duoneb for DOS.    # PFT 2/2019:  normal function, mild air trapping    GI: no GERD      # s/p gastric bypass 2007, had significant weight loss & malnutrition    # Hx Hep C, s/p treatment    ENDO: BMI 19    # No DM    ORTHO: full ROM of neck, no TMJ    # Chronic LBP, s/p 5 lumbar surgeries w/ fusion    IMMUNE:    # Latent TB, s/p treatment 1994    Patient is optimized and is acceptable candidate for the proposed procedure. No further diagnostic evaluation is needed.        Reviewed and Signed by PAC Mid-Level Provider/Resident  Mid-Level Provider/Resident: Rocio Ventura PA-C  Date: 5/27/20  Time: 1441    Attending Anesthesiologist Anesthesia Assessment:        Anesthesiologist:   Date:   Time:   Pass/Fail:   Disposition:     PAC Pharmacist Assessment:        Pharmacist:   Date:   Time:    Rocio Ventura PA-C

## 2020-05-27 NOTE — H&P
"  Pre-Operative H & P     CC:  Preoperative exam to assess for increased cardiopulmonary risk while undergoing surgery and anesthesia.    Date of Encounter: 5/27/2020  Primary Care Physician:  Varinder Crespo  Reason for Visit: Nuclear sclerosis of both eyes    ALFRED Eldridge is a 48 y/o female who presents for pre-operative H&P in preparation for RIGHT PHACOEMULSIFICATION, CATARACT, WITH INTRAOCULAR LENS IMPLANT  with Lyla Mahoney MD on 5/29/20 at [facility] for treatment of Nuclear sclerosis of both eyes.    Ms. Eldridge has a history of constant blurry vision for several months of both eyes. She does note dryness and \"grit\" of both eyes. Some days she has tearing. She endorses stable floaters each eye. No flashes or curtain. No use of gtts. She now presents for the above procedure.    PMH is also significant for HLD, asthma, mild COPD, 30 pk yr smoking hx (quit 2014), hx Hep C s/p treatment, s/p gastric bypass in 2007, malnutrition, hx spinal compression fractures, s/p 5 lumbar surgeries, latent TB s/p treatment.    History was obtained from patient & chart review.     Past Medical History  Past Medical History:   Diagnosis Date     Abnormal Pap smear 2/21/2017    Dr said pap was abnormal     Arthritis      Chronic back pain     hx spondylolisthesis     Chronic pain      COPD (chronic obstructive pulmonary disease) (H)     PFT's 2013 FEV1/FVC = 2.68/3.20 = 84%     H/O gastric bypass 2007     Hepatitis C     treated ribaviron & interferon in 2008 for serotype 2 with failed 6 month treatment     Hyperlipidemia     on simvatatin     Insomnia      Other chronic pain      Positive TB test     has taken INH     RLS (restless legs syndrome)      Seasonal allergies      TB lung, latent 1994    treated     Uncomplicated asthma      Wounds and injuries 2003       Past Surgical History  Past Surgical History:   Procedure Laterality Date     CHOLECYSTECTOMY       D & C  1987     ENDOSCOPIC " ULTRASOUND UPPER GASTROINTESTINAL TRACT (GI) N/A 10/21/2019    Procedure: ENDOSCOPIC ULTRASOUND, ESOPHAGOSCOPY / UPPER GASTROINTESTINAL TRACT (GI) with jejunalgastrostomy with stent placement x 2, dilation tract;  Surgeon: Ramesh Self MD;  Location: [facility] OR     ENDOSCOPIC ULTRASOUND UPPER GASTROINTESTINAL TRACT (GI) N/A 1/8/2020    Procedure: ENDOSCOPIC ULTRASOUND, ESOPHAGOSCOPY / UPPER GASTROINTESTINAL TRACT (GI)  with stent exchange;  Surgeon: Ramesh Self MD;  Location: [facility] OR     ESOPHAGOSCOPY, GASTROSCOPY, DUODENOSCOPY (EGD), COMBINED  7/30/2014    Procedure: COMBINED ESOPHAGOSCOPY, GASTROSCOPY, DUODENOSCOPY (EGD);  Surgeon: Ramesh Self MD;  Location: [facility] GI     ESOPHAGOSCOPY, GASTROSCOPY, DUODENOSCOPY (EGD), COMBINED N/A 8/14/2019    Procedure: ESOPHAGOGASTRODUODENOSCOPY, WITH BIOPSY;  Surgeon: Johann Hazel MD;  [facility]     FRACTURE TX, ANKLE RT/LT  1991    ORIF rt ankle     FUSION SPINE POSTERIOR ONE LEVEL N/A 11/21/2014    Procedure: FUSION SPINE POSTERIOR ONE LEVEL;  Surgeon: Amilcar Baldwin MD;  Location: [facility] OR     FUSION SPINE POSTERIOR ONE LEVEL Right 7/30/2015    Procedure: FUSION SPINE POSTERIOR ONE LEVEL;  Surgeon: Amilcar Baldwin MD;  [facility]: [facility] OR     GASTRIC BYPASS  2007    lost 150 lbs     GRAFT BONE FROM ILIAC CREST Left 11/21/2014    Procedure: GRAFT BONE FROM ILIAC CREST;  Surgeon: Amilcar Baldwin MD;  Location: [facility] OR     hardware removal       INJECT PARAVERTEBRAL FACET JOINT LUMBAR / SACRAL FIRST Bilateral 9/30/2019    Procedure: Lumbar Medial Branch Nerve Block Injection Bilateral;  Surgeon: Hira Rodríguez MD;  Location: [facility] OR     OPTICAL TRACKING SYSTEM FUSION POSTERIOR SPINE LUMBAR  11/20/2013    Procedure: OPTICAL TRACKING SYSTEM FUSION SPINE POSTERIOR LUMBAR ONE LEVEL;  Lumbar 4-5 Transforaminal Interbody Fusion;  Surgeon: Amilcar Baldwin MD;  Location: [facility] OR     ORTHOPEDIC SURGERY   "    shoulder surgery, removed bone spur     TUBAL LIGATION         Hx of Blood transfusions/reactions: no     Hx of abnormal bleeding or anti-platelet use: no    Menstrual history: Patient's last menstrual period was 06/11/2012 (lmp unknown).:      Steroid use in the last year: no    Personal or FH with difficulty with Anesthesia:  no    Prior to Admission Medications  Current Outpatient Medications   Medication Sig Dispense Refill     albuterol (VENTOLIN HFA) 108 (90 Base) MCG/ACT inhaler Inhale 2 puffs into the lungs every 4 hours as needed for shortness of breath / dyspnea or wheezing 1 Inhaler 11     ATROVENT HFA 17 MCG/ACT inhaler INHALE 2 PUFFS BY MOUTH INTO LUNGS EVERY 6 HOURS 12.9 g 11     baclofen (LIORESAL) 10 MG tablet Take 1 tablet (10 mg) by mouth 3 times daily 90 tablet 1     cetirizine (ZYRTEC) 10 MG tablet Take 1 tablet (10 mg) by mouth daily (Patient taking differently: Take 10 mg by mouth every morning ) 30 tablet 11     cyanocobalamin (CYANOCOBALAMIN) 1000 MCG/ML injection Inject 1 mL (1,000 mcg) into the muscle every 30 days 1 mL 11     gabapentin (NEURONTIN) 300 MG capsule Take 1 capsule (300 mg) by mouth 3 times daily Titrate to this dose as in your discharge paperwork 90 capsule 1     mometasone-formoterol (DULERA) 100-5 MCG/ACT inhaler Inhale 2 puffs into the lungs 2 times daily 1 Inhaler 11     montelukast (SINGULAIR) 10 MG tablet Take 1 tablet (10 mg) by mouth At Bedtime 90 tablet 3     traZODone (DESYREL) 100 MG tablet TAKE 2 TABLETS(200 MG) BY MOUTH EVERY NIGHT AS NEEDED FOR SLEEP 60 tablet 2     Needle, Disp, 23G X 1\" MISC 1 Device every 30 days For B12 monthly B12 injection 1 each 11     order for DME Nebulizer 1 each 0     spacer (OPTICHAMBER OTTONIEL) holding chamber USE ONCE DAILY 1 each 0     spacer (OPTICHAMBER OTTONIEL) holding chamber USE ONCE DAILY 1 each 0     Spacer/Aero Chamber Mouthpiece MISC 1 Units daily 1 each 0       Allergies  Allergies   Allergen Reactions     Bee Venom " Anaphylaxis, Anxiety, Difficulty breathing, Hives, Itching, Nausea and Vomiting, Palpitations, Shortness Of Breath and Swelling     Opium Alkaloids, Hcls Itching     Can take opiate derived narcotics with Benadryl.     Latex Rash       Social History  Social History     Socioeconomic History     Marital status:      Spouse name: Not on file     Number of children: Not on file     Years of education: Not on file     Highest education level: Not on file   Occupational History     Not on file   Social Needs     Financial resource strain: Not on file     Food insecurity     Worry: Not on file     Inability: Not on file     Transportation needs     Medical: Not on file     Non-medical: Not on file   Tobacco Use     Smoking status: Former Smoker     Packs/day: 1.00     Years: 30.00     Pack years: 30.00     Types: Cigarettes     Last attempt to quit: 2014     Years since quittin.9     Smokeless tobacco: Never Used     Tobacco comment: pack a day   Substance and Sexual Activity     Alcohol use: Not Currently     Alcohol/week: 0.0 standard drinks     Comment: x3 drinks per year     Drug use: Yes     Types: Marijuana     Comment: Marijuana---- denies use since 2014       Sexual activity: Not Currently     Partners: Male     Birth control/protection: Post-menopausal   Lifestyle     Physical activity     Days per week: Not on file     Minutes per session: Not on file     Stress: Not on file   Relationships     Social connections     Talks on phone: Not on file     Gets together: Not on file     Attends Church service: Not on file     Active member of club or organization: Not on file     Attends meetings of clubs or organizations: Not on file     Relationship status: Not on file     Intimate partner violence     Fear of current or ex partner: Not on file     Emotionally abused: Not on file     Physically abused: Not on file     Forced sexual activity: Not on file   Other Topics Concern      Parent/sibling w/ CABG, MI or angioplasty before 65F 55M? Not Asked   Social History Narrative    Lives in [place] with significant other and 3 cats.  Pt does not work currently, has applied for disability        Family History  Family History   Problem Relation Age of Onset     Hypertension Father      Respiratory Father         COPD, smoked     Glaucoma Father      Sleep Apnea Father      C.A.D. Mother         CHF,  age 59 of MI, smoked     Crohn's Disease Brother      Diabetes Maternal Grandmother         insulin dependent     Diabetes Paternal Grandmother      Glaucoma Paternal Grandmother      Alzheimer Disease Paternal Grandmother      Melanoma No family hx of      Skin Cancer No family hx of      Macular Degeneration No family hx of      Anesthesia Reaction No family hx of      Deep Vein Thrombosis (DVT) No family hx of        Preop Vitals    BP Readings from Last 3 Encounters:   20 105/75   03/10/20 125/79   20 118/78    Pulse Readings from Last 3 Encounters:   20 82   03/10/20 65   20 108      Resp Readings from Last 3 Encounters:   20 16   20 16   20 18    SpO2 Readings from Last 3 Encounters:   20 97%   03/10/20 100%   20 98%      Temp Readings from Last 1 Encounters:   20 99  F (37.2  C) (Oral)    Ht Readings from Last 1 Encounters:   20 1.524 m (5')      Wt Readings from Last 1 Encounters:   20 44.5 kg (98 lb)    Estimated body mass index is 19.14 kg/m  as calculated from the following:    Height as of this encounter: 1.524 m (5').    Weight as of this encounter: 44.5 kg (98 lb).       ROS/MED HX  The complete review of systems is negative other than noted in the HPI or here.  Patient denies recent illness, fever and respiratory infection during past month.  Pt denies steroid use during past year.    ENT/Pulmonary: Comment: Uses Dulera & Atrovent bid. Last used albuterol 1 yr ago (uses more during summer months)    PFT  "2/2019: normal function, mild air trapping    (+)tobacco use, Past use 30 pk yr hx, quit 2014 packs/day  Moderate Persistent asthma Treatment: Inhaler daily,  mild COPD, , . .    Neurologic:  - neg neurologic ROS    (-) seizures, CVA and Neuropathy   Cardiovascular:  - neg cardiovascular ROS   (+) ----. : . . . :. . Previous cardiac testing Echodate:2/5/19results:date: results:ECG reviewed date:3/11/19 results: date: results:          METS/Exercise Tolerance: Comment: Ascends 4 flights of stairs to apartment daily. Denies SOB & CP. >4 METS   Hematologic:  - neg hematologic  ROS      (-) history of blood clots and History of Transfusion   Musculoskeletal: Comment: Chronic knee & back pain.   S/P 5 prior lumbar surgeries, s/p fusion.  (+) arthritis,  -       GI/Hepatic: Comment: S/P gastric bypass 2007, hx malnutrition w/ weight loss    S/P hepatitis treatment    (+) hepatitis type C,       Renal/Genitourinary:  - ROS Renal section negative       Endo:  - neg endo ROS    (-) Type II DM   Psychiatric:  - neg psychiatric ROS       Infectious Disease: Comment: S/p TB treatment 1994  (+) TB,       Malignancy:      - no malignancy   Other:    (+) H/O Chronic Pain,               PHYSICAL EXAM:   Mental Status/Neuro: A/A/O; Age Appropriate   Airway: Facies: Feasible  Mallampati: I  Mouth/Opening: Full  TM distance: > 6 cm  Neck ROM: Full   Respiratory: Auscultation: CTAB (Single expiratory wheeze noted)     Resp. Rate: Normal     Resp. Effort: Normal      CV: Rhythm: Regular  Rate: Age appropriate  Heart: Normal Sounds  Edema: None   Comments:      Dental: Endentulous              Temp: 99  F (37.2  C) Temp src: Oral BP: 105/75 Pulse: 82   Resp: 16 SpO2: 97 %         98 lbs 0 oz  5' 0\"   Body mass index is 19.14 kg/m .    Physical Exam  Constitutional: Awake, alert, cooperative, no apparent distress, and appears stated age.  Eyes: Pupils equal, round and reactive to light, extra ocular muscles intact, sclera clear, " conjunctiva normal.  HENT: Normocephalic, oral pharynx with moist mucus membranes, no teeth. No goiter appreciated. No removable dental hardware.  Respiratory: Single expiratory wheeze on left, otherwise clear to auscultation bilaterally, no crackles or wheezing. No SOB when supine.  Cardiovascular: Regular rate and rhythm, normal S1 and S2, and no murmur noted.  Carotids +2, no bruits. No edema. Palpable pulses to radial, DP and PT arteries.   GI: Normal bowel sounds, soft, non-distended, non-tender, no masses palpated.    Lymph/Hematologic: No cervical lymphadenopathy and no supraclavicular lymphadenopathy.  Genitourinary:  deferred  Skin: Warm and dry.  No rashes.   Musculoskeletal: full ROM of neck. There is no redness, warmth, or swelling of the joints. Gross motor strength is normal.    Neurologic: Awake, alert, oriented to name, place and time. Cranial nerves II-XII are grossly intact. Gait is normal. Ambulates from chair to exam table, seats self, lies supine and sits back up w/o assistance.  Neuropsychiatric: Calm, cooperative. Normal affect. Talkative, pleasant. Answers questions appropriately, follows commands w/o difficulty.      PRIOR LABS/DIAGNOSTIC STUDIES:  All labs and imaging personally reviewed    EKG 3/11/19  Sinus rhythm  Normal ECG  No previous ECGs available  Ventricular rate 60 bpm    ECHOCARDIOGRAM 2/5/19  Interpretation Summary  Global and regional left ventricular function is hyperkinetic with an EF of  65-70%.  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  Aortic valve with fusion of left and right coronary cusp without significant  stenosis.    LABS:  CBC:   Lab Results   Component Value Date    WBC 13.1 (H) 01/08/2020    WBC 7.9 10/21/2019    HGB 14.1 01/08/2020    HGB 12.7 10/21/2019    HCT 43.6 01/08/2020    HCT 38.6 10/21/2019     01/08/2020     10/21/2019     BMP:   Lab Results   Component Value Date     07/24/2019     03/05/2019    POTASSIUM  3.5 07/24/2019    POTASSIUM 3.2 (L) 03/05/2019    CHLORIDE 104 07/24/2019    CHLORIDE 101 03/05/2019    CO2 30 07/24/2019    CO2 31 03/05/2019    BUN 8 01/08/2020    BUN 12 10/21/2019    CR 0.94 07/24/2019    CR 0.85 03/05/2019    GLC 74 07/24/2019    GLC 85 03/05/2019     COAGS:   Lab Results   Component Value Date    PTT 30 04/16/2015    INR 1.03 01/08/2020     POC:   Lab Results   Component Value Date     (H) 01/08/2020    HCG Negative 11/21/2014    HCGS Negative 04/16/2015     OTHER:   Lab Results   Component Value Date    A1C 5.3 02/20/2020    MARIA FERNANDA 8.2 (L) 07/24/2019    PHOS 3.1 09/12/2012    MAG 2.0 09/16/2015    ALBUMIN 3.6 07/24/2019    PROTTOTAL 6.6 (L) 07/24/2019    ALT 55 (H) 07/24/2019    AST 35 07/24/2019    ALKPHOS 98 07/24/2019    BILITOTAL 0.4 07/24/2019    TSH 3.12 03/11/2019    CRP 4.1 03/11/2019    SED 16 01/31/2013        Labs today: not indicated  COVID19 testing completed 5/26    Outside records reviewed from: Care Everywhere    ASSESSMENT and PLAN  Kristina Eldridge is a 49 year old female scheduled to undergo RIGHT PHACOEMULSIFICATION, CATARACT, WITH INTRAOCULAR LENS IMPLANT  with Lyla Mahoney MD on 5/29/20 at [facility] for treatment of Nuclear sclerosis of both eyes.     Pt has had prior anesthetic. Type: General, MAC and Regional    No history of anesthetic complications    She has the following specific operative considerations:   # DORIS 1/8 = low risk  # VTE risk: 0.26%  # Risk of PONV score = 2.  If > 2, anti-emetic intervention recommended.  # Anesthesia considerations:  Refer to PAC assessment in anesthesia records      CARDIAC: METS 4,  Ascends 4 flights of stairs to apartment daily. Denies SOB & CP.     # RCRI : No serious cardiac risks.  0.4% risk of major adverse cardiac event.     # EKG 3/11/19: normal         # Echo 2/5/19:  EF 65-70%, Myxematous mitral valve w/o significant regurgitation. Aortic  valve w/ fusion of left and right coronary cusp w/o  significant stenosis.    PULMONARY:     # Former smoker, 30 pk yr hx, quit 2014    # Asthma, COPD.  Uses Dulera & Atrovent bid. Last used albuterol 1 yr ago (uses more during summer months). Will order Duoneb for DOS.    # PFT 2/2019: normal function, mild air trapping    GI: no GERD      # s/p gastric bypass 2007, had significant weight loss & malnutrition    # Hx Hep C, s/p treatment    ENDO: BMI 19    # No DM    ORTHO: full ROM of neck, no TMJ    # Chronic LBP, s/p 5 lumbar surgeries w/ fusion    IMMUNE:    # Latent TB, s/p treatment 1994    Patient is optimized and is acceptable candidate for the proposed procedure. No further diagnostic evaluation is needed.    Arrival time, NPO, shower and medication instructions provided by nursing staff today.  Preparing For Your Surgery handout given.    Rocio Ventura PA-C  Preoperative Assessment Center  [facility]  Phone: [phone]  Fax: [phone]

## 2020-05-29 ENCOUNTER — ANESTHESIA (OUTPATIENT)
Dept: SURGERY | Facility: AMBULATORY SURGERY CENTER | Age: 50
End: 2020-05-29

## 2020-05-29 ENCOUNTER — HOSPITAL ENCOUNTER (OUTPATIENT)
Facility: AMBULATORY SURGERY CENTER | Age: 50
End: 2020-05-29
Attending: OPHTHALMOLOGY
Payer: COMMERCIAL

## 2020-05-29 ENCOUNTER — OFFICE VISIT (OUTPATIENT)
Dept: OPHTHALMOLOGY | Facility: CLINIC | Age: 50
End: 2020-05-29
Payer: COMMERCIAL

## 2020-05-29 VITALS
HEART RATE: 76 BPM | DIASTOLIC BLOOD PRESSURE: 71 MMHG | RESPIRATION RATE: 16 BRPM | OXYGEN SATURATION: 96 % | SYSTOLIC BLOOD PRESSURE: 112 MMHG | TEMPERATURE: 97.8 F

## 2020-05-29 DIAGNOSIS — Z96.1 PSEUDOPHAKIA, RIGHT EYE: Primary | ICD-10-CM

## 2020-05-29 DIAGNOSIS — H25.811 MIXED TYPE AGE-RELATED CATARACT, RIGHT EYE: Primary | ICD-10-CM

## 2020-05-29 DEVICE — EYE IMP IOL AMO PCL TECNIS ZCB00 22.0: Type: IMPLANTABLE DEVICE | Site: EYE | Status: FUNCTIONAL

## 2020-05-29 RX ORDER — ONDANSETRON 4 MG/1
4 TABLET, ORALLY DISINTEGRATING ORAL EVERY 30 MIN PRN
Status: DISCONTINUED | OUTPATIENT
Start: 2020-05-29 | End: 2020-05-30 | Stop reason: HOSPADM

## 2020-05-29 RX ORDER — BALANCED SALT SOLUTION 6.4; .75; .48; .3; 3.9; 1.7 MG/ML; MG/ML; MG/ML; MG/ML; MG/ML; MG/ML
SOLUTION OPHTHALMIC PRN
Status: DISCONTINUED | OUTPATIENT
Start: 2020-05-29 | End: 2020-05-29 | Stop reason: HOSPADM

## 2020-05-29 RX ORDER — GABAPENTIN 300 MG/1
300 CAPSULE ORAL ONCE
Status: COMPLETED | OUTPATIENT
Start: 2020-05-29 | End: 2020-05-29

## 2020-05-29 RX ORDER — OFLOXACIN 3 MG/ML
SOLUTION/ DROPS OPHTHALMIC
Qty: 1 BOTTLE | Refills: 0 | Status: SHIPPED | OUTPATIENT
Start: 2020-05-29 | End: 2022-03-29

## 2020-05-29 RX ORDER — CYCLOPENTOLAT/TROPIC/PHENYLEPH 1%-1%-2.5%
1 DROPS (EA) OPHTHALMIC (EYE)
Status: COMPLETED | OUTPATIENT
Start: 2020-05-29 | End: 2020-05-29

## 2020-05-29 RX ORDER — ACETAMINOPHEN 325 MG/1
975 TABLET ORAL ONCE
Status: COMPLETED | OUTPATIENT
Start: 2020-05-29 | End: 2020-05-29

## 2020-05-29 RX ORDER — IPRATROPIUM BROMIDE AND ALBUTEROL SULFATE 2.5; .5 MG/3ML; MG/3ML
3 SOLUTION RESPIRATORY (INHALATION) ONCE
Status: COMPLETED | OUTPATIENT
Start: 2020-05-29 | End: 2020-05-29

## 2020-05-29 RX ORDER — MEPERIDINE HYDROCHLORIDE 25 MG/ML
12.5 INJECTION INTRAMUSCULAR; INTRAVENOUS; SUBCUTANEOUS
Status: DISCONTINUED | OUTPATIENT
Start: 2020-05-29 | End: 2020-05-30 | Stop reason: HOSPADM

## 2020-05-29 RX ORDER — LIDOCAINE HYDROCHLORIDE 10 MG/ML
INJECTION, SOLUTION EPIDURAL; INFILTRATION; INTRACAUDAL; PERINEURAL PRN
Status: DISCONTINUED | OUTPATIENT
Start: 2020-05-29 | End: 2020-05-29 | Stop reason: HOSPADM

## 2020-05-29 RX ORDER — ONDANSETRON 2 MG/ML
4 INJECTION INTRAMUSCULAR; INTRAVENOUS EVERY 30 MIN PRN
Status: DISCONTINUED | OUTPATIENT
Start: 2020-05-29 | End: 2020-05-30 | Stop reason: HOSPADM

## 2020-05-29 RX ORDER — SODIUM CHLORIDE, SODIUM LACTATE, POTASSIUM CHLORIDE, CALCIUM CHLORIDE 600; 310; 30; 20 MG/100ML; MG/100ML; MG/100ML; MG/100ML
INJECTION, SOLUTION INTRAVENOUS CONTINUOUS
Status: DISCONTINUED | OUTPATIENT
Start: 2020-05-29 | End: 2020-05-30 | Stop reason: HOSPADM

## 2020-05-29 RX ORDER — FENTANYL CITRATE 50 UG/ML
25-50 INJECTION, SOLUTION INTRAMUSCULAR; INTRAVENOUS
Status: DISCONTINUED | OUTPATIENT
Start: 2020-05-29 | End: 2020-05-29 | Stop reason: HOSPADM

## 2020-05-29 RX ORDER — LIDOCAINE 40 MG/G
CREAM TOPICAL
Status: DISCONTINUED | OUTPATIENT
Start: 2020-05-29 | End: 2020-05-29 | Stop reason: HOSPADM

## 2020-05-29 RX ORDER — NALOXONE HYDROCHLORIDE 0.4 MG/ML
.1-.4 INJECTION, SOLUTION INTRAMUSCULAR; INTRAVENOUS; SUBCUTANEOUS
Status: DISCONTINUED | OUTPATIENT
Start: 2020-05-29 | End: 2020-05-30 | Stop reason: HOSPADM

## 2020-05-29 RX ORDER — DICLOFENAC SODIUM 1 MG/ML
1 SOLUTION/ DROPS OPHTHALMIC
Status: COMPLETED | OUTPATIENT
Start: 2020-05-29 | End: 2020-05-29

## 2020-05-29 RX ORDER — OFLOXACIN 3 MG/ML
1 SOLUTION/ DROPS OPHTHALMIC
Status: COMPLETED | OUTPATIENT
Start: 2020-05-29 | End: 2020-05-29

## 2020-05-29 RX ORDER — PREDNISOLONE ACETATE 10 MG/ML
SUSPENSION/ DROPS OPHTHALMIC
Qty: 1 BOTTLE | Refills: 1 | Status: SHIPPED | OUTPATIENT
Start: 2020-05-29 | End: 2021-01-07

## 2020-05-29 RX ORDER — SODIUM CHLORIDE, SODIUM LACTATE, POTASSIUM CHLORIDE, CALCIUM CHLORIDE 600; 310; 30; 20 MG/100ML; MG/100ML; MG/100ML; MG/100ML
INJECTION, SOLUTION INTRAVENOUS CONTINUOUS
Status: DISCONTINUED | OUTPATIENT
Start: 2020-05-29 | End: 2020-05-29 | Stop reason: HOSPADM

## 2020-05-29 RX ORDER — TETRACAINE HYDROCHLORIDE 5 MG/ML
SOLUTION OPHTHALMIC PRN
Status: DISCONTINUED | OUTPATIENT
Start: 2020-05-29 | End: 2020-05-29 | Stop reason: HOSPADM

## 2020-05-29 RX ORDER — OXYCODONE HYDROCHLORIDE 5 MG/1
5 TABLET ORAL EVERY 4 HOURS PRN
Status: DISCONTINUED | OUTPATIENT
Start: 2020-05-29 | End: 2020-05-30 | Stop reason: HOSPADM

## 2020-05-29 RX ADMIN — DICLOFENAC SODIUM 1 DROP: 1 SOLUTION/ DROPS OPHTHALMIC at 10:38

## 2020-05-29 RX ADMIN — OFLOXACIN 1 DROP: 3 SOLUTION/ DROPS OPHTHALMIC at 10:38

## 2020-05-29 RX ADMIN — OFLOXACIN 1 DROP: 3 SOLUTION/ DROPS OPHTHALMIC at 10:47

## 2020-05-29 RX ADMIN — ACETAMINOPHEN 975 MG: 325 TABLET ORAL at 10:30

## 2020-05-29 RX ADMIN — OFLOXACIN 1 DROP: 3 SOLUTION/ DROPS OPHTHALMIC at 10:30

## 2020-05-29 RX ADMIN — DICLOFENAC SODIUM 1 DROP: 1 SOLUTION/ DROPS OPHTHALMIC at 10:47

## 2020-05-29 RX ADMIN — Medication 1 DROP: at 10:38

## 2020-05-29 RX ADMIN — GABAPENTIN 300 MG: 300 CAPSULE ORAL at 10:31

## 2020-05-29 RX ADMIN — IPRATROPIUM BROMIDE AND ALBUTEROL SULFATE 3 ML: 2.5; .5 SOLUTION RESPIRATORY (INHALATION) at 10:31

## 2020-05-29 RX ADMIN — DICLOFENAC SODIUM 1 DROP: 1 SOLUTION/ DROPS OPHTHALMIC at 10:30

## 2020-05-29 RX ADMIN — SODIUM CHLORIDE, SODIUM LACTATE, POTASSIUM CHLORIDE, CALCIUM CHLORIDE: 600; 310; 30; 20 INJECTION, SOLUTION INTRAVENOUS at 10:42

## 2020-05-29 RX ADMIN — Medication 1 DROP: at 10:46

## 2020-05-29 RX ADMIN — Medication 1 DROP: at 10:30

## 2020-05-29 ASSESSMENT — VISUAL ACUITY
OD_SC+: +2
OD_SC: 20/25
METHOD: SNELLEN - LINEAR

## 2020-05-29 ASSESSMENT — TONOMETRY
IOP_METHOD: TONOPEN
OD_IOP_MMHG: 20
OD_IOP_MMHG: 51

## 2020-05-29 ASSESSMENT — EXTERNAL EXAM - RIGHT EYE: OD_EXAM: NORMAL

## 2020-05-29 ASSESSMENT — SLIT LAMP EXAM - LIDS: COMMENTS: NORMAL

## 2020-05-29 NOTE — ANESTHESIA CARE TRANSFER NOTE
Patient: Kristina Eldridge    Procedure(s):  RIGHT PHACOEMULSIFICATION, CATARACT, WITH INTRAOCULAR LENS IMPLANT    Diagnosis: Nuclear sclerosis of both eyes [H25.13]  Diagnosis Additional Information: No value filed.    Anesthesia Type:   MAC     Note:  Airway :Room Air  Patient transferred to:Phase II  Handoff Report: Identifed the Patient, Identified the Reponsible Provider, Reviewed the pertinent medical history, Discussed the surgical course, Reviewed Intra-OP anesthesia mangement and issues during anesthesia, Set expectations for post-procedure period and Allowed opportunity for questions and acknowledgement of understanding      Vitals: (Last set prior to Anesthesia Care Transfer)    CR[facility] VITALS  5/29/2020 1115 - 5/29/2020 1154      5/29/2020             Pulse:  77    Ht Rate:  76    SpO2:  100 %    Resp Rate (set):  10                Electronically Signed By: MILEY Mendoza CRNA  May 29, 2020  11:54 AM

## 2020-05-29 NOTE — NURSING NOTE
Chief Complaints and History of Present Illnesses   Patient presents with     Post Op (Ophthalmology) Right Eye     Chief Complaint(s) and History of Present Illness(es)     Post Op (Ophthalmology) Right Eye     Laterality: right eye    Frequency: constantly    Course: stable    Pain scale: 0/10              Comments     No eye pain but a little irritation today. Vision is blurry. Patient states they have their post op drops from the pharmacy.       Elizabeth MARTINEZ 12:40 PM May 29, 2020

## 2020-05-29 NOTE — ANESTHESIA POSTPROCEDURE EVALUATION
Anesthesia POST Procedure Evaluation    Patient: Kristina Eldridge   MRN:     [id number] Gender:   female   Age:    49 year old :      1970        Preoperative Diagnosis: Nuclear sclerosis of both eyes [H25.13]   Procedure(s):  RIGHT PHACOEMULSIFICATION, CATARACT, WITH INTRAOCULAR LENS IMPLANT   Postop Comments: No value filed.     Anesthesia Type: MAC       Disposition: Outpatient   Postop Pain Control: Uneventful            Sign Out: Well controlled pain   PONV: No   Neuro/Psych: Uneventful            Sign Out: Acceptable/Baseline neuro status   Airway/Respiratory: Uneventful            Sign Out: Acceptable/Baseline resp. status   CV/Hemodynamics: Uneventful            Sign Out: Acceptable CV status   Other NRE: NONE   DID A NON-ROUTINE EVENT OCCUR? No         Last Anesthesia Record Vitals:  CR[facility] VITALS  2020 1115 - 2020 1215      2020             Pulse:  77    Ht Rate:  76    SpO2:  100 %    Resp Rate (set):  10          Last PACU Vitals:  Vitals Value Taken Time   /69 2020 11:49 AM   Temp 36.6  C (97.8  F) 2020 11:49 AM   Pulse 75 2020 11:49 AM   Resp 16 2020 11:49 AM   SpO2 94 % 2020 11:49 AM   Temp src     NIBP 120/66 2020 11:41 AM   Pulse 77 2020 11:46 AM   SpO2 100 % 2020 11:46 AM   Resp     Temp     Ht Rate 76 2020 11:46 AM   Temp 2           Electronically Signed By: Pravin Araujo MD, MD, May 29, 2020, 12:43 PM

## 2020-05-29 NOTE — DISCHARGE INSTRUCTIONS
[facility]  Home Care Following Anesthesia  For 24 hours after surgery:  1. Get plenty of rest.  A responsible adult must stay with you for at least 24 hours after you leave the surgery center.  2. Do not drive or use heavy equipment.  If you have weakness or tingling, don't drive or use heavy equipment until this feeling goes away.   3. Do not drink alcohol.   4. Avoid strenuous or risky activities.  Ask for help when climbing stairs.  5. You may feel lightheaded.  IF so, sit for a few minutes before standing.  Have someone help you get up.   6. If you have nausea (feel sick to your stomach): Drink only clear liquids such as apple juice, ginger ale, broth or 7-Up.  Rest may also help.  Be sure to drink enough fluids.  Move to a regular diet as you feel able.   7. You may have a slight fever.  Call the doctor if your fever is over 100 F (37.7 C) (taken under the tongue) or lasts longer than 24 hours.  8. You may have a dry mouth, a sore throat, muscle aches or trouble sleeping. These should go away after 24 hours.  9. Do not make important or legal decisions.         Tips for taking pain medications  To get the best pain relief possible, remember these points:    Take pain medications as directed, before pain becomes severe.    Pain medication can upset your stomach: taking it with food may help.    Constipation is a common side effect of pain medication. Drink plenty of  fluids.    Eat foods high in fiber. Take a stool softener if recommended by your doctor or pharmacist.    Do not drink alcohol, drive or operate machinery while taking pain medications.    Ask about other ways to control pain, such as with heat, ice or relaxation.    Tylenol/Acetaminophen Consumption  To help encourage the safe use of acetaminophen, the makers of TYLENOL  have lowered the maximum daily dose for single-ingredient Extra Strength TYLENOL  (acetaminophen) products sold in the [place] from 8 pills per  day (4,000 mg) to 6 pills per day (3,000 mg). The dosing interval has also changed from 2 pills every 4-6 hours to 2 pills every 6 hours.    If you feel your pain relief is insufficient, you may take Tylenol/Acetaminophen in addition to your narcotic pain medication.     Be careful not to exceed 3,000 mg of Tylenol/Acetaminophen in a 24 hour period from all sources.    If you are taking extra strength Tylenol/acetaminophen (500 mg), the maximum dose is 6 tablets in 24 hours.    If you are taking regular strength acetaminophen (325 mg), the maximum dose is 9 tablets in 24 hours.    Call a doctor for any of the followin. Signs of infection (fever, growing tenderness at the surgery site, a large amount of drainage or bleeding, severe pain, foul-smelling drainage, redness, swelling).  2. It has been over 8 to 10 hours since surgery and you are still not able to urinate (pass water).  3. Headache for over 24 hours.  4. Numbness, tingling or weakness the day after surgery (if you had spinal anesthesia).  5. Signs of Covid-19 infection (temperature over 100 degrees, shortness of breath, cough, loss of taste/smell, generalized body aches, persistent headache, chills, sore throat, nausea/vomiting/diarrhea)  Your doctor is:  ***  Dr. Lyla Mahoney, Ophthalmology: [phone]               Or dial [phone] and ask for the resident on call for:  Ophthalmology  For emergency care, call the:  [facility] Emergency Department: [phone] (TTY for hearing impaired: [phone])

## 2020-05-29 NOTE — PROGRESS NOTES
POD#0, status post cataract surgery, right eye    No complaints.  Denies eye pain.    Impression/Plan:  Pseudophakia, OD: POD0, good post-operative appearance. IOP reasonable.    - Fluoroquinolone antibiotic 4x daily in the surgical eye for 1 week  - Prednisolone 4x daily in the surgical eye for 1 week, then weekly taper      Eye protection at all times and eye shield at night for 1 week.    Limited activities with no exercise or heavy lifting for 1 week.    Instructed patient to contact us for decreasing vision, eye pain, new floaters or flashes of light or other concerning symptoms.    Written instructions given    Return to clinic 3-4 weeks with refraction and dilation.    Teaching statement:  Complete documentation of historical and exam elements from today's encounter can be found in the full encounter summary report (not reduplicated in this progress note). I personally obtained the chief complaint(s) and history of present illness.  I confirmed and edited as necessary the review of systems, past medical/surgical history, family history, social history, and examination findings as documented by others; and I examined the patient myself. I personally reviewed the relevant tests, images, and reports as documented above.     I formulated and edited as necessary the assessment and plan and discussed the findings and management plan with the patient and family.    Lyla Mahoney MD  Comprehensive Ophthalmology & Ocular Pathology  Department of Ophthalmology and Visual Neurosciences  [email or web address]  Pager [phone]

## 2020-05-29 NOTE — PROCEDURES
Ophthalmology Post-op Procedure Note    Surgical Service:    Ophthalmology & Visual Sciences  Date Performed:      May 29, 2020  Location: [facility]      Pre-operative Diagnosis: Visually significant cataract, right eye  Post-operative Diagnosis:  Pseudophakia, right eye  Operative Procedure:  Phacoemulsification with intraocular lens implantation, right eye      Surgeon(s):  Fellow/Staff Surgeon:       Lyla Mahoney MD  Resident Surgeon:            Kenya Castelan MD    Anesthesia:   Topical/MAC   Findings:  No unusual findings   Blood Loss:    Minimal  Implants:  ZCB00 22.0 intraocular lens  Specimens:  None     Complications:  The patient did not experience any complications.   Condition: Stable    Operative Report Completion:    Description of Operation/Procedure:  After appropriate informed consent was obtained, the patient was brought to the operating room. The appropriate cardiac and blood pressure monitors were placed. A final pause occurred just before the start of the procedure during which the entire procedure team actively confirmed the correct patient, procedure, site, special equipment and special requirements. The patient was prepped and draped in the usual sterile fashion using 5% povidone/iodine.     An eyelid speculum was placed to open the eyelids. A paracentesis port was placed approximately sixty degrees to the left of the planned temporal incision location using the sideport blade. Approximately 0.8 cc of 1% nonpreserved lidocaine was placed into the anterior chamber. The anterior chamber was filled with dispersive viscoelastic. A clear corneal temporal incision was made with a metal 2.6 mm keratome. A round continuous tear capsulorhexis was initiated with a cystotome and completed with the Utrata forceps. Balanced salt solution on a cannula was used to perform hydrodissection. The nucleus was removed using phacoemulsification with a chop technique. The remaining cortical material was  removed using the irrigation/aspiration handpiece. The capsular bag was filled with dispersive viscoelastic. A lens of the  model and power listed above was placed into the capsular bag using the cartridge injection system. The remaining viscoelastic was removed using the irrigation aspiration handpiece. The paracentesis and temporal wounds were hydrated with balanced salt solution. Intracameral moxifloxacin was administered. At the conclusion of the case, the wounds were felt to be watertight, the pupil was round, the lens was centered, and the anterior chamber was deep. A few drops of antibiotic and prednisolone were given to the operative eye. The eyelid speculum was removed. A shield was placed over the operative eye.    Kenya Castelan MD    Attending Attestation:  I was present for the entire procedure.  Lyla Mahoney MD

## 2020-06-01 ENCOUNTER — ANESTHESIA EVENT (OUTPATIENT)
Dept: SURGERY | Facility: CLINIC | Age: 50
End: 2020-06-01
Payer: COMMERCIAL

## 2020-06-01 NOTE — OR NURSING
In basket message sent to Dr Self and Hilda Navarro that patient had covid 19 test done 5/26/20 and was not asked to get repeat test for procedure on 6/2/20

## 2020-06-01 NOTE — OR NURSING
unable to contact patient   Received: Today   Message Contents   Bonnie Moy RN Lavaty, Megan, RN; aRmesh Self MD; Bettie Martel               Good Morning, Hilda just left a message on your voice mail. Am working on 6/2/ surgery schedule and Mery is scheduled with Dr Self. Unable to leave message on her cell/home number or her husbands cell number. How are you communicating with Mery?? She had a covid test done 5/26 for eye surgery on 5/29 and I noticed they released Dr Self's order for the covid test. She did have a H&P done 5/27 in PAC for eye surgery on 5/29/20. Please let me know how you communicate with Mery so I can go over pre op instructions. Thanks   Claire PARR [facility] office   [phone]

## 2020-06-02 ENCOUNTER — APPOINTMENT (OUTPATIENT)
Dept: GENERAL RADIOLOGY | Facility: CLINIC | Age: 50
End: 2020-06-02
Attending: INTERNAL MEDICINE
Payer: COMMERCIAL

## 2020-06-02 ENCOUNTER — ANESTHESIA (OUTPATIENT)
Dept: SURGERY | Facility: CLINIC | Age: 50
End: 2020-06-02
Payer: COMMERCIAL

## 2020-06-02 ENCOUNTER — HOSPITAL ENCOUNTER (OUTPATIENT)
Facility: CLINIC | Age: 50
Discharge: HOME | End: 2020-06-02
Attending: INTERNAL MEDICINE | Admitting: INTERNAL MEDICINE
Payer: COMMERCIAL

## 2020-06-02 VITALS
TEMPERATURE: 98.7 F | BODY MASS INDEX: 18.83 KG/M2 | HEIGHT: 60 IN | DIASTOLIC BLOOD PRESSURE: 92 MMHG | HEART RATE: 70 BPM | WEIGHT: 95.9 LBS | RESPIRATION RATE: 16 BRPM | SYSTOLIC BLOOD PRESSURE: 157 MMHG | OXYGEN SATURATION: 92 %

## 2020-06-02 DIAGNOSIS — R62.7 FAILURE TO THRIVE IN ADULT: ICD-10-CM

## 2020-06-02 LAB
BUN SERPL-MCNC: 5 MG/DL (ref 7–30)
ERYTHROCYTE [DISTWIDTH] IN BLOOD BY AUTOMATED COUNT: 14.7 % (ref 10–15)
GLUCOSE BLDC GLUCOMTR-MCNC: 88 MG/DL (ref 70–99)
HCT VFR BLD AUTO: 37.5 % (ref 35–47)
HGB BLD-MCNC: 12.6 G/DL (ref 11.7–15.7)
INR PPP: 1.09 (ref 0.86–1.14)
MCH RBC QN AUTO: 31.7 PG (ref 26.5–33)
MCHC RBC AUTO-ENTMCNC: 33.6 G/DL (ref 31.5–36.5)
MCV RBC AUTO: 94 FL (ref 78–100)
PLATELET # BLD AUTO: 305 10E9/L (ref 150–450)
RBC # BLD AUTO: 3.98 10E12/L (ref 3.8–5.2)
UPPER GI ENDOSCOPY: NORMAL
WBC # BLD AUTO: 8.7 10E9/L (ref 4–11)

## 2020-06-02 PROCEDURE — 37000009 ZZH ANESTHESIA TECHNICAL FEE, EACH ADDTL 15 MIN: Performed by: INTERNAL MEDICINE

## 2020-06-02 PROCEDURE — 85027 COMPLETE CBC AUTOMATED: CPT | Performed by: INTERNAL MEDICINE

## 2020-06-02 PROCEDURE — 25000125 ZZHC RX 250: Performed by: NURSE ANESTHETIST, CERTIFIED REGISTERED

## 2020-06-02 PROCEDURE — 71000014 ZZH RECOVERY PHASE 1 LEVEL 2 FIRST HR: Performed by: INTERNAL MEDICINE

## 2020-06-02 PROCEDURE — 84520 ASSAY OF UREA NITROGEN: CPT | Performed by: INTERNAL MEDICINE

## 2020-06-02 PROCEDURE — C1769 GUIDE WIRE: HCPCS | Performed by: INTERNAL MEDICINE

## 2020-06-02 PROCEDURE — 27210794 ZZH OR GENERAL SUPPLY STERILE: Performed by: INTERNAL MEDICINE

## 2020-06-02 PROCEDURE — 71000027 ZZH RECOVERY PHASE 2 EACH 15 MINS: Performed by: INTERNAL MEDICINE

## 2020-06-02 PROCEDURE — 40000277 XR SURGERY CARM FLUORO LESS THAN 5 MIN W STILLS: Mod: TC

## 2020-06-02 PROCEDURE — 37000008 ZZH ANESTHESIA TECHNICAL FEE, 1ST 30 MIN: Performed by: INTERNAL MEDICINE

## 2020-06-02 PROCEDURE — 25000125 ZZHC RX 250: Performed by: INTERNAL MEDICINE

## 2020-06-02 PROCEDURE — 25000566 ZZH SEVOFLURANE, EA 15 MIN: Performed by: INTERNAL MEDICINE

## 2020-06-02 PROCEDURE — 36000051 ZZH SURGERY LEVEL 2 1ST 30 MIN - UMMC: Performed by: INTERNAL MEDICINE

## 2020-06-02 PROCEDURE — 36000053 ZZH SURGERY LEVEL 2 EA 15 ADDTL MIN - UMMC: Performed by: INTERNAL MEDICINE

## 2020-06-02 PROCEDURE — 85610 PROTHROMBIN TIME: CPT | Performed by: INTERNAL MEDICINE

## 2020-06-02 PROCEDURE — C1876 STENT, NON-COA/NON-COV W/DEL: HCPCS | Performed by: INTERNAL MEDICINE

## 2020-06-02 PROCEDURE — 82962 GLUCOSE BLOOD TEST: CPT

## 2020-06-02 PROCEDURE — 40000170 ZZH STATISTIC PRE-PROCEDURE ASSESSMENT II: Performed by: INTERNAL MEDICINE

## 2020-06-02 PROCEDURE — 25000128 H RX IP 250 OP 636: Performed by: NURSE ANESTHETIST, CERTIFIED REGISTERED

## 2020-06-02 PROCEDURE — 25800030 ZZH RX IP 258 OP 636: Performed by: NURSE ANESTHETIST, CERTIFIED REGISTERED

## 2020-06-02 DEVICE — STENT ESU AXIOS W/DEL SYS 20X10MM 10.8FR 138CM M00553660
Type: IMPLANTABLE DEVICE | Site: JEJUNUM | Status: NON-FUNCTIONAL
Removed: 2021-05-05

## 2020-06-02 RX ORDER — FENTANYL CITRATE 50 UG/ML
25-50 INJECTION, SOLUTION INTRAMUSCULAR; INTRAVENOUS
Status: DISCONTINUED | OUTPATIENT
Start: 2020-06-02 | End: 2020-06-02 | Stop reason: HOSPADM

## 2020-06-02 RX ORDER — ONDANSETRON 4 MG/1
4 TABLET, ORALLY DISINTEGRATING ORAL EVERY 30 MIN PRN
Status: DISCONTINUED | OUTPATIENT
Start: 2020-06-02 | End: 2020-06-02 | Stop reason: HOSPADM

## 2020-06-02 RX ORDER — SODIUM CHLORIDE, SODIUM LACTATE, POTASSIUM CHLORIDE, CALCIUM CHLORIDE 600; 310; 30; 20 MG/100ML; MG/100ML; MG/100ML; MG/100ML
INJECTION, SOLUTION INTRAVENOUS CONTINUOUS PRN
Status: DISCONTINUED | OUTPATIENT
Start: 2020-06-02 | End: 2020-06-02

## 2020-06-02 RX ORDER — NALOXONE HYDROCHLORIDE 0.4 MG/ML
.1-.4 INJECTION, SOLUTION INTRAMUSCULAR; INTRAVENOUS; SUBCUTANEOUS
Status: DISCONTINUED | OUTPATIENT
Start: 2020-06-02 | End: 2020-06-02 | Stop reason: HOSPADM

## 2020-06-02 RX ORDER — HYDRALAZINE HYDROCHLORIDE 20 MG/ML
2.5-5 INJECTION INTRAMUSCULAR; INTRAVENOUS EVERY 10 MIN PRN
Status: DISCONTINUED | OUTPATIENT
Start: 2020-06-02 | End: 2020-06-02 | Stop reason: HOSPADM

## 2020-06-02 RX ORDER — METOPROLOL TARTRATE 1 MG/ML
1-2 INJECTION, SOLUTION INTRAVENOUS EVERY 5 MIN PRN
Status: DISCONTINUED | OUTPATIENT
Start: 2020-06-02 | End: 2020-06-02 | Stop reason: HOSPADM

## 2020-06-02 RX ORDER — SODIUM CHLORIDE, SODIUM LACTATE, POTASSIUM CHLORIDE, CALCIUM CHLORIDE 600; 310; 30; 20 MG/100ML; MG/100ML; MG/100ML; MG/100ML
INJECTION, SOLUTION INTRAVENOUS CONTINUOUS
Status: DISCONTINUED | OUTPATIENT
Start: 2020-06-02 | End: 2020-06-02 | Stop reason: HOSPADM

## 2020-06-02 RX ORDER — LIDOCAINE 40 MG/G
CREAM TOPICAL
Status: DISCONTINUED | OUTPATIENT
Start: 2020-06-02 | End: 2020-06-02 | Stop reason: HOSPADM

## 2020-06-02 RX ORDER — LIDOCAINE HYDROCHLORIDE 20 MG/ML
INJECTION, SOLUTION INFILTRATION; PERINEURAL PRN
Status: DISCONTINUED | OUTPATIENT
Start: 2020-06-02 | End: 2020-06-02

## 2020-06-02 RX ORDER — FENTANYL CITRATE 50 UG/ML
INJECTION, SOLUTION INTRAMUSCULAR; INTRAVENOUS PRN
Status: DISCONTINUED | OUTPATIENT
Start: 2020-06-02 | End: 2020-06-02

## 2020-06-02 RX ORDER — PROPOFOL 10 MG/ML
INJECTION, EMULSION INTRAVENOUS PRN
Status: DISCONTINUED | OUTPATIENT
Start: 2020-06-02 | End: 2020-06-02

## 2020-06-02 RX ORDER — ONDANSETRON 2 MG/ML
4 INJECTION INTRAMUSCULAR; INTRAVENOUS EVERY 30 MIN PRN
Status: DISCONTINUED | OUTPATIENT
Start: 2020-06-02 | End: 2020-06-02 | Stop reason: HOSPADM

## 2020-06-02 RX ORDER — DEXAMETHASONE SODIUM PHOSPHATE 4 MG/ML
INJECTION, SOLUTION INTRA-ARTICULAR; INTRALESIONAL; INTRAMUSCULAR; INTRAVENOUS; SOFT TISSUE PRN
Status: DISCONTINUED | OUTPATIENT
Start: 2020-06-02 | End: 2020-06-02

## 2020-06-02 RX ORDER — ONDANSETRON 2 MG/ML
INJECTION INTRAMUSCULAR; INTRAVENOUS PRN
Status: DISCONTINUED | OUTPATIENT
Start: 2020-06-02 | End: 2020-06-02

## 2020-06-02 RX ORDER — DIPHENHYDRAMINE HYDROCHLORIDE 50 MG/ML
25 INJECTION INTRAMUSCULAR; INTRAVENOUS EVERY 6 HOURS PRN
Status: DISCONTINUED | OUTPATIENT
Start: 2020-06-02 | End: 2020-06-02 | Stop reason: HOSPADM

## 2020-06-02 RX ADMIN — FENTANYL CITRATE 50 MCG: 50 INJECTION, SOLUTION INTRAMUSCULAR; INTRAVENOUS at 17:15

## 2020-06-02 RX ADMIN — DEXAMETHASONE SODIUM PHOSPHATE 4 MG: 4 INJECTION, SOLUTION INTRA-ARTICULAR; INTRALESIONAL; INTRAMUSCULAR; INTRAVENOUS; SOFT TISSUE at 17:05

## 2020-06-02 RX ADMIN — PROPOFOL 90 MG: 10 INJECTION, EMULSION INTRAVENOUS at 17:02

## 2020-06-02 RX ADMIN — ROCURONIUM BROMIDE 50 MG: 10 INJECTION INTRAVENOUS at 17:03

## 2020-06-02 RX ADMIN — MIDAZOLAM 2 MG: 1 INJECTION INTRAMUSCULAR; INTRAVENOUS at 16:50

## 2020-06-02 RX ADMIN — ONDANSETRON 4 MG: 2 INJECTION INTRAMUSCULAR; INTRAVENOUS at 17:05

## 2020-06-02 RX ADMIN — LIDOCAINE HYDROCHLORIDE 40 MG: 20 INJECTION, SOLUTION INFILTRATION; PERINEURAL at 17:00

## 2020-06-02 RX ADMIN — SUGAMMADEX 100 MG: 100 INJECTION, SOLUTION INTRAVENOUS at 17:22

## 2020-06-02 RX ADMIN — FENTANYL CITRATE 50 MCG: 50 INJECTION, SOLUTION INTRAMUSCULAR; INTRAVENOUS at 17:00

## 2020-06-02 RX ADMIN — SODIUM CHLORIDE, POTASSIUM CHLORIDE, SODIUM LACTATE AND CALCIUM CHLORIDE: 600; 310; 30; 20 INJECTION, SOLUTION INTRAVENOUS at 16:55

## 2020-06-02 ASSESSMENT — MIFFLIN-ST. JEOR: SCORE: 971.5

## 2020-06-02 NOTE — ANESTHESIA PREPROCEDURE EVALUATION
Anesthesia Pre-Procedure Evaluation    Patient: Kristina Eldridge   MRN:     [id number] Gender:   female   Age:    49 year old :      1970        Preoperative Diagnosis: Failure to thrive in adult [R62.7]   Procedure(s):  ENDOSCOPIC ULTRASOUND, ESOPHAGOSCOPY / UPPER GASTROINTESTINAL TRACT (GI)  **Latex Allergy** stent exchange     LABS:  CBC:   Lab Results   Component Value Date    WBC 8.7 2020    WBC 13.1 (H) 2020    HGB 12.6 2020    HGB 14.1 2020    HCT 37.5 2020    HCT 43.6 2020     2020     2020     BMP:   Lab Results   Component Value Date     2019     2019    POTASSIUM 3.5 2019    POTASSIUM 3.2 (L) 2019    CHLORIDE 104 2019    CHLORIDE 101 2019    CO2 30 2019    CO2 31 2019    BUN 5 (L) 2020    BUN 8 2020    CR 0.94 2019    CR 0.85 2019    GLC 74 2019    GLC 85 2019     COAGS:   Lab Results   Component Value Date    PTT 30 2015    INR 1.09 2020     POC:   Lab Results   Component Value Date    BGM 88 2020    HCG Negative 2014    HCGS Negative 2015     OTHER:   Lab Results   Component Value Date    A1C 5.3 2020    MARIA FERNANDA 8.2 (L) 2019    PHOS 3.1 2012    MAG 2.0 2015    ALBUMIN 3.6 2019    PROTTOTAL 6.6 (L) 2019    ALT 55 (H) 2019    AST 35 2019    ALKPHOS 98 2019    BILITOTAL 0.4 2019    TSH 3.12 2019    CRP 4.1 2019    SED 16 2013        Preop Vitals    BP Readings from Last 3 Encounters:   20 113/72   20 112/71   20 105/75    Pulse Readings from Last 3 Encounters:   20 72   20 76   20 82      Resp Readings from Last 3 Encounters:   20 18   20 16   20 16    SpO2 Readings from Last 3 Encounters:   20 91%   20 96%   20 97%      Temp Readings from Last 1 Encounters:   20  36.7  C (98.1  F) (Oral)    Ht Readings from Last 1 Encounters:   06/02/20 1.524 m (5')      Wt Readings from Last 1 Encounters:   06/02/20 43.5 kg (95 lb 14.4 oz)    Estimated body mass index is 18.73 kg/m  as calculated from the following:    Height as of this encounter: 1.524 m (5').    Weight as of this encounter: 43.5 kg (95 lb 14.4 oz).     LDA:  Peripheral IV 11/22/14 Left Hand (Active)   Number of days: 2019       Peripheral IV 06/02/20 Left Hand (Active)   Site Assessment WDL 06/02/20 1745   Line Status Infusing 06/02/20 1745   Phlebitis Scale 0-->no symptoms 06/02/20 1745   Infiltration Scale 0 06/02/20 1745   Infiltration Site Treatment Method  None 06/02/20 1745   Extravasation? No 06/02/20 1745   Number of days: 0       Closed/Suction Drain 1 Left Back Accordion 10 Faroese (Active)   Number of days: 2020        Past Medical History:   Diagnosis Date     Abnormal Pap smear 2/21/2017     said pap was abnormal     Arthritis      Chronic back pain     hx spondylolisthesis     Chronic pain      COPD (chronic obstructive pulmonary disease) (H)     PFT's 2013 FEV1/FVC = 2.68/3.20 = 84%     H/O gastric bypass 2007     Hepatitis C     treated ribaviron & interferon in 2008 for serotype 2 with failed 6 month treatment     Hyperlipidemia     on simvatatin     Insomnia      Other chronic pain      Positive TB test     has taken INH     RLS (restless legs syndrome)      Seasonal allergies      TB lung, latent 1994    treated     Uncomplicated asthma      Wounds and injuries 2003      Past Surgical History:   Procedure Laterality Date     CHOLECYSTECTOMY       D & C  1987     ENDOSCOPIC ULTRASOUND UPPER GASTROINTESTINAL TRACT (GI) N/A 10/21/2019    Procedure: ENDOSCOPIC ULTRASOUND, ESOPHAGOSCOPY / UPPER GASTROINTESTINAL TRACT (GI) with jejunalgastrostomy with stent placement x 2, dilation tract;  Surgeon: Ramesh Self MD;  Location: [facility] OR     ENDOSCOPIC ULTRASOUND UPPER GASTROINTESTINAL TRACT (GI) N/A  1/8/2020    Procedure: ENDOSCOPIC ULTRASOUND, ESOPHAGOSCOPY / UPPER GASTROINTESTINAL TRACT (GI)  with stent exchange;  Surgeon: Ramesh Self MD;  Location: [facility] OR     ESOPHAGOSCOPY, GASTROSCOPY, DUODENOSCOPY (EGD), COMBINED  7/30/2014    Procedure: COMBINED ESOPHAGOSCOPY, GASTROSCOPY, DUODENOSCOPY (EGD);  Surgeon: Ramesh Self MD;  Location: [facility] GI     ESOPHAGOSCOPY, GASTROSCOPY, DUODENOSCOPY (EGD), COMBINED N/A 8/14/2019    Procedure: ESOPHAGOGASTRODUODENOSCOPY, WITH BIOPSY;  Surgeon: Johann Hazel MD;  [facility] OR     FRACTURE TX, ANKLE RT/LT  1991    ORIF rt ankle     FUSION SPINE POSTERIOR ONE LEVEL N/A 11/21/2014    Procedure: FUSION SPINE POSTERIOR ONE LEVEL;  Surgeon: Amilcar Baldwin MD;  [facility]: [facility] OR     FUSION SPINE POSTERIOR ONE LEVEL Right 7/30/2015    Procedure: FUSION SPINE POSTERIOR ONE LEVEL;  Surgeon: Amilcar Baldwin MD;  [facility]: [facility] OR     GASTRIC BYPASS  2007    lost 150 lbs     GRAFT BONE FROM ILIAC CREST Left 11/21/2014    Procedure: GRAFT BONE FROM ILIAC CREST;  Surgeon: Amilcar Baldwin MD;  Location: [facility] OR     hardware removal       INJECT PARAVERTEBRAL FACET JOINT LUMBAR / SACRAL FIRST Bilateral 9/30/2019    Procedure: Lumbar Medial Branch Nerve Block Injection Bilateral;  Surgeon: Hira Rodríguez MD;  Location: [facility] OR     OPTICAL TRACKING SYSTEM FUSION POSTERIOR SPINE LUMBAR  11/20/2013    Procedure: OPTICAL TRACKING SYSTEM FUSION SPINE POSTERIOR LUMBAR ONE LEVEL;  Lumbar 4-5 Transforaminal Interbody Fusion;  Surgeon: Amilcar Baldwin MD;  [facility]     ORTHOPEDIC SURGERY      shoulder surgery, removed bone spur     PHACOEMULSIFICATION CLEAR CORNEA WITH STANDARD INTRAOCULAR LENS IMPLANT Right 5/29/2020    Procedure: RIGHT PHACOEMULSIFICATION, CATARACT, WITH INTRAOCULAR LENS IMPLANT;  Surgeon: Lyla Mahoney MD;  Location: [facility] OR     TUBAL LIGATION        Allergies   Allergen Reactions     Bee  Venom Anaphylaxis, Anxiety, Difficulty breathing, Hives, Itching, Nausea and Vomiting, Palpitations, Shortness Of Breath and Swelling     Opium Alkaloids, Hcls Itching     Can take opiate derived narcotics with Benadryl.     Morphine Itching     Latex Rash and Itching        Anesthesia Evaluation     . Pt has had prior anesthetic. Type: General           ROS/MED HX    ENT/Pulmonary:       Neurologic:  - neg neurologic ROS     Cardiovascular:  - neg cardiovascular ROS       METS/Exercise Tolerance:     Hematologic:  - neg hematologic  ROS       Musculoskeletal:  - neg musculoskeletal ROS       GI/Hepatic:         Renal/Genitourinary:  - ROS Renal section negative       Endo:         Psychiatric:  - neg psychiatric ROS       Infectious Disease:  - neg infectious disease ROS       Malignancy:         Other:                         PHYSICAL EXAM:   Mental Status/Neuro: A/A/O   Airway: Facies: Feasible  Mallampati: I  Mouth/Opening: Full  TM distance: > 6 cm  Neck ROM: Full   Respiratory: Auscultation: CTAB     Resp. Rate: Normal     Resp. Effort: Normal      CV: Rhythm: Regular  Rate: Age appropriate  Heart: Normal Sounds  Edema: None   Comments:      Dental: Normal Dentition                Assessment:   ASA SCORE: 2    H&P: History and physical reviewed and following examination; no interval change.   Smoking Status:  Non-Smoker/Unknown   NPO Status: NPO Appropriate     Plan:   Anes. Type:  General   Pre-Medication: None   Induction:  IV (Standard)   Airway: ETT; Oral   Access/Monitoring: PIV   Maintenance: Balanced     Postop Plan:   Postop Pain: Opioids  Postop Sedation/Airway: Not planned     PONV Management:   Adult Risk Factors: Female, Non-Smoker, Postop Opioids   Prevention: Ondansetron, Dexamethasone     CONSENT: Direct conversation   Plan and risks discussed with: Patient   Blood Products: Consent Deferred (Minimal Blood Loss)                   Reuben Pedersen MD

## 2020-06-02 NOTE — ANESTHESIA CARE TRANSFER NOTE
Patient: Kristina Eldridge    Procedure(s):  ENDOSCOPIC ULTRASOUND, ESOPHAGOSCOPY / UPPER GASTROINTESTINAL TRACT (GI)  **Latex Allergy** stent exchange    Diagnosis: Failure to thrive in adult [R62.7]  Diagnosis Additional Information: No value filed.    Anesthesia Type:   No value filed.     Note:  Airway :Face Mask (non RB)  Patient transferred to:PACU  Handoff Report: Identifed the Patient, Identified the Reponsible Provider, Reviewed the pertinent medical history, Discussed the surgical course, Reviewed Intra-OP anesthesia mangement and issues during anesthesia, Set expectations for post-procedure period and Allowed opportunity for questions and acknowledgement of understanding      Vitals: (Last set prior to Anesthesia Care Transfer)    CRNA VITALS  6/2/2020 1705 - 6/2/2020 1735      6/2/2020             Pulse:  81    SpO2:  99 %    Resp Rate (observed):  (!) 2                Electronically Signed By: MILEY Madera CRNA  June 2, 2020  5:35 PM

## 2020-06-02 NOTE — DISCHARGE INSTRUCTIONS
[facility] [place]  Same-Day EUS Procedure  Adult Discharge Orders & Instructions     You had a procedure known as an Endoscopic Ultrasound (EUS) of either the upper gastrointestinal (GI) tract or the lower GI tract. An UPPER EUS will place a camera into either your mouth or nose to examine your esophagus, stomach, pancreas, liver and/or small intestines. Biopsies, small samples of tissue, are often taken to help diagnose and/or classify stages of disease growth. The EUS is also used to help locate areas of the GI tract that may require further treatment (dilation, stenting, clipping, removal, etc.) or medical interventions (medication specific).      After your procedure   1. Make sure to clarify with your healthcare provider any diet restrictions (For example, clear liquid, low fat, no caffeine, etc.)   2. Do NOT take aspirin containing medications or any other blood-thinning medicines (anticoagulants) until your healthcare provider says it's OK.   3. You MAY be prescribed antibiotics, depending on what was done and/or found during your EUS, make sure to take antibiotics as prescribed by your healthcare provider    For 24 hours after surgery  1. Get plenty of rest.  A responsible adult must stay with you for at least 24 hours after you leave the hospital.   2. Do not drive or use heavy equipment.  If you have weakness or tingling, don't drive or use heavy equipment until this feeling goes away.  3. Do not drink alcohol.  4. Avoid strenuous or risky activities (gym, yoga, cycling, etc.).  Ask for help when climbing stairs.   5. You may feel lightheaded.  IF so, sit for a few minutes before standing.  Have someone help you get up.   6. If you have nausea (feel sick to your stomach): Drink only clear liquids such as apple juice, ginger ale, broth or 7-Up.  Rest may also help.  Be sure to drink enough fluids.  Move to a regular diet as you feel able.  7. If you feel bloated or have too  much gas, use a heating pad on your belly to help reduce the discomfort. This should help you feel better.   8. You may have a slight fever. This is normal for the first 24 hours.   9. You may have a dry mouth, a sore throat, muscle aches or trouble sleeping.  These are normal and will go away after 24 hours. A sore throat is most common. Use lozenges or gargle with salt water to ease the discomfort.   10. Do not make important or legal decisions.      Call your doctor for any of the followin. Chest pain, and/or shortness of breath  2. Abdominal  pain, bloating or cramping that has not improved or does not respond to pain reliving medications (Tylenol or narcotics if prescribed)   3. Difficulty swallowing or feeling as though food or liquids are stuck in your throat   4. Sore throat lasting more than 2 days or pain that has worsened over time   5. Black or tarry stools   6. Nausea and/or vomiting that is not resolving or has not responded to anti-nausea medications prescribed to you   7. It has been over 8 to 10 hours since surgery and you are still not able to urinate (pass water)   8. Headache for over 24 hours   9. Fever over 100.5 F (38 C) lasting more than 24 hours after the procedure   10. Signs of jaundice or blockage (fever, chills, abdominal pain, yellowing of the whites of your eyes, yellowing of your skin, and/or passing darker than normal urine)     To contact a doctor, call:   [ ] Dr Pathak's office at [phone] (Monday thru Friday 8:00am to 4:30pm)    [ ] [phone] and ask for the GASTROENTEROLOGY FELLOW on call (answered 24 hours a day)   [ ] Emergency Department: [facility]: [phone]   Take it easy when you get home.  Remember, same day surgery DOES NOT MEAN SAME DAY RECOVERY!  Healing is a gradual process.  You will need some time to recover - you may be more tired than you realize at first.  Rest and relax for at least the first 24 hours at home.  You'll feel better and heal  faster if you take good care of yourself.

## 2020-06-02 NOTE — ANESTHESIA POSTPROCEDURE EVALUATION
Anesthesia POST Procedure Evaluation    Patient: Kristina Eldridge   MRN:     [id number] Gender:   female   Age:    49 year old :      1970        Preoperative Diagnosis: Failure to thrive in adult [R62.7]   Procedure(s):  ENDOSCOPIC ULTRASOUND, ESOPHAGOSCOPY / UPPER GASTROINTESTINAL TRACT (GI)  **Latex Allergy** stent exchange   Postop Comments: No value filed.     Anesthesia Type: No value filed.       Disposition: Outpatient   Postop Pain Control: Uneventful            Sign Out: Well controlled pain   PONV: No   Neuro/Psych: Uneventful            Sign Out: Acceptable/Baseline neuro status   Airway/Respiratory: Uneventful            Sign Out: Acceptable/Baseline resp. status   CV/Hemodynamics: Uneventful            Sign Out: Acceptable CV status   Other NRE: NONE   DID A NON-ROUTINE EVENT OCCUR? No         Last Anesthesia Record Vitals:  CRNA VITALS  2020 1712 - 2020 1809      2020             Resp Rate (observed):  16    EKG:  Sinus rhythm          Last PACU Vitals:  Vitals Value Taken Time   /72 2020  6:00 PM   Temp 36.7  C (98.1  F) 2020  5:45 PM   Pulse 72 2020  6:00 PM   Resp 18 2020  6:00 PM   SpO2 90 % 2020  6:07 PM   Temp src Available 2020  5:45 PM   NIBP     Pulse     SpO2     Resp     Temp     Ht Rate     Temp 2     Vitals shown include unvalidated device data.      Electronically Signed By: Reuben Pedersen MD, 2020, 6:09 PM

## 2020-06-02 NOTE — OP NOTE
Upper GI Endoscopy  06/02/2020  4:36 PM  [facility], [address] [place] [address] [phone]     Endoscopy Department   _______________________________________________________________________________   Patient Name: Kristina Eldridge            Procedure Date: 6/2/2020 4:36 PM   MRN: [id number]                       Account Number: [id number]   YOB: 1970              Admit Type: Outpatient   Age: 49                               Room: OR   Gender: Female                        Note Status: Finalized   Attending MD: Ramesh Self MD   Total Sedation Time:   _______________________________________________________________________________       Procedure:           Upper GI endoscopy   Indications:         Stent follow-up   Providers:           Ramesh Self MD, Jennifer Vanderheyden   Patient Profile:     Ms Eldridge is a 48yo woman with a distant history of RYGB                        who had been failing to thrive and underwent EUS guided                        jejunogastrostomy to reaccess the foregut who has done                        quite well so far as improving maintaining or weight                        gain. She now returns for scheduled stent exchange by                        upper endoscopy.   Referring MD:        Abdulkadir Benites MD   Medicines:           General Anesthesia   Complications:       No immediate complications.   _______________________________________________________________________________   Procedure:           Pre-Anesthesia Assessment:                        - Prior to the procedure, a History and Physical was                        performed, and patient medications and allergies were                        reviewed. The patient is competent. The risks and                        benefits of the procedure and the sedation options and                        risks were discussed with the patient. All questions                         were answered and informed consent was obtained. Patient                        identification and proposed procedure were verified by                        the nurse in the pre-procedure area. Mental Status                        Examination: alert and oriented. Airway Examination:                        Mallampati Class II (the uvula but not tonsillar pillars                        visualized). Respiratory Examination: clear to                        auscultation. CV Examination: normal. ASA Grade                        Assessment: II - A patient with mild systemic disease.                        After reviewing the risks and benefits, the patient was                        deemed in satisfactory condition to undergo the                        procedure. The anesthesia plan was to use general                        anesthesia. Immediately prior to administration of                        medications, the patient was re-assessed for adequacy to                        receive sedatives. The heart rate, respiratory rate,                        oxygen saturations, blood pressure, adequacy of                        pulmonary ventilation, and response to care were                        monitored throughout the procedure. The physical status                        of the patient was re-assessed after the procedure.                        After obtaining informed consent, the endoscope was                        passed under direct vision. Throughout the procedure,                        the patient's blood pressure, pulse, and oxygen                        saturations were monitored continuously. The                        duodenoscope was introduced through the mouth, and                        advanced to the jejunum. The upper GI endoscopy was                        accomplished without difficulty. The patient tolerated                        the procedure well.                                                                                      Findings:        The proximal, mid and distal esophagus were unremarkable without        inflammation or lesion. The squamocolumnar line was found at the        gastroesophageal junction without significant irregularity. The pouch        was modestly sized and the gastrojejunostomy generously patent        withoutinflammation or lesion. This is an end to side anastomosis. A few        centimeters into the Alicia a widely patent Axios was found and traversed        leading to the remnant and duodenal sweep. The existing stent was        removed in toto with a rat tooth and a [id number] lumen apposing covered        metal stent was deployed across what appeared to be a matured tract. The        stent seated well and expanded as anticipated.                                                                                     Impression:          - RYGB anatomy with well postioned 20mm Axios                        maintaining a now mature jejunogastrostomy                        - Uncomplicated exchange of the 20mm Axios with a fresh                        20mm Axios using cold, endoscopically guided technique   Recommendation:      - General anesthesia recovery with probable discharge                        home this afternoon                        - Previous diet and all medications may be resumed                        immediately upon discharge                        - [facility]                        - Return for stent removal/exchange in 6m                        - The findings and recommendations were discussed with                        the patient and their family                                                                                       electronically signed by CHER Self

## 2020-06-02 NOTE — OR NURSING
discharge instructions reviewed with patient and her . No questions at this time regarding this information. Patient and  know where to find the phone number to call with questions and concerns.

## 2020-06-05 ENCOUNTER — PREP FOR PROCEDURE (OUTPATIENT)
Dept: GASTROENTEROLOGY | Facility: CLINIC | Age: 50
End: 2020-06-05

## 2020-06-05 ENCOUNTER — PATIENT OUTREACH (OUTPATIENT)
Dept: GASTROENTEROLOGY | Facility: CLINIC | Age: 50
End: 2020-06-05

## 2020-06-05 DIAGNOSIS — R62.7 ADULT FAILURE TO THRIVE: Primary | ICD-10-CM

## 2020-06-05 NOTE — TELEPHONE ENCOUNTER
Post Upper GI (2020) with Dr. Self: Follow-up    Post procedure recommendations:   Previous diet and all medications may be resumed                        immediately upon discharge                        - [facility]                        - Return for stent removal/exchange in 6m                        - The findings and recommendations were discussed with                        the patient and their family     Orders placed:   Please assist in scheduling:     Procedure/Imaging/Clinic: Upper GI  Physician: Dr. Self    Timin months  Procedure length:50 minutes  Anesthesia:general  Dx: FTT  Tier:3  Location: [facility]       Comments:       Patient states : feeling ok, no concerning symptoms, understands followup    Clinic contact and scheduling numbers verified for future questions/concerns.    Hilda Navarro, RN Care Coordinator

## 2020-06-08 DIAGNOSIS — Z98.84 GASTRIC BYPASS STATUS FOR OBESITY: ICD-10-CM

## 2020-06-10 RX ORDER — SYRINGE WITH NEEDLE, 1 ML 25GX5/8"
SYRINGE, EMPTY DISPOSABLE MISCELLANEOUS
Qty: 12 EACH | Refills: 0 | Status: SHIPPED | OUTPATIENT
Start: 2020-06-10 | End: 2020-07-20

## 2020-06-15 ENCOUNTER — OFFICE VISIT (OUTPATIENT)
Dept: OPHTHALMOLOGY | Facility: CLINIC | Age: 50
End: 2020-06-15
Attending: OPHTHALMOLOGY
Payer: COMMERCIAL

## 2020-06-15 DIAGNOSIS — H04.123 DRY EYES, BILATERAL: ICD-10-CM

## 2020-06-15 DIAGNOSIS — H25.12 AGE-RELATED NUCLEAR CATARACT, LEFT: ICD-10-CM

## 2020-06-15 DIAGNOSIS — Z96.1 PSEUDOPHAKIA, RIGHT EYE: Primary | ICD-10-CM

## 2020-06-15 PROCEDURE — G0463 HOSPITAL OUTPT CLINIC VISIT: HCPCS | Mod: ZF

## 2020-06-15 PROCEDURE — 92015 DETERMINE REFRACTIVE STATE: CPT | Mod: 52,ZF

## 2020-06-15 ASSESSMENT — SLIT LAMP EXAM - LIDS
COMMENTS: MGD, MILD BLEPHARITIS
COMMENTS: NORMAL

## 2020-06-15 ASSESSMENT — REFRACTION_WEARINGRX
OS_AXIS: 012
OS_SPHERE: -3.00
OS_CYLINDER: +0.25

## 2020-06-15 ASSESSMENT — CUP TO DISC RATIO
OS_RATIO: 0.20
OD_RATIO: 0.20

## 2020-06-15 ASSESSMENT — TONOMETRY
IOP_METHOD: TONOPEN
OS_IOP_MMHG: 26
OD_IOP_MMHG: 22

## 2020-06-15 ASSESSMENT — REFRACTION_MANIFEST
OD_AXIS: 015
OD_SPHERE: -0.25
OD_CYLINDER: +0.50

## 2020-06-15 ASSESSMENT — VISUAL ACUITY
OS_CC: 20/70+
OD_SC: 20/20-
OS_PH_CC: 20/40-2/+

## 2020-06-15 ASSESSMENT — EXTERNAL EXAM - LEFT EYE: OS_EXAM: NORMAL

## 2020-06-15 ASSESSMENT — EXTERNAL EXAM - RIGHT EYE: OD_EXAM: NORMAL

## 2020-06-15 NOTE — PROGRESS NOTES
HPI: Kristina Eldridge is a 49 year old female here for follow-up after CE/IOL right eye. She is pleased with her improvement in vision. No pain, redness, discharge. No flashes/floaters.    POHx: myopia with astigmatism and presbyopia. Pseudphakia right eye, cataract left eye.   PMHx:  Chronic back pain, COPD, insomnia, B12 deficiency s/p gastric bypass. Seasonal allergies. No HTN or DM.  Family Hx: no known glaucoma or AMD  Social Hx: cigarettes, no drugs or etoh    Assessment & Plan     (Z96.1) Pseudophakia, right eye  (primary encounter diagnosis)  Comment: Excellent post-operative appearance.  Plan: Complete drop taper.    (H25.12) Age-related nuclear cataract, left  Comment: Now bothersome for her that right eye vision is so clear.    Dilates to: 5.5 mm  Alpha blockers/Flomax: None  Trauma/Pseudoxfoliation: None  Fuchs dystrophy/guttae: None    Diabetes: No  Anticoagulation: None    Cyl: 0.52 @ 096 left eye    We discussed the risks and benefits of cataract surgery in the left eye, and informed consent was obtained.  Proceed with CE/IOL OS.    Surgical plan:  Topical  Malyugin ring possible  Aim emmetropia both eyes    (H04.123) Dry eyes, bilateral  Comment: Intermittent symptoms  Plan: Continue ATs PRN     -----------------------------------------------------------------------------------    Patient disposition:   Return for scheduled procedure. or sooner as needed.    Teaching statement:  Complete documentation of historical and exam elements from today's encounter can be found in the full encounter summary report (not reduplicated in this progress note). I personally obtained the chief complaint(s) and history of present illness.  I confirmed and edited as necessary the review of systems, past medical/surgical history, family history, social history, and examination findings as documented by others; and I examined the patient myself. I personally reviewed the relevant tests, images, and reports as documented  above.     I formulated and edited as necessary the assessment and plan and discussed the findings and management plan with the patient and family.      Lyla Mahoney MD  Comprehensive Ophthalmology & Ocular Pathology  Department of Ophthalmology and Visual Neurosciences  [email or web address]  Pager [phone]

## 2020-06-18 DIAGNOSIS — M47.816 LUMBAR SPONDYLOSIS: Primary | ICD-10-CM

## 2020-06-19 ENCOUNTER — TELEPHONE (OUTPATIENT)
Dept: ANESTHESIOLOGY | Facility: CLINIC | Age: 50
End: 2020-06-19

## 2020-06-19 DIAGNOSIS — Z11.59 ENCOUNTER FOR SCREENING FOR OTHER VIRAL DISEASES: Primary | ICD-10-CM

## 2020-06-23 NOTE — TELEPHONE ENCOUNTER
RNCC called pt to review pre-procedure instructions, including COVID testing, NPO,  needed, and medications to hold. Pt advised that procedure is scheduled 7/10/20 at 1030, check in time 0930. Pt is aware of current visitor policy due to COVID. Pt still has copy of instructions to reference if needed and declined additional copy.     Sumaya Cevallos RN, BSN

## 2020-06-24 NOTE — TELEPHONE ENCOUNTER
Called patient to schedule procedure with Dr. Lyla Mahoney, there was no answer.  Left message with my direct line [phone].

## 2020-06-25 ENCOUNTER — TELEPHONE (OUTPATIENT)
Dept: OPHTHALMOLOGY | Facility: CLINIC | Age: 50
End: 2020-06-25

## 2020-06-25 DIAGNOSIS — Z11.59 ENCOUNTER FOR SCREENING FOR OTHER VIRAL DISEASES: Primary | ICD-10-CM

## 2020-06-25 PROBLEM — H25.12 AGE-RELATED NUCLEAR CATARACT, LEFT: Status: ACTIVE | Noted: 2020-06-25

## 2020-06-29 ENCOUNTER — TELEPHONE (OUTPATIENT)
Dept: ANESTHESIOLOGY | Facility: CLINIC | Age: 50
End: 2020-06-29

## 2020-07-07 NOTE — TELEPHONE ENCOUNTER
RNCC called pt to confirm that clinic appointment 7/2/20 with Dr. Bagley should be cancelled, as she has procedure scheduled 7/9/20 for dx injection. Pt confirmed cancellation of appointment.     ANABELLA NunoN, RN

## 2020-07-08 DIAGNOSIS — Z11.59 ENCOUNTER FOR SCREENING FOR OTHER VIRAL DISEASES: ICD-10-CM

## 2020-07-09 LAB
SARS-COV-2 RNA SPEC QL NAA+PROBE: NOT DETECTED
SPECIMEN SOURCE: NORMAL

## 2020-07-10 ENCOUNTER — ANCILLARY PROCEDURE (OUTPATIENT)
Dept: RADIOLOGY | Facility: AMBULATORY SURGERY CENTER | Age: 50
End: 2020-07-10
Attending: ANESTHESIOLOGY
Payer: COMMERCIAL

## 2020-07-10 ENCOUNTER — HOSPITAL ENCOUNTER (OUTPATIENT)
Facility: AMBULATORY SURGERY CENTER | Age: 50
End: 2020-07-10
Attending: ANESTHESIOLOGY
Payer: COMMERCIAL

## 2020-07-10 VITALS
RESPIRATION RATE: 12 BRPM | HEART RATE: 83 BPM | DIASTOLIC BLOOD PRESSURE: 93 MMHG | OXYGEN SATURATION: 100 % | SYSTOLIC BLOOD PRESSURE: 138 MMHG | TEMPERATURE: 97.7 F

## 2020-07-10 DIAGNOSIS — R52 PAIN: ICD-10-CM

## 2020-07-10 DIAGNOSIS — M47.816 LUMBAR SPONDYLOSIS: ICD-10-CM

## 2020-07-10 RX ORDER — IOPAMIDOL 408 MG/ML
INJECTION, SOLUTION INTRATHECAL PRN
Status: DISCONTINUED | OUTPATIENT
Start: 2020-07-10 | End: 2020-07-10 | Stop reason: HOSPADM

## 2020-07-10 RX ORDER — BUPIVACAINE HYDROCHLORIDE 7.5 MG/ML
INJECTION, SOLUTION EPIDURAL; RETROBULBAR PRN
Status: DISCONTINUED | OUTPATIENT
Start: 2020-07-10 | End: 2020-07-10 | Stop reason: HOSPADM

## 2020-07-10 RX ORDER — PRAMIPEXOLE DIHYDROCHLORIDE 1 MG/1
1 TABLET ORAL 3 TIMES DAILY
COMMUNITY
End: 2020-10-30

## 2020-07-10 NOTE — DISCHARGE INSTRUCTIONS
Home Care Instructions after a Medial Branch Block      In a medial branch block, a local anesthetic (numbing medicine) is injected near the medial branch nerve. This stops the transmission of pain signals from the facet joint. If this reduces your pain and improves your mobility, it may tell the doctor which facet joint is causing the pain. This procedure is a diagnostic procedure and is typically short lasting. With this injection, a steroid to increase the longevity of the blocks effect may or may not be used.    Activity  -You may resume most normal activity levels with the exception of strenuous activity. It is important for us to know if your pain with normal activity is relieved after this injection.  -DO NOT shower for 24 hours  -DO NOT remove bandaid for 24 hours    Pain  -You may experience soreness at the injection site for one or two days  -You may use an ice pack for 20 minutes every 2 hours for the first 24 hours  -You may use a heating pad after the first 24 hours  -You may use Tylenol (acetaminophen) every 4 hours or other pain medicines as     directed by your physician    You may experience numbness radiating into your legs or arms (depending on the procedure location). This numbness may last several hours. Until sensation returns to normal; please use caution in walking, climbing stairs, and stepping out of your vehicle, etc.    DID YOU RECEIVE SEDATION TODAY?  No    If you received sedation please follow these additional safety measures.  Sedation medicine, if given, may remain active for many hours. It is important for the next 24 hours that you do not:  -Drive a car  -Operate machines or power tools  -Consume alcohol, including beer  -Sign any important papers or legal documents    DID YOU RECEIVE STEROIDS TODAY?  No    Common side effects of steroids:  Not everyone will experience corticosteroid side effects. If side effects are experienced, they will gradually subside in the 7-10 day period  following an injection. Most common side effects include:  -Flushed face and/or chest  -Feeling of warmth, particularly in the face but could be an overall feeling of warmth  -Increased blood sugar in diabetic patients  -Menstrual irregularities my occur. If taking hormone-based birth control an alternate method of birth control is recommended  -Sleep disturbances and/or mood swings are possible  -Leg cramps      PLEASE KEEP TRACK OF YOUR SYMPTOMS AND NOTE YOUR IMPROVEMENT FOR YOUR DOCTOR.       Fill out the pain diary and fax it back to us. This cues us to call the patient to follow up with scheduling the next procedure. They do not need to call us they have faxed the Pain Diary.     If they do not have access to a fax machine, they can attach it to [facility] and we will follow up with them. They do not need to call us.     If they cannot fax or [place], then call our call center and request to speak with a nurse for follow up after a procedure [phone].    Please contact us if you have:  -Severe pain  -Fever more than 101.5 degrees Fahrenheit  -Signs of infection at the injection site (redness, swelling, or drainage)    If you have questions, please contact our office at [phone] between the hours of 7:00 am and 3:00 pm Monday through Friday. After office hours you can contact the on call provider by dialing [phone]. If you need immediate attention, we recommend that you go to a hospital emergency room or dial [phone].

## 2020-07-10 NOTE — PROCEDURES
Patient: Kristina Eldridge Age: 49 year old   MRN: [id number] Attending: Dr. Silva     Date of Visit: July 10, 2020    PAIN MEDICINE CLINIC PROCEDURE NOTE    ATTENDING CLINICIAN:    Pravin Silva MD    ASSISTANT CLINICIAN:  None    PREPROCEDURE DIAGNOSES:  1. Lumbar facet arthropathy   2. Chronic low back pain     POSTPROCEDURE DIAGNOSES:  1. Lumbar facet arthropathy   2. Chronic low back pain     PROCEDURE(S) PERFORMED:  1.  Bilateral L3 and L4 medial branch nerve and L5 dorsal ramus nerve blocks  2.  Fluoroscopic guidance for the above procedures    ANESTHESIA:  Local    MEDICATIONS ADMINISTERED  Bupivacaine 0.75% 3.6 mL (0.6 mL/site)    COMPLICATIONS:  None.    INDICATIONS:    Kristina Eldridge is a 49 year old female with a history of low back pain secondary to lumbar facet joint arthopathy .  The patient stated that she was in their usual state of health and denied recent anticoagulant use or recent infections.  Therefore, the plan is to perform above mentioned procedure.     Procedure Details:  Written informed consent was obtained and saved in the electronic medical record, after the risks, benefits, and alternatives were discussed with the patient.  All of the patient s questions were answered.  The patient was brought to the procedure room, where she was identified by name, medical record number and date of birth.      The patient was placed in the prone position on the procedure room table.  All pressure points were checked and comfortably padded.  Routine monitors were placed.  Vital signs were stable.  A formal time-out procedure was performed, as per protocol, including patient name, title of procedure, and site of procedure, and all in the room concurred.    A chlorhexidine prep was completed followed by sterile draping per standard procedure.    Utilizing an AP view, the fifth lumbar vertebral body and sacral ramus were identified.   Bilateral sacral ala(e) were marked and the skin and subcutaneous  tissues overlying this area were anesthetized with a 1% lidocaine.  Under intermittent fluoroscopic guidance, a 3.5 inch spinal needle was directed through the skin and subcutaneous tissues until osseous contact was achieved.  The fluoroscope was then obliqued towards the patient's right in order to identify the pedicles of the L4, and L5 vertebrae.  The targets were recognized at the junction of the transverse process and the superior articular process. Skin and subcutaneous tissues overlying this area were anesthetized with a 1% lidocaine. Under intermittent fluoroscopic guidance, a 3.5 inch spinal needle was directed through the skin and subcutaneous tissues until osseous contact was achieved.  Needles WERE placed on the CONTRALATERAL side utilizing the exact technique as described above at the same levels..  The final needle position was verified in AP and lateral views.  At that point, the stylets were removed and after aspiration was negative at each site, the above listed injectate was injected equally among the placed needles.    Light pressure was held at the puncture sites to prevent ecchymosis and oozing.  The patient's skin was cleansed, and hemostasis was confirmed.  Band-aids were applied to the needle injection sites.      Condition:    The patient tolerated the procedure well and was monitored for approximately 15 minutes afterward in the post procedure area.  There were no immediate post procedure complications noted.  The patient was then discharged to home as per protocol.     Patient condition  stable.    Preprocedure pain score: 6/10  Postprocedure pain score: 5/10    Pravin Silva MD    Department of Anesthesiology  Pain Management Division

## 2020-07-10 NOTE — H&P
Abbreviated History and Physical    Patient Name: Kristina Eldridge  YOB: 1970    Reason for Procedure: Lumbar spondylosis    History: Kristina Eldridge is a 49 year old year old female who presents today for bilateral lumbar 3, lumbar 4, and lumbar 5 medial branch nerve blocks.  She has a history of lumbar spondylosis for which the aforementioned procedure will likely provide significant diagnostic value.  We discussed the procedure at length, including the risks, benefits, and alternatives, and she wishes to proceed with the procedure.  She denies any recent anticoagulant use, recent steroid exposure, recent or upcoming surgeries, or recent signs of infection or systemic illness.    Past Medical History:   Diagnosis Date     Abnormal Pap smear 2/21/2017    Dr said pap was abnormal     Arthritis      Chronic back pain     hx spondylolisthesis     Chronic pain      COPD (chronic obstructive pulmonary disease) (H)     PFT's 2013 FEV1/FVC = 2.68/3.20 = 84%     H/O gastric bypass 2007     Hepatitis C     treated ribaviron & interferon in 2008 for serotype 2 with failed 6 month treatment     Hyperlipidemia     on simvatatin     Insomnia      Other chronic pain      Positive TB test     has taken INH     RLS (restless legs syndrome)      Seasonal allergies      TB lung, latent 1994    treated     Uncomplicated asthma      Wounds and injuries 2003       History of obstructive sleep apnea? NA    History of problems with sedation? NA    Physical exam:  BP (!) 138/93   Pulse 83   Temp 97.7  F (36.5  C) (Temporal)   Resp 12   LMP 06/11/2012 (LMP Unknown)   SpO2 100%   General: in no apparent distress   Neuro: At least antigravity strength noted in all 4 extremities  Respiratory: Clear to ausculation bilaterally   Cardiac: Regular rate and rhythm  Skin: No rashes or lesions on exposed areas of skin    Medications:   Current Outpatient Medications   Medication     albuterol (VENTOLIN HFA) 108 (90 Base) MCG/ACT  "inhaler     cetirizine (ZYRTEC) 10 MG tablet     cyanocobalamin (CYANOCOBALAMIN) 1000 MCG/ML injection     montelukast (SINGULAIR) 10 MG tablet     pramipexole (MIRAPEX) 1 MG tablet     traZODone (DESYREL) 100 MG tablet     ATROVENT HFA 17 MCG/ACT inhaler     B-D LUER-JIMMY SYRINGE 23G X 1\" 3 ML MISC     baclofen (LIORESAL) 10 MG tablet     gabapentin (NEURONTIN) 300 MG capsule     mometasone-formoterol (DULERA) 100-5 MCG/ACT inhaler     ofloxacin (OCUFLOX) 0.3 % ophthalmic solution     order for DME     prednisoLONE acetate (PRED FORTE) 1 % ophthalmic suspension     spacer (OPTICHAMBER OTTONIEL) holding chamber     spacer (OPTICHAMBER OTTONIEL) holding chamber     Spacer/Aero Chamber Mouthpiece MISC     No current facility-administered medications for this encounter.        Allergies:     Allergies   Allergen Reactions     Bee Venom Anaphylaxis, Anxiety, Difficulty breathing, Hives, Itching, Nausea and Vomiting, Palpitations, Shortness Of Breath and Swelling     Opium Alkaloids, Hcls Itching     Can take opiate derived narcotics with Benadryl.     Morphine Itching     Latex Rash and Itching       ASA Classification: 3    OK for local anesthetic use.     Pravin Silva MD    Department of Anesthesiology  Pain Management Division  "

## 2020-07-14 DIAGNOSIS — H25.12 AGE-RELATED NUCLEAR CATARACT, LEFT: Primary | ICD-10-CM

## 2020-07-14 RX ORDER — OFLOXACIN 3 MG/ML
SOLUTION/ DROPS OPHTHALMIC
Qty: 1 BOTTLE | Refills: 0 | Status: SHIPPED | OUTPATIENT
Start: 2020-07-14 | End: 2021-01-07

## 2020-07-14 RX ORDER — PREDNISOLONE ACETATE 10 MG/ML
SUSPENSION/ DROPS OPHTHALMIC
Qty: 1 BOTTLE | Refills: 1 | Status: SHIPPED | OUTPATIENT
Start: 2020-07-14 | End: 2021-01-07

## 2020-07-15 DIAGNOSIS — Z11.59 ENCOUNTER FOR SCREENING FOR OTHER VIRAL DISEASES: ICD-10-CM

## 2020-07-16 ENCOUNTER — ANESTHESIA EVENT (OUTPATIENT)
Dept: SURGERY | Facility: AMBULATORY SURGERY CENTER | Age: 50
End: 2020-07-16

## 2020-07-16 LAB
SARS-COV-2 RNA SPEC QL NAA+PROBE: NOT DETECTED
SPECIMEN SOURCE: NORMAL

## 2020-07-17 ENCOUNTER — ANESTHESIA (OUTPATIENT)
Dept: SURGERY | Facility: AMBULATORY SURGERY CENTER | Age: 50
End: 2020-07-17

## 2020-07-17 ENCOUNTER — HOSPITAL ENCOUNTER (OUTPATIENT)
Facility: AMBULATORY SURGERY CENTER | Age: 50
End: 2020-07-17
Attending: OPHTHALMOLOGY
Payer: COMMERCIAL

## 2020-07-17 ENCOUNTER — OFFICE VISIT (OUTPATIENT)
Dept: OPHTHALMOLOGY | Facility: CLINIC | Age: 50
End: 2020-07-17
Payer: COMMERCIAL

## 2020-07-17 VITALS
DIASTOLIC BLOOD PRESSURE: 76 MMHG | HEART RATE: 64 BPM | OXYGEN SATURATION: 97 % | SYSTOLIC BLOOD PRESSURE: 136 MMHG | WEIGHT: 95 LBS | TEMPERATURE: 98 F | RESPIRATION RATE: 16 BRPM | BODY MASS INDEX: 18.65 KG/M2 | HEIGHT: 60 IN

## 2020-07-17 DIAGNOSIS — H25.12 AGE-RELATED NUCLEAR CATARACT, LEFT: ICD-10-CM

## 2020-07-17 DIAGNOSIS — Z96.1 PSEUDOPHAKIA OF LEFT EYE: Primary | ICD-10-CM

## 2020-07-17 DEVICE — EYE IMP IOL AMO PCL TECNIS ZCB00 22.5: Type: IMPLANTABLE DEVICE | Site: EYE | Status: FUNCTIONAL

## 2020-07-17 RX ORDER — DICLOFENAC SODIUM 1 MG/ML
1 SOLUTION/ DROPS OPHTHALMIC
Status: COMPLETED | OUTPATIENT
Start: 2020-07-17 | End: 2020-07-17

## 2020-07-17 RX ORDER — SODIUM CHLORIDE, SODIUM LACTATE, POTASSIUM CHLORIDE, CALCIUM CHLORIDE 600; 310; 30; 20 MG/100ML; MG/100ML; MG/100ML; MG/100ML
INJECTION, SOLUTION INTRAVENOUS CONTINUOUS
Status: DISCONTINUED | OUTPATIENT
Start: 2020-07-17 | End: 2020-07-17 | Stop reason: HOSPADM

## 2020-07-17 RX ORDER — TIMOLOL MALEATE 5 MG/ML
1 SOLUTION/ DROPS OPHTHALMIC ONCE
Status: DISCONTINUED | OUTPATIENT
Start: 2020-07-17 | End: 2020-07-17

## 2020-07-17 RX ORDER — CYCLOPENTOLAT/TROPIC/PHENYLEPH 1%-1%-2.5%
1 DROPS (EA) OPHTHALMIC (EYE)
Status: COMPLETED | OUTPATIENT
Start: 2020-07-17 | End: 2020-07-17

## 2020-07-17 RX ORDER — LIDOCAINE HYDROCHLORIDE 10 MG/ML
INJECTION, SOLUTION EPIDURAL; INFILTRATION; INTRACAUDAL; PERINEURAL PRN
Status: DISCONTINUED | OUTPATIENT
Start: 2020-07-17 | End: 2020-07-17 | Stop reason: HOSPADM

## 2020-07-17 RX ORDER — MEPERIDINE HYDROCHLORIDE 25 MG/ML
12.5 INJECTION INTRAMUSCULAR; INTRAVENOUS; SUBCUTANEOUS
Status: DISCONTINUED | OUTPATIENT
Start: 2020-07-17 | End: 2020-07-18 | Stop reason: HOSPADM

## 2020-07-17 RX ORDER — ONDANSETRON 2 MG/ML
4 INJECTION INTRAMUSCULAR; INTRAVENOUS EVERY 30 MIN PRN
Status: DISCONTINUED | OUTPATIENT
Start: 2020-07-17 | End: 2020-07-18 | Stop reason: HOSPADM

## 2020-07-17 RX ORDER — LIDOCAINE 40 MG/G
CREAM TOPICAL
Status: DISCONTINUED | OUTPATIENT
Start: 2020-07-17 | End: 2020-07-17 | Stop reason: HOSPADM

## 2020-07-17 RX ORDER — BALANCED SALT SOLUTION 6.4; .75; .48; .3; 3.9; 1.7 MG/ML; MG/ML; MG/ML; MG/ML; MG/ML; MG/ML
SOLUTION OPHTHALMIC PRN
Status: DISCONTINUED | OUTPATIENT
Start: 2020-07-17 | End: 2020-07-17 | Stop reason: HOSPADM

## 2020-07-17 RX ORDER — TETRACAINE HYDROCHLORIDE 5 MG/ML
SOLUTION OPHTHALMIC PRN
Status: DISCONTINUED | OUTPATIENT
Start: 2020-07-17 | End: 2020-07-17 | Stop reason: HOSPADM

## 2020-07-17 RX ORDER — PROPARACAINE HYDROCHLORIDE 5 MG/ML
1 SOLUTION/ DROPS OPHTHALMIC ONCE
Status: COMPLETED | OUTPATIENT
Start: 2020-07-17 | End: 2020-07-17

## 2020-07-17 RX ORDER — ACETAMINOPHEN 325 MG/1
975 TABLET ORAL ONCE
Status: DISCONTINUED | OUTPATIENT
Start: 2020-07-17 | End: 2020-07-17 | Stop reason: HOSPADM

## 2020-07-17 RX ORDER — OFLOXACIN 3 MG/ML
1 SOLUTION/ DROPS OPHTHALMIC
Status: COMPLETED | OUTPATIENT
Start: 2020-07-17 | End: 2020-07-17

## 2020-07-17 RX ORDER — ONDANSETRON 4 MG/1
4 TABLET, ORALLY DISINTEGRATING ORAL EVERY 30 MIN PRN
Status: DISCONTINUED | OUTPATIENT
Start: 2020-07-17 | End: 2020-07-18 | Stop reason: HOSPADM

## 2020-07-17 RX ORDER — SODIUM CHLORIDE, SODIUM LACTATE, POTASSIUM CHLORIDE, CALCIUM CHLORIDE 600; 310; 30; 20 MG/100ML; MG/100ML; MG/100ML; MG/100ML
INJECTION, SOLUTION INTRAVENOUS CONTINUOUS
Status: DISCONTINUED | OUTPATIENT
Start: 2020-07-17 | End: 2020-07-18 | Stop reason: HOSPADM

## 2020-07-17 RX ORDER — FENTANYL CITRATE 50 UG/ML
25-50 INJECTION, SOLUTION INTRAMUSCULAR; INTRAVENOUS EVERY 5 MIN PRN
Status: DISCONTINUED | OUTPATIENT
Start: 2020-07-17 | End: 2020-07-17 | Stop reason: HOSPADM

## 2020-07-17 RX ORDER — GABAPENTIN 300 MG/1
300 CAPSULE ORAL ONCE
Status: DISCONTINUED | OUTPATIENT
Start: 2020-07-17 | End: 2020-07-17 | Stop reason: HOSPADM

## 2020-07-17 RX ORDER — NALOXONE HYDROCHLORIDE 0.4 MG/ML
.1-.4 INJECTION, SOLUTION INTRAMUSCULAR; INTRAVENOUS; SUBCUTANEOUS
Status: DISCONTINUED | OUTPATIENT
Start: 2020-07-17 | End: 2020-07-18 | Stop reason: HOSPADM

## 2020-07-17 RX ADMIN — Medication 1 DROP: at 08:01

## 2020-07-17 RX ADMIN — DICLOFENAC SODIUM 1 DROP: 1 SOLUTION/ DROPS OPHTHALMIC at 08:14

## 2020-07-17 RX ADMIN — OFLOXACIN 1 DROP: 3 SOLUTION/ DROPS OPHTHALMIC at 08:01

## 2020-07-17 RX ADMIN — DICLOFENAC SODIUM 1 DROP: 1 SOLUTION/ DROPS OPHTHALMIC at 08:01

## 2020-07-17 RX ADMIN — OFLOXACIN 1 DROP: 3 SOLUTION/ DROPS OPHTHALMIC at 08:14

## 2020-07-17 RX ADMIN — PROPARACAINE HYDROCHLORIDE 1 DROP: 5 SOLUTION/ DROPS OPHTHALMIC at 08:01

## 2020-07-17 RX ADMIN — Medication 1 DROP: at 08:07

## 2020-07-17 RX ADMIN — SODIUM CHLORIDE, SODIUM LACTATE, POTASSIUM CHLORIDE, CALCIUM CHLORIDE: 600; 310; 30; 20 INJECTION, SOLUTION INTRAVENOUS at 08:22

## 2020-07-17 RX ADMIN — DICLOFENAC SODIUM 1 DROP: 1 SOLUTION/ DROPS OPHTHALMIC at 08:07

## 2020-07-17 RX ADMIN — OFLOXACIN 1 DROP: 3 SOLUTION/ DROPS OPHTHALMIC at 08:07

## 2020-07-17 RX ADMIN — Medication 1 DROP: at 08:14

## 2020-07-17 ASSESSMENT — LIFESTYLE VARIABLES: TOBACCO_USE: 1

## 2020-07-17 ASSESSMENT — MIFFLIN-ST. JEOR: SCORE: 967.42

## 2020-07-17 ASSESSMENT — VISUAL ACUITY
OS_SC: 20/20
METHOD: SNELLEN - LINEAR
OD_SC: 20/25

## 2020-07-17 ASSESSMENT — TONOMETRY
IOP_METHOD: ICARE
OS_IOP_MMHG: 30
OD_IOP_MMHG: 15

## 2020-07-17 ASSESSMENT — SLIT LAMP EXAM - LIDS: COMMENTS: NORMAL

## 2020-07-17 ASSESSMENT — ENCOUNTER SYMPTOMS: SEIZURES: 0

## 2020-07-17 ASSESSMENT — COPD QUESTIONNAIRES
COPD: 1
CAT_SEVERITY: MILD

## 2020-07-17 NOTE — ANESTHESIA PREPROCEDURE EVALUATION
Anesthesia Pre-Procedure Evaluation    Patient: Kristina Eldridge   MRN:     [id number] Gender:   female   Age:    49 year old :      1970        Preoperative Diagnosis: Nuclear sclerosis of both eyes [H25.13]   Procedure(s):  RIGHT PHACOEMULSIFICATION, CATARACT, WITH INTRAOCULAR LENS IMPLANT     LABS:  CBC:   Lab Results   Component Value Date    WBC 8.7 2020    WBC 13.1 (H) 2020    HGB 12.6 2020    HGB 14.1 2020    HCT 37.5 2020    HCT 43.6 2020     2020     2020     BMP:   Lab Results   Component Value Date     2019     2019    POTASSIUM 3.5 2019    POTASSIUM 3.2 (L) 2019    CHLORIDE 104 2019    CHLORIDE 101 2019    CO2 30 2019    CO2 31 2019    BUN 5 (L) 2020    BUN 8 2020    CR 0.94 2019    CR 0.85 2019    GLC 74 2019    GLC 85 2019     COAGS:   Lab Results   Component Value Date    PTT 30 2015    INR 1.09 2020     POC:   Lab Results   Component Value Date    BGM 88 2020    HCG Negative 2014    HCGS Negative 2015     OTHER:   Lab Results   Component Value Date    A1C 5.3 2020    MARIA FERNANDA 8.2 (L) 2019    PHOS 3.1 2012    MAG 2.0 2015    ALBUMIN 3.6 2019    PROTTOTAL 6.6 (L) 2019    ALT 55 (H) 2019    AST 35 2019    ALKPHOS 98 2019    BILITOTAL 0.4 2019    TSH 3.12 2019    CRP 4.1 2019    SED 16 2013        Preop Vitals    BP Readings from Last 3 Encounters:   20 138/75   07/10/20 (!) 138/93   20 (!) 157/92    Pulse Readings from Last 3 Encounters:   20 56   07/10/20 83   20 70      Resp Readings from Last 3 Encounters:   20 16   07/10/20 12   20 16    SpO2 Readings from Last 3 Encounters:   20 95%   07/10/20 100%   20 92%      Temp Readings from Last 1 Encounters:   20 36.8  C (98.2  F) (Oral)     Ht Readings from Last 1 Encounters:   07/17/20 1.524 m (5')      Wt Readings from Last 1 Encounters:   07/17/20 43.1 kg (95 lb)    Estimated body mass index is 18.55 kg/m  as calculated from the following:    Height as of an earlier encounter on 7/17/20: 1.524 m (5').    Weight as of an earlier encounter on 7/17/20: 43.1 kg (95 lb).     LDA:  Peripheral IV 07/17/20 Left Hand (Active)   Site Assessment WDL 07/17/20 0823   Line Status Infusing 07/17/20 0823   Phlebitis Scale 0-->no symptoms 07/17/20 0823   Infiltration Scale 0 07/17/20 0823   Infiltration Site Treatment Method  None 07/17/20 0823   Extravasation? No 07/17/20 0823   Dressing Intervention New dressing  07/17/20 0823   Number of days: 0        Past Medical History:   Diagnosis Date     Abnormal Pap smear 2/21/2017     said pap was abnormal     Arthritis      Chronic back pain     hx spondylolisthesis     Chronic pain      COPD (chronic obstructive pulmonary disease) (H)     PFT's 2013 FEV1/FVC = 2.68/3.20 = 84%     H/O gastric bypass 2007     Hepatitis C     treated ribaviron & interferon in 2008 for serotype 2 with failed 6 month treatment     Hyperlipidemia     on simvatatin     Insomnia      Other chronic pain      Positive TB test     has taken INH     RLS (restless legs syndrome)      Seasonal allergies      TB lung, latent 1994    treated     Uncomplicated asthma      Wounds and injuries 2003      Past Surgical History:   Procedure Laterality Date     CHOLECYSTECTOMY       D & C  1987     ENDOSCOPIC ULTRASOUND UPPER GASTROINTESTINAL TRACT (GI) N/A 10/21/2019    Procedure: ENDOSCOPIC ULTRASOUND, ESOPHAGOSCOPY / UPPER GASTROINTESTINAL TRACT (GI) with jejunalgastrostomy with stent placement x 2, dilation tract;  Surgeon: Ramesh Self MD;  [facility]     ENDOSCOPIC ULTRASOUND UPPER GASTROINTESTINAL TRACT (GI) N/A 1/8/2020    Procedure: ENDOSCOPIC ULTRASOUND, ESOPHAGOSCOPY / UPPER GASTROINTESTINAL TRACT (GI)  with stent exchange;   Surgeon: Ramesh Self MD;  [facility]     ENDOSCOPIC ULTRASOUND UPPER GASTROINTESTINAL TRACT (GI) N/A 6/2/2020    Procedure: ENDOSCOPIC ULTRASOUND, ESOPHAGOSCOPY / UPPER GASTROINTESTINAL TRACT (GI)  **Latex Allergy** stent exchange;  Surgeon: Ramesh Self MD;  Location: [facility]     ESOPHAGOSCOPY, GASTROSCOPY, DUODENOSCOPY (EGD), COMBINED  7/30/2014    Procedure: COMBINED ESOPHAGOSCOPY, GASTROSCOPY, DUODENOSCOPY (EGD);  Surgeon: Ramesh Self MD;  [facility]     ESOPHAGOSCOPY, GASTROSCOPY, DUODENOSCOPY (EGD), COMBINED N/A 8/14/2019    Procedure: ESOPHAGOGASTRODUODENOSCOPY, WITH BIOPSY;  Surgeon: Johann Hazel MD;  [facility]     FRACTURE TX, ANKLE RT/LT  1991    ORIF rt ankle     FUSION SPINE POSTERIOR ONE LEVEL N/A 11/21/2014    Procedure: FUSION SPINE POSTERIOR ONE LEVEL;  Surgeon: Amilcar Baldwin MD;  [facility]     FUSION SPINE POSTERIOR ONE LEVEL Right 7/30/2015    Procedure: FUSION SPINE POSTERIOR ONE LEVEL;  Surgeon: Amilcar Baldwin MD;  [facility]     GASTRIC BYPASS  2007    lost 150 lbs     GRAFT BONE FROM ILIAC CREST Left 11/21/2014    Procedure: GRAFT BONE FROM ILIAC CREST;  Surgeon: Amilcar Baldwin MD;  [facility] OR     hardware removal       INJECT BLOCK MEDIAL BRANCH CERVICAL/THORACIC/LUMBAR Bilateral 7/10/2020    Procedure: Bilateral lumbar 3, lumbar 4, and lumbar 5 medial branch nerve diagnostic blocks #2;  Surgeon: Pravin Bagley MD;  [facility]     INJECT PARAVERTEBRAL FACET JOINT LUMBAR / SACRAL FIRST Bilateral 9/30/2019    Procedure: Lumbar Medial Branch Nerve Block Injection Bilateral;  Surgeon: Hira Rodríguez MD;  [facility]     OPTICAL TRACKING SYSTEM FUSION POSTERIOR SPINE LUMBAR  11/20/2013    Procedure: OPTICAL TRACKING SYSTEM FUSION SPINE POSTERIOR LUMBAR ONE LEVEL;  Lumbar 4-5 Transforaminal Interbody Fusion;  Surgeon: Amilcar Baldwin MD;  [facility]     ORTHOPEDIC SURGERY      shoulder surgery, removed bone spur     PHACOEMULSIFICATION CLEAR CORNEA WITH STANDARD INTRAOCULAR LENS IMPLANT Right 5/29/2020    Procedure: RIGHT PHACOEMULSIFICATION, CATARACT, WITH INTRAOCULAR LENS IMPLANT;  Surgeon: Lyla Mahoney MD;  [facility]     TUBAL LIGATION        Allergies   Allergen Reactions     Bee Venom Anaphylaxis, Anxiety, Difficulty breathing, Hives, Itching, Nausea and Vomiting, Palpitations, Shortness Of Breath and Swelling     Opium Alkaloids, Hcls Itching     Can take opiate derived narcotics with Benadryl.     Morphine Itching     Latex Rash and Itching        Anesthesia Evaluation     . Pt has had prior anesthetic. Type: General, MAC and Regional    No history of anesthetic complications          ROS/MED HX    ENT/Pulmonary: Comment: Uses Dulera & Atrovent bid. Last used albuterol >1 yr ago (uses more during summer months)    PFT 2/2019: normal function, mild air trapping    (+)tobacco use, Past use 30 pk yr hx, quit 2014 packs/day  Moderate Persistent asthma Treatment: Inhaler daily,  mild COPD, , . .    Neurologic:  - neg neurologic ROS    (-) seizures, CVA and Neuropathy   Cardiovascular:  - neg cardiovascular ROS   (+) ----. : . . . :. . Previous cardiac testing Echodate:2/5/19results:date: results:ECG reviewed date:3/11/19 results: date: results:          METS/Exercise Tolerance: Comment: Ascends 4 flights of stairs to apartment daily. Denies SOB & CP. >4 METS   Hematologic:  - neg hematologic  ROS      (-) history of blood clots and History of Transfusion   Musculoskeletal: Comment: Chronic knee & back pain.   S/P 5 prior lumbar surgeries, s/p fusion.  (+) arthritis,  -       GI/Hepatic: Comment: S/P gastric bypass 2007, hx malnutrition w/ weight loss    S/P hepatitis treatment    (+) hepatitis type C,       Renal/Genitourinary:  - ROS Renal section negative       Endo:  - neg endo ROS    (-) Type II DM   Psychiatric:  - neg psychiatric ROS        Infectious Disease: Comment: S/p TB treatment 1994  (+) TB,       Malignancy:      - no malignancy   Other:    (+) H/O Chronic Pain,                       PHYSICAL EXAM:   Mental Status/Neuro: A/A/O; Age Appropriate   Airway: Facies: Feasible  Mallampati: I  Mouth/Opening: Full  TM distance: > 6 cm  Neck ROM: Full   Respiratory: Auscultation: CTAB (Single expiratory wheeze noted)     Resp. Rate: Normal     Resp. Effort: Normal      CV: Rhythm: Regular  Rate: Age appropriate  Heart: Normal Sounds  Edema: None   Comments:      Dental: Endentulous                Assessment:   ASA SCORE: 3    H&P: History and physical reviewed and following examination; no interval change.     Smoking Status:  Active Smoker       - patient did not smoke on day of surgery       - instructed to abstain from smoking on day of procedure   NPO Status: NPO Appropriate     Plan:   Anes. Type:  MAC   Pre-Medication: None   Induction:  N/a   Airway: Native Airway   Access/Monitoring: PIV   Maintenance: N/a     Postop Plan:   Postop Pain: None  Postop Sedation/Airway: Not planned  Disposition: Outpatient     PONV Management: Adult Risk Factors: Female   Prevention: Ondansetron, Dexamethasone     CONSENT: Direct conversation   Plan and risks discussed with: Patient                        Milton Luis MD

## 2020-07-17 NOTE — DISCHARGE INSTRUCTIONS
[facility]  Home Care Following Anesthesia  For 24 hours after surgery:  1. Get plenty of rest.  A responsible adult must stay with you for at least 24 hours after you leave the surgery center.  2. Do not drive or use heavy equipment.  If you have weakness or tingling, don't drive or use heavy equipment until this feeling goes away.   3. Do not drink alcohol.   4. Avoid strenuous or risky activities.  Ask for help when climbing stairs.  5. You may feel lightheaded.  IF so, sit for a few minutes before standing.  Have someone help you get up.   6. If you have nausea (feel sick to your stomach): Drink only clear liquids such as apple juice, ginger ale, broth or 7-Up.  Rest may also help.  Be sure to drink enough fluids.  Move to a regular diet as you feel able.   7. You may have a slight fever.  Call the doctor if your fever is over 100 F (37.7 C) (taken under the tongue) or lasts longer than 24 hours.  8. You may have a dry mouth, a sore throat, muscle aches or trouble sleeping. These should go away after 24 hours.  9. Do not make important or legal decisions.               Tips for taking pain medications  To get the best pain relief possible, remember these points:    Take pain medications as directed, before pain becomes severe.    Pain medication can upset your stomach: taking it with food may help.    Constipation is a common side effect of pain medication. Drink plenty of  fluids.    Eat foods high in fiber. Take a stool softener if recommended by your doctor or pharmacist.    Do not drink alcohol, drive or operate machinery while taking pain medications.    Ask about other ways to control pain, such as with heat, ice or relaxation.    Tylenol/Acetaminophen Consumption  To help encourage the safe use of acetaminophen, the makers of TYLENOL  have lowered the maximum daily dose for single-ingredient Extra Strength TYLENOL  (acetaminophen) products sold in the [place] from 8  pills per day (4,000 mg) to 6 pills per day (3,000 mg). The dosing interval has also changed from 2 pills every 4-6 hours to 2 pills every 6 hours.    If you feel your pain relief is insufficient, you may take Tylenol/Acetaminophen in addition to your narcotic pain medication.     Be careful not to exceed 3,000 mg of Tylenol/Acetaminophen in a 24 hour period from all sources.    If you are taking extra strength Tylenol/acetaminophen (500 mg), the maximum dose is 6 tablets in 24 hours.    If you are taking regular strength acetaminophen (325 mg), the maximum dose is 9 tablets in 24 hours.    Call a doctor for any of the followin. Signs of infection (fever, growing tenderness at the surgery site, a large amount of drainage or bleeding, severe pain, foul-smelling drainage, redness, swelling).  2. It has been over 8 to 10 hours since surgery and you are still not able to urinate (pass water).  3. Headache for over 24 hours.  4. Numbness, tingling or weakness the day after surgery (if you had spinal anesthesia).  5. Signs of Covid-19 infection (temperature over 100 degrees, shortness of breath, cough, loss of taste/smell, generalized body aches, persistent headache, chills, sore throat, nausea/vomiting/diarrhea)    Your doctor is:       Dr. Lyla Mahoney, Ophthalmology: [phone]               Or dial [phone] and ask for the resident on call for:  Ophthalmology  For emergency care, call the:  [facility]:  [phone] (TTY for hearing impaired: [phone])

## 2020-07-17 NOTE — ANESTHESIA CARE TRANSFER NOTE
Patient: Kristina Eldridge    Procedure(s):  LEFT EYE CATARACT REMOVAL WITH INTRAOCULAR LENS IMPLANT    Diagnosis: Age-related nuclear cataract, left [H25.12]  Diagnosis Additional Information: No value filed.    Anesthesia Type:   MAC     Note:  Airway :Room Air  Patient transferred to:Phase II  Handoff Report: Identifed the Patient, Identified the Reponsible Provider, Reviewed the pertinent medical history, Discussed the surgical course, Reviewed Intra-OP anesthesia mangement and issues during anesthesia, Set expectations for post-procedure period and Allowed opportunity for questions and acknowledgement of understanding      Vitals: (Last set prior to Anesthesia Care Transfer)    CRNA VITALS  7/17/2020 0914 - 7/17/2020 0958      7/17/2020             Pulse:  60    Ht Rate:  61    SpO2:  94 %    Resp Rate (set):  10                Electronically Signed By: MILEY Domínguez CRNA  July 17, 2020  9:58 AM

## 2020-07-17 NOTE — PROCEDURES
Ophthalmology Post-op Procedure Note    Surgical Service:    Ophthalmology & Visual Sciences  Date Performed:      July 17, 2020  Location: [facility]      Pre-operative Diagnosis: Visually significant cataract, left eye  Post-operative Diagnosis:  Pseudophakia, left eye  Operative Procedure:  Phacoemulsification with intraocular lens implantation, left eye    Surgeon(s):  Fellow/Staff Surgeon:       Lyla Mahoney MD  Resident Surgeon:            Yony Duggan MD    Anesthesia:   Topical/MAC  Findings:  No unusual findings   Blood Loss:    Minimal  Implants:  ZCB00 22.5 intraocular lens  Specimens:  None     Complications:  The patient did not experience any complications.   Condition: Stable    Operative Report Completion:    Description of Operation/Procedure:  After appropriate informed consent was obtained, the patient was brought to the operating room. The appropriate cardiac and blood pressure monitors were placed. A final pause occurred just before the start of the procedure during which the entire procedure team actively confirmed the correct patient, procedure, site, special equipment and special requirements. The patient was prepped and draped in the usual sterile fashion using 5% povidone/iodine.     An eyelid speculum was placed to open the eyelids. A paracentesis port was placed approximately sixty degrees to the left of the planned temporal incision location using the sideport blade. Approximately 0.8 cc of 1% nonpreserved lidocaine was placed into the anterior chamber. The anterior chamber was filled with dispersive viscoelastic. A clear corneal temporal incision was made with a metal 2.6 mm keratome. A round continuous tear capsulorhexis was initiated with a cystotome and completed with the Utrata forceps. Balanced salt solution on a cannula was used to perform hydrodissection. The nucleus was removed using phacoemulsification with a chop technique. The remaining cortical material was removed  using the irrigation/aspiration handpiece. The capsular bag was filled with dispersive viscoelastic. A lens of the  model and power listed above was placed into the capsular bag using the cartridge injection system. The remaining viscoelastic was removed using the irrigation aspiration handpiece. The paracentesis and temporal wounds were hydrated with balanced salt solution. Intracameral moxifloxacin was administered. At the conclusion of the case, the wounds were felt to be watertight, the pupil was round, the lens was centered, and the anterior chamber was deep. A few drops of antibiotic and prednisolone were given to the operative eye. The eyelid speculum was removed. A shield was placed over the operative eye.      Attending Attestation:  I was present for and performed key portions of the procedure.  Lyla Mahoney MD

## 2020-07-17 NOTE — NURSING NOTE
Chief Complaints and History of Present Illnesses   Patient presents with     Post Op (Ophthalmology) Left Eye     Chief Complaint(s) and History of Present Illness(es)     Post Op (Ophthalmology) Left Eye     Laterality: left eye    Onset: hours ago    Quality: blurred vision    Frequency: constantly    Course: stable    Pain scale: 0/10              Comments     No eye pain today. Vision is blurry. Patient states they have their post op drops from the pharmacy.     Elizabeth Michelle COT 10:35 AM July 17, 2020

## 2020-07-17 NOTE — ANESTHESIA POSTPROCEDURE EVALUATION
Anesthesia POST Procedure Evaluation    Patient: Kristina Eldridge   MRN:     [id number] Gender:   female   Age:    49 year old :      1970        Preoperative Diagnosis: Age-related nuclear cataract, left [H25.12]   Procedure(s):  LEFT EYE CATARACT REMOVAL WITH INTRAOCULAR LENS IMPLANT   Postop Comments: No value filed.     Anesthesia Type: MAC       Disposition: Outpatient   Postop Pain Control: Uneventful            Sign Out: Well controlled pain   PONV: No   Neuro/Psych: Uneventful            Sign Out: Acceptable/Baseline neuro status   Airway/Respiratory: Uneventful            Sign Out: Acceptable/Baseline resp. status   CV/Hemodynamics: Uneventful            Sign Out: Acceptable CV status   Other NRE: NONE   DID A NON-ROUTINE EVENT OCCUR? No         Last Anesthesia Record Vitals:  CRNA VITALS  2020 0914 - 2020 1014      2020             Pulse:  60    Ht Rate:  61    SpO2:  94 %    Resp Rate (set):  10          Last PACU Vitals:  Vitals Value Taken Time   BP     Temp     Pulse     Resp     SpO2     Temp src     NIBP 147/81 2020  9:41 AM   Pulse 60 2020  9:44 AM   SpO2 94 % 2020  9:44 AM   Resp     Temp     Ht Rate 61 2020  9:44 AM   Temp 2           Electronically Signed By: Milton Luis MD, 2020, 2:59 PM

## 2020-07-17 NOTE — PROGRESS NOTES
Kristina Eldridge is a 49 year old  who is POD0  s/p cataract extraction with intraocular lens placement in left eye.    Off to a good start.    Medications in the surgical eye:    Prednisolone acetate 1% four times a day     Ofloxacin four times a day      Limit heavy lifting, bending over, and heavy exertion. Light aerobic activity is acceptable. Avoid swimming pools, hot tubs, or saunas for 3-4 weeks.   Wear the protective shield at night for the next seven days, and call for increased redness, discharge, pain, or decreased vision.    Return to clinic in approximately 1 week, earlier as needed.    Yony Duggan MD  Ophthalmology PGY-3  [facility]  Pager: [phone]    Teaching statement:  Complete documentation of historical and exam elements from today's encounter can be found in the full encounter summary report (not reduplicated in this progress note). I personally obtained the chief complaint(s) and history of present illness.  I confirmed and edited as necessary the review of systems, past medical/surgical history, family history, social history, and examination findings as documented by others; and I examined the patient myself. I personally reviewed the relevant tests, images, and reports as documented above.     I formulated and edited as necessary the assessment and plan and discussed the findings and management plan with the patient and family.    Lyla Mahoney MD  Comprehensive Ophthalmology & Ocular Pathology  Department of Ophthalmology and Visual Neurosciences  [email or web address]  Pager [phone]

## 2020-07-19 ENCOUNTER — TELEPHONE (OUTPATIENT)
Dept: INTERNAL MEDICINE | Facility: CLINIC | Age: 50
End: 2020-07-19

## 2020-07-19 DIAGNOSIS — Z98.84 GASTRIC BYPASS STATUS FOR OBESITY: ICD-10-CM

## 2020-07-20 RX ORDER — SYRINGE WITH NEEDLE, 1 ML 25GX5/8"
SYRINGE, EMPTY DISPOSABLE MISCELLANEOUS
Qty: 12 EACH | Refills: 1 | Status: SHIPPED | OUTPATIENT
Start: 2020-07-20 | End: 2022-03-29

## 2020-07-20 NOTE — H&P
HPI:  Pre-op physical exam for cataract surgery, left eye    She denies any change in health since her last H&P on 7/10/20. No questions prior to the cataract procedure.    Physical Exam  Vitals signs reviewed.   Constitutional:       Appearance: Normal appearance.   HENT:      Head: Normocephalic and atraumatic.   Eyes:      Conjunctiva/sclera: Conjunctivae normal.      Pupils: Pupils are equal, round, and reactive to light.   Cardiovascular:      Rate and Rhythm: Normal rate and regular rhythm.      Heart sounds: Normal heart sounds.   Pulmonary:      Breath sounds: Normal breath sounds.   Abdominal:      General: Abdomen is flat.   Skin:     General: Skin is warm and dry.   Neurological:      Mental Status: She is alert.   Psychiatric:         Mood and Affect: Mood normal.     A/P:  Ok to proceed with cataract removal under topical/MAC anesthesia.    Teaching statement:  Complete documentation of historical and exam elements from today's encounter can be found in the full encounter summary report (not reduplicated in this progress note). I personally obtained the chief complaint(s) and history of present illness.  I confirmed and edited as necessary the review of systems, past medical/surgical history, family history, social history, and examination findings as documented by others; and I examined the patient myself. I personally reviewed the relevant tests, images, and reports as documented above.     I formulated and edited as necessary the assessment and plan and discussed the findings and management plan with the patient and family.    Lyla Mahoney MD  Comprehensive Ophthalmology & Ocular Pathology  Department of Ophthalmology and Visual Neurosciences  [email or web address]  Pager [phone]

## 2020-07-20 NOTE — TELEPHONE ENCOUNTER
B-D #[id number] SYR/NDL 3ML 23GX1 LL      Last Written Prescription Date:  6/10/2020  Last Fill Quantity: 12,   # refills: 0  Last Office Visit : 12/19/2019  Future Office visit:  None  Routing refill request to provider for review/approval because:  Pt has a question about injections.    Order was already sent to pharm.  Please call Pt regarding her questions about injections.     Thank you     Simona Quintanilla RN  Central Triage Red Flags/Med Refills

## 2020-07-20 NOTE — TELEPHONE ENCOUNTER
" Health Call Center    Phone Message    May a detailed message be left on voicemail: yes     Reason for Call: Medication Refill Request    Has the patient contacted the pharmacy for the refill? Yes   Name of medication being requested: B-D LUER-JIMMY SYRINGE 23G X 1\" 3 ML MISC  cyanocobalamin (CYANOCOBALAMIN) 1000 MCG/ML injection  Provider who prescribed the medication: Varinder Crespo MD  Pharmacy:      [id number] [facility] [address]    Date medication is needed: ASAP   Pt needs the needles.  Pt has a question about the medication admininistration. Please call to discuss. Injections.         Action Taken: Message routed to:  [facility] ([facility] medication refill team    Travel Screening: Not Applicable                                                                      "

## 2020-07-21 NOTE — TELEPHONE ENCOUNTER
Pt has been injecting B12 shots in her abdominal area, but she ran out of the sites on her abdomen for this injection, asking which muscle she could use. I recommended deltoid muscle (upper arm) or gluteal muscle (buttock), but if she has difficulty injecting her self, she can have the nurse visit appt in our clinic for this injection. Pt agreed.

## 2020-08-03 ENCOUNTER — OFFICE VISIT (OUTPATIENT)
Dept: OPHTHALMOLOGY | Facility: CLINIC | Age: 50
End: 2020-08-03
Attending: OPHTHALMOLOGY
Payer: COMMERCIAL

## 2020-08-03 DIAGNOSIS — Z96.1 PSEUDOPHAKIA, BOTH EYES: Primary | ICD-10-CM

## 2020-08-03 DIAGNOSIS — H52.4 PRESBYOPIA: ICD-10-CM

## 2020-08-03 PROCEDURE — G0463 HOSPITAL OUTPT CLINIC VISIT: HCPCS | Mod: ZF

## 2020-08-03 PROCEDURE — 92015 DETERMINE REFRACTIVE STATE: CPT | Mod: ZF

## 2020-08-03 ASSESSMENT — TONOMETRY
IOP_METHOD: TONOPEN
OD_IOP_MMHG: 14
OS_IOP_MMHG: 21

## 2020-08-03 ASSESSMENT — VISUAL ACUITY
METHOD: SNELLEN - LINEAR
OS_SC+: -1
OS_SC: 20/20
OD_SC: 20/25

## 2020-08-03 ASSESSMENT — REFRACTION_MANIFEST
OD_CYLINDER: SPHERE
OS_ADD: +3.00
OD_ADD: +3.00
OS_SPHERE: PLANO
OD_SPHERE: -0.50

## 2020-08-03 ASSESSMENT — CUP TO DISC RATIO
OD_RATIO: 0.2
OS_RATIO: 0.2

## 2020-08-03 ASSESSMENT — SLIT LAMP EXAM - LIDS
COMMENTS: NORMAL
COMMENTS: NORMAL

## 2020-08-03 NOTE — PROGRESS NOTES
HPI:  Kristina Eldridge is a 49 year old female here for follow-up after CE/IOL now both eyes. She is pleased with her improvement in vision without glasses. No pain, redness, discharge. No flashes/floaters.    Assessment & Plan     (Z96.1) Pseudophakia, both eyes  (primary encounter diagnosis)  Comment: Good post-op appearance.  Plan: Complete drop taper    (H52.4) Presbyopia  Comment: Good distance vision without correction as targeted.  Plan: Given updated glasses Rx, optional to fill     -----------------------------------------------------------------------------------    Patient disposition:   Return in about 1 year (around 8/3/2021).    Teaching statement:  Complete documentation of historical and exam elements from today's encounter can be found in the full encounter summary report (not reduplicated in this progress note). I personally obtained the chief complaint(s) and history of present illness.  I confirmed and edited as necessary the review of systems, past medical/surgical history, family history, social history, and examination findings as documented by others; and I examined the patient myself. I personally reviewed the relevant tests, images, and reports as documented above.     I formulated and edited as necessary the assessment and plan and discussed the findings and management plan with the patient and family.      Lyla Mahoney MD  Comprehensive Ophthalmology & Ocular Pathology  Department of Ophthalmology and Visual Neurosciences  [email or web address]  Pager [phone]

## 2020-08-03 NOTE — NURSING NOTE
Chief Complaints and History of Present Illnesses   Patient presents with     Post Op (Ophthalmology) Left Eye     Chief Complaint(s) and History of Present Illness(es)     Post Op (Ophthalmology) Left Eye     Laterality: left eye    Treatments tried: eye drops    Pain scale: 0/10              Comments     2 week post op CE/IOL LE 7/17/20  Prednisolone bid LE  Doing good   Wants readers  Valeria HENSON 12:56 PM August 3, 2020

## 2020-08-28 ENCOUNTER — DOCUMENTATION ONLY (OUTPATIENT)
Dept: ANESTHESIOLOGY | Facility: CLINIC | Age: 50
End: 2020-08-28

## 2020-08-28 NOTE — PROGRESS NOTES
Purpose of call: Follow up after Bilateral lumbar 3, lumbar 4, and lumbar 5 medial branch nerve diagnostic blocks #2    Date of service: 7/10/20  Spoke with: pain diary     Location of pain: low back   Percentage of pain relief after procedure: 100%  Duration of pain relief: 1.5 hours    Follow up: Dr. Bagley updated, orders for RFA requested.     Sumaya Cevallos, ANABELLAN, RN

## 2020-08-31 ENCOUNTER — SURGERY (OUTPATIENT)
Age: 50
End: 2020-08-31
Payer: COMMERCIAL

## 2020-08-31 ENCOUNTER — DOCUMENTATION ONLY (OUTPATIENT)
Dept: ANESTHESIOLOGY | Facility: CLINIC | Age: 50
End: 2020-08-31

## 2020-08-31 DIAGNOSIS — M47.816 LUMBAR SPONDYLOSIS: Primary | ICD-10-CM

## 2020-08-31 RX ORDER — DIAZEPAM 5 MG
5-10 TABLET ORAL ONCE
Status: CANCELLED | OUTPATIENT
Start: 2020-08-31 | End: 2020-08-31

## 2020-08-31 NOTE — PROGRESS NOTES
Documentation of pain relief post Bilateral L3/L4/ALA medial branch nerve blocks   Date of service: 09/30/2019      Percentage of pain relief after procedure: 100%

## 2020-09-09 NOTE — OR NURSING
SUBJECTIVE  HPI Antonio is 19 year old male who presents w/ c/o headache  and nausea  this has been present for 2 days . Other symptoms include  ear ache  and nasal congestion . No nausea, vomiting, diarrhea, constipation, urinary difficulties. No numbness and tingling in the extremities or blurred vision. No chest pain or SOB or wheezing. ROS negative otherwise    OTC meds tried: [+]    No past medical history on file.   No past surgical history on file.   Social History     Tobacco Use   • Smoking status: Never Smoker   • Smokeless tobacco: Never Used   Substance Use Topics   • Alcohol use: Not on file   • Drug use: Not on file      No current outpatient medications on file.     No current facility-administered medications for this visit.         OBJECTIVE  Vitals:    09/09/20 1217   BP: 137/70   Pulse: 68   Resp: 16   Temp: 97.9 °F (36.6 °C)   TempSrc: Tympanic   SpO2: 98%      Gen: Alert, well hydrated, in no apparent distress  Voice: clear    EAR: TM [s] retracted bilateral  EYES:CELSA, no injection  bilateral  NOSE: erythematous swollen mucosa  THROAT: No erythema, edema or exudates  NECK: supple without cervical adenopathy. No meningismus  HEART: regular rate and rhythm without murmur, rub or gallop  LUNG: clear to auscultation without ronchi, wheezing or crackles  ABDOMEN: soft, non-tender, non-distended. Bowel sounds present and active without rigidity. No organomegally  SKIN:color, texture, turgor are normal  Vascular: intact      ASSESSMENT  Sinusitis  Allergies  HA    PLAN  Underwent discussion with Antonio regarding this clinical situation. symptomatic measures were given and meds discussed  F/U: Antonio will follow up with his primary care physician as needed or as directed but may return to the [facility] if needed. If symptoms become severe may go to the ER.    Arvind Maciel, DO   Solus stent no present at start of EGD.  Explanted in EMR.

## 2020-09-28 DIAGNOSIS — M47.816 LUMBAR SPONDYLOSIS: Primary | ICD-10-CM

## 2020-09-30 ENCOUNTER — TELEPHONE (OUTPATIENT)
Dept: ANESTHESIOLOGY | Facility: CLINIC | Age: 50
End: 2020-09-30

## 2020-09-30 NOTE — TELEPHONE ENCOUNTER
LPN was directed to contact the pt and informed her that insurance had denied the RFA. The reason being that the pt needs to complete 4 sessions of PT within a 6 week period.   Pt stated that they had already done PT, the winter of [id number]. Pt stated that they did pool therapy for 6 months and then general Physical Therapy for 3 months following.   Pt stated that they were also mailed papers by their insurance company and is requesting an appeal on the denial.   Pt states that the never benefited from the physical therapy and felt that it only aggravated their pain.     LPN will updated Dr. Bagley and [facility].     Becky Mcnkight LPN

## 2020-09-30 NOTE — TELEPHONE ENCOUNTER
----- Message from Sumaya Cevallos RN sent at 9/30/2020  8:55 AM CDT -----  I forgot to route this on to you before I left yesterday. Please contact Mery if you have time. Her RFA has been denied by insurance until she completes 4 sessions of PT in a 6 week period.     Thanks,  A  ----- Message -----  From: Pravin Bagley MD  Sent: 9/28/2020   4:02 PM CDT  To: Sumaya Cevallos RN, Becky Mcknight LPN    I just put in a PT order for this patient if someone can call and let her know to schedule so she can get her RFA approved.  Thanks,    - Juice  ----- Message -----  From: Joyce Arias RN  Sent: 9/28/2020   3:55 PM CDT  To: Pravin Bagley MD    [facility] denied Lumbar RFA , see copy of ltr in Media tab in chart  Zo  ----- Message -----  From: Pravin Bagley MD  Sent: 9/25/2020   3:54 PM CDT  To: Joyce Arias RN    Any updates on this case?    - Juice  ----- Message -----  From: Joyce Arias RN  Sent: 9/10/2020   8:13 AM CDT  To: Sumaya Cevallos RN, Pravin Bagley MD    FYI  I have submitted the Prior Auth to  for RFA, however, while I was away yesterday, I got a VM from them asking for current PT notes. I have left a VM for the pt to call me.   gave me until Friday morning to supply them with this info, or they will send the pt's acct to medical review w/o and see what happends.  Zo  ----- Message -----  From: Sumaya Cevallos RN  Sent: 9/9/2020   1:04 PM CDT  To: Joyce Arias RN    Please call pt to schedule.     Thanks,  A  ----- Message -----  From: Pravin Bagley MD  Sent: 8/31/2020  10:56 AM CDT  To: Sumaya Cevallos RN, Joyce Arias RN    Figure it out!  Orders are in!  ----- Message -----  From: Sumaya Cevallos RN  Sent: 8/28/2020   4:00 PM CDT  To: Sumaya Cevallos RN, Pravin Bagley MD, #    Take 2, with pt attached  ----- Message -----  From: Sumaya Cevallos RN  Sent: 8/28/2020   3:59 PM CDT  To: Sumaya Cevallos RN, Pravin Bagley MD, #    Juice put orders in for  RFA    Purpose of call: Follow up after Bilateral lumbar 3, lumbar 4, and lumbar 5 medial branch nerve diagnostic blocks #2    Date of service: 7/10/20  Spoke with: pain diary     Location of pain: low back   Percentage of pain relief after procedure: 100%  Duration of pain relief: 1.5 hours      Let us know once orders are in.       Zo, once orders are in, please call pt to schedule.       Thanks,  A

## 2020-10-01 ENCOUNTER — PREP FOR PROCEDURE (OUTPATIENT)
Dept: GASTROENTEROLOGY | Facility: CLINIC | Age: 50
End: 2020-10-01

## 2020-10-01 DIAGNOSIS — R62.7 ADULT FAILURE TO THRIVE: Primary | ICD-10-CM

## 2020-10-26 DIAGNOSIS — J45.30 MILD PERSISTENT ASTHMA, UNCOMPLICATED: ICD-10-CM

## 2020-10-26 DIAGNOSIS — G25.81 RESTLESS LEGS SYNDROME: Primary | ICD-10-CM

## 2020-10-26 RX ORDER — PRAMIPEXOLE DIHYDROCHLORIDE 1 MG/1
1 TABLET ORAL 3 TIMES DAILY
Status: CANCELLED | OUTPATIENT
Start: 2020-10-26

## 2020-10-26 NOTE — TELEPHONE ENCOUNTER
[facility] Call Center    Phone Message    May a detailed message be left on voicemail: yes     Reason for Call: Medication Refill Request    Has the patient contacted the pharmacy for the refill? Yes   Name of medication being requested: pramipexole (MIRAPEX) 1 MG tablet  Provider who prescribed the medication: Dr. Crespo   Pharmacy:    [id number] - [place], [place] - [facility] [employer] [facility] [address]    Date medication is needed: ASAP - Patient is on her last bottle. Needs a new script sent to the pharmacy.         Action Taken: Message routed to:  [facility]    Travel Screening: Not Applicable

## 2020-10-26 NOTE — TELEPHONE ENCOUNTER
pramipexole (MIRAPEX) 1 MG tablet      HISTORICAL ONLY ON MEDICATION LIST    Last Office Visit : 2-  Future Office visit:  NONE    Routing refill request to provider for review/approval because:  Medication is reported/historical.      Kathleen M Doege RN

## 2020-10-28 RX ORDER — INHALER, ASSIST DEVICES
SPACER (EA) MISCELLANEOUS
Qty: 1 EACH | Refills: 0 | Status: SHIPPED | OUTPATIENT
Start: 2020-10-28 | End: 2022-03-29

## 2020-10-28 NOTE — TELEPHONE ENCOUNTER
[facility] Call Center    Phone Message    May a detailed message be left on voicemail: yes     Reason for Call: Patient calling again to check on status. She is completely out and her legs are hurting. Please call her back to give her an update.     Action Taken: Message routed to:  [facility] P[facility]    Travel Screening: Not Applicable

## 2020-10-28 NOTE — TELEPHONE ENCOUNTER
mometasone-formoterol (DULERA) 100-5 MCG/ACT inhaler  Last Written Prescription Date:  2/19/2019  Last Fill Quantity: 1,   # refills: 11  Last Office Visit : 2/20/2020  Future Office visit:  None  Routing refill request to provider for review/approval because:  Who is taking care of refills and medication management of the Pt's inhaler for Pt care.     PCP or [facility]monary Clinic with Dr. Kwesi Quintanilla RN  Central Triage Red Flags/Med Refills    spacer (OPTICHAMBER OTTONIEL) holding chamber  Last Written Prescription Date:  9/9/2019  Last Fill Quantity: 1,   # refills: 0  Last Office Visit : 2/20/2020  Future Office visit:  None:  Sent refill to pharm 10/28/2020

## 2020-10-30 RX ORDER — PRAMIPEXOLE DIHYDROCHLORIDE 0.12 MG/1
.125-.375 TABLET ORAL AT BEDTIME
Qty: 180 TABLET | Refills: 1 | Status: SHIPPED | OUTPATIENT
Start: 2020-10-30 | End: 2021-01-07

## 2020-11-19 ENCOUNTER — TELEPHONE (OUTPATIENT)
Dept: GASTROENTEROLOGY | Facility: CLINIC | Age: 50
End: 2020-11-19

## 2020-11-19 NOTE — TELEPHONE ENCOUNTER
LVM for patient in regards to scheduling procedure with Dr. Self in December. Left direct line for patient to call to go over scheduling details.

## 2020-11-23 ENCOUNTER — HOSPITAL ENCOUNTER (OUTPATIENT)
Facility: CLINIC | Age: 50
End: 2020-11-23
Attending: INTERNAL MEDICINE | Admitting: INTERNAL MEDICINE
Payer: COMMERCIAL

## 2020-11-23 DIAGNOSIS — R62.7 ADULT FAILURE TO THRIVE: ICD-10-CM

## 2020-11-27 ENCOUNTER — PATIENT OUTREACH (OUTPATIENT)
Dept: GASTROENTEROLOGY | Facility: CLINIC | Age: 50
End: 2020-11-27

## 2020-11-27 DIAGNOSIS — Z11.59 ENCOUNTER FOR SCREENING FOR OTHER VIRAL DISEASES: Primary | ICD-10-CM

## 2020-11-27 NOTE — TELEPHONE ENCOUNTER
Patient scheduled for f/ up procedure with Dr. Self on 12/16 for stent exchange:  Return for stent removal/exchange in 6m     We've been unable to reach to confirm that date. Called phone # of file x3, rings once, they disconnects.     Letter sent to home address and [employer].     ML

## 2020-11-27 NOTE — LETTER
November 27, 2020    Kristina Eldridge                              [address]  [facility] [place] [address]    Dear Kristina,    You are scheduled for a follow up procedure with Dr. Self on 12/16/2020. This procedure is for a recommended stent exchange.    We've tried to reach you several time to discuss this procedure but have been unable to reach you. Please contact us ASAP if you wish to proceed as scheduled. As a reminder you will also need a preop physical within 30 days of procedure, and a covid test no more than 4 days prior to procedure date.    Your healthcare is important to us. We look forward to hearing from you.    Hilda Navarro, RN Care Coordinator  Dr. Pathak, Dr. Self & Dr. Boone  [facility]  [phone]

## 2020-11-29 ENCOUNTER — HEALTH MAINTENANCE LETTER (OUTPATIENT)
Age: 50
End: 2020-11-29

## 2020-12-03 ENCOUNTER — PATIENT OUTREACH (OUTPATIENT)
Dept: GASTROENTEROLOGY | Facility: CLINIC | Age: 50
End: 2020-12-03

## 2020-12-03 NOTE — TELEPHONE ENCOUNTER
Returned patients call r/t scheduling. 121/6 does not work for her, she wants procedure in January or February.     Number on file disconnects after one ring. Will work with scheduling to reschedule procedure and sent new letter to patient.    JORGE

## 2020-12-08 DIAGNOSIS — Z11.59 ENCOUNTER FOR SCREENING FOR OTHER VIRAL DISEASES: Primary | ICD-10-CM

## 2020-12-30 DIAGNOSIS — Z98.84 BARIATRIC SURGERY STATUS: ICD-10-CM

## 2020-12-30 RX ORDER — CYANOCOBALAMIN 1000 UG/ML
1 INJECTION, SOLUTION INTRAMUSCULAR; SUBCUTANEOUS
Qty: 1 ML | Refills: 11 | Status: SHIPPED | OUTPATIENT
Start: 2020-12-30 | End: 2022-03-29

## 2020-12-30 NOTE — TELEPHONE ENCOUNTER
cyanocobalamin (CYANOCOBALAMIN) 1000 MCG/ML injection  Last Written Prescription Date:  11/26/2019  Last Fill Quantity: 1,   # refills: 11  Last Office Visit : 12/19/2019  Future Office visit:  1/7/2021  1 mL, 11 Refills sent to pharm 12/30/2020      Simona Quintanilla RN  Central Triage Red Flags/Med Refills

## 2021-01-07 ENCOUNTER — OFFICE VISIT (OUTPATIENT)
Dept: FAMILY MEDICINE | Facility: CLINIC | Age: 51
End: 2021-01-07
Payer: COMMERCIAL

## 2021-01-07 VITALS
BODY MASS INDEX: 19.96 KG/M2 | HEART RATE: 65 BPM | SYSTOLIC BLOOD PRESSURE: 138 MMHG | WEIGHT: 102.2 LBS | DIASTOLIC BLOOD PRESSURE: 85 MMHG | OXYGEN SATURATION: 97 %

## 2021-01-07 DIAGNOSIS — G25.81 RESTLESS LEGS SYNDROME: ICD-10-CM

## 2021-01-07 DIAGNOSIS — Z98.84 GASTRIC BYPASS STATUS FOR OBESITY: ICD-10-CM

## 2021-01-07 DIAGNOSIS — E53.8 VITAMIN B12 DEFICIENCY (NON ANEMIC): Primary | ICD-10-CM

## 2021-01-07 DIAGNOSIS — Z01.818 PREOP GENERAL PHYSICAL EXAM: ICD-10-CM

## 2021-01-07 PROCEDURE — 99214 OFFICE O/P EST MOD 30 MIN: CPT | Mod: 25 | Performed by: FAMILY MEDICINE

## 2021-01-07 PROCEDURE — 90686 IIV4 VACC NO PRSV 0.5 ML IM: CPT | Performed by: FAMILY MEDICINE

## 2021-01-07 PROCEDURE — 90471 IMMUNIZATION ADMIN: CPT | Performed by: FAMILY MEDICINE

## 2021-01-07 RX ORDER — PRAMIPEXOLE DIHYDROCHLORIDE 0.12 MG/1
TABLET ORAL
Qty: 240 TABLET | Refills: 1 | Status: SHIPPED | OUTPATIENT
Start: 2021-01-07 | End: 2021-04-29

## 2021-01-07 ASSESSMENT — ENCOUNTER SYMPTOMS
NAIL CHANGES: 1
EYE WATERING: 0
POSTURAL DYSPNEA: 0
SHORTNESS OF BREATH: 1
SNORES LOUDLY: 0
TROUBLE SWALLOWING: 1
COUGH: 0
DOUBLE VISION: 0
SINUS PAIN: 0
SPUTUM PRODUCTION: 0
DYSPNEA ON EXERTION: 0
POOR WOUND HEALING: 0
SINUS CONGESTION: 0
WHEEZING: 1
EYE IRRITATION: 1
HEMOPTYSIS: 0
EYE PAIN: 0
SKIN CHANGES: 0
EYE REDNESS: 0
COUGH DISTURBING SLEEP: 0
SORE THROAT: 0
NECK MASS: 0
TASTE DISTURBANCE: 0
HOARSE VOICE: 0
SMELL DISTURBANCE: 0

## 2021-01-07 ASSESSMENT — PATIENT HEALTH QUESTIONNAIRE - PHQ9
5. POOR APPETITE OR OVEREATING: NOT AT ALL
SUM OF ALL RESPONSES TO PHQ QUESTIONS 1-9: 6

## 2021-01-07 ASSESSMENT — ANXIETY QUESTIONNAIRES
5. BEING SO RESTLESS THAT IT IS HARD TO SIT STILL: NOT AT ALL
IF YOU CHECKED OFF ANY PROBLEMS ON THIS QUESTIONNAIRE, HOW DIFFICULT HAVE THESE PROBLEMS MADE IT FOR YOU TO DO YOUR WORK, TAKE CARE OF THINGS AT HOME, OR GET ALONG WITH OTHER PEOPLE: SOMEWHAT DIFFICULT
GAD7 TOTAL SCORE: 0
6. BECOMING EASILY ANNOYED OR IRRITABLE: NOT AT ALL
2. NOT BEING ABLE TO STOP OR CONTROL WORRYING: NOT AT ALL
3. WORRYING TOO MUCH ABOUT DIFFERENT THINGS: NOT AT ALL
1. FEELING NERVOUS, ANXIOUS, OR ON EDGE: NOT AT ALL
7. FEELING AFRAID AS IF SOMETHING AWFUL MIGHT HAPPEN: NOT AT ALL

## 2021-01-07 NOTE — LETTER
1/7/2021      RE: Kristina Eldridge  [address]  [facility] [address]       Rooming Note      Pre-Operative Information  Surgeon: Ramesh Self MD  Date or Surgery: 1/27/21  Surgery: ESOPHAGOGASTRODUODENOSCOPY (EGD)    Lacehlle Momin LPN at 2:09 PM on 1/7/2021.Answers for HPI/ROS submitted by the patient on 1/7/2021   General Symptoms: No  Skin Symptoms: Yes  HENT Symptoms: Yes  EYE SYMPTOMS: Yes  HEART SYMPTOMS: No  LUNG SYMPTOMS: Yes  INTESTINAL SYMPTOMS: No  URINARY SYMPTOMS: No  GYNECOLOGIC SYMPTOMS: No  BREAST SYMPTOMS: No  SKELETAL SYMPTOMS: No  BLOOD SYMPTOMS: No  NERVOUS SYSTEM SYMPTOMS: No  MENTAL HEALTH SYMPTOMS: No  Changes in hair: No  Changes in moles/birth marks: No  Itching: Yes  Rashes: No  Changes in nails: Yes  Acne: No  Hair in places you don't want it: No  Change in facial hair: No  Warts: No  Non-healing sores: No  Scarring: No  Flaking of skin: Yes  Color changes of hands/feet in cold : No  Sun sensitivity: No  Skin thickening: No  Ear pain: Yes  Ear discharge: No  Hearing loss: Yes  Tinnitus: Yes  Nosebleeds: No  Congestion: No  Sinus pain: No  Trouble swallowing: Yes   Voice hoarseness: No  Mouth sores: No  Sore throat: No  Tooth pain: No  Gum tenderness: No  Bleeding gums: No  Change in taste: No  Change in sense of smell: No  Dry mouth: No  Hearing aid used: No  Neck lump: No  Eye pain: No  Vision loss: Yes  Dry eyes: Yes  Watery eyes: No  Eye bulging: No  Double vision: No  Flashing of lights: No  Spots: No  Floaters: Yes  Redness: No  Crossed eyes: No  Tunnel Vision: No  Yellowing of eyes: No  Eye irritation: Yes  Cough: No  Sputum or phlegm: No  Coughing up blood: No  Difficulty breating or shortness of breath: Yes  Snoring: No  Wheezing: Yes  Difficulty breathing on exertion: No  Nighttime Cough: No  Difficulty breathing when lying flat: No      [facility] [place]  [address]  4TH [address]LO[address]  [facility] [address]  Phone:  [phone]  Fax: [phone]  Primary Provider: Varinder Esteban  Pre-op Performing Provider: VARINDER ESTEBAN    PREOPERATIVE EVALUATION:  Today's date: 1/7/2021    Kristina Eldridge is a 50 year old female who presents for a preoperative evaluation.    Surgical Information:  Surgery/Procedure: Pancreatic duct stent chane  Surgery Location: [facility]  Surgeon: Aramis  Surgery Date: January 27 2021  Time of Surgery: TBD  Where patient plans to recover: At home with family  Fax number for surgical facility: Note does not need to be faxed, will be available electronically in Epic.    Type of Anesthesia Anticipated: to be determined    Subjective     HPI related to upcoming procedure: Pancreas duct tent in place due for routine change, chronic anorexia, wt is up several lbs from last summer. No vomit, some nausea. Bowels move normally.     Past Medical History:   Diagnosis Date     Abnormal Pap smear 2/21/2017    Dr said pap was abnormal     Arthritis      Chronic back pain     hx spondylolisthesis     Chronic pain      COPD (chronic obstructive pulmonary disease) (H)     PFT's 2013 FEV1/FVC = 2.68/3.20 = 84%     H/O gastric bypass 2007     Hepatitis C     treated ribaviron & interferon in 2008 for serotype 2 with failed 6 month treatment     Hyperlipidemia     on simvatatin     Insomnia      Other chronic pain      Positive TB test     has taken INH     RLS (restless legs syndrome)      Seasonal allergies      TB lung, latent 1994    treated     Uncomplicated asthma      Wounds and injuries 2003     Past Surgical History:   Procedure Laterality Date     CHOLECYSTECTOMY       D & C  1987     ENDOSCOPIC ULTRASOUND UPPER GASTROINTESTINAL TRACT (GI) N/A 10/21/2019    Procedure: ENDOSCOPIC ULTRASOUND, ESOPHAGOSCOPY / UPPER GASTROINTESTINAL TRACT (GI) with jejunalgastrostomy with stent placement x 2, dilation tract;  Surgeon: Ramesh Self MD;  [facility]     ENDOSCOPIC ULTRASOUND UPPER GASTROINTESTINAL TRACT  (GI) N/A 1/8/2020    Procedure: ENDOSCOPIC ULTRASOUND, ESOPHAGOSCOPY / UPPER GASTROINTESTINAL TRACT (GI)  with stent exchange;  Surgeon: Ramesh Self MD;  [facility]     ENDOSCOPIC ULTRASOUND UPPER GASTROINTESTINAL TRACT (GI) N/A 6/2/2020    Procedure: ENDOSCOPIC ULTRASOUND, ESOPHAGOSCOPY / UPPER GASTROINTESTINAL TRACT (GI)  **Latex Allergy** stent exchange;  Surgeon: Ramesh Self MD;  [facility]     ESOPHAGOSCOPY, GASTROSCOPY, DUODENOSCOPY (EGD), COMBINED  7/30/2014    Procedure: COMBINED ESOPHAGOSCOPY, GASTROSCOPY, DUODENOSCOPY (EGD);  Surgeon: Ramesh Self MD;  [facility]     ESOPHAGOSCOPY, GASTROSCOPY, DUODENOSCOPY (EGD), COMBINED N/A 8/14/2019    Procedure: ESOPHAGOGASTRODUODENOSCOPY, WITH BIOPSY;  Surgeon: Johann Hazel MD;  [facility]     FRACTURE TX, ANKLE RT/LT  1991    ORIF rt ankle     FUSION SPINE POSTERIOR ONE LEVEL N/A 11/21/2014    Procedure: FUSION SPINE POSTERIOR ONE LEVEL;  Surgeon: Amilcar Baldwin MD;  [facility]     FUSION SPINE POSTERIOR ONE LEVEL Right 7/30/2015    Procedure: FUSION SPINE POSTERIOR ONE LEVEL;  Surgeon: Amilcar Baldwin MD;  [facility]     GASTRIC BYPASS  2007    lost 150 lbs     GRAFT BONE FROM ILIAC CREST Left 11/21/2014    Procedure: GRAFT BONE FROM ILIAC CREST;  Surgeon: Amilcar Baldwin MD;  [facility]     hardware removal       INJECT BLOCK MEDIAL BRANCH CERVICAL/THORACIC/LUMBAR Bilateral 7/10/2020    Procedure: Bilateral lumbar 3, lumbar 4, and lumbar 5 medial branch nerve diagnostic blocks #2;  Surgeon: Pravin Bagley MD;  [facility]     INJECT PARAVERTEBRAL FACET JOINT LUMBAR / SACRAL FIRST Bilateral 9/30/2019    Procedure: Lumbar Medial Branch Nerve Block Injection Bilateral;  Surgeon: Hira Rodríguez MD;  [facility]     OPTICAL TRACKING SYSTEM FUSION POSTERIOR SPINE LUMBAR  11/20/2013    Procedure: OPTICAL TRACKING SYSTEM FUSION SPINE  POSTERIOR LUMBAR ONE LEVEL;  Lumbar 4-5 Transforaminal Interbody Fusion;  Surgeon: Amilcar Baldwin MD;  [facility]     ORTHOPEDIC SURGERY      shoulder surgery, removed bone spur     PHACOEMULSIFICATION CLEAR CORNEA WITH STANDARD INTRAOCULAR LENS IMPLANT Right 5/29/2020    Procedure: RIGHT PHACOEMULSIFICATION, CATARACT, WITH INTRAOCULAR LENS IMPLANT;  Surgeon: Lyla Mahoney MD;  [facility]     PHACOEMULSIFICATION CLEAR CORNEA WITH STANDARD INTRAOCULAR LENS IMPLANT Left 7/17/2020    Procedure: LEFT EYE CATARACT REMOVAL WITH INTRAOCULAR LENS IMPLANT;  Surgeon: Lyla Mahoney MD;  [facility]     TUBAL LIGATION           Preop Questions 1/7/2021   1. Have you ever had a heart attack or stroke? No   2. Have you ever had surgery on your heart or blood vessels, such as a stent placement, a coronary artery bypass, or surgery on an artery in your head, neck, heart, or legs? No   3. Do you have chest pain with activity? No   4. Do you have a history of  heart failure? No   5. Do you currently have a cold, bronchitis or symptoms of other infection? No   6. Do you have a cough, shortness of breath, or wheezing? No   7. Do you or anyone in your family have previous history of blood clots? YES - aunt (one) had a dvt   8. Do you or does anyone in your family have a serious bleeding problem such as prolonged bleeding following surgeries or cuts? No   9. Have you ever had problems with anemia or been told to take iron pills? YES - normal last Manjula   10. Have you had any abnormal blood loss such as black, tarry or bloody stools, or abnormal vaginal bleeding? No   11. Have you ever had a blood transfusion? No   12. Are you willing to have a blood transfusion if it is medically needed before, during, or after your surgery? Yes   13. Have you or any of your relatives ever had problems with anesthesia? No   14. Do you have sleep apnea, excessive snoring or daytime drowsiness? No   15. Do you have any  artifical heart valves or other implanted medical devices like a pacemaker, defibrillator, or continuous glucose monitor? No   16. Do you have artificial joints? No   17. Are you allergic to latex? YES:    18. Is there any chance that you may be pregnant? No       Health Care Directive:  Patient has a Health Care Directive on file      Preoperative Review of :   reviewed - no record of controlled substances prescribed.      Status of Chronic Conditions:  See problem list for active medical problems.  Problems all longstanding and stable, except as noted/documented.  See ROS for pertinent symptoms related to these conditions.      Review of Systems  See above    Patient Active Problem List    Diagnosis Date Noted     Adult failure to thrive 11/23/2020     Priority: Medium     Added automatically from request for surgery [id number]       Age-related nuclear cataract, left 06/25/2020     Priority: Medium     Added automatically from request for surgery [id number]       Failure to thrive in adult 05/22/2020     Priority: Medium     Added automatically from request for surgery [id number]       Nuclear sclerosis of both eyes 03/10/2020     Priority: Medium     Added automatically from request for surgery [id number]       Lumbar spondylosis 02/27/2020     Priority: Medium     Added automatically from request for surgery [id number]       Encounter for pancreatic duct stent exchange 10/26/2019     Priority: Medium     Added automatically from request for surgery [id number]       Malnutrition (H) 10/10/2019     Priority: Medium     Added automatically from request for surgery [id number]       Dermal and epidermal nevus 04/30/2018     Priority: Medium     irritated       Weight loss 09/28/2016     Priority: Medium     Lumbar pseudoarthrosis 07/30/2015     Priority: Medium     COPD (chronic obstructive pulmonary disease) (H) 01/01/2015     Priority: Medium     Pseudoarthrosis of lumbar spine with nonunion 11/21/2014     Priority: Medium      HIV exposure 03/20/2014     Priority: Medium     Personal history of spine surgery 11/20/2013     Priority: Medium     Lumbar stenosis 10/23/2013     Priority: Medium     Depression 04/19/2013     Priority: Medium     Radicular leg pain 03/01/2013     Priority: Medium     Spondylolisthesis, grade 2 03/01/2013     Priority: Medium     Marijuana smoker, continuous 03/01/2013     Priority: Medium     Acute cholecystitis 12/11/2012     Priority: Medium     Chronic hepatitis C (H) 10/19/2012     Priority: Medium     Bariatric surgery status 10/19/2012     Priority: Medium     Hyperlipidemia      Priority: Medium     on simvatatin       LTBI (latent tuberculosis infection) 02/18/2010     Priority: Medium     sp INH       Tobacco dependence 06/24/2009     Priority: Medium     Vitamin D deficiency 06/24/2009     Priority: Medium     Gastric bypass status for obesity 01/09/2008     Priority: Medium     Restless leg syndrome 10/17/2007     Priority: Medium     Insomnia 08/17/2007     Priority: Medium     Osteoarthrosis 08/17/2007     Priority: Medium      Past Medical History:   Diagnosis Date     Abnormal Pap smear 2/21/2017     said pap was abnormal     Arthritis      Chronic back pain     hx spondylolisthesis     Chronic pain      COPD (chronic obstructive pulmonary disease) (H)     PFT's 2013 FEV1/FVC = 2.68/3.20 = 84%     H/O gastric bypass 2007     Hepatitis C     treated ribaviron & interferon in 2008 for serotype 2 with failed 6 month treatment     Hyperlipidemia     on simvatatin     Insomnia      Other chronic pain      Positive TB test     has taken INH     RLS (restless legs syndrome)      Seasonal allergies      TB lung, latent 1994    treated     Uncomplicated asthma      Wounds and injuries 2003     Past Surgical History:   Procedure Laterality Date     CHOLECYSTECTOMY       D & C  1987     ENDOSCOPIC ULTRASOUND UPPER GASTROINTESTINAL TRACT (GI) N/A 10/21/2019    Procedure: ENDOSCOPIC ULTRASOUND,  ESOPHAGOSCOPY / UPPER GASTROINTESTINAL TRACT (GI) with jejunalgastrostomy with stent placement x 2, dilation tract;  Surgeon: Ramesh Self MD;  [facility]     ENDOSCOPIC ULTRASOUND UPPER GASTROINTESTINAL TRACT (GI) N/A 1/8/2020    Procedure: ENDOSCOPIC ULTRASOUND, ESOPHAGOSCOPY / UPPER GASTROINTESTINAL TRACT (GI)  with stent exchange;  Surgeon: Ramesh Self MD;  [facility]     ENDOSCOPIC ULTRASOUND UPPER GASTROINTESTINAL TRACT (GI) N/A 6/2/2020    Procedure: ENDOSCOPIC ULTRASOUND, ESOPHAGOSCOPY / UPPER GASTROINTESTINAL TRACT (GI)  **Latex Allergy** stent exchange;  Surgeon: Ramesh Self MD;  [facility]     ESOPHAGOSCOPY, GASTROSCOPY, DUODENOSCOPY (EGD), COMBINED  7/30/2014    Procedure: COMBINED ESOPHAGOSCOPY, GASTROSCOPY, DUODENOSCOPY (EGD);  Surgeon: Ramesh Self MD;  [facility]     ESOPHAGOSCOPY, GASTROSCOPY, DUODENOSCOPY (EGD), COMBINED N/A 8/14/2019    Procedure: ESOPHAGOGASTRODUODENOSCOPY, WITH BIOPSY;  Surgeon: Johann Hazel MD;  [facility]     FRACTURE TX, ANKLE RT/LT  1991    ORIF rt ankle     FUSION SPINE POSTERIOR ONE LEVEL N/A 11/21/2014    Procedure: FUSION SPINE POSTERIOR ONE LEVEL;  Surgeon: Amilcar Baldwin MD;  [facility]     FUSION SPINE POSTERIOR ONE LEVEL Right 7/30/2015    Procedure: FUSION SPINE POSTERIOR ONE LEVEL;  Surgeon: Amilcar Baldwin MD;  [facility]     GASTRIC BYPASS  2007    lost 150 lbs     GRAFT BONE FROM ILIAC CREST Left 11/21/2014    Procedure: GRAFT BONE FROM ILIAC CREST;  Surgeon: Amilcar Baldwin MD;  [facility]     hardware removal       INJECT BLOCK MEDIAL BRANCH CERVICAL/THORACIC/LUMBAR Bilateral 7/10/2020    Procedure: Bilateral lumbar 3, lumbar 4, and lumbar 5 medial branch nerve diagnostic blocks #2;  Surgeon: Pravin Bagley MD;  [facility]     INJECT PARAVERTEBRAL FACET JOINT LUMBAR / SACRAL FIRST Bilateral 9/30/2019    Procedure:  Lumbar Medial Branch Nerve Block Injection Bilateral;  Surgeon: Hira Rodríguez MD;  [facility]     OPTICAL TRACKING SYSTEM FUSION POSTERIOR SPINE LUMBAR  11/20/2013    Procedure: OPTICAL TRACKING SYSTEM FUSION SPINE POSTERIOR LUMBAR ONE LEVEL;  Lumbar 4-5 Transforaminal Interbody Fusion;  Surgeon: Amilcar Baldwin MD;  [facility]     ORTHOPEDIC SURGERY      shoulder surgery, removed bone spur     PHACOEMULSIFICATION CLEAR CORNEA WITH STANDARD INTRAOCULAR LENS IMPLANT Right 5/29/2020    Procedure: RIGHT PHACOEMULSIFICATION, CATARACT, WITH INTRAOCULAR LENS IMPLANT;  Surgeon: Lyla Mahoney MD;  [facility]     PHACOEMULSIFICATION CLEAR CORNEA WITH STANDARD INTRAOCULAR LENS IMPLANT Left 7/17/2020    Procedure: LEFT EYE CATARACT REMOVAL WITH INTRAOCULAR LENS IMPLANT;  Surgeon: Lyla Mahoney MD;  [facility]     TUBAL LIGATION       Current Outpatient Medications   Medication Sig Dispense Refill     albuterol (VENTOLIN HFA) 108 (90 Base) MCG/ACT inhaler Inhale 2 puffs into the lungs every 4 hours as needed for shortness of breath / dyspnea or wheezing 1 Inhaler 11     ATROVENT HFA 17 MCG/ACT inhaler INHALE 2 PUFFS BY MOUTH INTO LUNGS EVERY 6 HOURS 12.9 g 11     cetirizine (ZYRTEC) 10 MG tablet Take 1 tablet (10 mg) by mouth daily (Patient taking differently: Take 10 mg by mouth every morning ) 30 tablet 11     cyanocobalamin (CYANOCOBALAMIN) 1000 MCG/ML injection Inject 1 mL (1,000 mcg) into the muscle every 30 days 1 mL 11     mometasone-formoterol (DULERA) 100-5 MCG/ACT inhaler Inhale 2 puffs into the lungs 2 times daily 1 Inhaler 11     montelukast (SINGULAIR) 10 MG tablet Take 1 tablet (10 mg) by mouth At Bedtime 90 tablet 3     ofloxacin (OCUFLOX) 0.3 % ophthalmic solution 1 drop 4x daily in the surgical eye for 1 week after surgery, then stop 1 Bottle 0     order for DME Nebulizer 1 each 0     pramipexole (MIRAPEX) 0.125 MG tablet Take 1-3 tablets (0.125-0.375 mg) by mouth  "At Bedtime 180 tablet 1     spacer (OPTICHAMBER OTTONIEL) holding chamber USE ONCE DAILY 1 each 0     spacer (OPTICHAMBER OTTONIEL) holding chamber USE ONCE DAILY 1 each 0     Spacer/Aero Chamber Mouthpiece MISC 1 Units daily 1 each 0     syringe/needle, disp, (B-D LUER-JIMMY SYRINGE) 23G X 1\" 3 ML MISC Use 1 monthly with B12 12 each 1     traZODone (DESYREL) 100 MG tablet TAKE 2 TABLETS(200 MG) BY MOUTH EVERY NIGHT AS NEEDED FOR SLEEP 60 tablet 2       Allergies   Allergen Reactions     Bee Venom Anaphylaxis, Anxiety, Difficulty breathing, Hives, Itching, Nausea and Vomiting, Palpitations, Shortness Of Breath and Swelling     Opium Alkaloids, Hcls Itching     Can take opiate derived narcotics with Benadryl.     Morphine Itching     Latex Rash and Itching        Social History     Tobacco Use     Smoking status: Former Smoker     Packs/day: 1.00     Years: 30.00     Pack years: 30.00     Types: Cigarettes     Quit date: 2014     Years since quittin.5     Smokeless tobacco: Never Used     Tobacco comment: pack a day   Substance Use Topics     Alcohol use: Not Currently     Alcohol/week: 0.0 standard drinks     Comment: x3 drinks per year     Family History   Problem Relation Age of Onset     Hypertension Father      Respiratory Father         COPD, smoked     Glaucoma Father      Sleep Apnea Father      C.A.D. Mother         CHF,  age 59 of MI, smoked     Crohn's Disease Brother      Diabetes Maternal Grandmother         insulin dependent     Diabetes Paternal Grandmother      Glaucoma Paternal Grandmother      Alzheimer Disease Paternal Grandmother      Melanoma No family hx of      Skin Cancer No family hx of      Macular Degeneration No family hx of      Anesthesia Reaction No family hx of      Deep Vein Thrombosis (DVT) No family hx of      History   Drug Use     Types: Marijuana     Comment: Marijuana---- denies use since 2014           Objective     /85   Pulse 65   Wt 46.4 kg (102 lb 3.2 " oz)   LMP 06/11/2012 (LMP Unknown)   SpO2 97%   BMI 19.96 kg/m      Physical Exam    GENERAL APPEARANCE: healthy, alert and no distress     EYES: EOMI, PERRL     HENT: ear canals and TM's normal and nose and mouth without ulcers or lesions     NECK: no adenopathy, no asymmetry, masses, or scars and thyroid normal to palpation     RESP: lungs clear to auscultation - no rales, rhonchi or wheezes     CV: regular rates and rhythm, normal S1 S2, no S3 or S4 and no murmur, click or rub     ABDOMEN:  soft, nontender, no HSM or masses and bowel sounds normal     MS: extremities normal- no gross deformities noted, no evidence of inflammation in joints, FROM in all extremities.     SKIN: no suspicious lesions or rashes     NEURO: Normal strength and tone, sensory exam grossly normal, mentation intact and speech normal     PSYCH: mentation appears normal. and affect normal/bright     LYMPHATICS: No cervical adenopathy    Recent Labs   Lab Test 06/02/20  1500 02/20/20  1435 01/08/20  1404 07/24/19  1127 07/24/19  1127 03/05/19  1428 02/19/19  1545   HGB 12.6  --  14.1   < > 12.9  --  12.1     --  333   < > 175  --  146*   INR 1.09  --  1.03   < >  --   --   --    NA  --   --   --   --  138 136 137   POTASSIUM  --   --   --   --  3.5 3.2* 3.4   CR  --   --   --   --  0.94 0.85 0.84   A1C  --  5.3  --   --   --   --  5.5    < > = values in this interval not displayed.        Diagnostics:  No labs were ordered during this visit.   No EKG required for low risk surgery (cataract, skin procedure, breast biopsy, etc).  No EKG required, no history of coronary heart disease, significant arrhythmia, peripheral arterial disease or other structural heart disease.    Revised Cardiac Risk Index (RCRI):  The patient has the following serious cardiovascular risks for perioperative complications:   - No serious cardiac risks = 0 points     RCRI Interpretation: 0 points: Class I (very low risk - 0.4% complication rate)    I did not  order any studies for this procedure       Assessment & Plan   The proposed surgical procedure is considered LOW risk.    I did refill meds for RLS (helps, tolerated) and b12 injection supplies during visit.       Risks and Recommendations:  The patient has the following additional risks and recommendations for perioperative complications:   - No identified additional risk factors other than previously addressed    Medication Instructions:  Patient is to take all scheduled medications on the day of surgery    RECOMMENDATION:  APPROVAL GIVEN to proceed with proposed procedure, without further diagnostic evaluation.    Signed Electronically by: Varinder Crespo MD    Copy of this evaluation report is provided to requesting physician.    [facility] Preop Guidelines    Revised Cardiac Risk Index      Varinder Crespo MD

## 2021-01-07 NOTE — NURSING NOTE
Patient received flu vaccine.  See immunization list for administration details.  Patient tolerated injection well.     Lachelle Lee LPN at 2:23 PM on 1/7/2021.

## 2021-01-07 NOTE — NURSING NOTE
Chief Complaint   Patient presents with     Pre-Op Exam     In kal Momin LPN at 2:15 PM on 1/7/2021.

## 2021-01-07 NOTE — PROGRESS NOTES
Rooming Note      Pre-Operative Information  Surgeon: Ramesh Self MD  Date or Surgery: 1/27/21  Surgery: ESOPHAGOGASTRODUODENOSCOPY (EGD)    Lachelle Momin LPN at 2:09 PM on 1/7/2021.Answers for HPI/ROS submitted by the patient on 1/7/2021   General Symptoms: No  Skin Symptoms: Yes  HENT Symptoms: Yes  EYE SYMPTOMS: Yes  HEART SYMPTOMS: No  LUNG SYMPTOMS: Yes  INTESTINAL SYMPTOMS: No  URINARY SYMPTOMS: No  GYNECOLOGIC SYMPTOMS: No  BREAST SYMPTOMS: No  SKELETAL SYMPTOMS: No  BLOOD SYMPTOMS: No  NERVOUS SYSTEM SYMPTOMS: No  MENTAL HEALTH SYMPTOMS: No  Changes in hair: No  Changes in moles/birth marks: No  Itching: Yes  Rashes: No  Changes in nails: Yes  Acne: No  Hair in places you don't want it: No  Change in facial hair: No  Warts: No  Non-healing sores: No  Scarring: No  Flaking of skin: Yes  Color changes of hands/feet in cold : No  Sun sensitivity: No  Skin thickening: No  Ear pain: Yes  Ear discharge: No  Hearing loss: Yes  Tinnitus: Yes  Nosebleeds: No  Congestion: No  Sinus pain: No  Trouble swallowing: Yes   Voice hoarseness: No  Mouth sores: No  Sore throat: No  Tooth pain: No  Gum tenderness: No  Bleeding gums: No  Change in taste: No  Change in sense of smell: No  Dry mouth: No  Hearing aid used: No  Neck lump: No  Eye pain: No  Vision loss: Yes  Dry eyes: Yes  Watery eyes: No  Eye bulging: No  Double vision: No  Flashing of lights: No  Spots: No  Floaters: Yes  Redness: No  Crossed eyes: No  Tunnel Vision: No  Yellowing of eyes: No  Eye irritation: Yes  Cough: No  Sputum or phlegm: No  Coughing up blood: No  Difficulty breating or shortness of breath: Yes  Snoring: No  Wheezing: Yes  Difficulty breathing on exertion: No  Nighttime Cough: No  Difficulty breathing when lying flat: No      [facility] [address] [address]  Phone: [phone]  Fax: [phone]  Primary Provider: Varinder Crespo  Pre-op Performing  Provider: KERRY ESTEBAN    PREOPERATIVE EVALUATION:  Today's date: 1/7/2021    Kristina Eldridge is a 50 year old female who presents for a preoperative evaluation.    Surgical Information:  Surgery/Procedure: Pancreatic duct stent chane  Surgery Location: [facility]  Surgeon: Aramis  Surgery Date: January 27 2021  Time of Surgery: TBD  Where patient plans to recover: At home with family  Fax number for surgical facility: Note does not need to be faxed, will be available electronically in Epic.    Type of Anesthesia Anticipated: to be determined    Subjective     HPI related to upcoming procedure: Pancreas duct tent in place due for routine change, chronic anorexia, wt is up several lbs from last summer. No vomit, some nausea. Bowels move normally.     Past Medical History:   Diagnosis Date     Abnormal Pap smear 2/21/2017     said pap was abnormal     Arthritis      Chronic back pain     hx spondylolisthesis     Chronic pain      COPD (chronic obstructive pulmonary disease) (H)     PFT's 2013 FEV1/FVC = 2.68/3.20 = 84%     H/O gastric bypass 2007     Hepatitis C     treated ribaviron & interferon in 2008 for serotype 2 with failed 6 month treatment     Hyperlipidemia     on simvatatin     Insomnia      Other chronic pain      Positive TB test     has taken INH     RLS (restless legs syndrome)      Seasonal allergies      TB lung, latent 1994    treated     Uncomplicated asthma      Wounds and injuries 2003     Past Surgical History:   Procedure Laterality Date     CHOLECYSTECTOMY       D & C  1987     ENDOSCOPIC ULTRASOUND UPPER GASTROINTESTINAL TRACT (GI) N/A 10/21/2019    Procedure: ENDOSCOPIC ULTRASOUND, ESOPHAGOSCOPY / UPPER GASTROINTESTINAL TRACT (GI) with jejunalgastrostomy with stent placement x 2, dilation tract;  Surgeon: Ramesh Self MD;  [facility]     ENDOSCOPIC ULTRASOUND UPPER GASTROINTESTINAL TRACT (GI) N/A 1/8/2020    Procedure: ENDOSCOPIC ULTRASOUND, ESOPHAGOSCOPY / UPPER  GASTROINTESTINAL TRACT (GI)  with stent exchange;  Surgeon: Ramesh Self MD;  [facility]     ENDOSCOPIC ULTRASOUND UPPER GASTROINTESTINAL TRACT (GI) N/A 6/2/2020    Procedure: ENDOSCOPIC ULTRASOUND, ESOPHAGOSCOPY / UPPER GASTROINTESTINAL TRACT (GI)  **Latex Allergy** stent exchange;  Surgeon: Ramesh Self MD;  [facility]     ESOPHAGOSCOPY, GASTROSCOPY, DUODENOSCOPY (EGD), COMBINED  7/30/2014    Procedure: COMBINED ESOPHAGOSCOPY, GASTROSCOPY, DUODENOSCOPY (EGD);  Surgeon: Ramesh Self MD;  [facility]     ESOPHAGOSCOPY, GASTROSCOPY, DUODENOSCOPY (EGD), COMBINED N/A 8/14/2019    Procedure: ESOPHAGOGASTRODUODENOSCOPY, WITH BIOPSY;  Surgeon: Johann Hazel MD;  [facility]     FRACTURE TX, ANKLE RT/LT  1991    ORIF rt ankle     FUSION SPINE POSTERIOR ONE LEVEL N/A 11/21/2014    Procedure: FUSION SPINE POSTERIOR ONE LEVEL;  Surgeon: Amilcar Baldwin MD;  [facility]     FUSION SPINE POSTERIOR ONE LEVEL Right 7/30/2015    Procedure: FUSION SPINE POSTERIOR ONE LEVEL;  Surgeon: Amilcar Baldwin MD;  [facility]     GASTRIC BYPASS  2007    lost 150 lbs     GRAFT BONE FROM ILIAC CREST Left 11/21/2014    Procedure: GRAFT BONE FROM ILIAC CREST;  Surgeon: Amilcar Baldwin MD;  [facility]     hardware removal       INJECT BLOCK MEDIAL BRANCH CERVICAL/THORACIC/LUMBAR Bilateral 7/10/2020    Procedure: Bilateral lumbar 3, lumbar 4, and lumbar 5 medial branch nerve diagnostic blocks #2;  Surgeon: Pravin Bagley MD;  [facility]     INJECT PARAVERTEBRAL FACET JOINT LUMBAR / SACRAL FIRST Bilateral 9/30/2019    Procedure: Lumbar Medial Branch Nerve Block Injection Bilateral;  Surgeon: Hira Rodríguez MD;  [facility]     OPTICAL TRACKING SYSTEM FUSION POSTERIOR SPINE LUMBAR  11/20/2013    Procedure: OPTICAL TRACKING SYSTEM FUSION SPINE POSTERIOR LUMBAR ONE LEVEL;  Lumbar 4-5 Transforaminal Interbody Fusion;   Surgeon: Amilcar Baldwin MD;  [facility]     ORTHOPEDIC SURGERY      shoulder surgery, removed bone spur     PHACOEMULSIFICATION CLEAR CORNEA WITH STANDARD INTRAOCULAR LENS IMPLANT Right 5/29/2020    Procedure: RIGHT PHACOEMULSIFICATION, CATARACT, WITH INTRAOCULAR LENS IMPLANT;  Surgeon: Lyla Mahoney MD;  [facility]     PHACOEMULSIFICATION CLEAR CORNEA WITH STANDARD INTRAOCULAR LENS IMPLANT Left 7/17/2020    Procedure: LEFT EYE CATARACT REMOVAL WITH INTRAOCULAR LENS IMPLANT;  Surgeon: Lyla Mahoney MD;  [facility]     TUBAL LIGATION           Preop Questions 1/7/2021   1. Have you ever had a heart attack or stroke? No   2. Have you ever had surgery on your heart or blood vessels, such as a stent placement, a coronary artery bypass, or surgery on an artery in your head, neck, heart, or legs? No   3. Do you have chest pain with activity? No   4. Do you have a history of  heart failure? No   5. Do you currently have a cold, bronchitis or symptoms of other infection? No   6. Do you have a cough, shortness of breath, or wheezing? No   7. Do you or anyone in your family have previous history of blood clots? YES - aunt (one) had a dvt   8. Do you or does anyone in your family have a serious bleeding problem such as prolonged bleeding following surgeries or cuts? No   9. Have you ever had problems with anemia or been told to take iron pills? YES - normal last Manjula   10. Have you had any abnormal blood loss such as black, tarry or bloody stools, or abnormal vaginal bleeding? No   11. Have you ever had a blood transfusion? No   12. Are you willing to have a blood transfusion if it is medically needed before, during, or after your surgery? Yes   13. Have you or any of your relatives ever had problems with anesthesia? No   14. Do you have sleep apnea, excessive snoring or daytime drowsiness? No   15. Do you have any artifical heart valves or other implanted medical devices like a  pacemaker, defibrillator, or continuous glucose monitor? No   16. Do you have artificial joints? No   17. Are you allergic to latex? YES:    18. Is there any chance that you may be pregnant? No       Health Care Directive:  Patient has a Health Care Directive on file      Preoperative Review of :   reviewed - no record of controlled substances prescribed.      Status of Chronic Conditions:  See problem list for active medical problems.  Problems all longstanding and stable, except as noted/documented.  See ROS for pertinent symptoms related to these conditions.      Review of Systems  See above    Patient Active Problem List    Diagnosis Date Noted     Adult failure to thrive 11/23/2020     Priority: Medium     Added automatically from request for surgery [id number]       Age-related nuclear cataract, left 06/25/2020     Priority: Medium     Added automatically from request for surgery [id number]       Failure to thrive in adult 05/22/2020     Priority: Medium     Added automatically from request for surgery [id number]       Nuclear sclerosis of both eyes 03/10/2020     Priority: Medium     Added automatically from request for surgery [id number]       Lumbar spondylosis 02/27/2020     Priority: Medium     Added automatically from request for surgery [id number]       Encounter for pancreatic duct stent exchange 10/26/2019     Priority: Medium     Added automatically from request for surgery [id number]       Malnutrition (H) 10/10/2019     Priority: Medium     Added automatically from request for surgery [id number]       Dermal and epidermal nevus 04/30/2018     Priority: Medium     irritated       Weight loss 09/28/2016     Priority: Medium     Lumbar pseudoarthrosis 07/30/2015     Priority: Medium     COPD (chronic obstructive pulmonary disease) (H) 01/01/2015     Priority: Medium     Pseudoarthrosis of lumbar spine with nonunion 11/21/2014     Priority: Medium     HIV exposure 03/20/2014     Priority: Medium     Personal history  of spine surgery 11/20/2013     Priority: Medium     Lumbar stenosis 10/23/2013     Priority: Medium     Depression 04/19/2013     Priority: Medium     Radicular leg pain 03/01/2013     Priority: Medium     Spondylolisthesis, grade 2 03/01/2013     Priority: Medium     Marijuana smoker, continuous 03/01/2013     Priority: Medium     Acute cholecystitis 12/11/2012     Priority: Medium     Chronic hepatitis C (H) 10/19/2012     Priority: Medium     Bariatric surgery status 10/19/2012     Priority: Medium     Hyperlipidemia      Priority: Medium     on simvatatin       LTBI (latent tuberculosis infection) 02/18/2010     Priority: Medium     sp INH       Tobacco dependence 06/24/2009     Priority: Medium     Vitamin D deficiency 06/24/2009     Priority: Medium     Gastric bypass status for obesity 01/09/2008     Priority: Medium     Restless leg syndrome 10/17/2007     Priority: Medium     Insomnia 08/17/2007     Priority: Medium     Osteoarthrosis 08/17/2007     Priority: Medium      Past Medical History:   Diagnosis Date     Abnormal Pap smear 2/21/2017     said pap was abnormal     Arthritis      Chronic back pain     hx spondylolisthesis     Chronic pain      COPD (chronic obstructive pulmonary disease) (H)     PFT's 2013 FEV1/FVC = 2.68/3.20 = 84%     H/O gastric bypass 2007     Hepatitis C     treated ribaviron & interferon in 2008 for serotype 2 with failed 6 month treatment     Hyperlipidemia     on simvatatin     Insomnia      Other chronic pain      Positive TB test     has taken INH     RLS (restless legs syndrome)      Seasonal allergies      TB lung, latent 1994    treated     Uncomplicated asthma      Wounds and injuries 2003     Past Surgical History:   Procedure Laterality Date     CHOLECYSTECTOMY       D & C  1987     ENDOSCOPIC ULTRASOUND UPPER GASTROINTESTINAL TRACT (GI) N/A 10/21/2019    Procedure: ENDOSCOPIC ULTRASOUND, ESOPHAGOSCOPY / UPPER GASTROINTESTINAL TRACT (GI) with jejunalgastrostomy with  stent placement x 2, dilation tract;  Surgeon: Ramesh Self MD;  [facility]     ENDOSCOPIC ULTRASOUND UPPER GASTROINTESTINAL TRACT (GI) N/A 1/8/2020    Procedure: ENDOSCOPIC ULTRASOUND, ESOPHAGOSCOPY / UPPER GASTROINTESTINAL TRACT (GI)  with stent exchange;  Surgeon: Ramesh Self MD;  [facility]     ENDOSCOPIC ULTRASOUND UPPER GASTROINTESTINAL TRACT (GI) N/A 6/2/2020    Procedure: ENDOSCOPIC ULTRASOUND, ESOPHAGOSCOPY / UPPER GASTROINTESTINAL TRACT (GI)  **Latex Allergy** stent exchange;  Surgeon: Ramesh Self MD;  [facility]     ESOPHAGOSCOPY, GASTROSCOPY, DUODENOSCOPY (EGD), COMBINED  7/30/2014    Procedure: COMBINED ESOPHAGOSCOPY, GASTROSCOPY, DUODENOSCOPY (EGD);  Surgeon: Ramesh Self MD;  [facility]     ESOPHAGOSCOPY, GASTROSCOPY, DUODENOSCOPY (EGD), COMBINED N/A 8/14/2019    Procedure: ESOPHAGOGASTRODUODENOSCOPY, WITH BIOPSY;  Surgeon: Johann Hazel MD;  [facility]     FRACTURE TX, ANKLE RT/LT  1991    ORIF rt ankle     FUSION SPINE POSTERIOR ONE LEVEL N/A 11/21/2014    Procedure: FUSION SPINE POSTERIOR ONE LEVEL;  Surgeon: Amilcar Baldwin MD;  [facility]     FUSION SPINE POSTERIOR ONE LEVEL Right 7/30/2015    Procedure: FUSION SPINE POSTERIOR ONE LEVEL;  Surgeon: Amilcar Baldwin MD;  [facility]     GASTRIC BYPASS  2007    lost 150 lbs     GRAFT BONE FROM ILIAC CREST Left 11/21/2014    Procedure: GRAFT BONE FROM ILIAC CREST;  Surgeon: Amilcar Baldwin MD;  [facility]     hardware removal       INJECT BLOCK MEDIAL BRANCH CERVICAL/THORACIC/LUMBAR Bilateral 7/10/2020    Procedure: Bilateral lumbar 3, lumbar 4, and lumbar 5 medial branch nerve diagnostic blocks #2;  Surgeon: Pravin Bagley MD;  [facility]     INJECT PARAVERTEBRAL FACET JOINT LUMBAR / SACRAL FIRST Bilateral 9/30/2019    Procedure: Lumbar Medial Branch Nerve Block Injection Bilateral;  Surgeon: Anne  MD Hira;  [facility]     OPTICAL TRACKING SYSTEM FUSION POSTERIOR SPINE LUMBAR  11/20/2013    Procedure: OPTICAL TRACKING SYSTEM FUSION SPINE POSTERIOR LUMBAR ONE LEVEL;  Lumbar 4-5 Transforaminal Interbody Fusion;  Surgeon: Amilcar Baldwin MD;  [facility]     ORTHOPEDIC SURGERY      shoulder surgery, removed bone spur     PHACOEMULSIFICATION CLEAR CORNEA WITH STANDARD INTRAOCULAR LENS IMPLANT Right 5/29/2020    Procedure: RIGHT PHACOEMULSIFICATION, CATARACT, WITH INTRAOCULAR LENS IMPLANT;  Surgeon: Lyla Mahoney MD;  [facility]     PHACOEMULSIFICATION CLEAR CORNEA WITH STANDARD INTRAOCULAR LENS IMPLANT Left 7/17/2020    Procedure: LEFT EYE CATARACT REMOVAL WITH INTRAOCULAR LENS IMPLANT;  Surgeon: Lyla Mahoney MD;  [facility]     TUBAL LIGATION       Current Outpatient Medications   Medication Sig Dispense Refill     albuterol (VENTOLIN HFA) 108 (90 Base) MCG/ACT inhaler Inhale 2 puffs into the lungs every 4 hours as needed for shortness of breath / dyspnea or wheezing 1 Inhaler 11     ATROVENT HFA 17 MCG/ACT inhaler INHALE 2 PUFFS BY MOUTH INTO LUNGS EVERY 6 HOURS 12.9 g 11     cetirizine (ZYRTEC) 10 MG tablet Take 1 tablet (10 mg) by mouth daily (Patient taking differently: Take 10 mg by mouth every morning ) 30 tablet 11     cyanocobalamin (CYANOCOBALAMIN) 1000 MCG/ML injection Inject 1 mL (1,000 mcg) into the muscle every 30 days 1 mL 11     mometasone-formoterol (DULERA) 100-5 MCG/ACT inhaler Inhale 2 puffs into the lungs 2 times daily 1 Inhaler 11     montelukast (SINGULAIR) 10 MG tablet Take 1 tablet (10 mg) by mouth At Bedtime 90 tablet 3     ofloxacin (OCUFLOX) 0.3 % ophthalmic solution 1 drop 4x daily in the surgical eye for 1 week after surgery, then stop 1 Bottle 0     order for DME Nebulizer 1 each 0     pramipexole (MIRAPEX) 0.125 MG tablet Take 1-3 tablets (0.125-0.375 mg) by mouth At Bedtime 180 tablet 1     spacer (OPTICHAMBER OTTONIEL) holding chamber  "USE ONCE DAILY 1 each 0     spacer (OPTICHAMBER OTTONIEL) holding chamber USE ONCE DAILY 1 each 0     Spacer/Aero Chamber Mouthpiece MISC 1 Units daily 1 each 0     syringe/needle, disp, (B-D LUER-JIMMY SYRINGE) 23G X 1\" 3 ML MISC Use 1 monthly with B12 12 each 1     traZODone (DESYREL) 100 MG tablet TAKE 2 TABLETS(200 MG) BY MOUTH EVERY NIGHT AS NEEDED FOR SLEEP 60 tablet 2       Allergies   Allergen Reactions     Bee Venom Anaphylaxis, Anxiety, Difficulty breathing, Hives, Itching, Nausea and Vomiting, Palpitations, Shortness Of Breath and Swelling     Opium Alkaloids, Hcls Itching     Can take opiate derived narcotics with Benadryl.     Morphine Itching     Latex Rash and Itching        Social History     Tobacco Use     Smoking status: Former Smoker     Packs/day: 1.00     Years: 30.00     Pack years: 30.00     Types: Cigarettes     Quit date: 2014     Years since quittin.5     Smokeless tobacco: Never Used     Tobacco comment: pack a day   Substance Use Topics     Alcohol use: Not Currently     Alcohol/week: 0.0 standard drinks     Comment: x3 drinks per year     Family History   Problem Relation Age of Onset     Hypertension Father      Respiratory Father         COPD, smoked     Glaucoma Father      Sleep Apnea Father      C.A.D. Mother         CHF,  age 59 of MI, smoked     Crohn's Disease Brother      Diabetes Maternal Grandmother         insulin dependent     Diabetes Paternal Grandmother      Glaucoma Paternal Grandmother      Alzheimer Disease Paternal Grandmother      Melanoma No family hx of      Skin Cancer No family hx of      Macular Degeneration No family hx of      Anesthesia Reaction No family hx of      Deep Vein Thrombosis (DVT) No family hx of      History   Drug Use     Types: Marijuana     Comment: Marijuana---- denies use since 2014           Objective     /85   Pulse 65   Wt 46.4 kg (102 lb 3.2 oz)   LMP 2012 (LMP Unknown)   SpO2 97%   BMI 19.96 kg/m  "     Physical Exam    GENERAL APPEARANCE: healthy, alert and no distress     EYES: EOMI, PERRL     HENT: ear canals and TM's normal and nose and mouth without ulcers or lesions     NECK: no adenopathy, no asymmetry, masses, or scars and thyroid normal to palpation     RESP: lungs clear to auscultation - no rales, rhonchi or wheezes     CV: regular rates and rhythm, normal S1 S2, no S3 or S4 and no murmur, click or rub     ABDOMEN:  soft, nontender, no HSM or masses and bowel sounds normal     MS: extremities normal- no gross deformities noted, no evidence of inflammation in joints, FROM in all extremities.     SKIN: no suspicious lesions or rashes     NEURO: Normal strength and tone, sensory exam grossly normal, mentation intact and speech normal     PSYCH: mentation appears normal. and affect normal/bright     LYMPHATICS: No cervical adenopathy    Recent Labs   Lab Test 06/02/20  1500 02/20/20  1435 01/08/20  1404 07/24/19  1127 07/24/19  1127 03/05/19  1428 02/19/19  1545   HGB 12.6  --  14.1   < > 12.9  --  12.1     --  333   < > 175  --  146*   INR 1.09  --  1.03   < >  --   --   --    NA  --   --   --   --  138 136 137   POTASSIUM  --   --   --   --  3.5 3.2* 3.4   CR  --   --   --   --  0.94 0.85 0.84   A1C  --  5.3  --   --   --   --  5.5    < > = values in this interval not displayed.        Diagnostics:  No labs were ordered during this visit.   No EKG required for low risk surgery (cataract, skin procedure, breast biopsy, etc).  No EKG required, no history of coronary heart disease, significant arrhythmia, peripheral arterial disease or other structural heart disease.    Revised Cardiac Risk Index (RCRI):  The patient has the following serious cardiovascular risks for perioperative complications:   - No serious cardiac risks = 0 points     RCRI Interpretation: 0 points: Class I (very low risk - 0.4% complication rate)    I did not order any studies for this procedure       Assessment & Plan   The  proposed surgical procedure is considered LOW risk.    I did refill meds for RLS (helps, tolerated) and b12 injection supplies during visit.       Risks and Recommendations:  The patient has the following additional risks and recommendations for perioperative complications:   - No identified additional risk factors other than previously addressed    Medication Instructions:  Patient is to take all scheduled medications on the day of surgery    RECOMMENDATION:  APPROVAL GIVEN to proceed with proposed procedure, without further diagnostic evaluation.    Signed Electronically by: Varinder Crespo MD    Copy of this evaluation report is provided to requesting physician.    Preop [facility] Preop Guidelines    Revised Cardiac Risk Index

## 2021-01-08 ASSESSMENT — ANXIETY QUESTIONNAIRES: GAD7 TOTAL SCORE: 0

## 2021-01-20 RX ORDER — ONDANSETRON 2 MG/ML
4 INJECTION INTRAMUSCULAR; INTRAVENOUS
Status: CANCELLED | OUTPATIENT
Start: 2021-01-20

## 2021-01-20 RX ORDER — LIDOCAINE 40 MG/G
CREAM TOPICAL
Status: CANCELLED | OUTPATIENT
Start: 2021-01-20

## 2021-03-01 DIAGNOSIS — J45.30 MILD PERSISTENT ASTHMA, UNCOMPLICATED: ICD-10-CM

## 2021-03-02 RX ORDER — IPRATROPIUM BROMIDE 17 UG/1
AEROSOL, METERED RESPIRATORY (INHALATION)
Qty: 12.9 G | Refills: 11 | Status: SHIPPED | OUTPATIENT
Start: 2021-03-02 | End: 2021-09-15

## 2021-03-02 NOTE — TELEPHONE ENCOUNTER
ipratropium (ATROVENT HFA) 17 MCG/ACT inhaler  Last Written Prescription Date:  2/5/2020  Last Fill Quantity: 12.9,   # refills: 11  Last Office Visit : 1/7/2021  Future Office visit:  None  12.9 g, 11 Refills sent to pharm 2/5/2020      Simona Quintanilla RN  Central Triage Red Flags/Med Refills

## 2021-03-19 ENCOUNTER — TELEPHONE (OUTPATIENT)
Dept: FAMILY MEDICINE | Facility: CLINIC | Age: 51
End: 2021-03-19

## 2021-03-19 NOTE — TELEPHONE ENCOUNTER
[facility] Call Center    Phone Message    May a detailed message be left on voicemail: yes     Reason for Call: Symptoms or Concerns     If patient has red-flag symptoms, warm transfer to triage line    Current symptom or concern: stomach cramping, diarrhea, throwing up    Symptoms have been present for:  unknown day(s)    Has patient previously been seen for this? No    By : Dr. Crespo    Are there any new or worsening symptoms? Yes: see above, pt also left another detailed [employer] message on 3/18 regarding this       Action Taken: Message routed to:  [facility] mode

## 2021-03-29 ENCOUNTER — PATIENT OUTREACH (OUTPATIENT)
Dept: GASTROENTEROLOGY | Facility: CLINIC | Age: 51
End: 2021-03-29

## 2021-03-29 ENCOUNTER — PREP FOR PROCEDURE (OUTPATIENT)
Dept: GASTROENTEROLOGY | Facility: CLINIC | Age: 51
End: 2021-03-29

## 2021-03-29 DIAGNOSIS — Z98.84 HX OF GASTRIC BYPASS: Primary | ICD-10-CM

## 2021-03-29 DIAGNOSIS — R10.13 ABDOMINAL PAIN, EPIGASTRIC: Primary | ICD-10-CM

## 2021-03-29 NOTE — TELEPHONE ENCOUNTER
"Pt wanting to reschedule procedure with Dr. Self previously scheduled in 2021, per Dr. Self  abdominal film would be helpful to ensure the stent is where we left it     Please assist in scheduling:     Procedure/Imaging/Clinic: EGD  Physician: Dr. Self  Timin21  Procedure length:50 minute  Anesthesia:general  Dx: hx of of gastric bypass  Tier:3  [facility]     Per patient she's having \"issues\", her  brought home a \"block of government cheese, and I ate a ton of it and I got really constipated, went 1 week with no BM, usually go 2x daily,now stool hard to pass, back and stomach pain.\" This has been going over for over week.Has appointment with PCP on , will get xray done on that visit.       Called to discuss with patient. Explained they can expect a call from  for date and time of procedure, will need a , someone to stay with them for 24 hours and should stay in town for 24 hours (within 45 min of Hospital) post procedure    Patient needs to get pre-op physical completed. If outside  health system will need physical faxed to number [phone]   If you do not get a preop physical, your procedure could be cancelled, patient voiced understanding*    Preop Plan: PCP will do     Med Review    Blood thinner -  no  ASA - no  Diabetic - yes    COVID test discussed:needs 4 days prior    Patient Education r/t procedure:done    Does patient have any history of gastric bypass/gastric surgery/altered panc/bili anatomy?yes, MD aware    A pre-op nurse will call 1-2 days prior to the procedure. Is advised to be NPO/no solid food 8 hours before the procedure. Ok to drink clear liquids (Water, Apple Juice or Gatorade) up to 2 hours prior to procedure.     Other specific details/comments:no     Verbalized understanding of all instructions. All questions answered.            "

## 2021-04-09 ENCOUNTER — OFFICE VISIT (OUTPATIENT)
Dept: FAMILY MEDICINE | Facility: CLINIC | Age: 51
End: 2021-04-09
Payer: COMMERCIAL

## 2021-04-09 ENCOUNTER — ANCILLARY PROCEDURE (OUTPATIENT)
Dept: GENERAL RADIOLOGY | Facility: CLINIC | Age: 51
End: 2021-04-09
Attending: INTERNAL MEDICINE
Payer: COMMERCIAL

## 2021-04-09 VITALS
DIASTOLIC BLOOD PRESSURE: 89 MMHG | HEART RATE: 85 BPM | WEIGHT: 100 LBS | SYSTOLIC BLOOD PRESSURE: 146 MMHG | BODY MASS INDEX: 19.53 KG/M2 | OXYGEN SATURATION: 98 %

## 2021-04-09 DIAGNOSIS — Z00.00 HEALTH CARE MAINTENANCE: Primary | ICD-10-CM

## 2021-04-09 DIAGNOSIS — Z98.84 HISTORY OF GASTRIC BYPASS: ICD-10-CM

## 2021-04-09 DIAGNOSIS — R10.13 ABDOMINAL PAIN, EPIGASTRIC: ICD-10-CM

## 2021-04-09 DIAGNOSIS — J45.30 MILD PERSISTENT ASTHMA, UNCOMPLICATED: ICD-10-CM

## 2021-04-09 DIAGNOSIS — Z00.00 HEALTH CARE MAINTENANCE: ICD-10-CM

## 2021-04-09 DIAGNOSIS — Z87.898 HX OF MULTIPLE PULMONARY NODULES: ICD-10-CM

## 2021-04-09 LAB
ALBUMIN SERPL-MCNC: 3.1 G/DL (ref 3.4–5)
ALP SERPL-CCNC: 127 U/L (ref 40–150)
ALT SERPL W P-5'-P-CCNC: 14 U/L (ref 0–50)
ANION GAP SERPL CALCULATED.3IONS-SCNC: 4 MMOL/L (ref 3–14)
AST SERPL W P-5'-P-CCNC: 13 U/L (ref 0–45)
BASOPHILS # BLD AUTO: 0.1 10E9/L (ref 0–0.2)
BASOPHILS NFR BLD AUTO: 0.6 %
BILIRUB SERPL-MCNC: 0.3 MG/DL (ref 0.2–1.3)
BUN SERPL-MCNC: 3 MG/DL (ref 7–30)
CALCIUM SERPL-MCNC: 8.3 MG/DL (ref 8.5–10.1)
CHLORIDE SERPL-SCNC: 102 MMOL/L (ref 94–109)
CHOLEST SERPL-MCNC: 162 MG/DL
CO2 SERPL-SCNC: 30 MMOL/L (ref 20–32)
CREAT SERPL-MCNC: 0.89 MG/DL (ref 0.52–1.04)
DIFFERENTIAL METHOD BLD: NORMAL
EOSINOPHIL # BLD AUTO: 0.1 10E9/L (ref 0–0.7)
EOSINOPHIL NFR BLD AUTO: 1.3 %
ERYTHROCYTE [DISTWIDTH] IN BLOOD BY AUTOMATED COUNT: 13.7 % (ref 10–15)
GFR SERPL CREATININE-BSD FRML MDRD: 74 ML/MIN/{1.73_M2}
GLUCOSE SERPL-MCNC: 105 MG/DL (ref 70–99)
HBA1C MFR BLD: 5.5 % (ref 0–5.6)
HCT VFR BLD AUTO: 37.7 % (ref 35–47)
HDLC SERPL-MCNC: 37 MG/DL
HGB BLD-MCNC: 12.4 G/DL (ref 11.7–15.7)
IMM GRANULOCYTES # BLD: 0 10E9/L (ref 0–0.4)
IMM GRANULOCYTES NFR BLD: 0.2 %
LDLC SERPL CALC-MCNC: 110 MG/DL
LYMPHOCYTES # BLD AUTO: 2.6 10E9/L (ref 0.8–5.3)
LYMPHOCYTES NFR BLD AUTO: 30.6 %
MCH RBC QN AUTO: 30.1 PG (ref 26.5–33)
MCHC RBC AUTO-ENTMCNC: 32.9 G/DL (ref 31.5–36.5)
MCV RBC AUTO: 92 FL (ref 78–100)
MONOCYTES # BLD AUTO: 0.5 10E9/L (ref 0–1.3)
MONOCYTES NFR BLD AUTO: 5.4 %
NEUTROPHILS # BLD AUTO: 5.2 10E9/L (ref 1.6–8.3)
NEUTROPHILS NFR BLD AUTO: 61.9 %
NONHDLC SERPL-MCNC: 126 MG/DL
NRBC # BLD AUTO: 0 10*3/UL
NRBC BLD AUTO-RTO: 0 /100
PLATELET # BLD AUTO: 321 10E9/L (ref 150–450)
POTASSIUM SERPL-SCNC: 3.8 MMOL/L (ref 3.4–5.3)
PROT SERPL-MCNC: 6.5 G/DL (ref 6.8–8.8)
RBC # BLD AUTO: 4.12 10E12/L (ref 3.8–5.2)
SODIUM SERPL-SCNC: 136 MMOL/L (ref 133–144)
TRIGL SERPL-MCNC: 76 MG/DL
VIT B12 SERPL-MCNC: 356 PG/ML (ref 193–986)
WBC # BLD AUTO: 8.4 10E9/L (ref 4–11)

## 2021-04-09 PROCEDURE — 36415 COLL VENOUS BLD VENIPUNCTURE: CPT | Performed by: PATHOLOGY

## 2021-04-09 PROCEDURE — 99214 OFFICE O/P EST MOD 30 MIN: CPT | Performed by: FAMILY MEDICINE

## 2021-04-09 PROCEDURE — 82607 VITAMIN B-12: CPT | Performed by: PATHOLOGY

## 2021-04-09 PROCEDURE — 80053 COMPREHEN METABOLIC PANEL: CPT | Performed by: PATHOLOGY

## 2021-04-09 PROCEDURE — 82306 VITAMIN D 25 HYDROXY: CPT | Mod: 90 | Performed by: PATHOLOGY

## 2021-04-09 PROCEDURE — 85025 COMPLETE CBC W/AUTO DIFF WBC: CPT | Performed by: PATHOLOGY

## 2021-04-09 PROCEDURE — 99000 SPECIMEN HANDLING OFFICE-LAB: CPT | Performed by: PATHOLOGY

## 2021-04-09 PROCEDURE — 83036 HEMOGLOBIN GLYCOSYLATED A1C: CPT | Performed by: PATHOLOGY

## 2021-04-09 PROCEDURE — 80061 LIPID PANEL: CPT | Performed by: PATHOLOGY

## 2021-04-09 PROCEDURE — 74019 RADEX ABDOMEN 2 VIEWS: CPT | Performed by: RADIOLOGY

## 2021-04-09 RX ORDER — MONTELUKAST SODIUM 10 MG/1
10 TABLET ORAL AT BEDTIME
Qty: 90 TABLET | Refills: 3 | Status: SHIPPED | OUTPATIENT
Start: 2021-04-09 | End: 2022-05-02

## 2021-04-09 ASSESSMENT — PAIN SCALES - GENERAL: PAINLEVEL: WORST PAIN (10)

## 2021-04-09 NOTE — PROGRESS NOTES
Assessment & Plan     Health care maintenance  Will help find covid shot too. See me one mo phys/pap  - Mammogram, routine screening; Future  - Lipid panel reflex to direct LDL Fasting; Future    History of gastric bypass  Due  - CBC with platelets differential; Future  - Comprehensive metabolic panel; Future  - Hemoglobin A1c; Future  - Vitamin B12; Future  - Vitamin D Deficiency; Future  - Lipid panel reflex to direct LDL Fasting; Future    Hx of multiple pulmonary nodules  Due  - CT Chest w/o contrast; Future    Mild persistent asthma, uncomplicated  Helps needs refill  - montelukast (SINGULAIR) 10 MG tablet; Take 1 tablet (10 mg) by mouth At Bedtime      33 minutes spent on the date of the encounter doing chart review, history and exam, documentation and further activities per the note    Varinder Crespo MD  [facility] [place]    Maribell Ordonez is a 50 year old who presents for the following health issues     HPI Here in f/u.  1-lactose intolerant. If consumes, GI upset. Needs form done, done in visit, she takes form, 2 page.  2-due for f/u pulm nodules, no new pulm sm  3-smoker. Not willing to try to quit, discussed.  4-due for mamogram  5-per 2018 pap result, redo now, she'll do phys/pap w/ me soon  6-h/o gastric bypass. Due for labs.   7-low b12 since bypass. Off med for four mo, redo lab  8-declines colonoscopy  9-wants help find covid shot  10-asthma: no recent flare, rare albut need  Past Medical History:   Diagnosis Date     Abnormal Pap smear 2/21/2017     said pap was abnormal     Arthritis      Chronic back pain     hx spondylolisthesis     Chronic pain      COPD (chronic obstructive pulmonary disease) (H)     PFT's 2013 FEV1/FVC = 2.68/3.20 = 84%     H/O gastric bypass 2007     Hepatitis C     treated ribaviron & interferon in 2008 for serotype 2 with failed 6 month treatment     Hyperlipidemia     on simvatatin     Insomnia      Other chronic pain       Positive TB test     has taken INH     RLS (restless legs syndrome)      Seasonal allergies      TB lung, latent 1994    treated     Uncomplicated asthma      Wounds and injuries 2003     Past Surgical History:   Procedure Laterality Date     CHOLECYSTECTOMY       D & C  1987     ENDOSCOPIC ULTRASOUND UPPER GASTROINTESTINAL TRACT (GI) N/A 10/21/2019    Procedure: ENDOSCOPIC ULTRASOUND, ESOPHAGOSCOPY / UPPER GASTROINTESTINAL TRACT (GI) with jejunalgastrostomy with stent placement x 2, dilation tract;  Surgeon: Ramesh Self MD;  [facility]     ENDOSCOPIC ULTRASOUND UPPER GASTROINTESTINAL TRACT (GI) N/A 1/8/2020    Procedure: ENDOSCOPIC ULTRASOUND, ESOPHAGOSCOPY / UPPER GASTROINTESTINAL TRACT (GI)  with stent exchange;  Surgeon: Ramesh Self MD;  [facility]     ENDOSCOPIC ULTRASOUND UPPER GASTROINTESTINAL TRACT (GI) N/A 6/2/2020    Procedure: ENDOSCOPIC ULTRASOUND, ESOPHAGOSCOPY / UPPER GASTROINTESTINAL TRACT (GI)  **Latex Allergy** stent exchange;  Surgeon: Ramesh Self MD;  [facility]     ESOPHAGOSCOPY, GASTROSCOPY, DUODENOSCOPY (EGD), COMBINED  7/30/2014    Procedure: COMBINED ESOPHAGOSCOPY, GASTROSCOPY, DUODENOSCOPY (EGD);  Surgeon: Ramesh Self MD;  [facility]     ESOPHAGOSCOPY, GASTROSCOPY, DUODENOSCOPY (EGD), COMBINED N/A 8/14/2019    Procedure: ESOPHAGOGASTRODUODENOSCOPY, WITH BIOPSY;  Surgeon: Johann Hazel MD;  [facility]     FRACTURE TX, ANKLE RT/LT  1991    ORIF rt ankle     FUSION SPINE POSTERIOR ONE LEVEL N/A 11/21/2014    Procedure: FUSION SPINE POSTERIOR ONE LEVEL;  Surgeon: Amilcar Baldwin MD;  [facility]     FUSION SPINE POSTERIOR ONE LEVEL Right 7/30/2015    Procedure: FUSION SPINE POSTERIOR ONE LEVEL;  Surgeon: Amilcar Baldwin MD;  [facility]     GASTRIC BYPASS  2007    lost 150 lbs     GRAFT BONE FROM ILIAC CREST Left 11/21/2014    Procedure: GRAFT BONE FROM ILIAC CREST;  Surgeon: Denny  "Amilcar Carrillo MD;  [facility]     hardware removal       INJECT BLOCK MEDIAL BRANCH CERVICAL/THORACIC/LUMBAR Bilateral 7/10/2020    Procedure: Bilateral lumbar 3, lumbar 4, and lumbar 5 medial branch nerve diagnostic blocks #2;  Surgeon: Pravin Bagley MD;  [facility]     INJECT PARAVERTEBRAL FACET JOINT LUMBAR / SACRAL FIRST Bilateral 9/30/2019    Procedure: Lumbar Medial Branch Nerve Block Injection Bilateral;  Surgeon: Hira Rodríguez MD;  [facility]     OPTICAL TRACKING SYSTEM FUSION POSTERIOR SPINE LUMBAR  11/20/2013    Procedure: OPTICAL TRACKING SYSTEM FUSION SPINE POSTERIOR LUMBAR ONE LEVEL;  Lumbar 4-5 Transforaminal Interbody Fusion;  Surgeon: Amilcar Baldwin MD;  [facility]     ORTHOPEDIC SURGERY      shoulder surgery, removed bone spur     PHACOEMULSIFICATION CLEAR CORNEA WITH STANDARD INTRAOCULAR LENS IMPLANT Right 5/29/2020    Procedure: RIGHT PHACOEMULSIFICATION, CATARACT, WITH INTRAOCULAR LENS IMPLANT;  Surgeon: Lyla Mahoney MD;  [facility]     PHACOEMULSIFICATION CLEAR CORNEA WITH STANDARD INTRAOCULAR LENS IMPLANT Left 7/17/2020    Procedure: LEFT EYE CATARACT REMOVAL WITH INTRAOCULAR LENS IMPLANT;  Surgeon: Lyla Mahoney MD;  [facility]     TUBAL LIGATION       Current Outpatient Medications   Medication     albuterol (VENTOLIN HFA) 108 (90 Base) MCG/ACT inhaler     cetirizine (ZYRTEC) 10 MG tablet     cyanocobalamin (CYANOCOBALAMIN) 1000 MCG/ML injection     ipratropium (ATROVENT HFA) 17 MCG/ACT inhaler     mometasone-formoterol (DULERA) 100-5 MCG/ACT inhaler     montelukast (SINGULAIR) 10 MG tablet     ofloxacin (OCUFLOX) 0.3 % ophthalmic solution     order for DME     pramipexole (MIRAPEX) 0.125 MG tablet     spacer (OPTICHAMBER OTTONIEL) holding chamber     spacer (OPTICHAMBER OTTONIEL) holding chamber     Spacer/Aero Chamber Mouthpiece MISC     syringe/needle, disp, (B-D LUER-JIMMY SYRINGE) 23G X 1\" 3 ML MISC     " "Syringe/Needle, Disp, 23G X 1\" 1 ML MISC     traZODone (DESYREL) 100 MG tablet     No current facility-administered medications for this visit.      Allergies   Allergen Reactions     Bee Venom Anaphylaxis, Anxiety, Difficulty breathing, Hives, Itching, Nausea and Vomiting, Palpitations, Shortness Of Breath and Swelling     Opium Alkaloids, Hcls Itching     Can take opiate derived narcotics with Benadryl.     Morphine Itching     Latex Rash and Itching             Review of Systems         Objective    BP (!) 146/89 (BP Location: Right arm, Patient Position: Sitting, Cuff Size: Adult Regular)   Pulse 85   Wt 45.4 kg (100 lb)   LMP 06/11/2012 (LMP Unknown)   SpO2 98%   BMI 19.53 kg/m    Body mass index is 19.53 kg/m .  Physical Exam   GENERAL: healthy, alert and no distress  NECK: no adenopathy, no asymmetry, masses, or scars and thyroid normal to palpation  RESP: lungs clear to auscultation - no rales, rhonchi or wheezes  CV: regular rate and rhythm, normal S1 S2, no S3 or S4, no murmur, click or rub, no peripheral edema and peripheral pulses strong  ABDOMEN: soft, nontender, no hepatosplenomegaly, no masses and bowel sounds normal  MS: no gross musculoskeletal defects noted, no edema              "

## 2021-04-09 NOTE — PATIENT INSTRUCTIONS
Radiology:  [phone] [facility]  [phone] [facility]  [phone] [facility]    To schedule a mammogram:  [facility] [phone]   [facility] [phone]   [facility] [phone]

## 2021-04-10 ENCOUNTER — HEALTH MAINTENANCE LETTER (OUTPATIENT)
Age: 51
End: 2021-04-10

## 2021-04-10 LAB — DEPRECATED CALCIDIOL+CALCIFEROL SERPL-MC: 5 UG/L (ref 20–75)

## 2021-04-12 ENCOUNTER — TELEPHONE (OUTPATIENT)
Dept: FAMILY MEDICINE | Facility: CLINIC | Age: 51
End: 2021-04-12

## 2021-04-12 ENCOUNTER — MYC MEDICAL ADVICE (OUTPATIENT)
Dept: INTERNAL MEDICINE | Facility: CLINIC | Age: 51
End: 2021-04-12

## 2021-04-12 DIAGNOSIS — R79.89 LOW VITAMIN D LEVEL: Primary | ICD-10-CM

## 2021-04-12 RX ORDER — ERGOCALCIFEROL 1.25 MG/1
50000 CAPSULE, LIQUID FILLED ORAL WEEKLY
Qty: 8 CAPSULE | Refills: 0 | Status: SHIPPED | OUTPATIENT
Start: 2021-04-12 | End: 2021-06-01

## 2021-04-12 NOTE — TELEPHONE ENCOUNTER
[employer] message sent to patient to relay providers message. Rx sent to pharmacy. Lisa Cortes LPN 4/12/2021 7:33 AM     Unable to verbalize due to mental status

## 2021-04-13 ENCOUNTER — PATIENT OUTREACH (OUTPATIENT)
Dept: GASTROENTEROLOGY | Facility: CLINIC | Age: 51
End: 2021-04-13

## 2021-04-13 NOTE — TELEPHONE ENCOUNTER
Pt had follow up xray as recommended by Dr. Self- per Dr. Self    We can reach out to her and schedule her procedure   Stent is in place; some suggestion of ileus, when you talk to her ask her if she is feeling well and passing stool     Left message.  ML

## 2021-04-15 NOTE — TELEPHONE ENCOUNTER
"Called patient again to make sure she's feeling ok in light of recent imaging showing possible ileus. Feeling ok, \"whatever it was, it passed\", Is passing stool. Previously went 9 days with no BM. Has had 3 bms now. Stomach cramps every times shes eats, has bowel movements or urinates. Confirmed procedure on 5/5 and need for preop.    Encouraged ambulation and hydration, patient admits not drinking much water and eating too much cheese. Patient is also lactose intolerant.     Pt will call with further questions.    ML  "

## 2021-04-22 ENCOUNTER — TELEPHONE (OUTPATIENT)
Dept: FAMILY MEDICINE | Facility: CLINIC | Age: 51
End: 2021-04-22

## 2021-04-22 DIAGNOSIS — Z98.84 S/P BARIATRIC SURGERY: ICD-10-CM

## 2021-04-22 DIAGNOSIS — R79.89 LOW VITAMIN D LEVEL: Primary | ICD-10-CM

## 2021-04-22 NOTE — TELEPHONE ENCOUNTER
Calcium Carb-Cholecalciferol (CALCIUM 500/D) 500-400 MG-UNIT CHEW   Last Written Prescription Date:  11/29/2020  Last Fill Quantity: 90   # refills: 11  Last Office Visit : 4/9/2021  Future Office visit:  None    Routing refill request to provider for review/approval because:  Chocolate Calcium Carb-Cholecalciferol (Calcium 500/H938-127 MG Unit Chews  Not on active med list.      Unable to pend not available on preference or facility list     Simona Quintanilla RN  Central Triage Red Flags/Med Refills

## 2021-04-23 DIAGNOSIS — Z11.59 ENCOUNTER FOR SCREENING FOR OTHER VIRAL DISEASES: ICD-10-CM

## 2021-04-26 DIAGNOSIS — J30.1 CHRONIC SEASONAL ALLERGIC RHINITIS DUE TO POLLEN: ICD-10-CM

## 2021-04-27 RX ORDER — CETIRIZINE HYDROCHLORIDE 10 MG/1
10 TABLET ORAL DAILY
Qty: 30 TABLET | Refills: 11 | Status: SHIPPED | OUTPATIENT
Start: 2021-04-27 | End: 2022-05-02

## 2021-04-27 NOTE — TELEPHONE ENCOUNTER
cetirizine (ZYRTEC) 10 MG tablet  Last Written Prescription Date:  4/16/2020  Last Fill Quantity: 30,   # refills: 11  Last Office Visit : 4/9/2021  Future Office visit:  None  30 Tabs, 11 Refills sent to pharm 4/27/2021      Simona Quintanilla RN  Central Triage Red Flags/Med Refills

## 2021-04-29 DIAGNOSIS — G25.81 RESTLESS LEGS SYNDROME: ICD-10-CM

## 2021-04-29 RX ORDER — PRAMIPEXOLE DIHYDROCHLORIDE 0.12 MG/1
TABLET ORAL
Qty: 240 TABLET | Refills: 0 | Status: SHIPPED | OUTPATIENT
Start: 2021-04-29 | End: 2021-06-24

## 2021-05-01 DIAGNOSIS — Z11.59 ENCOUNTER FOR SCREENING FOR OTHER VIRAL DISEASES: ICD-10-CM

## 2021-05-01 LAB
LABORATORY COMMENT REPORT: NORMAL
SARS-COV-2 RNA RESP QL NAA+PROBE: NEGATIVE
SARS-COV-2 RNA RESP QL NAA+PROBE: NORMAL
SPECIMEN SOURCE: NORMAL
SPECIMEN SOURCE: NORMAL

## 2021-05-01 PROCEDURE — U0003 INFECTIOUS AGENT DETECTION BY NUCLEIC ACID (DNA OR RNA); SEVERE ACUTE RESPIRATORY SYNDROME CORONAVIRUS 2 (SARS-COV-2) (CORONAVIRUS DISEASE [COVID-19]), AMPLIFIED PROBE TECHNIQUE, MAKING USE OF HIGH THROUGHPUT TECHNOLOGIES AS DESCRIBED BY CMS-2020-01-R: HCPCS | Mod: 90 | Performed by: PATHOLOGY

## 2021-05-01 PROCEDURE — U0005 INFEC AGEN DETEC AMPLI PROBE: HCPCS | Mod: 90 | Performed by: PATHOLOGY

## 2021-05-05 ENCOUNTER — ANESTHESIA (OUTPATIENT)
Dept: SURGERY | Facility: CLINIC | Age: 51
End: 2021-05-05
Payer: COMMERCIAL

## 2021-05-05 ENCOUNTER — TELEPHONE (OUTPATIENT)
Dept: GASTROENTEROLOGY | Facility: OUTPATIENT CENTER | Age: 51
End: 2021-05-05

## 2021-05-05 ENCOUNTER — ANESTHESIA EVENT (OUTPATIENT)
Dept: SURGERY | Facility: CLINIC | Age: 51
End: 2021-05-05
Payer: COMMERCIAL

## 2021-05-05 ENCOUNTER — HOSPITAL ENCOUNTER (OUTPATIENT)
Facility: CLINIC | Age: 51
Discharge: HOME | End: 2021-05-05
Attending: INTERNAL MEDICINE | Admitting: INTERNAL MEDICINE
Payer: COMMERCIAL

## 2021-05-05 ENCOUNTER — TELEPHONE (OUTPATIENT)
Dept: GASTROENTEROLOGY | Facility: CLINIC | Age: 51
End: 2021-05-05

## 2021-05-05 ENCOUNTER — APPOINTMENT (OUTPATIENT)
Dept: GENERAL RADIOLOGY | Facility: CLINIC | Age: 51
End: 2021-05-05
Attending: INTERNAL MEDICINE
Payer: COMMERCIAL

## 2021-05-05 VITALS
HEART RATE: 73 BPM | BODY MASS INDEX: 20.26 KG/M2 | DIASTOLIC BLOOD PRESSURE: 77 MMHG | SYSTOLIC BLOOD PRESSURE: 132 MMHG | WEIGHT: 103.17 LBS | OXYGEN SATURATION: 97 % | TEMPERATURE: 97.9 F | HEIGHT: 60 IN | RESPIRATION RATE: 16 BRPM

## 2021-05-05 DIAGNOSIS — Z98.84 HX OF GASTRIC BYPASS: ICD-10-CM

## 2021-05-05 LAB
GLUCOSE BLDC GLUCOMTR-MCNC: 97 MG/DL (ref 70–99)
UPPER GI ENDOSCOPY: NORMAL

## 2021-05-05 PROCEDURE — 710N000010 HC RECOVERY PHASE 1, LEVEL 2, PER MIN: Performed by: INTERNAL MEDICINE

## 2021-05-05 PROCEDURE — 999N000179 XR SURGERY CARM FLUORO LESS THAN 5 MIN W STILLS: Mod: TC

## 2021-05-05 PROCEDURE — C1876 STENT, NON-COA/NON-COV W/DEL: HCPCS | Performed by: INTERNAL MEDICINE

## 2021-05-05 PROCEDURE — 258N000003 HC RX IP 258 OP 636: Performed by: NURSE ANESTHETIST, CERTIFIED REGISTERED

## 2021-05-05 PROCEDURE — 999N000141 HC STATISTIC PRE-PROCEDURE NURSING ASSESSMENT: Performed by: INTERNAL MEDICINE

## 2021-05-05 PROCEDURE — 370N000017 HC ANESTHESIA TECHNICAL FEE, PER MIN: Performed by: INTERNAL MEDICINE

## 2021-05-05 PROCEDURE — 710N000012 HC RECOVERY PHASE 2, PER MINUTE: Performed by: INTERNAL MEDICINE

## 2021-05-05 PROCEDURE — 250N000011 HC RX IP 250 OP 636: Performed by: NURSE ANESTHETIST, CERTIFIED REGISTERED

## 2021-05-05 PROCEDURE — 360N000075 HC SURGERY LEVEL 2, PER MIN: Performed by: INTERNAL MEDICINE

## 2021-05-05 PROCEDURE — 250N000009 HC RX 250: Performed by: NURSE ANESTHETIST, CERTIFIED REGISTERED

## 2021-05-05 PROCEDURE — 250N000009 HC RX 250: Performed by: INTERNAL MEDICINE

## 2021-05-05 PROCEDURE — 250N000025 HC SEVOFLURANE, PER MIN: Performed by: INTERNAL MEDICINE

## 2021-05-05 PROCEDURE — 272N000001 HC OR GENERAL SUPPLY STERILE: Performed by: INTERNAL MEDICINE

## 2021-05-05 PROCEDURE — 258N000003 HC RX IP 258 OP 636: Performed by: ANESTHESIOLOGY

## 2021-05-05 PROCEDURE — 250N000011 HC RX IP 250 OP 636: Performed by: STUDENT IN AN ORGANIZED HEALTH CARE EDUCATION/TRAINING PROGRAM

## 2021-05-05 PROCEDURE — 82962 GLUCOSE BLOOD TEST: CPT

## 2021-05-05 DEVICE — STENT ESU AXIOS W/DEL SYS 20X10MM 10.8FR 138CM M00553660
Type: IMPLANTABLE DEVICE | Site: JEJUNUM | Status: NON-FUNCTIONAL
Removed: 2022-05-10

## 2021-05-05 RX ORDER — ONDANSETRON 2 MG/ML
4 INJECTION INTRAMUSCULAR; INTRAVENOUS EVERY 30 MIN PRN
Status: DISCONTINUED | OUTPATIENT
Start: 2021-05-05 | End: 2021-05-05 | Stop reason: HOSPADM

## 2021-05-05 RX ORDER — HYDROMORPHONE HYDROCHLORIDE 1 MG/ML
.3-.5 INJECTION, SOLUTION INTRAMUSCULAR; INTRAVENOUS; SUBCUTANEOUS EVERY 5 MIN PRN
Status: DISCONTINUED | OUTPATIENT
Start: 2021-05-05 | End: 2021-05-05 | Stop reason: HOSPADM

## 2021-05-05 RX ORDER — LIDOCAINE HYDROCHLORIDE 40 MG/ML
SOLUTION TOPICAL PRN
Status: DISCONTINUED | OUTPATIENT
Start: 2021-05-05 | End: 2021-05-05

## 2021-05-05 RX ORDER — DIPHENHYDRAMINE HYDROCHLORIDE 50 MG/ML
INJECTION INTRAMUSCULAR; INTRAVENOUS PRN
Status: DISCONTINUED | OUTPATIENT
Start: 2021-05-05 | End: 2021-05-05

## 2021-05-05 RX ORDER — NALOXONE HYDROCHLORIDE 0.4 MG/ML
0.2 INJECTION, SOLUTION INTRAMUSCULAR; INTRAVENOUS; SUBCUTANEOUS
Status: DISCONTINUED | OUTPATIENT
Start: 2021-05-05 | End: 2021-05-05 | Stop reason: HOSPADM

## 2021-05-05 RX ORDER — LIDOCAINE 40 MG/G
CREAM TOPICAL
Status: DISCONTINUED | OUTPATIENT
Start: 2021-05-05 | End: 2021-05-05 | Stop reason: HOSPADM

## 2021-05-05 RX ORDER — EPHEDRINE SULFATE 50 MG/ML
INJECTION, SOLUTION INTRAMUSCULAR; INTRAVENOUS; SUBCUTANEOUS PRN
Status: DISCONTINUED | OUTPATIENT
Start: 2021-05-05 | End: 2021-05-05

## 2021-05-05 RX ORDER — FENTANYL CITRATE 50 UG/ML
25-50 INJECTION, SOLUTION INTRAMUSCULAR; INTRAVENOUS
Status: DISCONTINUED | OUTPATIENT
Start: 2021-05-05 | End: 2021-05-05 | Stop reason: HOSPADM

## 2021-05-05 RX ORDER — ONDANSETRON 2 MG/ML
4 INJECTION INTRAMUSCULAR; INTRAVENOUS
Status: COMPLETED | OUTPATIENT
Start: 2021-05-05 | End: 2021-05-05

## 2021-05-05 RX ORDER — SODIUM CHLORIDE, SODIUM LACTATE, POTASSIUM CHLORIDE, CALCIUM CHLORIDE 600; 310; 30; 20 MG/100ML; MG/100ML; MG/100ML; MG/100ML
INJECTION, SOLUTION INTRAVENOUS CONTINUOUS
Status: DISCONTINUED | OUTPATIENT
Start: 2021-05-05 | End: 2021-05-05 | Stop reason: HOSPADM

## 2021-05-05 RX ORDER — DEXAMETHASONE SODIUM PHOSPHATE 4 MG/ML
INJECTION, SOLUTION INTRA-ARTICULAR; INTRALESIONAL; INTRAMUSCULAR; INTRAVENOUS; SOFT TISSUE PRN
Status: DISCONTINUED | OUTPATIENT
Start: 2021-05-05 | End: 2021-05-05

## 2021-05-05 RX ORDER — GABAPENTIN 300 MG/1
CAPSULE ORAL
COMMUNITY
Start: 2020-05-15 | End: 2022-03-29

## 2021-05-05 RX ORDER — NALOXONE HYDROCHLORIDE 0.4 MG/ML
0.4 INJECTION, SOLUTION INTRAMUSCULAR; INTRAVENOUS; SUBCUTANEOUS
Status: DISCONTINUED | OUTPATIENT
Start: 2021-05-05 | End: 2021-05-05 | Stop reason: HOSPADM

## 2021-05-05 RX ORDER — PROPOFOL 10 MG/ML
INJECTION, EMULSION INTRAVENOUS PRN
Status: DISCONTINUED | OUTPATIENT
Start: 2021-05-05 | End: 2021-05-05

## 2021-05-05 RX ORDER — ONDANSETRON 4 MG/1
4 TABLET, ORALLY DISINTEGRATING ORAL EVERY 30 MIN PRN
Status: DISCONTINUED | OUTPATIENT
Start: 2021-05-05 | End: 2021-05-05 | Stop reason: HOSPADM

## 2021-05-05 RX ORDER — LIDOCAINE HYDROCHLORIDE 20 MG/ML
INJECTION, SOLUTION INFILTRATION; PERINEURAL PRN
Status: DISCONTINUED | OUTPATIENT
Start: 2021-05-05 | End: 2021-05-05

## 2021-05-05 RX ADMIN — SUGAMMADEX 200 MG: 100 INJECTION, SOLUTION INTRAVENOUS at 13:53

## 2021-05-05 RX ADMIN — DIPHENHYDRAMINE HYDROCHLORIDE 50 MG: 50 INJECTION, SOLUTION INTRAMUSCULAR; INTRAVENOUS at 13:24

## 2021-05-05 RX ADMIN — LIDOCAINE HYDROCHLORIDE 4 ML: 40 SOLUTION TOPICAL at 13:29

## 2021-05-05 RX ADMIN — ROCURONIUM BROMIDE 50 MG: 10 INJECTION INTRAVENOUS at 13:27

## 2021-05-05 RX ADMIN — PROPOFOL 50 MG: 10 INJECTION, EMULSION INTRAVENOUS at 13:28

## 2021-05-05 RX ADMIN — PROPOFOL 150 MG: 10 INJECTION, EMULSION INTRAVENOUS at 13:27

## 2021-05-05 RX ADMIN — PHENYLEPHRINE HYDROCHLORIDE 100 MCG: 10 INJECTION INTRAVENOUS at 13:33

## 2021-05-05 RX ADMIN — DEXAMETHASONE SODIUM PHOSPHATE 8 MG: 4 INJECTION, SOLUTION INTRA-ARTICULAR; INTRALESIONAL; INTRAMUSCULAR; INTRAVENOUS; SOFT TISSUE at 13:32

## 2021-05-05 RX ADMIN — ONDANSETRON 4 MG: 2 INJECTION INTRAMUSCULAR; INTRAVENOUS at 13:34

## 2021-05-05 RX ADMIN — SODIUM CHLORIDE, POTASSIUM CHLORIDE, SODIUM LACTATE AND CALCIUM CHLORIDE: 600; 310; 30; 20 INJECTION, SOLUTION INTRAVENOUS at 13:17

## 2021-05-05 RX ADMIN — PHENYLEPHRINE HYDROCHLORIDE 100 MCG: 10 INJECTION INTRAVENOUS at 13:40

## 2021-05-05 RX ADMIN — Medication 5 MG: at 13:33

## 2021-05-05 RX ADMIN — LIDOCAINE HYDROCHLORIDE 100 MG: 20 INJECTION, SOLUTION INFILTRATION; PERINEURAL at 13:27

## 2021-05-05 ASSESSMENT — COPD QUESTIONNAIRES: COPD: 1

## 2021-05-05 ASSESSMENT — MIFFLIN-ST. JEOR: SCORE: 999.5

## 2021-05-05 NOTE — TELEPHONE ENCOUNTER
Patient called stated she was at [facility] but schedulers there informed her she should be at [address]. Unable to access Epic during call, transfer her to [address] RN line at [phone]

## 2021-05-05 NOTE — H&P
[facility]  GASTROENTEROLOGY H&P NOTE  Kristina Eldridge [id number]   05/05/2021    HPI  49 yo F w/ h/o Alicia-en-Y gastric bypass, complicated by excessive weight loss and malnutrition, s/p EUS-guided jejunogastrostomy creation via lumen-apposing metal stent (10/21/19). Patient has been undergoing scheduled LAMS exchanges since initial creation of the endoscopic jejunogastrostomy. Patient has not yet reached her goal weight of 125-lbs; instead, her weight hovers around 100-lbs. Patient is maintaining her current weight though.    Past Medical History:   Diagnosis Date     Abnormal Pap smear 2/21/2017    Dr said pap was abnormal     Arthritis      Chronic back pain     hx spondylolisthesis     Chronic pain      COPD (chronic obstructive pulmonary disease) (H)     PFT's 2013 FEV1/FVC = 2.68/3.20 = 84%     H/O gastric bypass 2007     Hepatitis C     treated ribaviron & interferon in 2008 for serotype 2 with failed 6 month treatment     Hyperlipidemia     on simvatatin     Insomnia      Other chronic pain      Positive TB test     has taken INH     RLS (restless legs syndrome)      Seasonal allergies      TB lung, latent 1994    treated     Uncomplicated asthma      Wounds and injuries 2003     Past Surgical History:   Procedure Laterality Date     CHOLECYSTECTOMY       D & C  1987     ENDOSCOPIC ULTRASOUND UPPER GASTROINTESTINAL TRACT (GI) N/A 10/21/2019    Procedure: ENDOSCOPIC ULTRASOUND, ESOPHAGOSCOPY / UPPER GASTROINTESTINAL TRACT (GI) with jejunalgastrostomy with stent placement x 2, dilation tract;  Surgeon: Ramesh Self MD;  [facility]     ENDOSCOPIC ULTRASOUND UPPER GASTROINTESTINAL TRACT (GI) N/A 1/8/2020    Procedure: ENDOSCOPIC ULTRASOUND, ESOPHAGOSCOPY / UPPER GASTROINTESTINAL TRACT (GI)  with stent exchange;  Surgeon: Ramesh Self MD;  [facility]     ENDOSCOPIC ULTRASOUND UPPER GASTROINTESTINAL TRACT (GI) N/A 6/2/2020    Procedure: ENDOSCOPIC ULTRASOUND, ESOPHAGOSCOPY / UPPER  GASTROINTESTINAL TRACT (GI)  **Latex Allergy** stent exchange;  Surgeon: Ramesh Self MD;  [facility]     ESOPHAGOSCOPY, GASTROSCOPY, DUODENOSCOPY (EGD), COMBINED  7/30/2014    Procedure: COMBINED ESOPHAGOSCOPY, GASTROSCOPY, DUODENOSCOPY (EGD);  Surgeon: Ramesh Self MD;  [facility]     ESOPHAGOSCOPY, GASTROSCOPY, DUODENOSCOPY (EGD), COMBINED N/A 8/14/2019    Procedure: ESOPHAGOGASTRODUODENOSCOPY, WITH BIOPSY;  Surgeon: Johann Hazel MD;  [facility]     FRACTURE TX, ANKLE RT/LT  1991    ORIF rt ankle     FUSION SPINE POSTERIOR ONE LEVEL N/A 11/21/2014    Procedure: FUSION SPINE POSTERIOR ONE LEVEL;  Surgeon: Amilcar Baldwin MD;  [facility]     FUSION SPINE POSTERIOR ONE LEVEL Right 7/30/2015    Procedure: FUSION SPINE POSTERIOR ONE LEVEL;  Surgeon: Amilcar Baldwin MD;  [facility]     GASTRIC BYPASS  2007    lost 150 lbs     GRAFT BONE FROM ILIAC CREST Left 11/21/2014    Procedure: GRAFT BONE FROM ILIAC CREST;  Surgeon: Amilcar Baldwin MD;  [facility]     hardware removal       INJECT BLOCK MEDIAL BRANCH CERVICAL/THORACIC/LUMBAR Bilateral 7/10/2020    Procedure: Bilateral lumbar 3, lumbar 4, and lumbar 5 medial branch nerve diagnostic blocks #2;  Surgeon: Pravin Bagley MD;  [facility]     INJECT PARAVERTEBRAL FACET JOINT LUMBAR / SACRAL FIRST Bilateral 9/30/2019    Procedure: Lumbar Medial Branch Nerve Block Injection Bilateral;  Surgeon: Hira Rodríguez MD;  [facility]     OPTICAL TRACKING SYSTEM FUSION POSTERIOR SPINE LUMBAR  11/20/2013    Procedure: OPTICAL TRACKING SYSTEM FUSION SPINE POSTERIOR LUMBAR ONE LEVEL;  Lumbar 4-5 Transforaminal Interbody Fusion;  Surgeon: Amilcar Baldwin MD;  [facility]     ORTHOPEDIC SURGERY      shoulder surgery, removed bone spur     PHACOEMULSIFICATION CLEAR CORNEA WITH STANDARD INTRAOCULAR LENS IMPLANT Right 5/29/2020    Procedure: RIGHT  PHACOEMULSIFICATION, CATARACT, WITH INTRAOCULAR LENS IMPLANT;  Surgeon: Lyla Mahoney MD;  [facility]     PHACOEMULSIFICATION CLEAR CORNEA WITH STANDARD INTRAOCULAR LENS IMPLANT Left 2020    Procedure: LEFT EYE CATARACT REMOVAL WITH INTRAOCULAR LENS IMPLANT;  Surgeon: Lyla Mahoney MD;  [facility]     TUBAL LIGATION        Allergies   Allergen Reactions     Bee Venom Anaphylaxis, Anxiety, Difficulty breathing, Hives, Itching, Nausea and Vomiting, Palpitations, Shortness Of Breath and Swelling     Opium Alkaloids, Hcls Itching     Can take opiate derived narcotics with Benadryl.     Morphine Itching     Latex Rash and Itching       Current Facility-Administered Medications:      lactated ringers infusion, , Intravenous, Continuous, Nimisha Aguirre MD     lidocaine (LMX4) cream, , Topical, Q1H PRN, Dami Benz MD     lidocaine (LMX4) cream, , Topical, Q1H PRN, Nimisha Aguirre MD     lidocaine 1 % 0.1-1 mL, 0.1-1 mL, Other, Q1H PRN, Dami Benz MD     lidocaine 1 % 0.1-1 mL, 0.1-1 mL, Other, Q1H PRN, Nimisha Aguirre MD     ondansetron (ZOFRAN) injection 4 mg, 4 mg, Intravenous, Once PRN, Dami Benz MD     sodium chloride (PF) 0.9% PF flush 3 mL, 3 mL, Intracatheter, Q8H, Dami Benz MD     sodium chloride (PF) 0.9% PF flush 3 mL, 3 mL, Intracatheter, q1 min prn, Dami Benz MD     sodium chloride (PF) 0.9% PF flush 3 mL, 3 mL, Intracatheter, Q8H, Nimisha Aguirre MD     sodium chloride (PF) 0.9% PF flush 3 mL, 3 mL, Intracatheter, q1 min prn, Nimisha Aguirre MD    Family History   Problem Relation Age of Onset     Hypertension Father      Respiratory Father         COPD, smoked     Glaucoma Father      Sleep Apnea Father      C.A.D. Mother         CHF,  age 59 of MI, smoked     Crohn's Disease Brother      Diabetes Maternal Grandmother         insulin dependent     Diabetes Paternal Grandmother      Glaucoma Paternal Grandmother      Alzheimer Disease Paternal Grandmother       Melanoma No family hx of      Skin Cancer No family hx of      Macular Degeneration No family hx of      Anesthesia Reaction No family hx of      Deep Vein Thrombosis (DVT) No family hx of      Social History     Socioeconomic History     Marital status:      Spouse name: Not on file     Number of children: Not on file     Years of education: Not on file     Highest education level: Not on file   Occupational History     Not on file   Social Needs     Financial resource strain: Not on file     Food insecurity     Worry: Not on file     Inability: Not on file     Transportation needs     Medical: Not on file     Non-medical: Not on file   Tobacco Use     Smoking status: Former Smoker     Packs/day: 1.00     Years: 30.00     Pack years: 30.00     Types: Cigarettes     Quit date: 2014     Years since quittin.8     Smokeless tobacco: Never Used     Tobacco comment: pack a day   Substance and Sexual Activity     Alcohol use: Not Currently     Alcohol/week: 0.0 standard drinks     Comment: x3 drinks per year     Drug use: Yes     Types: Marijuana     Comment: daily smoking use since age 14     Sexual activity: Not Currently     Partners: Male     Birth control/protection: Post-menopausal   Lifestyle     Physical activity     Days per week: Not on file     Minutes per session: Not on file     Stress: Not on file   Relationships     Social connections     Talks on phone: Not on file     Gets together: Not on file     Attends L[employer] service: Not on file     Active member of club or organization: Not on file     Attends meetings of clubs or organizations: Not on file     Relationship status: Not on file     Intimate partner violence     Fear of current or ex partner: Not on file     Emotionally abused: Not on file     Physically abused: Not on file     Forced sexual activity: Not on file   Other Topics Concern     Parent/sibling w/ CABG, MI or angioplasty before 65F 55M? Not Asked   Social History  Narrative    Lives in [place] with significant other and 3 cats.  Pt does not work currently, has applied for disability      Review Of Systems  Constitutional: Denies fevers.  HEENT: Normal vision.  Cardiac: Denies chest pain.  Lungs: Denies SOB.  Abdomen: Denies abdominal pain.  Extremities: Denies swelling.  Skin: Denies rashes.  Hematology: Denies bruising.  Psych: Denies stress.  Neuro: Denies confusion.    OBJECTIVE:  VS: /85 (BP Location: Left arm)   Pulse 86   Temp 98.3  F (36.8  C) (Oral)   Resp 17   Ht 1.524 m (5')   Wt 46.8 kg (103 lb 2.8 oz)   LMP 06/11/2012 (LMP Unknown)   SpO2 98%   BMI 20.15 kg/m     GEN: NAD, comfortable.  CV:  Well perfused extremities.  PULM:  Non-labored breathing.  ABD: Non-distended.  SKIN: No rashes.  EXT: Non-edematous  NEURO: A&Ox3.      IMPRESSION:  49 yo F w/ h/o Alicia-en-Y gastric bypass, complicated by excessive weight loss and malnutrition, s/p EUS-guided jejunogastrostomy creation via lumen-apposing metal stent (10/21/19).    RECOMMENDATION:  - Proceed to EGD with exchange of jejunogastrostomy lumen-apposing metal stent.      Dami Benz MD on 5/5/2021 at 1:01 PM  Therapeutic Endoscopy Fellow

## 2021-05-05 NOTE — OP NOTE
Upper GI Endoscopy 05/05/2021  1:27 PM [facility], [address] [place] [address] [phone]     Endoscopy Department   _______________________________________________________________________________   Patient Name: Kristina Eldridge            Procedure Date: 5/5/2021 1:27 PM   MRN: [id number]                       Account Number: [id number]   YOB: 1970              Admit Type: Outpatient   Age: 50                               Room: OR   Gender: Female                        Note Status: Finalized   Attending MD: Ramesh Self MD   Total Sedation Time:   _______________________________________________________________________________       Procedure:           Upper GI endoscopy   Indications:         Stent follow-up, Malnutrition   Providers:           Ramesh Self MD, Dami Benz MD   Patient Profile:     Ms Eldridge is a 51yo woman with a distant history of RYGB                        who had been failing to thrive and underwent EUS guided                        jejunogastrostomy to reaccess the foregut who has done                        quite well so far as improving maintaining weight. She                        now returns over 10m later for scheduled stent exchange                        by upper endoscopy.   Referring MD:        Abdulkadir Benites MD   Medicines:           General Anesthesia   Complications:       No immediate complications.   _______________________________________________________________________________   Procedure:           Pre-Anesthesia Assessment:                        - Prior to the procedure, a History and Physical was                        performed, and patient medications and allergies were                        reviewed. The patient is competent. The risks and                        benefits of the procedure and the sedation options and                        risks were discussed with the patient. All questions                         were answered and informed consent was obtained. Patient                        identification and proposed procedure were verified by                        the nurse in the pre-procedure area. Mental Status                        Examination: alert and oriented. Airway Examination:                        normal oropharyngeal airway and neck mobility.                        Respiratory Examination: clear to auscultation. CV                        Examination: normal. ASA Grade Assessment: II - A                        patient with mild systemic disease. After reviewing the                        risks and benefits, the patient was deemed in                        satisfactory condition to undergo the procedure. The                        anesthesia plan was to use general anesthesia.                        Immediately prior to administration of medications, the                        patient was re-assessed for adequacy to receive                        sedatives. The heart rate, respiratory rate, oxygen                        saturations, blood pressure, adequacy of pulmonary                        ventilation, and response to care were monitored                        throughout the procedure. The physical status of the                        patient was re-assessed after the procedure. After                        obtaining informed consent, the endoscope was passed                        under direct vision. Throughout the procedure, the                        patient's blood pressure, pulse, and oxygen saturations                        were monitored continuously. The 1T gastroscope was                        introduced through the mouth, and advanced to the                        jejunum. The upper GI endoscopy was accomplished without                        difficulty. The patient tolerated the procedure well.                                                                                      Findings:         films demonstrated surgical clips and a well expanded Axios stent        in its anticipated position. The proximal, mid and distal esophagus were        unremarkable without inflammation or lesion. The squamocolumnar line        wasÂ found at the gastroesophageal junction without significant        irregularity. The pouch was modestly sized and the gastrojejunostomy        generously patent without inflammation or lesion. This is an end to side        anastomosis. A few centimeters into the Alicia a widely patent Axios was        found and traversed leading to the remnant and duodenal sweep. The        existing stent was removed in toto with a rat tooth and a [id number]        lumen apposing covered metal stent was deployed across what appeared to        be a matured tract. The stent seated well and expanded as anticipated.                                                                                     Impression:          - RYGB anatomy with well postioned 20mm Axios                        maintaining a now mature jejunogastrostomy                        - Uncomplicated exchange of the 20mm Axios with a fresh                        20mm Axios using cold, endoscopically guided technique   Recommendation:      - General anesthesia recovery with probable discharge                        home this afternoon                        - Previous diet and all medications may be resumed                        immediately upon discharge                        - [facility]                        - Return for stent removal/exchange in 12m                        - The findings and recommendations were discussed with                        the patient and their family                                                                                       electronically signed by CHER Self

## 2021-05-05 NOTE — ANESTHESIA PREPROCEDURE EVALUATION
Anesthesia Pre-Procedure Evaluation    Patient: Kristina Eldridge   MRN:     [id number] Gender:   female   Age:    49 year old :      1970        Preoperative Diagnosis: Nuclear sclerosis of both eyes [H25.13]   Procedure(s):  RIGHT PHACOEMULSIFICATION, CATARACT, WITH INTRAOCULAR LENS IMPLANT     LABS:  CBC:   Lab Results   Component Value Date    WBC 8.4 2021    WBC 8.7 2020    HGB 12.4 2021    HGB 12.6 2020    HCT 37.7 2021    HCT 37.5 2020     2021     2020     BMP:   Lab Results   Component Value Date     2021     2019    POTASSIUM 3.8 2021    POTASSIUM 3.5 2019    CHLORIDE 102 2021    CHLORIDE 104 2019    CO2 30 2021    CO2 30 2019    BUN 3 (L) 2021    BUN 5 (L) 2020    CR 0.89 2021    CR 0.94 2019     (H) 2021    GLC 74 2019     COAGS:   Lab Results   Component Value Date    PTT 30 2015    INR 1.09 2020     POC:   Lab Results   Component Value Date    BGM 97 2021    HCG Negative 2014    HCGS Negative 2015     OTHER:   Lab Results   Component Value Date    A1C 5.5 2021    MARIA FERNANDA 8.3 (L) 2021    PHOS 3.1 2012    MAG 2.0 2015    ALBUMIN 3.1 (L) 2021    PROTTOTAL 6.5 (L) 2021    ALT 14 2021    AST 13 2021    ALKPHOS 127 2021    BILITOTAL 0.3 2021    TSH 3.12 2019    CRP 4.1 2019    SED 16 2013        Preop Vitals    BP Readings from Last 3 Encounters:   21 137/85   21 (!) 146/89   21 138/85    Pulse Readings from Last 3 Encounters:   21 86   21 85   21 65      Resp Readings from Last 3 Encounters:   21 17   20 16   07/10/20 12    SpO2 Readings from Last 3 Encounters:   21 98%   21 98%   21 97%      Temp Readings from Last 1 Encounters:   21 36.8  C (98.3  F) (Oral)    Ht  Readings from Last 1 Encounters:   05/05/21 1.524 m (5')      Wt Readings from Last 1 Encounters:   05/05/21 46.8 kg (103 lb 2.8 oz)    Estimated body mass index is 20.15 kg/m  as calculated from the following:    Height as of an earlier encounter on 5/5/21: 1.524 m (5').    Weight as of an earlier encounter on 5/5/21: 46.8 kg (103 lb 2.8 oz).     LDA:        Past Medical History:   Diagnosis Date     Abnormal Pap smear 2/21/2017     said pap was abnormal     Arthritis      Chronic back pain     hx spondylolisthesis     Chronic pain      COPD (chronic obstructive pulmonary disease) (H)     PFT's 2013 FEV1/FVC = 2.68/3.20 = 84%     H/O gastric bypass 2007     Hepatitis C     treated ribaviron & interferon in 2008 for serotype 2 with failed 6 month treatment     Hyperlipidemia     on simvatatin     Insomnia      Other chronic pain      Positive TB test     has taken INH     RLS (restless legs syndrome)      Seasonal allergies      TB lung, latent 1994    treated     Uncomplicated asthma      Wounds and injuries 2003      Past Surgical History:   Procedure Laterality Date     CHOLECYSTECTOMY       D & C  1987     ENDOSCOPIC ULTRASOUND UPPER GASTROINTESTINAL TRACT (GI) N/A 10/21/2019    Procedure: ENDOSCOPIC ULTRASOUND, ESOPHAGOSCOPY / UPPER GASTROINTESTINAL TRACT (GI) with jejunalgastrostomy with stent placement x 2, dilation tract;  Surgeon: Ramesh Self MD;  Location: [facility] OR     ENDOSCOPIC ULTRASOUND UPPER GASTROINTESTINAL TRACT (GI) N/A 1/8/2020    Procedure: ENDOSCOPIC ULTRASOUND, ESOPHAGOSCOPY / UPPER GASTROINTESTINAL TRACT (GI)  with stent exchange;  Surgeon: Ramesh Self MD;  Location: [facility] OR     ENDOSCOPIC ULTRASOUND UPPER GASTROINTESTINAL TRACT (GI) N/A 6/2/2020    Procedure: ENDOSCOPIC ULTRASOUND, ESOPHAGOSCOPY / UPPER GASTROINTESTINAL TRACT (GI)  **Latex Allergy** stent exchange;  Surgeon: Ramesh Self MD;  Location: [facility] OR     ESOPHAGOSCOPY, GASTROSCOPY, DUODENOSCOPY (EGD),  COMBINED  7/30/2014    Procedure: COMBINED ESOPHAGOSCOPY, GASTROSCOPY, DUODENOSCOPY (EGD);  Surgeon: Ramesh Self MD;  Location: [facility] GI     ESOPHAGOSCOPY, GASTROSCOPY, DUODENOSCOPY (EGD), COMBINED N/A 8/14/2019    Procedure: ESOPHAGOGASTRODUODENOSCOPY, WITH BIOPSY;  Surgeon: Johann Hazel MD;  [facility]     FRACTURE TX, ANKLE RT/LT  1991    ORIF rt ankle     FUSION SPINE POSTERIOR ONE LEVEL N/A 11/21/2014    Procedure: FUSION SPINE POSTERIOR ONE LEVEL;  Surgeon: Amilcar Baldwin MD;  [facility]     FUSION SPINE POSTERIOR ONE LEVEL Right 7/30/2015    Procedure: FUSION SPINE POSTERIOR ONE LEVEL;  Surgeon: Amilcar Baldwin MD;  [facility] OR     GASTRIC BYPASS  2007    lost 150 lbs     GRAFT BONE FROM ILIAC CREST Left 11/21/2014    Procedure: GRAFT BONE FROM ILIAC CREST;  Surgeon: Amilcar Baldwin MD;  [facility] OR     hardware removal       INJECT BLOCK MEDIAL BRANCH CERVICAL/THORACIC/LUMBAR Bilateral 7/10/2020    Procedure: Bilateral lumbar 3, lumbar 4, and lumbar 5 medial branch nerve diagnostic blocks #2;  Surgeon: Pravin Bagley MD;  [facility]     INJECT PARAVERTEBRAL FACET JOINT LUMBAR / SACRAL FIRST Bilateral 9/30/2019    Procedure: Lumbar Medial Branch Nerve Block Injection Bilateral;  Surgeon: Hira Rodríguez MD;  [facility]     OPTICAL TRACKING SYSTEM FUSION POSTERIOR SPINE LUMBAR  11/20/2013    Procedure: OPTICAL TRACKING SYSTEM FUSION SPINE POSTERIOR LUMBAR ONE LEVEL;  Lumbar 4-5 Transforaminal Interbody Fusion;  Surgeon: Amilcar Baldwin MD;  [facility]     ORTHOPEDIC SURGERY      shoulder surgery, removed bone spur     PHACOEMULSIFICATION CLEAR CORNEA WITH STANDARD INTRAOCULAR LENS IMPLANT Right 5/29/2020    Procedure: RIGHT PHACOEMULSIFICATION, CATARACT, WITH INTRAOCULAR LENS IMPLANT;  Surgeon: Lyla Mahoney MD;  [facility]     PHACOEMULSIFICATION CLEAR CORNEA WITH STANDARD  INTRAOCULAR LENS IMPLANT Left 7/17/2020    Procedure: LEFT EYE CATARACT REMOVAL WITH INTRAOCULAR LENS IMPLANT;  Surgeon: Lyla Mahoney MD;  [facility]     TUBAL LIGATION        Allergies   Allergen Reactions     Bee Venom Anaphylaxis, Anxiety, Difficulty breathing, Hives, Itching, Nausea and Vomiting, Palpitations, Shortness Of Breath and Swelling     Opium Alkaloids, Hcls Itching     Can take opiate derived narcotics with Benadryl.     Morphine Itching     Latex Rash and Itching        Anesthesia Evaluation     . Pt has had prior anesthetic. Type: General.           ROS/MED HX    ENT/Pulmonary:     (+) asthma COPD,     Neurologic:     (+) peripheral neuropathy, - h/o facial palsy.     Cardiovascular:         METS/Exercise Tolerance:     Hematologic:         Musculoskeletal:         GI/Hepatic:     (+) hepatitis liver disease,       Renal/Genitourinary:         Endo:         Psychiatric:         Infectious Disease:         Malignancy:       Other:    (+) , H/O Chronic Pain,                   ANESTHESIA PHYSICAL [id number]    Assessment:   ASA SCORE: 3            Plan:   Anes. Type:  General                                  Nimisha Aguirre MD    Anesthesia Evaluation   Pt has had prior anesthetic. Type: General.        ROS/MED HX  ENT/Pulmonary:     (+) asthma COPD,     Neurologic:     (+) peripheral neuropathy, - h/o facial palsy.     Cardiovascular:       METS/Exercise Tolerance:     Hematologic:       Musculoskeletal:       GI/Hepatic:     (+) hepatitis liver disease,     Renal/Genitourinary:       Endo:       Psychiatric/Substance Use:       Infectious Disease:       Malignancy:  - neg malignancy ROS     Other:      (+) , H/O Chronic Pain,          Physical Exam    Airway        Mallampati: II   TM distance: > 3 FB   Neck ROM: full   Mouth opening: > 3 cm    Respiratory Devices and Support         Dental           Cardiovascular             Pulmonary                     Anesthesia Plan    ASA  Status:  3      Anesthesia Type: General.     - Airway: ETT   Induction: Intravenous.   Maintenance: Balanced.   Techniques and Equipment:     - Lines/Monitors: 2nd IV     Consents    Anesthesia Plan(s) and associated risks, benefits, and realistic alternatives discussed. Questions answered and patient/representative(s) expressed understanding.     - Discussed with:  Patient         Postoperative Care    Pain management: IV analgesics.   PONV prophylaxis: Ondansetron (or other 5HT-3), Dexamethasone or Solumedrol     Comments:

## 2021-05-05 NOTE — DISCHARGE INSTRUCTIONS
Dr Self at [phone] (General Surgery clinic)    Discharge Instructions after  Upper Endoscopy (EGD)    Activity and Diet  You were given medicine for pain. You may be dizzy or sleepy.  For 24 hours:    Do not drive or use heavy equipment.    Do not make important decisions.    Do not drink any alcohol.  You may return to your regular diet.    Discomfort  You may have a sore throat for 2 to 3 days. It may help to:    Avoid hot liquids for 24 hours.    Use sore throat lozenges.    Gargle as needed with salt water up to 4 times a day. Mix 1 cup of warm water  with 1 teaspoon of salt. Do not swallow.      You may take Tylenol (acetaminophen) for pain unless your doctor has told you not to.      Follow-up                       - Previous diet and all medications may be resumed                        immediately upon discharge                        - Follow up with established physicians as scheduled                        - Return for stent removal/exchange in 12 month       When to call us:  Problems are rare. Call right away if you have:    Unusual throat pain or trouble swallowing    Unusual pain in belly or chest that is not relieved by belching or passing air    Black stools (tar-like looking bowel movement)    Temperature above 100.6  F. (37.5  C).    If you vomit blood or have severe pain, go to an emergency room.    If you have questions, call:  Monday to Friday, 8 a.m. to 4:30 p.m.: Endoscopy: [phone] (We may have to call you back) or Call Dr Self at [phone] (General Surgery Clinic)      After hours: Hospital: [phone] (Ask for the GI fellow on call)

## 2021-05-05 NOTE — ANESTHESIA PROCEDURE NOTES
Airway       Patient location during procedure: OR  Staff -        CRNA: Janett Clement APRN CRNA       Performed By: CRNA  Consent for Airway        Urgency: elective  Indications and Patient Condition       Indications for airway management: luis alberto-procedural       Induction type:intravenous       Mask difficulty assessment: 1 - vent by mask    Final Airway Details       Final airway type: endotracheal airway       Successful airway: ETT - single  Endotracheal Airway Details        ETT size (mm): 7.0       Cuffed: yes       Successful intubation technique: direct laryngoscopy       DL Blade Type: Schuster 2       Grade View of Cords: 1       Adjucts: stylet       Position: Right       Measured from: lips       Secured at (cm): 21       Bite block used: None    Post intubation assessment        Placement verified by: capnometry, equal breath sounds and chest rise        Number of attempts at approach: 1       Secured with: pink tape       Ease of procedure: easy       Dentition: Intact    Medication(s) Administered   Medication Administration Time: 5/5/2021 1:29 PM

## 2021-05-05 NOTE — ANESTHESIA CARE TRANSFER NOTE
Patient: Kristina Eldridge    Procedure(s):  ESOPHAGOGASTRODUODENOSCOPY WITH TRANSENDOSCOPIC STENT EXCHANGE    Diagnosis: Hx of gastric bypass [Z98.84]  Diagnosis Additional Information: No value filed.    Anesthesia Type:   General     Note:    Oropharynx: oropharynx clear of all foreign objects  Level of Consciousness: drowsy  Oxygen Supplementation: face mask  Level of Supplemental Oxygen (L/min / FiO2): 8  Independent Airway: airway patency satisfactory and stable  Dentition: dentition unchanged  Vital Signs Stable: post-procedure vital signs reviewed and stable  Report to RN Given: handoff report given  Patient transferred to: PACU  Comments: Anesthesia Care Transfer Note    Patient: Kristina Eldridge    Transferred to: PACU with supplemental O2    Patient vital signs: stable    Airway: none    Monitors on, VSS, breathing spontaneously, report to KESHAV Redman CRNA   5/5/2021 2:12 PM  Handoff Report: Identifed the Patient, Identified the Reponsible Provider, Reviewed the pertinent medical history, Discussed the surgical course, Reviewed Intra-OP anesthesia mangement and issues during anesthesia, Set expectations for post-procedure period and Allowed opportunity for questions and acknowledgement of understanding      Vitals: (Last set prior to Anesthesia Care Transfer)  CRNA VITALS  5/5/2021 1337 - 5/5/2021 1411      5/5/2021             Pulse:  73    SpO2:  100 %        Electronically Signed By: MILEY Webber CRNA  May 5, 2021  2:11 PM

## 2021-05-05 NOTE — ANESTHESIA POSTPROCEDURE EVALUATION
Patient: Kristina Eldridge    Procedure(s):  ESOPHAGOGASTRODUODENOSCOPY WITH TRANSENDOSCOPIC STENT EXCHANGE    Diagnosis:Hx of gastric bypass [Z98.84]  Diagnosis Additional Information: No value filed.    Anesthesia Type:  General    Note:  Disposition: Outpatient   Postop Pain Control: Uneventful            Sign Out: Well controlled pain   PONV: No   Neuro/Psych: Uneventful            Sign Out: Acceptable/Baseline neuro status   Airway/Respiratory: Uneventful            Sign Out: Acceptable/Baseline resp. status   CV/Hemodynamics: Uneventful            Sign Out: Acceptable CV status; No obvious hypovolemia; No obvious fluid overload   Other NRE:    DID A NON-ROUTINE EVENT OCCUR?            Last vitals:  Vitals:    05/05/21 1445 05/05/21 1500 05/05/21 1517   BP: 137/72  132/77   Pulse: 75  73   Resp: 16 16 16   Temp: 36.6  C (97.9  F)  36.6  C (97.9  F)   SpO2: 98% 93% 97%       Last vitals prior to Anesthesia Care Transfer:  CRNA VITALS  5/5/2021 1337 - 5/5/2021 1437      5/5/2021             Pulse:  73    SpO2:  100 %          Electronically Signed By: Nimisha Aguirre MD  May 5, 2021  3:34 PM

## 2021-05-10 ENCOUNTER — ANCILLARY PROCEDURE (OUTPATIENT)
Dept: MAMMOGRAPHY | Facility: CLINIC | Age: 51
End: 2021-05-10
Attending: FAMILY MEDICINE
Payer: COMMERCIAL

## 2021-05-10 ENCOUNTER — PATIENT OUTREACH (OUTPATIENT)
Dept: GASTROENTEROLOGY | Facility: CLINIC | Age: 51
End: 2021-05-10

## 2021-05-10 ENCOUNTER — ANCILLARY PROCEDURE (OUTPATIENT)
Dept: CT IMAGING | Facility: CLINIC | Age: 51
End: 2021-05-10
Attending: FAMILY MEDICINE
Payer: COMMERCIAL

## 2021-05-10 DIAGNOSIS — Z87.898 HX OF MULTIPLE PULMONARY NODULES: ICD-10-CM

## 2021-05-10 DIAGNOSIS — Z00.00 HEALTH CARE MAINTENANCE: ICD-10-CM

## 2021-05-10 PROCEDURE — 77067 SCR MAMMO BI INCL CAD: CPT | Mod: GC | Performed by: RADIOLOGY

## 2021-05-10 PROCEDURE — 71250 CT THORAX DX C-: CPT | Performed by: RADIOLOGY

## 2021-05-10 PROCEDURE — 77063 BREAST TOMOSYNTHESIS BI: CPT | Mod: GC | Performed by: RADIOLOGY

## 2021-05-10 NOTE — TELEPHONE ENCOUNTER
Post EGD (5-5-21) with Dr. Self: Follow-up    Post procedure recommendations:   Return for stent removal/exchange in 12m                        Orders placed: none, reminder sent to pool    Patient states: feeling well, no concerning symptoms.     Clinic contact and scheduling numbers verified for future questions/concerns.    Hilda Navarro RN Care Coordinator

## 2021-05-11 ENCOUNTER — OFFICE VISIT (OUTPATIENT)
Dept: OPHTHALMOLOGY | Facility: CLINIC | Age: 51
End: 2021-05-11
Attending: OPHTHALMOLOGY
Payer: COMMERCIAL

## 2021-05-11 DIAGNOSIS — H04.123 DRY EYES, BILATERAL: Primary | ICD-10-CM

## 2021-05-11 DIAGNOSIS — Z96.1 PSEUDOPHAKIA, BOTH EYES: ICD-10-CM

## 2021-05-11 DIAGNOSIS — H52.4 PRESBYOPIA: ICD-10-CM

## 2021-05-11 PROCEDURE — 99213 OFFICE O/P EST LOW 20 MIN: CPT | Performed by: OPHTHALMOLOGY

## 2021-05-11 PROCEDURE — G0463 HOSPITAL OUTPT CLINIC VISIT: HCPCS

## 2021-05-11 ASSESSMENT — SLIT LAMP EXAM - LIDS
COMMENTS: NORMAL
COMMENTS: NORMAL

## 2021-05-11 ASSESSMENT — REFRACTION_MANIFEST
OD_AXIS: 015
OS_AXIS: 158
OD_CYLINDER: +1.00
OS_ADD: +3.00
OD_SPHERE: -0.25
OS_SPHERE: +0.00
OS_CYLINDER: +0.75
OD_ADD: +3.00

## 2021-05-11 ASSESSMENT — VISUAL ACUITY
OS_CC: 20/30
OS_PH_CC: 20/25
CORRECTION_TYPE: GLASSES
OD_CC: 20/20
METHOD: SNELLEN - LINEAR

## 2021-05-11 ASSESSMENT — TONOMETRY
OS_IOP_MMHG: 14
IOP_METHOD: ICARE
OD_IOP_MMHG: 16

## 2021-05-11 ASSESSMENT — CONF VISUAL FIELD
OS_NORMAL: 1
OD_NORMAL: 1
METHOD: COUNTING FINGERS

## 2021-05-11 ASSESSMENT — CUP TO DISC RATIO
OS_RATIO: 0.2
OD_RATIO: 0.2

## 2021-05-11 ASSESSMENT — REFRACTION_WEARINGRX
OD_SPHERE: -0.50
OS_SPHERE: PLANO
OD_CYLINDER: SPHERE
SPECS_TYPE: TRIFOCAL
OS_ADD: +3.00
OD_ADD: +3.00

## 2021-05-11 NOTE — PROGRESS NOTES
HPI:  Kristina Eldridge is a 50 year old female here for evaluation of blurred vision. Her current glasses aren't working well for her. She had to get very close to the computer screen to see the print. She is having intermittent bilateral eye irritation and dryness with some days where one eye will water constantly. She wakes up with crusting and sand in both eyes. No flashes/floaters.    Assessment & Plan   (H04.123) Dry eyes, bilateral  (primary encounter diagnosis)  Comment: Causing FBS and tearing  Plan: Start ATs 2-4 x daily, warm compresses 1-2 x daily    (Z96.1) Pseudophakia, both eyes  Comment: Clear visual axis  Plan: Observe    (H52.4) Presbyopia  Comment: Good vision with refraction  Plan: Given updated glasses Rx.     -----------------------------------------------------------------------------------    Patient disposition:   Return in about 1 year (around 5/11/2022) for dilated eye exam, or sooner as needed.    Teaching statement:  Complete documentation of historical and exam elements from today's encounter can be found in the full encounter summary report (not reduplicated in this progress note). I personally obtained the chief complaint(s) and history of present illness.  I confirmed and edited as necessary the review of systems, past medical/surgical history, family history, social history, and examination findings as documented by others; and I examined the patient myself. I personally reviewed the relevant tests, images, and reports as documented above.     I formulated and edited as necessary the assessment and plan and discussed the findings and management plan with the patient and family.      Lyla Mahoney MD  Comprehensive Ophthalmology & Ocular Pathology  Department of Ophthalmology and Visual Neurosciences  [email or web address]  Pager [phone]

## 2021-05-11 NOTE — NURSING NOTE
Chief Complaints and History of Present Illnesses   Patient presents with     Annual Eye Exam     Chief Complaint(s) and History of Present Illness(es)     Annual Eye Exam     Laterality: both eyes    Quality: blurred vision    Course: gradually worsening    Associated symptoms: dryness, discharge (crusting ) and floaters (stable).  Negative for eye pain    Pain scale: 0/10              Comments     She states that she is not seeing as well recently with her current glasses.   She has to get close to the computer screen to see the print.  Her eyes often feel dry and are teary.  She frequently wakes with crusting in both eyes.    Yessi Humphreys, COT 10:52 AM  May 11, 2021

## 2021-05-11 NOTE — NURSING NOTE
Chief Complaints and History of Present Illnesses   Patient presents with     Follow Up     Dry eyes     Chief Complaint(s) and History of Present Illness(es)     Follow Up     Laterality: both eyes    Quality: blurred vision    Course: gradually worsening    Associated symptoms: dryness, floaters (stable) and discharge (crusting ).  Negative for eye pain    Treatments tried: no treatments    Pain scale: 0/10    Comments: Dry eyes              Comments     She states that she is not seeing as well recently with her current glasses.   She has to get close to the computer screen to see the print.  Her eyes often feel dry and are teary.  She frequently wakes with crusting in both eyes.    Yessi Humphreys, COT 10:52 AM  May 11, 2021

## 2021-06-01 DIAGNOSIS — R79.89 LOW VITAMIN D LEVEL: ICD-10-CM

## 2021-06-04 RX ORDER — ERGOCALCIFEROL 1.25 MG/1
50000 CAPSULE, LIQUID FILLED ORAL WEEKLY
Qty: 8 CAPSULE | Refills: 0 | OUTPATIENT
Start: 2021-06-04

## 2021-06-04 NOTE — TELEPHONE ENCOUNTER
vitamin D2 (ERGOCALCIFEROL) 32635 units (1250 mcg) capsule      Last Written Prescription Date:  4-8-12-21, end 6-1-21  Last Fill Quantity: 8,   # refills: 0  Last Office Visit : 4-9-21  Future Office visit:  none      See tele note: 4-12-21    Routing refill request to provider for review/approval because:  ? Change dosage, rtc, repeat lab

## 2021-06-04 NOTE — TELEPHONE ENCOUNTER
This Rx was for 8 weeks only. Pt should take OTC vit D 2000 international unit(s)/day and follow up.

## 2021-06-22 ENCOUNTER — TELEPHONE (OUTPATIENT)
Dept: FAMILY MEDICINE | Facility: CLINIC | Age: 51
End: 2021-06-22

## 2021-06-22 DIAGNOSIS — G25.81 RESTLESS LEGS SYNDROME: ICD-10-CM

## 2021-06-22 NOTE — TELEPHONE ENCOUNTER
[facility] Call Center    Phone Message    May a detailed message be left on voicemail: yes     Reason for Call: Medication Question or concern regarding medication   Prescription Clarification  Name of Medication: pramipexole (MIRAPEX) 0.125 MG tablet  Prescribing Provider: Cherie    Pharmacy: [facility] #[id number] - [place] [id number] [facility] AT SEC OF [facility] [address]   What on the order needs clarification? Patient stating that she has 2 days left of this medication but patient states it is not working anymore. Patient states that she would like to try Requip or Neupro. Please follow up with patient to advise. Thank you!           Action Taken: Message routed to:  [facility] p[facility]    Travel Screening: Not Applicable

## 2021-06-23 NOTE — TELEPHONE ENCOUNTER
Ok Requip  It is hard to know what dose will help  Give her 0.25 mg pills    Take one po at bedtime for a week if not better go up to 2 at bedtime, we will slowly increase that way, generally some dose helps    Ok 60 pills re: RLS    See me in July to review

## 2021-06-24 RX ORDER — PRAMIPEXOLE DIHYDROCHLORIDE 0.12 MG/1
TABLET ORAL
Qty: 240 TABLET | Refills: 3 | Status: SHIPPED | OUTPATIENT
Start: 2021-06-24 | End: 2021-06-28 | Stop reason: ALTCHOICE

## 2021-06-24 NOTE — TELEPHONE ENCOUNTER
[facility]    Phone Message    May a detailed message be left on voicemail: yes     Reason for Call: Other: Pt is calling back to see if the updated Rx for the  Pramipexole has been done as of yet?  Pt is completely out of the medication.      Please call pt today    Action Taken: Message routed to:  [facility]    Travel Screening: Not Applicable

## 2021-06-25 RX ORDER — ROPINIROLE 0.25 MG/1
TABLET, FILM COATED ORAL
Qty: 60 TABLET | Refills: 0 | Status: SHIPPED | OUTPATIENT
Start: 2021-06-25 | End: 2021-09-17 | Stop reason: DRUGHIGH

## 2021-06-25 NOTE — TELEPHONE ENCOUNTER
Spoke with pt, states that mirapex is not working any more after takiing more than 12 years, wondering she could try different med like requip.

## 2021-06-25 NOTE — TELEPHONE ENCOUNTER
[facility]    Phone Message    May a detailed message be left on voicemail: yes     Reason for Call: Medication Question or concern regarding medication   Prescription Clarification  Name of Medication: pramipexole (MIRAPEX) 0.125 MG tablet  Prescribing Provider: Dr Crespo   Pharmacy: [facility] #[id number] [place] [id number] [facility] AT SEC OF [facility]     What on the order needs clarification? Pt thought she was supposed to get a different medication than the Mirapex? She says that this one is not working out for her. Please call to discuss further.She only has a few pills left.        Action Taken: Message routed to:  [facility]    Travel Screening: Not Applicable

## 2021-07-19 DIAGNOSIS — G25.81 RESTLESS LEGS SYNDROME: ICD-10-CM

## 2021-07-21 RX ORDER — PRAMIPEXOLE DIHYDROCHLORIDE 0.12 MG/1
TABLET ORAL
Qty: 240 TABLET | Refills: 3 | OUTPATIENT
Start: 2021-07-21

## 2021-07-21 NOTE — TELEPHONE ENCOUNTER
Pharm called  Discontinued Alternate therapy Sarah Willis, RN 6/28/21 1021   See 6-22-21 tele note

## 2021-07-26 ENCOUNTER — TELEPHONE (OUTPATIENT)
Dept: FAMILY MEDICINE | Facility: CLINIC | Age: 51
End: 2021-07-26

## 2021-07-26 DIAGNOSIS — G25.81 RESTLESS LEG SYNDROME: Primary | ICD-10-CM

## 2021-07-26 DIAGNOSIS — G25.81 RESTLESS LEGS SYNDROME: ICD-10-CM

## 2021-07-26 RX ORDER — ROPINIROLE 1 MG/1
1 TABLET, FILM COATED ORAL AT BEDTIME
Qty: 30 TABLET | Refills: 1 | Status: SHIPPED | OUTPATIENT
Start: 2021-07-26 | End: 2021-09-15

## 2021-07-26 NOTE — TELEPHONE ENCOUNTER
I spoke with Kristina over the phone and she stated that she was prescribed a prescription for Ropinirole at her last visit. She takes 1 pill at noon and 1 pill at 8/9PM. She said that did not help even a little bit with her RLS so she began to take that same dosage along with two of the Mirapex (of which she had leftover pills from her last prescription) and it is the only thing that is helping. She stated that when she was taking the mirapex alone it did work, but she felt that it was wearing off. She is requesting an increased dosage of the ropinirole or going back to just the mirapex.  Will send to provider for review.  Lesa Bright RN on 7/26/2021 at 1:00 PM

## 2021-07-26 NOTE — TELEPHONE ENCOUNTER
Sent patient MyC message about med refill. When I spoke with her on the phone earlier she was aware that I would be sending her a mychart message with an update.    Juju Bright RN

## 2021-07-26 NOTE — TELEPHONE ENCOUNTER
Let's try to stay w/ requip for now, less side effect prone overall  She takes 0.5 mg without help    Give her rx for 1 mg requip pills  30 pills  Take one po at bedtime  One refill  Re: restrless legs    Let's set up a nurse call to her a week later to see if helps or not

## 2021-07-27 RX ORDER — ROPINIROLE 0.25 MG/1
TABLET, FILM COATED ORAL
Qty: 60 TABLET | Refills: 0 | OUTPATIENT
Start: 2021-07-27

## 2021-09-15 DIAGNOSIS — G25.81 RESTLESS LEGS SYNDROME: ICD-10-CM

## 2021-09-15 DIAGNOSIS — G25.81 RESTLESS LEG SYNDROME: ICD-10-CM

## 2021-09-15 DIAGNOSIS — J45.30 MILD PERSISTENT ASTHMA, UNCOMPLICATED: ICD-10-CM

## 2021-09-15 RX ORDER — ROPINIROLE 0.25 MG/1
TABLET, FILM COATED ORAL
Qty: 60 TABLET | Status: CANCELLED | OUTPATIENT
Start: 2021-09-15

## 2021-09-15 NOTE — TELEPHONE ENCOUNTER
mometasone-formoterol (DULERA) 100-5 MCG/ACT inhaler      Last Written Prescription Date:  10/28/20  Last Fill Quantity: 1 inhaler,   # refills: 11  Last Office Visit : 4/9/21  Future Office visit:  None scheduled    Routing refill request to provider for review/approval because:  She is requesting sig to be changed to 4 time a day use  Why?    ipratropium (ATROVENT HFA) 17 MCG/ACT inhaler      Last Written Prescription Date:  3/2/21  Last Fill Quantity: 12.9 g,   # refills: 11  Last Office Visit : 4/9/21  Future Office visit:  None scheduled    Routing refill request to provider for review/approval because:  How is she using?  Should be taking 2 puffs every 6 hours  Should have refills available if taking as directed    rOPINIRole (REQUIP) 0.25 MG tablet      Last Written Prescription Date:  6/25/21  Last Fill Quantity: 60,   # refills: 0  Last Office Visit : 4/9/21  Future Office visit:  None scheduled    Routing refill request to provider for review/approval because:  Why requesting this dose?    rOPINIRole (REQUIP) 1 MG tablet      Last Written Prescription Date:  7/26/21  Last Fill Quantity: 30,   # refills: 1  Last Office Visit : 4/9/21  Future Office visit:  None scheduled    Routing refill request to provider for review/approval because:  Should have enough until nearly the end of the month.  How taking?

## 2021-09-15 NOTE — TELEPHONE ENCOUNTER
[facility] Call Center    Phone Message    May a detailed message be left on voicemail: no     Reason for Call: Medication Refill Request    Has the patient contacted the pharmacy for the refill? Yes   Name of medication being requested: rOPINIRole (REQUIP) 0.25 MG tablet,rOPINIRole (REQUIP) 1 MG tablet,ipratropium (ATROVENT HFA) 17 MCG/ACT inhaler, mometasone-formoterol (DULERA) 100-5 MCG/ACT inhaler    Provider who prescribed the medication:Dr. Crespo    Pharmacy: [facility] #[id number] [address] AT SEC OF [facility]    Date medication is needed: As soon as possible 6 tablets left and inhalers are out.     Patient stated the inhalers need to be changed to 4x daily instead of 2x daily because she is using both more and insurance wont cover otherwise.         Action Taken: Message routed to:  [facility]    Travel Screening: Not Applicable

## 2021-09-17 RX ORDER — IPRATROPIUM BROMIDE 17 UG/1
AEROSOL, METERED RESPIRATORY (INHALATION)
Qty: 12.9 G | Refills: 5 | Status: SHIPPED | OUTPATIENT
Start: 2021-09-17 | End: 2022-02-16

## 2021-09-17 RX ORDER — ROPINIROLE 1 MG/1
1 TABLET, FILM COATED ORAL AT BEDTIME
Qty: 90 TABLET | Refills: 1 | Status: SHIPPED | OUTPATIENT
Start: 2021-09-17 | End: 2021-11-29

## 2021-09-25 ENCOUNTER — HEALTH MAINTENANCE LETTER (OUTPATIENT)
Age: 51
End: 2021-09-25

## 2021-11-26 DIAGNOSIS — G25.81 RESTLESS LEG SYNDROME: ICD-10-CM

## 2021-11-26 NOTE — TELEPHONE ENCOUNTER
[facility] Call Center    Phone Message    May a detailed message be left on voicemail: yes     Reason for Call: Medication Refill Request    Has the patient contacted the pharmacy for the refill? Yes   Name of medication being requested: mometasone-formoterol (DULERA) 100-5 MCG  Provider who prescribed the medication: Dr. Crespo   Pharmacy: [facility] #[id number] [address] AT SEC OF [facility]     Patient calling stating the request for a refill was denied. Please call to discuss. Thank you    Action Taken: Message routed to:  [facility]    Travel Screening: Not Applicable

## 2021-11-29 NOTE — TELEPHONE ENCOUNTER
Call to [facility] pharmacy after receiving call from Mery regarding medication having been denied refill.  Pharmacist reviewed prescription sent 9/17/21, has refills.  He is unsure why she was told refill denied.  Insurance will permit refill on 12/1/21.  Call to Mery, she had received denial through her [employer] deysi.  Advised refills are available and able to pick inhaler up on 12/1/21.      Mery asking about response from Dr Crespo regarding ropinirole.  She states had left a message letting him know the 1 mg at bedtime is not enough for restless legs.  She begins experiencing symptoms at 1 pm, takes ropinirole at 4:30, but does not last through the night.  Is asking if could have medication changed to take 1 tab during the day and 1 tab at bedtime.  Best number to reach Mery [phone]  Ropinirole pended

## 2021-11-30 RX ORDER — ROPINIROLE 1 MG/1
1 TABLET, FILM COATED ORAL 2 TIMES DAILY
Qty: 60 TABLET | Refills: 0 | Status: SHIPPED | OUTPATIENT
Start: 2021-11-30 | End: 2022-03-16

## 2022-01-15 ENCOUNTER — HEALTH MAINTENANCE LETTER (OUTPATIENT)
Age: 52
End: 2022-01-15

## 2022-02-11 ENCOUNTER — PATIENT OUTREACH (OUTPATIENT)
Dept: PULMONOLOGY | Facility: CLINIC | Age: 52
End: 2022-02-11
Payer: COMMERCIAL

## 2022-02-11 DIAGNOSIS — J45.30 MILD PERSISTENT ASTHMA, UNCOMPLICATED: ICD-10-CM

## 2022-02-11 NOTE — TELEPHONE ENCOUNTER
Patient called today to request a change in her prescription of Atrovent.  She has been taking it more than prescribed.  Rather than taking 2 puffs every 6 hours she has been taking 4 puffs every 2-3 hours and consequently running out early.    Patient has an upcoming appointment on 3/1/22.      Will route to Dr. Orosco to review for medication recommendation.     Preferred pharmacy is [facility] on [facility].

## 2022-02-16 ENCOUNTER — TELEPHONE (OUTPATIENT)
Dept: FAMILY MEDICINE | Facility: CLINIC | Age: 52
End: 2022-02-16
Payer: COMMERCIAL

## 2022-02-16 ENCOUNTER — APPOINTMENT (OUTPATIENT)
Dept: URGENT CARE | Facility: CLINIC | Age: 52
End: 2022-02-16
Payer: COMMERCIAL

## 2022-02-16 RX ORDER — IPRATROPIUM BROMIDE 17 UG/1
AEROSOL, METERED RESPIRATORY (INHALATION)
Qty: 12.9 G | Refills: 5 | Status: SHIPPED | OUTPATIENT
Start: 2022-02-16 | End: 2022-03-29

## 2022-02-16 NOTE — PROGRESS NOTES
Atrovent sent to patient's preferred pharmacy [facility] on [facility].

## 2022-03-01 ENCOUNTER — OFFICE VISIT (OUTPATIENT)
Dept: PULMONOLOGY | Facility: CLINIC | Age: 52
End: 2022-03-01
Attending: INTERNAL MEDICINE
Payer: COMMERCIAL

## 2022-03-01 DIAGNOSIS — R91.8 PULMONARY NODULES: Primary | ICD-10-CM

## 2022-03-01 NOTE — LETTER
3/1/2022     RE: Kristina Eldridge  [address]  [facility] [address]     Dear Colleague,    Thank you for referring your patient, Kristina Eldridge, to the [facility] at [facility]. Please see a copy of my visit note below.    No show    Again, thank you for allowing me to participate in the care of your patient.      Sincerely,    Mekhi Orosco MD

## 2022-03-02 ENCOUNTER — TELEPHONE (OUTPATIENT)
Dept: FAMILY MEDICINE | Facility: CLINIC | Age: 52
End: 2022-03-02
Payer: COMMERCIAL

## 2022-03-02 NOTE — TELEPHONE ENCOUNTER
Attempted to call patient 2 different times and phone rang about 4-5 times and then call was disconnected on the other end.     If patient calls back, patient will need to schedule an appointment with Dr. Crespo or another provider in [facility] to have this evaluated. Patient can also call Ortho Same Day at [phone] to schedule an appointment to have this evaluated.  Lisa Cortes LPN 3/2/2022 2:49 PM

## 2022-03-16 ENCOUNTER — OFFICE VISIT (OUTPATIENT)
Dept: FAMILY MEDICINE | Facility: CLINIC | Age: 52
End: 2022-03-16
Payer: COMMERCIAL

## 2022-03-16 ENCOUNTER — LAB (OUTPATIENT)
Dept: LAB | Facility: CLINIC | Age: 52
End: 2022-03-16
Payer: COMMERCIAL

## 2022-03-16 VITALS
BODY MASS INDEX: 22.38 KG/M2 | WEIGHT: 114 LBS | SYSTOLIC BLOOD PRESSURE: 169 MMHG | HEIGHT: 60 IN | HEART RATE: 77 BPM | OXYGEN SATURATION: 97 % | DIASTOLIC BLOOD PRESSURE: 86 MMHG | RESPIRATION RATE: 16 BRPM

## 2022-03-16 DIAGNOSIS — Z98.84 S/P BARIATRIC SURGERY: ICD-10-CM

## 2022-03-16 DIAGNOSIS — G25.81 RESTLESS LEG SYNDROME: ICD-10-CM

## 2022-03-16 DIAGNOSIS — L29.9 ITCHING: ICD-10-CM

## 2022-03-16 DIAGNOSIS — M79.641 BILATERAL HAND PAIN: ICD-10-CM

## 2022-03-16 DIAGNOSIS — Z00.00 HEALTH CARE MAINTENANCE: ICD-10-CM

## 2022-03-16 DIAGNOSIS — M79.642 BILATERAL HAND PAIN: ICD-10-CM

## 2022-03-16 DIAGNOSIS — J44.9 CHRONIC OBSTRUCTIVE PULMONARY DISEASE, UNSPECIFIED COPD TYPE (H): ICD-10-CM

## 2022-03-16 DIAGNOSIS — Z98.84 S/P BARIATRIC SURGERY: Primary | ICD-10-CM

## 2022-03-16 LAB
ALBUMIN SERPL-MCNC: 3.7 G/DL (ref 3.4–5)
ALP SERPL-CCNC: 118 U/L (ref 40–150)
ALT SERPL W P-5'-P-CCNC: 17 U/L (ref 0–50)
ANION GAP SERPL CALCULATED.3IONS-SCNC: 6 MMOL/L (ref 3–14)
AST SERPL W P-5'-P-CCNC: 21 U/L (ref 0–45)
BASOPHILS # BLD AUTO: 0.1 10E3/UL (ref 0–0.2)
BASOPHILS NFR BLD AUTO: 1 %
BILIRUB SERPL-MCNC: 0.3 MG/DL (ref 0.2–1.3)
BUN SERPL-MCNC: 2 MG/DL (ref 7–30)
CALCIUM SERPL-MCNC: 8 MG/DL (ref 8.5–10.1)
CHLORIDE BLD-SCNC: 99 MMOL/L (ref 94–109)
CHOLEST SERPL-MCNC: 161 MG/DL
CO2 SERPL-SCNC: 27 MMOL/L (ref 20–32)
CREAT SERPL-MCNC: 0.82 MG/DL (ref 0.52–1.04)
EOSINOPHIL # BLD AUTO: 0.2 10E3/UL (ref 0–0.7)
EOSINOPHIL NFR BLD AUTO: 2 %
ERYTHROCYTE [DISTWIDTH] IN BLOOD BY AUTOMATED COUNT: 15.3 % (ref 10–15)
FASTING STATUS PATIENT QL REPORTED: NO
FERRITIN SERPL-MCNC: 8 NG/ML (ref 8–252)
GFR SERPL CREATININE-BSD FRML MDRD: 85 ML/MIN/1.73M2
GLUCOSE BLD-MCNC: 86 MG/DL (ref 70–99)
HCT VFR BLD AUTO: 35.9 % (ref 35–47)
HDLC SERPL-MCNC: 34 MG/DL
HGB BLD-MCNC: 11.9 G/DL (ref 11.7–15.7)
IMM GRANULOCYTES # BLD: 0 10E3/UL
IMM GRANULOCYTES NFR BLD: 0 %
IRON SATN MFR SERPL: 5 % (ref 15–46)
IRON SERPL-MCNC: 28 UG/DL (ref 35–180)
LDLC SERPL CALC-MCNC: 111 MG/DL
LYMPHOCYTES # BLD AUTO: 2.6 10E3/UL (ref 0.8–5.3)
LYMPHOCYTES NFR BLD AUTO: 30 %
MCH RBC QN AUTO: 30.1 PG (ref 26.5–33)
MCHC RBC AUTO-ENTMCNC: 33.1 G/DL (ref 31.5–36.5)
MCV RBC AUTO: 91 FL (ref 78–100)
MONOCYTES # BLD AUTO: 0.5 10E3/UL (ref 0–1.3)
MONOCYTES NFR BLD AUTO: 6 %
NEUTROPHILS # BLD AUTO: 5.4 10E3/UL (ref 1.6–8.3)
NEUTROPHILS NFR BLD AUTO: 61 %
NONHDLC SERPL-MCNC: 127 MG/DL
NRBC # BLD AUTO: 0 10E3/UL
NRBC BLD AUTO-RTO: 0 /100
PLATELET # BLD AUTO: 326 10E3/UL (ref 150–450)
POTASSIUM BLD-SCNC: 3.4 MMOL/L (ref 3.4–5.3)
PROT SERPL-MCNC: 7.1 G/DL (ref 6.8–8.8)
RBC # BLD AUTO: 3.95 10E6/UL (ref 3.8–5.2)
SODIUM SERPL-SCNC: 132 MMOL/L (ref 133–144)
TIBC SERPL-MCNC: 511 UG/DL (ref 240–430)
TRIGL SERPL-MCNC: 78 MG/DL
TSH SERPL DL<=0.005 MIU/L-ACNC: 1.44 MU/L (ref 0.4–4)
VIT B12 SERPL-MCNC: 286 PG/ML (ref 193–986)
WBC # BLD AUTO: 8.7 10E3/UL (ref 4–11)

## 2022-03-16 PROCEDURE — 36415 COLL VENOUS BLD VENIPUNCTURE: CPT | Performed by: PATHOLOGY

## 2022-03-16 PROCEDURE — 99213 OFFICE O/P EST LOW 20 MIN: CPT | Mod: 25 | Performed by: FAMILY MEDICINE

## 2022-03-16 PROCEDURE — 80061 LIPID PANEL: CPT | Performed by: PATHOLOGY

## 2022-03-16 PROCEDURE — 83550 IRON BINDING TEST: CPT | Performed by: PATHOLOGY

## 2022-03-16 PROCEDURE — 99396 PREV VISIT EST AGE 40-64: CPT | Performed by: FAMILY MEDICINE

## 2022-03-16 PROCEDURE — 87624 HPV HI-RISK TYP POOLED RSLT: CPT | Mod: 90 | Performed by: PATHOLOGY

## 2022-03-16 PROCEDURE — 82607 VITAMIN B-12: CPT | Performed by: PATHOLOGY

## 2022-03-16 PROCEDURE — 82728 ASSAY OF FERRITIN: CPT | Performed by: PATHOLOGY

## 2022-03-16 PROCEDURE — 99000 SPECIMEN HANDLING OFFICE-LAB: CPT | Performed by: PATHOLOGY

## 2022-03-16 PROCEDURE — G0145 SCR C/V CYTO,THINLAYER,RESCR: HCPCS | Performed by: FAMILY MEDICINE

## 2022-03-16 PROCEDURE — 82306 VITAMIN D 25 HYDROXY: CPT | Mod: 90 | Performed by: PATHOLOGY

## 2022-03-16 PROCEDURE — 80050 GENERAL HEALTH PANEL: CPT | Performed by: PATHOLOGY

## 2022-03-16 RX ORDER — ROPINIROLE 1 MG/1
1 TABLET, FILM COATED ORAL 2 TIMES DAILY
Qty: 180 TABLET | Refills: 3 | Status: SHIPPED | OUTPATIENT
Start: 2022-03-16 | End: 2023-02-10

## 2022-03-16 RX ORDER — ALBUTEROL SULFATE 0.83 MG/ML
2.5 SOLUTION RESPIRATORY (INHALATION) EVERY 6 HOURS PRN
Qty: 90 ML | Refills: 3 | Status: SHIPPED | OUTPATIENT
Start: 2022-03-16 | End: 2022-03-29

## 2022-03-16 ASSESSMENT — PAIN SCALES - GENERAL: PAINLEVEL: NO PAIN (0)

## 2022-03-16 NOTE — NURSING NOTE
Kristina Eldridge is a 51 year old female patient that presents today in clinic for the following:    Chief Complaint   Patient presents with     Physical     Patient comes for a physical, concerned about her hands.      The patient's allergies and medications were reviewed as noted. A set of vitals were recorded as noted without incident. The patient does not have any other questions for the provider.    Jaxon Lombardi, EMT at 2:27 PM on 3/16/2022

## 2022-03-16 NOTE — PROGRESS NOTES
Assessment & Plan     Restless leg syndrome  Works well, tolerated-check irons  - rOPINIRole (REQUIP) 1 MG tablet; Take 1 tablet (1 mg) by mouth 2 times daily    S/P bariatric surgery  Due  - Adult Gastro Ref - Procedure Only; Future  - CBC with platelets differential; Future  - Vitamin B12; Future  - Vitamin D Deficiency; Future  - Comprehensive metabolic panel; Future  - Lipid panel reflex to direct LDL Fasting; Future    Bilateral hand pain  Likely thumb DJD, plus triggers  - Orthopedic  Referral; Future    Itching  She requiests derm. Check tsh, liver.  - Adult Dermatology Referral    Chronic obstructive pulmonary disease, unspecified COPD type (H)  Duoble controller. Resume albut use, will use neb for now, see Pulm two weeks   - mometasone-formoterol (DULERA) 200-5 MCG/ACT inhaler; Inhale 2 puffs into the lungs 2 times daily  - albuterol (PROVENTIL) (2.5 MG/3ML) 0.083% neb solution; Take 1 vial (2.5 mg) by nebulization every 6 hours as needed for shortness of breath / dyspnea or wheezing  Consider prevnar  Health care maintenance  Due  - Pap screen with HPV - recommended age 30 - 65 years; Future  - Pap screen with HPV - recommended age 30 - 65 years      52 minutes spent on the date of the encounter doing chart review, history and exam, documentation and further activities per the note    Varinder Crespo MD  [facility] [place]    Maribell Ordonez is a 51 year old who presents for the following health issues     HPI   Several issues:  One-RLS, requip 2 mg/day works, tolerated  Two-due in May for annual EGD for GI stent post wt loss surgery/stenosis; no new GI sx since last years EGD  Declines colonoscopy    Three, due for breast imaging f/u abnl mammo, just set up now    Four, declines covid shot    Five, both hands second/third fingers triger, left hand pain base thumb, chronic slowly worse, no trauma    Six, vit D low in past, not on any    Seven, low b12  in past    Eight itchy all over despite various moisturizers tried    Nine, copd, last six mo or so (since moved to new house) much more tight/wheeze, uses atrovent spray often, does not have albut at all, has a neb machine, sees pulm in two weeks, on dulera/singulair/zyrtec too. Daily use of atrovent as a rescue med per pt.    Ten, due for pap, always wnl     Mom  at 59 MI, dad at 69 unclear why    Past Medical History:   Diagnosis Date     Abnormal Pap smear 2017    Dr said pap was abnormal     Arthritis      Chronic back pain     hx spondylolisthesis     Chronic pain      COPD (chronic obstructive pulmonary disease) (H)     PFT's  FEV1/FVC = 2.68/3.20 = 84%     H/O gastric bypass      Hepatitis C     treated ribaviron & interferon in  for serotype 2 with failed 6 month treatment     Hyperlipidemia     on simvatatin     Insomnia      Other chronic pain      Positive TB test     has taken INH     RLS (restless legs syndrome)      Seasonal allergies      TB lung, latent     treated     Uncomplicated asthma      Wounds and injuries      Past Surgical History:   Procedure Laterality Date     CHOLECYSTECTOMY       D & C       ENDOSCOPIC ULTRASOUND UPPER GASTROINTESTINAL TRACT (GI) N/A 10/21/2019    Procedure: ENDOSCOPIC ULTRASOUND, ESOPHAGOSCOPY / UPPER GASTROINTESTINAL TRACT (GI) with jejunalgastrostomy with stent placement x 2, dilation tract;  Surgeon: Ramesh Self MD;  [facility]     ENDOSCOPIC ULTRASOUND UPPER GASTROINTESTINAL TRACT (GI) N/A 2020    Procedure: ENDOSCOPIC ULTRASOUND, ESOPHAGOSCOPY / UPPER GASTROINTESTINAL TRACT (GI)  with stent exchange;  Surgeon: Ramesh Self MD;  [facility]     ENDOSCOPIC ULTRASOUND UPPER GASTROINTESTINAL TRACT (GI) N/A 2020    Procedure: ENDOSCOPIC ULTRASOUND, ESOPHAGOSCOPY / UPPER GASTROINTESTINAL TRACT (GI)  **Latex Allergy** stent exchange;  Surgeon: Ramesh Self MD;  [facility] OR      ESOPHAGOSCOPY, GASTROSCOPY, DUODENOSCOPY (EGD), COMBINED  7/30/2014    Procedure: COMBINED ESOPHAGOSCOPY, GASTROSCOPY, DUODENOSCOPY (EGD);  Surgeon: Ramesh Self MD;  [facility]: [facility] GI     ESOPHAGOSCOPY, GASTROSCOPY, DUODENOSCOPY (EGD), COMBINED N/A 8/14/2019    Procedure: ESOPHAGOGASTRODUODENOSCOPY, WITH BIOPSY;  Surgeon: Johann Hazel MD;  [facility]     FRACTURE TX, ANKLE RT/LT  1991    ORIF rt ankle     FUSION SPINE POSTERIOR ONE LEVEL N/A 11/21/2014    Procedure: FUSION SPINE POSTERIOR ONE LEVEL;  Surgeon: Amilcar Baldwin MD;  [facility] OR     FUSION SPINE POSTERIOR ONE LEVEL Right 7/30/2015    Procedure: FUSION SPINE POSTERIOR ONE LEVEL;  Surgeon: Amilcar Baldwin MD;  [facility] OR     GASTRIC BYPASS  2007    lost 150 lbs     GRAFT BONE FROM ILIAC CREST Left 11/21/2014    Procedure: GRAFT BONE FROM ILIAC CREST;  Surgeon: Amilcar Baldwin MD;  [facility] OR     hardware removal       INJECT BLOCK MEDIAL BRANCH CERVICAL/THORACIC/LUMBAR Bilateral 7/10/2020    Procedure: Bilateral lumbar 3, lumbar 4, and lumbar 5 medial branch nerve diagnostic blocks #2;  Surgeon: Pravin Bagley MD;  [facility]     INJECT PARAVERTEBRAL FACET JOINT LUMBAR / SACRAL FIRST Bilateral 9/30/2019    Procedure: Lumbar Medial Branch Nerve Block Injection Bilateral;  Surgeon: Hira Rodríguez MD;  [facility]     OPTICAL TRACKING SYSTEM FUSION POSTERIOR SPINE LUMBAR  11/20/2013    Procedure: OPTICAL TRACKING SYSTEM FUSION SPINE POSTERIOR LUMBAR ONE LEVEL;  Lumbar 4-5 Transforaminal Interbody Fusion;  Surgeon: Amilcar Baldwin MD;  [facility] OR     ORTHOPEDIC SURGERY      shoulder surgery, removed bone spur     PHACOEMULSIFICATION CLEAR CORNEA WITH STANDARD INTRAOCULAR LENS IMPLANT Right 5/29/2020    Procedure: RIGHT PHACOEMULSIFICATION, CATARACT, WITH INTRAOCULAR LENS IMPLANT;  Surgeon: Lyla Mahoney MD;  [facility]      "PHACOEMULSIFICATION CLEAR CORNEA WITH STANDARD INTRAOCULAR LENS IMPLANT Left 2020    Procedure: LEFT EYE CATARACT REMOVAL WITH INTRAOCULAR LENS IMPLANT;  Surgeon: Lyla Mahoney MD;  [facility]     TUBAL LIGATION       Current Outpatient Medications   Medication     albuterol (VENTOLIN HFA) 108 (90 Base) MCG/ACT inhaler     cetirizine (ZYRTEC) 10 MG tablet     ipratropium (ATROVENT HFA) 17 MCG/ACT inhaler     mometasone-formoterol (DULERA) 100-5 MCG/ACT inhaler     montelukast (SINGULAIR) 10 MG tablet     order for DME     rOPINIRole (REQUIP) 1 MG tablet     Spacer/Aero Chamber Mouthpiece MISC     Calcium Carb-Cholecalciferol 500-400 MG-UNIT CHEW     cyanocobalamin (CYANOCOBALAMIN) 1000 MCG/ML injection     gabapentin (NEURONTIN) 300 MG capsule     ofloxacin (OCUFLOX) 0.3 % ophthalmic solution     spacer (OPTICHAMBER OTTONIEL) holding chamber     spacer (OPTICHAMBER OTTONIEL) holding chamber     syringe/needle, disp, (B-D LUER-JIMMY SYRINGE) 23G X 1\" 3 ML MISC     Syringe/Needle, Disp, 23G X 1\" 1 ML MISC     traZODone (DESYREL) 100 MG tablet     No current facility-administered medications for this visit.     Allergies   Allergen Reactions     Bee Venom Anaphylaxis, Anxiety, Difficulty breathing, Hives, Itching, Nausea and Vomiting, Palpitations, Shortness Of Breath and Swelling     Opium Alkaloids, Hcls Itching     Can take opiate derived narcotics with Benadryl.     Morphine Itching     Latex Rash and Itching     Family History   Problem Relation Age of Onset     Hypertension Father      Respiratory Father         COPD, smoked     Glaucoma Father      Sleep Apnea Father      C.A.D. Mother         CHF,  age 59 of MI, smoked     Crohn's Disease Brother      Diabetes Maternal Grandmother         insulin dependent     Diabetes Paternal Grandmother      Glaucoma Paternal Grandmother      Alzheimer Disease Paternal Grandmother      Melanoma No family hx of      Skin Cancer No family hx of      Macular " Degeneration No family hx of      Anesthesia Reaction No family hx of      Deep Vein Thrombosis (DVT) No family hx of      Social History     Socioeconomic History     Marital status:      Spouse name: Not on file     Number of children: Not on file     Years of education: Not on file     Highest education level: Not on file   Occupational History     Not on file   Tobacco Use     Smoking status: Former Smoker     Packs/day: 1.00     Years: 30.00     Pack years: 30.00     Types: Cigarettes     Quit date: 2014     Years since quittin.7     Smokeless tobacco: Never Used     Tobacco comment: pack a day   Substance and Sexual Activity     Alcohol use: Not Currently     Alcohol/week: 0.0 standard drinks     Comment: x3 drinks per year     Drug use: Yes     Types: Marijuana     Comment: daily smoking use since age 14     Sexual activity: Not Currently     Partners: Male     Birth control/protection: Post-menopausal   Other Topics Concern     Parent/sibling w/ CABG, MI or angioplasty before 65F 55M? Not Asked   Social History Narrative    Lives in [place] with significant other and 3 cats.  Pt does not work currently, has applied for disability      Social Determinants of Health     Financial Resource Strain: Not on file   Food Insecurity: Not on file   Transportation Needs: Not on file   Physical Activity: Not on file   Stress: Not on file   Social Connections: Not on file   Intimate Partner Violence: Not on file   Housing Stability: Not on file           Review of Systems         Objective    BP (!) 169/86 (BP Location: Right arm, Patient Position: Sitting, Cuff Size: Adult Regular)   Pulse 77   Resp 16   Ht 1.524 m (5')   Wt 51.7 kg (114 lb)   LMP 2012 (LMP Unknown)   SpO2 97%   BMI 22.26 kg/m    Body mass index is 22.26 kg/m .  Physical Exam   GENERAL: healthy, alert and no distress  EYES: Eyes grossly normal to inspection, PERRL and conjunctivae and sclerae normal  HENT: ear canals and  TM's normal, nose and mouth without ulcers or lesions  NECK: no adenopathy, no asymmetry, masses, or scars and thyroid normal to palpation  RESP: lungs clear to auscultation - no rales, rhonchi or wheezes  CV: regular rate and rhythm, normal S1 S2, no S3 or S4, no murmur, click or rub, no peripheral edema and peripheral pulses strong  ABDOMEN: soft, nontender, no hepatosplenomegaly, no masses and bowel sounds normal   (female): normal female external genitalia, normal urethral meatus, vaginal mucosa pink, moist, well rugated, and normal cervix/adnexa/uterus without masses or discharge  MS: no gross musculoskeletal defects noted, no edema  SKIN: no suspicious lesions or rashes  NEURO: Normal strength and tone, mentation intact and speech normal  PSYCH: mentation appears normal, affect normal/bright

## 2022-03-17 ENCOUNTER — MYC MEDICAL ADVICE (OUTPATIENT)
Dept: INTERNAL MEDICINE | Facility: CLINIC | Age: 52
End: 2022-03-17
Payer: COMMERCIAL

## 2022-03-17 ENCOUNTER — HOSPITAL ENCOUNTER (OUTPATIENT)
Facility: AMBULATORY SURGERY CENTER | Age: 52
End: 2022-03-17
Attending: INTERNAL MEDICINE
Payer: COMMERCIAL

## 2022-03-17 ENCOUNTER — TELEPHONE (OUTPATIENT)
Dept: GASTROENTEROLOGY | Facility: CLINIC | Age: 52
End: 2022-03-17
Payer: COMMERCIAL

## 2022-03-17 DIAGNOSIS — D64.9 ANEMIA: ICD-10-CM

## 2022-03-17 DIAGNOSIS — E55.9 VITAMIN D DEFICIENCY: Primary | ICD-10-CM

## 2022-03-17 LAB — DEPRECATED CALCIDIOL+CALCIFEROL SERPL-MC: 8 UG/L (ref 20–75)

## 2022-03-17 NOTE — TELEPHONE ENCOUNTER
Screening Questions  BlueKIND OF PREP RedLOCATION [review exclusion criteria] GreenSEDATION TYPE  1. Have you had a positive covid test in the last 90 days? N     2. Are you active on mychart? Y    3. What insurance is in the chart? N     3.   Ordering/Referring Provider: Varinder Crespo MD     4. BMI 22.3 [BMI OVER 40-EXTENDED PREP]  If greater than 40 review exclusion criteria [PAC APPT IF @ [facility]]        5.  Respiratory Screening :  [If yes to any of the following HOSPITAL setting only]     Do you use daily home oxygen? N    Do you have mod to severe Obstructive Sleep Apnea? N  [OKAY @ [facility] SH PH RI]   Do you have Pulmonary Hypertension? N     Do you have UNCONTROLLED asthma? Y        6.   Have you had a heart or lung transplant? N      7.   Are you currently on dialysis? N [ If yes, G-PREP & HOSPITAL setting only]     8.   Do you have chronic kidney disease? N [ If yes, G-PREP ]    9.   Have you had a stroke or Transient ischemic attack (TIA - aka  mini stroke ) within 6 months?  N (If yes, please review exclusion criteria)    10.   In the past 6 months, have you had any heart related issues including cardiomyopathy or heart attack? N           If yes, did it require cardiac stenting or other implantable device? NA      11.   Do you have any implantable devices in your body (pacemaker, defib, LVAD)? N (If yes, please review exclusion criteria)    12.   Do you take nitroglycerin? N           If yes, how often? NA  (if yes, HOSPITAL setting ONLY)    13.   Are you currently taking any blood thinners? N           [IF YES, INFORM PATIENT TO FOLLOW UP W/ ORDERING PROVIDER FOR BRIDGING INSTRUCTIONS]     14.   Do you have a diagnosis of diabetes? N   [ If yes, G-PREP ]    15.   [FEMALES] Are you currently pregnant? N    If yes, how many weeks? NA    16.   Are you taking any prescription pain medications on a routine schedule?  N  [ If yes, EXTENDED PREP.] [If yes, MAC]    17.   Do you have any chemical  dependencies such as alcohol, street drugs, or methadone?  Y [If yes, MAC]    18.   Do you have any history of post-traumatic stress syndrome, severe anxiety or history of psychosis?  Y  [If yes, MAC]    19.   Do you have a significant intellectual disability?  N [If yes, MAC]    20.   Do you transfer independently?  Y    21.  On a regular basis do you go 3-5 days between bowel movements? N   [ If yes, EXTENDED PREP.]    22.   Preferred LOCAL Pharmacy for Pre Prescription  [facility] #[id number] - [address] AT [facility]      Scheduling Details      Caller : Kristina Eldridge  (Please ask for phone number if not scheduled by patient)    Type of Procedure Scheduled: EGD  Which Colonoscopy Prep was Sent?: NITISH RED CF PATIENTS & GROEN'S PATIENTS NEEDS EXTENDED PREP  Surgeon: Efrain  Date of Procedure: 5/23  Location: [facility]      Sedation Type: MAC  Conscious Sedation- Needs  for 6 hours after the procedure  MAC/General-Needs  for 24 hours after procedure    Pre-op Required at [facility] [place], [place] and OR for MAC sedation: N  (advise patient they will need a pre-op prior to procedure -)      Informed patient they will need an adult  Y    Cannot take any type of public or medical transportation alone    Pre-Procedure Covid test to be completed at [facility] or Externally: Yes 5/19 [facility]    Confirmed Nurse will call to complete assessment Y    Additional comments:

## 2022-03-17 NOTE — TELEPHONE ENCOUNTER
----- Message from Varinder Crespo MD sent at 3/17/2022 11:32 AM CDT -----  She also has low vitamin D  She should take hi dose 50,000 international unit(s) pill, one po each week for eight weeks   Re: low vitamin D  Give eight pills, no refills    In nine weeks, do lab only vitamin D again and then we can decide on long term dose.      Iron a bit low   See if on iron or not and if eats meat or not, let me know     Let her know if we get iron levels up a bit it might help her restless legs, along with the requip

## 2022-03-17 NOTE — TELEPHONE ENCOUNTER
DIAGNOSIS: Bilateral hand pain /Dr Crespo/ no images   APPOINTMENT DATE: 3.22.22   NOTES STATUS DETAILS   OFFICE NOTE from referring provider Internal 3.16.22 Dr Varinder Crespo, [facility]   MEDICATION LIST Internal

## 2022-03-21 LAB
BKR LAB AP GYN ADEQUACY: NORMAL
BKR LAB AP GYN INTERPRETATION: NORMAL
BKR LAB AP HPV REFLEX: NORMAL
BKR LAB AP LMP: NORMAL
BKR LAB AP PREVIOUS ABNORMAL: NORMAL
PATH REPORT.COMMENTS IMP SPEC: NORMAL
PATH REPORT.COMMENTS IMP SPEC: NORMAL
PATH REPORT.RELEVANT HX SPEC: NORMAL

## 2022-03-21 NOTE — TELEPHONE ENCOUNTER
Take the vit D for eight weeks, then redo lab    Take ferrous sulfate 325 mg tab  One po every other day   Take with citrus food or beverage  Ok 15 tab, three refills    In two mo do:  Vit D  CBC/diff  Iron, tibc, ferritin  Lab only

## 2022-03-22 ENCOUNTER — PRE VISIT (OUTPATIENT)
Dept: ORTHOPEDICS | Facility: CLINIC | Age: 52
End: 2022-03-22

## 2022-03-22 DIAGNOSIS — M79.641 BILATERAL HAND PAIN: Primary | ICD-10-CM

## 2022-03-22 DIAGNOSIS — M79.642 BILATERAL HAND PAIN: Primary | ICD-10-CM

## 2022-03-22 RX ORDER — FERROUS SULFATE 325(65) MG
325 TABLET ORAL EVERY OTHER DAY
Qty: 15 TABLET | Refills: 3 | Status: SHIPPED | OUTPATIENT
Start: 2022-03-22

## 2022-03-22 RX ORDER — ERGOCALCIFEROL 1.25 MG/1
50000 CAPSULE, LIQUID FILLED ORAL WEEKLY
Qty: 8 CAPSULE | Refills: 0 | Status: SHIPPED | OUTPATIENT
Start: 2022-03-22

## 2022-03-23 LAB
HUMAN PAPILLOMA VIRUS 16 DNA: NEGATIVE
HUMAN PAPILLOMA VIRUS 18 DNA: NEGATIVE
HUMAN PAPILLOMA VIRUS FINAL DIAGNOSIS: NORMAL
HUMAN PAPILLOMA VIRUS OTHER HR: NEGATIVE

## 2022-03-29 ENCOUNTER — OFFICE VISIT (OUTPATIENT)
Dept: PULMONOLOGY | Facility: CLINIC | Age: 52
End: 2022-03-29
Attending: INTERNAL MEDICINE
Payer: COMMERCIAL

## 2022-03-29 ENCOUNTER — TELEPHONE (OUTPATIENT)
Dept: ORTHOPEDICS | Facility: CLINIC | Age: 52
End: 2022-03-29

## 2022-03-29 ENCOUNTER — OFFICE VISIT (OUTPATIENT)
Dept: ORTHOPEDICS | Facility: CLINIC | Age: 52
End: 2022-03-29
Attending: FAMILY MEDICINE
Payer: COMMERCIAL

## 2022-03-29 ENCOUNTER — ANCILLARY PROCEDURE (OUTPATIENT)
Dept: GENERAL RADIOLOGY | Facility: CLINIC | Age: 52
End: 2022-03-29
Attending: FAMILY MEDICINE
Payer: COMMERCIAL

## 2022-03-29 ENCOUNTER — ANCILLARY PROCEDURE (OUTPATIENT)
Dept: MAMMOGRAPHY | Facility: CLINIC | Age: 52
End: 2022-03-29
Attending: FAMILY MEDICINE
Payer: COMMERCIAL

## 2022-03-29 VITALS
WEIGHT: 114 LBS | TEMPERATURE: 99.4 F | DIASTOLIC BLOOD PRESSURE: 93 MMHG | OXYGEN SATURATION: 97 % | HEART RATE: 86 BPM | SYSTOLIC BLOOD PRESSURE: 166 MMHG | BODY MASS INDEX: 22.26 KG/M2

## 2022-03-29 VITALS — WEIGHT: 114 LBS | HEIGHT: 60 IN | BODY MASS INDEX: 22.38 KG/M2

## 2022-03-29 DIAGNOSIS — R91.8 PULMONARY NODULES: Primary | ICD-10-CM

## 2022-03-29 DIAGNOSIS — M65.30 TRIGGER FINGER OF RIGHT HAND, UNSPECIFIED FINGER: ICD-10-CM

## 2022-03-29 DIAGNOSIS — R92.8 ABNORMAL MAMMOGRAM OF LEFT BREAST: ICD-10-CM

## 2022-03-29 DIAGNOSIS — M18.12 ARTHRITIS OF CARPOMETACARPAL (CMC) JOINT OF LEFT THUMB: Primary | ICD-10-CM

## 2022-03-29 DIAGNOSIS — J45.30 MILD PERSISTENT ASTHMA, UNCOMPLICATED: ICD-10-CM

## 2022-03-29 PROCEDURE — G0463 HOSPITAL OUTPT CLINIC VISIT: HCPCS

## 2022-03-29 PROCEDURE — 99204 OFFICE O/P NEW MOD 45 MIN: CPT | Performed by: INTERNAL MEDICINE

## 2022-03-29 PROCEDURE — 99213 OFFICE O/P EST LOW 20 MIN: CPT | Performed by: FAMILY MEDICINE

## 2022-03-29 PROCEDURE — G0279 TOMOSYNTHESIS, MAMMO: HCPCS

## 2022-03-29 PROCEDURE — 77066 DX MAMMO INCL CAD BI: CPT

## 2022-03-29 PROCEDURE — 73130 X-RAY EXAM OF HAND: CPT | Mod: LT | Performed by: RADIOLOGY

## 2022-03-29 RX ORDER — IPRATROPIUM BROMIDE 17 UG/1
AEROSOL, METERED RESPIRATORY (INHALATION)
Qty: 12.9 G | Refills: 11 | Status: SHIPPED | OUTPATIENT
Start: 2022-03-29 | End: 2022-04-27

## 2022-03-29 ASSESSMENT — PAIN SCALES - GENERAL: PAINLEVEL: NO PAIN (0)

## 2022-03-29 NOTE — PROGRESS NOTES
ASSESSMENT/PLAN:    (M18.12) Arthritis of carpometacarpal (CMC) joint of left thumb  (primary encounter diagnosis)  Comment: exam consistent w/ 1st CMC OA; will start w/ hand therapy/ custom splint; f/u in 6 wks; if no better, consider an injection  Plan: Hand Therapy Referral          (M65.30) Trigger finger of right hand, unspecified finger  Comment:   Plan: no acute trigger at this time; explained etiology and tx options; can follow-up prn for injection; will buy otc finger splint to use prn    Asa Bashir MD  March 29, 2022  2:43 PM        Pt is a 51 year old R-handed female referred by Dr Crespo here today for:     Bilateral Wrist/ Hand pain:   Location: Left CMC joint and thenar eminence. Right hand thumb, 2nd and 3rd digit are painful with locking.   Duration: 1 year    Trauma/ Fall? None   Swelling? None   Numbness/ Tingling? None   Weakness? Yes   Imaging? Yes, obtained today.   Treatment? None       Past Medical History:   Diagnosis Date     Abnormal Pap smear 2/21/2017     said pap was abnormal     Arthritis      Chronic back pain     hx spondylolisthesis     Chronic pain      COPD (chronic obstructive pulmonary disease) (H)     PFT's 2013 FEV1/FVC = 2.68/3.20 = 84%     H/O gastric bypass 2007     Hepatitis C     treated ribaviron & interferon in 2008 for serotype 2 with failed 6 month treatment     Hyperlipidemia     on simvatatin     Insomnia      Other chronic pain      Positive TB test     has taken INH     RLS (restless legs syndrome)      Seasonal allergies      TB lung, latent 1994    treated     Uncomplicated asthma      Wounds and injuries 2003      Past Surgical History:   Procedure Laterality Date     CHOLECYSTECTOMY       D & C  1987     ENDOSCOPIC ULTRASOUND UPPER GASTROINTESTINAL TRACT (GI) N/A 10/21/2019    Procedure: ENDOSCOPIC ULTRASOUND, ESOPHAGOSCOPY / UPPER GASTROINTESTINAL TRACT (GI) with jejunalgastrostomy with stent placement x 2, dilation tract;  Surgeon: Ramesh Self  MD Sanjeev;  [facility]     ENDOSCOPIC ULTRASOUND UPPER GASTROINTESTINAL TRACT (GI) N/A 1/8/2020    Procedure: ENDOSCOPIC ULTRASOUND, ESOPHAGOSCOPY / UPPER GASTROINTESTINAL TRACT (GI)  with stent exchange;  Surgeon: Ramesh Self MD;  [facility]     ENDOSCOPIC ULTRASOUND UPPER GASTROINTESTINAL TRACT (GI) N/A 6/2/2020    Procedure: ENDOSCOPIC ULTRASOUND, ESOPHAGOSCOPY / UPPER GASTROINTESTINAL TRACT (GI)  **Latex Allergy** stent exchange;  Surgeon: Ramesh Self MD;  [facility]     ESOPHAGOSCOPY, GASTROSCOPY, DUODENOSCOPY (EGD), COMBINED  7/30/2014    Procedure: COMBINED ESOPHAGOSCOPY, GASTROSCOPY, DUODENOSCOPY (EGD);  Surgeon: Ramesh Self MD;  [facility]     ESOPHAGOSCOPY, GASTROSCOPY, DUODENOSCOPY (EGD), COMBINED N/A 8/14/2019    Procedure: ESOPHAGOGASTRODUODENOSCOPY, WITH BIOPSY;  Surgeon: Johann Hazel MD;  [facility]     FRACTURE TX, ANKLE RT/LT  1991    ORIF rt ankle     FUSION SPINE POSTERIOR ONE LEVEL N/A 11/21/2014    Procedure: FUSION SPINE POSTERIOR ONE LEVEL;  Surgeon: Amilcar Baldwin MD;  [facility]     FUSION SPINE POSTERIOR ONE LEVEL Right 7/30/2015    Procedure: FUSION SPINE POSTERIOR ONE LEVEL;  Surgeon: Amilcar Baldwin MD;  [facility]     GASTRIC BYPASS  2007    lost 150 lbs     GRAFT BONE FROM ILIAC CREST Left 11/21/2014    Procedure: GRAFT BONE FROM ILIAC CREST;  Surgeon: Amilcar Baldwin MD;  [facility]     hardware removal       INJECT BLOCK MEDIAL BRANCH CERVICAL/THORACIC/LUMBAR Bilateral 7/10/2020    Procedure: Bilateral lumbar 3, lumbar 4, and lumbar 5 medial branch nerve diagnostic blocks #2;  Surgeon: Pravin Bagley MD;  [facility]     INJECT PARAVERTEBRAL FACET JOINT LUMBAR / SACRAL FIRST Bilateral 9/30/2019    Procedure: Lumbar Medial Branch Nerve Block Injection Bilateral;  Surgeon: Hira Rodríguez MD;  [facility]     OPTICAL TRACKING SYSTEM FUSION  POSTERIOR SPINE LUMBAR  11/20/2013    Procedure: OPTICAL TRACKING SYSTEM FUSION SPINE POSTERIOR LUMBAR ONE LEVEL;  Lumbar 4-5 Transforaminal Interbody Fusion;  Surgeon: Amilcar Baldwin MD;  [facility]     ORTHOPEDIC SURGERY      shoulder surgery, removed bone spur     PHACOEMULSIFICATION CLEAR CORNEA WITH STANDARD INTRAOCULAR LENS IMPLANT Right 5/29/2020    Procedure: RIGHT PHACOEMULSIFICATION, CATARACT, WITH INTRAOCULAR LENS IMPLANT;  Surgeon: Lyla Mahoney MD;  [facility]     PHACOEMULSIFICATION CLEAR CORNEA WITH STANDARD INTRAOCULAR LENS IMPLANT Left 7/17/2020    Procedure: LEFT EYE CATARACT REMOVAL WITH INTRAOCULAR LENS IMPLANT;  Surgeon: Lyla Mahoney MD;  [facility]     TUBAL LIGATION        Current Outpatient Medications   Medication Sig Dispense Refill     cetirizine (ZYRTEC) 10 MG tablet Take 1 tablet (10 mg) by mouth daily 30 tablet 11     ferrous sulfate (FEROSUL) 325 (65 Fe) MG tablet Take 1 tablet (325 mg) by mouth every other day Take with citrus food or beverage 15 tablet 3     ipratropium (ATROVENT HFA) 17 MCG/ACT inhaler INHALE 2 PUFFS BY MOUTH INTO LUNGS EVERY 6 HOURS 12.9 g 11     mometasone-formoterol (DULERA) 200-5 MCG/ACT inhaler Inhale 2 puffs into the lungs 2 times daily 13 g 1     montelukast (SINGULAIR) 10 MG tablet Take 1 tablet (10 mg) by mouth At Bedtime 90 tablet 3     order for DME Nebulizer 1 each 0     rOPINIRole (REQUIP) 1 MG tablet Take 1 tablet (1 mg) by mouth 2 times daily 180 tablet 3     Spacer/Aero Chamber Mouthpiece MISC 1 Units daily 1 each 0     vitamin D2 (ERGOCALCIFEROL) 61091 units (1250 mcg) capsule Take 1 capsule (50,000 Units) by mouth once a week 8 capsule 0      Allergies   Allergen Reactions     Bee Venom Anaphylaxis, Anxiety, Difficulty breathing, Hives, Itching, Nausea and Vomiting, Palpitations, Shortness Of Breath and Swelling     Opium Alkaloids, Hcls Itching     Can take opiate derived narcotics with Benadryl.      Morphine Itching     Latex Rash and Itching      ROS:   Gen- no fevers/chills   Rheum - no morning stiffness   Derm - no rash/ redness   Neuro - no numbness, no tingling   Remainder of ROS negative.     Exam:   Ht 1.524 m (5')   Wt 51.7 kg (114 lb)   LMP 06/11/2012 (LMP Unknown)   BMI 22.26 kg/m       Bilateral WRIST/HAND:   Inspection: Swelling - mild at 1st CMC; Atrophy - NO   Sensation: intact in median, radial, ulnar distribution   ROM:   Wrist: flexion- full/ extension- full/ pronation- full/ supination- full;   radial deviation - full; ulnar deviation- full   Hand: Full flexion at PIP/DIP, finger abduction/ adduction.   Strength: 5/5 in all motions   Bony tenderness:   Wrist: Carpal bones: +TTP at 1st CMC on L; Snuffbox: NO   Hand: Metacarpals: NO; Phalanges: No   Tendons: FDS/FDP/Extensor mechanism intact   Maneuvers: deferred  Other: No nodules palpated in palmar aspect of R hand     Xray hands - 3/29/2022     Findings:     PA, oblique and lateral view(s) of the right  hand(s) were obtained.      Left:     No acute osseous abnormality.  No erosion.     Moderate to severe degenerative changes of the first carpometacarpal  and triscaphe joints.  Additional scattered mild degenerative change  in the interphalangeal joints, particularly distally.     Soft tissue is unremarkable.     Right:     No acute osseous abnormality.  No erosion.     Moderate to severe degenerative changes of the first carpometacarpal  and triscaphe joints.  Additional scattered mild degenerative change  in the interphalangeal joints, particularly distally.     Soft tissue is unremarkable.                                                                      Impression:  1. No acute osseous abnormality.  2. Bilateral moderate degenerative changes of the first  carpometacarpal and triscaphe joints.  Additional scattered mild  degenerative change in the interphalangeal joints, particularly  distally.

## 2022-03-29 NOTE — NURSING NOTE
Oncology Rooming Note    March 29, 2022 11:20 AM   Kristina Eldridge is a 51 year old female who presents for:    Chief Complaint   Patient presents with     Oncology Clinic Visit     Rtn Lung Nodules     Initial Vitals: BP (!) 166/93   Pulse 86   Temp 99.4  F (37.4  C) (Oral)   Wt 51.7 kg (114 lb)   LMP 06/11/2012 (LMP Unknown)   SpO2 97%   BMI 22.26 kg/m   Estimated body mass index is 22.26 kg/m  as calculated from the following:    Height as of 3/16/22: 1.524 m (5').    Weight as of this encounter: 51.7 kg (114 lb). Body surface area is 1.48 meters squared.  No Pain (0) Comment: Data Unavailable   Patient's last menstrual period was 06/11/2012 (lmp unknown).  Allergies reviewed: Yes  Medications reviewed: Yes    Medications: Medication refills not needed today.  Pharmacy name entered into 7fgame: [id number] - [facility] [address]    Clinical concerns: none       Priscila Hurtado CMA

## 2022-03-29 NOTE — Clinical Note
Xu - this patient last saw Dr Coates in 7/2019 and was treated w/ injections in her back. She noted at visit today with me for her hand that her back pain has returned. Are you able to contact her for a follow-up appointment? Thank you! - linda Bashir MD  March 29, 2022  2:56 PM

## 2022-03-29 NOTE — LETTER
3/29/2022      RE: Kristina Eldridge  [address]  [facility] [id number]       ASSESSMENT/PLAN:    (M18.12) Arthritis of carpometacarpal (CMC) joint of left thumb  (primary encounter diagnosis)  Comment: exam consistent w/ 1st CMC OA; will start w/ hand therapy/ custom splint; f/u in 6 wks; if no better, consider an injection  Plan: Hand Therapy Referral          (M65.30) Trigger finger of right hand, unspecified finger  Comment:   Plan: no acute trigger at this time; explained etiology and tx options; can follow-up prn for injection; will buy otc finger splint to use prn    Asa Bashir MD  March 29, 2022  2:43 PM        Pt is a 51 year old R-handed female referred by Dr Crespo here today for:     Bilateral Wrist/ Hand pain:   Location: Left CMC joint and thenar eminence. Right hand thumb, 2nd and 3rd digit are painful with locking.   Duration: 1 year    Trauma/ Fall? None   Swelling? None   Numbness/ Tingling? None   Weakness? Yes   Imaging? Yes, obtained today.   Treatment? None       Past Medical History:   Diagnosis Date     Abnormal Pap smear 2/21/2017     said pap was abnormal     Arthritis      Chronic back pain     hx spondylolisthesis     Chronic pain      COPD (chronic obstructive pulmonary disease) (H)     PFT's 2013 FEV1/FVC = 2.68/3.20 = 84%     H/O gastric bypass 2007     Hepatitis C     treated ribaviron & interferon in 2008 for serotype 2 with failed 6 month treatment     Hyperlipidemia     on simvatatin     Insomnia      Other chronic pain      Positive TB test     has taken INH     RLS (restless legs syndrome)      Seasonal allergies      TB lung, latent 1994    treated     Uncomplicated asthma      Wounds and injuries 2003      Past Surgical History:   Procedure Laterality Date     CHOLECYSTECTOMY       D & C  1987     ENDOSCOPIC ULTRASOUND UPPER GASTROINTESTINAL TRACT (GI) N/A 10/21/2019    Procedure: ENDOSCOPIC ULTRASOUND, ESOPHAGOSCOPY / UPPER GASTROINTESTINAL TRACT (GI) with  jejunalgastrostomy with stent placement x 2, dilation tract;  Surgeon: Ramesh Self MD;  [facility]     ENDOSCOPIC ULTRASOUND UPPER GASTROINTESTINAL TRACT (GI) N/A 1/8/2020    Procedure: ENDOSCOPIC ULTRASOUND, ESOPHAGOSCOPY / UPPER GASTROINTESTINAL TRACT (GI)  with stent exchange;  Surgeon: Ramesh Self MD;  [facility]     ENDOSCOPIC ULTRASOUND UPPER GASTROINTESTINAL TRACT (GI) N/A 6/2/2020    Procedure: ENDOSCOPIC ULTRASOUND, ESOPHAGOSCOPY / UPPER GASTROINTESTINAL TRACT (GI)  **Latex Allergy** stent exchange;  Surgeon: Ramesh Self MD;  [facility]     ESOPHAGOSCOPY, GASTROSCOPY, DUODENOSCOPY (EGD), COMBINED  7/30/2014    Procedure: COMBINED ESOPHAGOSCOPY, GASTROSCOPY, DUODENOSCOPY (EGD);  Surgeon: Ramesh Self MD;  [facility]     ESOPHAGOSCOPY, GASTROSCOPY, DUODENOSCOPY (EGD), COMBINED N/A 8/14/2019    Procedure: ESOPHAGOGASTRODUODENOSCOPY, WITH BIOPSY;  Surgeon: Johann Hazel MD;  [facility]     FRACTURE TX, ANKLE RT/LT  1991    ORIF rt ankle     FUSION SPINE POSTERIOR ONE LEVEL N/A 11/21/2014    Procedure: FUSION SPINE POSTERIOR ONE LEVEL;  Surgeon: Amilcar Baldwin MD;  [facility]     FUSION SPINE POSTERIOR ONE LEVEL Right 7/30/2015    Procedure: FUSION SPINE POSTERIOR ONE LEVEL;  Surgeon: Amilcar Baldwin MD;  [facility]     GASTRIC BYPASS  2007    lost 150 lbs     GRAFT BONE FROM ILIAC CREST Left 11/21/2014    Procedure: GRAFT BONE FROM ILIAC CREST;  Surgeon: Amilcar Baldwin MD;  [facility]     hardware removal       INJECT BLOCK MEDIAL BRANCH CERVICAL/THORACIC/LUMBAR Bilateral 7/10/2020    Procedure: Bilateral lumbar 3, lumbar 4, and lumbar 5 medial branch nerve diagnostic blocks #2;  Surgeon: Pravin Bagley MD;  [facility]     INJECT PARAVERTEBRAL FACET JOINT LUMBAR / SACRAL FIRST Bilateral 9/30/2019    Procedure: Lumbar Medial Branch Nerve Block Injection Bilateral;   Surgeon: Hira Rodríguez MD;  [facility]     OPTICAL TRACKING SYSTEM FUSION POSTERIOR SPINE LUMBAR  11/20/2013    Procedure: OPTICAL TRACKING SYSTEM FUSION SPINE POSTERIOR LUMBAR ONE LEVEL;  Lumbar 4-5 Transforaminal Interbody Fusion;  Surgeon: Amilcar Baldwin MD;  [facility]     ORTHOPEDIC SURGERY      shoulder surgery, removed bone spur     PHACOEMULSIFICATION CLEAR CORNEA WITH STANDARD INTRAOCULAR LENS IMPLANT Right 5/29/2020    Procedure: RIGHT PHACOEMULSIFICATION, CATARACT, WITH INTRAOCULAR LENS IMPLANT;  Surgeon: Lyla Mahoney MD;  [facility]     PHACOEMULSIFICATION CLEAR CORNEA WITH STANDARD INTRAOCULAR LENS IMPLANT Left 7/17/2020    Procedure: LEFT EYE CATARACT REMOVAL WITH INTRAOCULAR LENS IMPLANT;  Surgeon: Lyla Mahoney MD;  [facility]     TUBAL LIGATION        Current Outpatient Medications   Medication Sig Dispense Refill     cetirizine (ZYRTEC) 10 MG tablet Take 1 tablet (10 mg) by mouth daily 30 tablet 11     ferrous sulfate (FEROSUL) 325 (65 Fe) MG tablet Take 1 tablet (325 mg) by mouth every other day Take with citrus food or beverage 15 tablet 3     ipratropium (ATROVENT HFA) 17 MCG/ACT inhaler INHALE 2 PUFFS BY MOUTH INTO LUNGS EVERY 6 HOURS 12.9 g 11     mometasone-formoterol (DULERA) 200-5 MCG/ACT inhaler Inhale 2 puffs into the lungs 2 times daily 13 g 1     montelukast (SINGULAIR) 10 MG tablet Take 1 tablet (10 mg) by mouth At Bedtime 90 tablet 3     order for DME Nebulizer 1 each 0     rOPINIRole (REQUIP) 1 MG tablet Take 1 tablet (1 mg) by mouth 2 times daily 180 tablet 3     Spacer/Aero Chamber Mouthpiece MISC 1 Units daily 1 each 0     vitamin D2 (ERGOCALCIFEROL) 62262 units (1250 mcg) capsule Take 1 capsule (50,000 Units) by mouth once a week 8 capsule 0      Allergies   Allergen Reactions     Bee Venom Anaphylaxis, Anxiety, Difficulty breathing, Hives, Itching, Nausea and Vomiting, Palpitations, Shortness Of Breath and Swelling     Opium  Alkaloids, Hcls Itching     Can take opiate derived narcotics with Benadryl.     Morphine Itching     Latex Rash and Itching      ROS:   Gen- no fevers/chills   Rheum - no morning stiffness   Derm - no rash/ redness   Neuro - no numbness, no tingling   Remainder of ROS negative.     Exam:   Ht 1.524 m (5')   Wt 51.7 kg (114 lb)   LMP 06/11/2012 (LMP Unknown)   BMI 22.26 kg/m       Bilateral WRIST/HAND:   Inspection: Swelling - mild at 1st CMC; Atrophy - NO   Sensation: intact in median, radial, ulnar distribution   ROM:   Wrist: flexion- full/ extension- full/ pronation- full/ supination- full;   radial deviation - full; ulnar deviation- full   Hand: Full flexion at PIP/DIP, finger abduction/ adduction.   Strength: 5/5 in all motions   Bony tenderness:   Wrist: Carpal bones: +TTP at 1st CMC on L; Snuffbox: NO   Hand: Metacarpals: NO; Phalanges: No   Tendons: FDS/FDP/Extensor mechanism intact   Maneuvers: deferred  Other: No nodules palpated in palmar aspect of R hand     Xray hands - 3/29/2022     Findings:     PA, oblique and lateral view(s) of the right  hand(s) were obtained.      Left:     No acute osseous abnormality.  No erosion.     Moderate to severe degenerative changes of the first carpometacarpal  and triscaphe joints.  Additional scattered mild degenerative change  in the interphalangeal joints, particularly distally.     Soft tissue is unremarkable.     Right:     No acute osseous abnormality.  No erosion.     Moderate to severe degenerative changes of the first carpometacarpal  and triscaphe joints.  Additional scattered mild degenerative change  in the interphalangeal joints, particularly distally.     Soft tissue is unremarkable.                                                                      Impression:  1. No acute osseous abnormality.  2. Bilateral moderate degenerative changes of the first  carpometacarpal and triscaphe joints.  Additional scattered mild  degenerative change in the  interphalangeal joints, particularly  distally.           Asa Bashir MD

## 2022-03-29 NOTE — PROGRESS NOTES
LUNG NODULE & INTERVENTIONAL PULMONARY CLINIC  [facility]     Kristina Eldridge MRN# [id number]   Age: 51 year old YOB: 1970     Reason for Consultation: lung nodule(s)    Requesting Physician: Milton Brothers MD  [address] [place] [address]       Assessment and Plan:    1. New multiple pulmonary lung nodule(s). Given the characteristics on current/previous imaging and risk factors; I would classify this to be Intermediate (6-65%) risk for cancer. Sub-6mm nodules on 5/2021. No recent followup CT. Would have previously planned a 12mo followup back in 5/2021, so will repeat CT in 3mo.     2. COPD/asthma. Stable on inhalers.      Billing: I spent 45-59min (New, 33580) on the date of the encounter doing chart review, history and exam, documentation and further activities as described in this note.    Mekhi Orosco MD   of Medicine  Interventional Pulmonology  Department of Pulmonary, Allergy, Critical Care and Sleep Medicine   [facility]  Pager: [phone]          History:     Kristina Eldridge is a 51 year old female with sig h/o for HCV, COPD/asthma, RYGB, LTB, RLS  who is here for evaluation/followup of lung nodule(s).    - No new resp sx or complaints. Denies dyspnea or cough.   - Had CT chest which demonstrated nodules back in 5/2021. She is here for evaluation  - Personal hx of cancer: NA  - Family hx of cancer: No  - Exposure hx: Denies asbestos or radon exposure   - Tobacco hx: Past Smoker: currently smoking 2ppd since 6yo.   - My interpretation of the images relevant for this visit includes: nodules   - My interpretation of the PFT's relevant for this visit includes: None     Culprit Nodule(s):   Abdominal cystic change medially in the left lung apex  appears similar. Lingular subsegmental atelectasis and scarring is  present. Scattered sub-4 mm pulmonary nodules are  incidentally noted.  Mild bronchiectasis in the medial right middle lobe appears similar.    Other active medical problems include:   - has COPD/asthma overlap. On dulera, montelukast and prn atrovent. No recent AECOPD. Vaccinated.    - had LTB.    - has HCV.           Past Medical History:      Past Medical History:   Diagnosis Date     Abnormal Pap smear 2/21/2017    Dr said pap was abnormal     Arthritis      Chronic back pain     hx spondylolisthesis     Chronic pain      COPD (chronic obstructive pulmonary disease) (H)     PFT's 2013 FEV1/FVC = 2.68/3.20 = 84%     H/O gastric bypass 2007     Hepatitis C     treated ribaviron & interferon in 2008 for serotype 2 with failed 6 month treatment     Hyperlipidemia     on simvatatin     Insomnia      Other chronic pain      Positive TB test     has taken INH     RLS (restless legs syndrome)      Seasonal allergies      TB lung, latent 1994    treated     Uncomplicated asthma      Wounds and injuries 2003           Past Surgical History:      Past Surgical History:   Procedure Laterality Date     CHOLECYSTECTOMY       D & C  1987     ENDOSCOPIC ULTRASOUND UPPER GASTROINTESTINAL TRACT (GI) N/A 10/21/2019    Procedure: ENDOSCOPIC ULTRASOUND, ESOPHAGOSCOPY / UPPER GASTROINTESTINAL TRACT (GI) with jejunalgastrostomy with stent placement x 2, dilation tract;  Surgeon: Ramesh Self MD;  Location: [facility]     ENDOSCOPIC ULTRASOUND UPPER GASTROINTESTINAL TRACT (GI) N/A 1/8/2020    Procedure: ENDOSCOPIC ULTRASOUND, ESOPHAGOSCOPY / UPPER GASTROINTESTINAL TRACT (GI)  with stent exchange;  Surgeon: Ramesh Self MD;  Location: [facility] OR     ENDOSCOPIC ULTRASOUND UPPER GASTROINTESTINAL TRACT (GI) N/A 6/2/2020    Procedure: ENDOSCOPIC ULTRASOUND, ESOPHAGOSCOPY / UPPER GASTROINTESTINAL TRACT (GI)  **Latex Allergy** stent exchange;  Surgeon: Ramesh Self MD;  Location: [facility] OR     ESOPHAGOSCOPY, GASTROSCOPY, DUODENOSCOPY (EGD), COMBINED  7/30/2014    Procedure:  COMBINED ESOPHAGOSCOPY, GASTROSCOPY, DUODENOSCOPY (EGD);  Surgeon: Ramesh Self MD;  Location: [facility] GI     ESOPHAGOSCOPY, GASTROSCOPY, DUODENOSCOPY (EGD), COMBINED N/A 8/14/2019    Procedure: ESOPHAGOGASTRODUODENOSCOPY, WITH BIOPSY;  Surgeon: Johann Hazel MD;  [facility]     FRACTURE TX, ANKLE RT/LT  1991    ORIF rt ankle     FUSION SPINE POSTERIOR ONE LEVEL N/A 11/21/2014    Procedure: FUSION SPINE POSTERIOR ONE LEVEL;  Surgeon: Amilcar Baldwin MD;  [facility]     FUSION SPINE POSTERIOR ONE LEVEL Right 7/30/2015    Procedure: FUSION SPINE POSTERIOR ONE LEVEL;  Surgeon: Amilcar Baldwin MD;  [facility]     GASTRIC BYPASS  2007    lost 150 lbs     GRAFT BONE FROM ILIAC CREST Left 11/21/2014    Procedure: GRAFT BONE FROM ILIAC CREST;  Surgeon: Amilcar Baldwin MD;  [facility]     hardware removal       INJECT BLOCK MEDIAL BRANCH CERVICAL/THORACIC/LUMBAR Bilateral 7/10/2020    Procedure: Bilateral lumbar 3, lumbar 4, and lumbar 5 medial branch nerve diagnostic blocks #2;  Surgeon: Pravin Bagley MD;  [facility]     INJECT PARAVERTEBRAL FACET JOINT LUMBAR / SACRAL FIRST Bilateral 9/30/2019    Procedure: Lumbar Medial Branch Nerve Block Injection Bilateral;  Surgeon: Hira Rodríguez MD;  [facility]     OPTICAL TRACKING SYSTEM FUSION POSTERIOR SPINE LUMBAR  11/20/2013    Procedure: OPTICAL TRACKING SYSTEM FUSION SPINE POSTERIOR LUMBAR ONE LEVEL;  Lumbar 4-5 Transforaminal Interbody Fusion;  Surgeon: Amilcar Baldwin MD;  [facility]     ORTHOPEDIC SURGERY      shoulder surgery, removed bone spur     PHACOEMULSIFICATION CLEAR CORNEA WITH STANDARD INTRAOCULAR LENS IMPLANT Right 5/29/2020    Procedure: RIGHT PHACOEMULSIFICATION, CATARACT, WITH INTRAOCULAR LENS IMPLANT;  Surgeon: Lyla Mahoney MD;  [facility]     PHACOEMULSIFICATION CLEAR CORNEA WITH STANDARD INTRAOCULAR LENS IMPLANT Left 7/17/2020     Procedure: LEFT EYE CATARACT REMOVAL WITH INTRAOCULAR LENS IMPLANT;  Surgeon: Lyla Mahoney MD;  [facility]     TUBAL LIGATION            Social History:     Social History     Tobacco Use     Smoking status: Former Smoker     Packs/day: 1.00     Years: 30.00     Pack years: 30.00     Types: Cigarettes     Quit date: 2014     Years since quittin.7     Smokeless tobacco: Never Used     Tobacco comment: pack a day   Substance Use Topics     Alcohol use: Not Currently     Alcohol/week: 0.0 standard drinks     Comment: x3 drinks per year          Family History:     Family History   Problem Relation Age of Onset     Hypertension Father      Respiratory Father         COPD, smoked     Glaucoma Father      Sleep Apnea Father      C.A.D. Mother         CHF,  age 59 of MI, smoked     Crohn's Disease Brother      Diabetes Maternal Grandmother         insulin dependent     Diabetes Paternal Grandmother      Glaucoma Paternal Grandmother      Alzheimer Disease Paternal Grandmother      Melanoma No family hx of      Skin Cancer No family hx of      Macular Degeneration No family hx of      Anesthesia Reaction No family hx of      Deep Vein Thrombosis (DVT) No family hx of            Allergies:      Allergies   Allergen Reactions     Bee Venom Anaphylaxis, Anxiety, Difficulty breathing, Hives, Itching, Nausea and Vomiting, Palpitations, Shortness Of Breath and Swelling     Opium Alkaloids, Hcls Itching     Can take opiate derived narcotics with Benadryl.     Morphine Itching     Latex Rash and Itching          Medications:     Current Outpatient Medications   Medication Sig     albuterol (PROVENTIL) (2.5 MG/3ML) 0.083% neb solution Take 1 vial (2.5 mg) by nebulization every 6 hours as needed for shortness of breath / dyspnea or wheezing     albuterol (VENTOLIN HFA) 108 (90 Base) MCG/ACT inhaler Inhale 2 puffs into the lungs every 4 hours as needed for shortness of breath / dyspnea or wheezing     Calcium  "Carb-Cholecalciferol 500-400 MG-UNIT CHEW Take 1 tablet by mouth 2 times daily     cetirizine (ZYRTEC) 10 MG tablet Take 1 tablet (10 mg) by mouth daily     cyanocobalamin (CYANOCOBALAMIN) 1000 MCG/ML injection Inject 1 mL (1,000 mcg) into the muscle every 30 days     ferrous sulfate (FEROSUL) 325 (65 Fe) MG tablet Take 1 tablet (325 mg) by mouth every other day Take with citrus food or beverage     gabapentin (NEURONTIN) 300 MG capsule      ipratropium (ATROVENT HFA) 17 MCG/ACT inhaler INHALE 2 PUFFS BY MOUTH INTO LUNGS EVERY 6 HOURS     mometasone-formoterol (DULERA) 100-5 MCG/ACT inhaler Inhale 2 puffs into the lungs 2 times daily     mometasone-formoterol (DULERA) 200-5 MCG/ACT inhaler Inhale 2 puffs into the lungs 2 times daily     montelukast (SINGULAIR) 10 MG tablet Take 1 tablet (10 mg) by mouth At Bedtime     ofloxacin (OCUFLOX) 0.3 % ophthalmic solution 1 drop 4x daily in the surgical eye for 1 week after surgery, then stop     order for DME Nebulizer     rOPINIRole (REQUIP) 1 MG tablet Take 1 tablet (1 mg) by mouth 2 times daily     spacer (OPTICHAMBER OTTONIEL) holding chamber USE ONCE DAILY     spacer (OPTICHAMBER OTTONIEL) holding chamber USE ONCE DAILY     Spacer/Aero Chamber Mouthpiece MISC 1 Units daily     syringe/needle, disp, (B-D LUER-JIMMY SYRINGE) 23G X 1\" 3 ML MISC Use 1 monthly with B12     Syringe/Needle, Disp, 23G X 1\" 1 ML MISC Use for b12 injection monthly     traZODone (DESYREL) 100 MG tablet TAKE 2 TABLETS(200 MG) BY MOUTH EVERY NIGHT AS NEEDED FOR SLEEP     vitamin D2 (ERGOCALCIFEROL) 57262 units (1250 mcg) capsule Take 1 capsule (50,000 Units) by mouth once a week     No current facility-administered medications for this visit.          Review of Systems:     CONSTITUTIONAL: negative for fever, chills, change in weight  INTEGUMENTARY/SKIN: no rash or obvious new lesions  ENT/MOUTH: no sore throat, new sinus pain or nasal drainage  RESP: see interval history  CV: negative for chest pain, " palpitations or peripheral edema  GI: no nausea, vomiting, change in stools  : no dysuria  MUSCULOSKELETAL: no myalgias, arthralgias  ENDOCRINE: blood sugars with adequate control  PSYCHIATRIC: mood stable  LYMPHATIC: no new lymphadenopathy  HEME: no bleeding or easy bruisability  NEURO: no numbness, weakness, headaches         Physical Exam:     Constitutional - looks well, in no apparent distress  Eyes - no redness or discharge  Respiratory -breathing appears comfortable. No wheeze or rhonchi.   Cardiac -- Normal rate, rhythm.   Skin - No appreciable discoloration or lesions (very limited exam)  Neurological - No apparent tremors. Speech fluent and articlate  Psychiatric - no signs of delirium or anxiety          Current Laboratory Data:   All laboratory and imaging data reviewed.    No results found for this or any previous visit (from the past 24 hour(s)).         Previous Pulmonary Function Testing     FVC-Pred   Date Value Ref Range Status   02/15/2019 2.59 L      FVC-Pre   Date Value Ref Range Status   02/15/2019 2.64 L      FVC-%Pred-Pre   Date Value Ref Range Status   02/15/2019 101 %      FEV1-Pre   Date Value Ref Range Status   02/15/2019 2.22 L      FEV1-%Pred-Pre   Date Value Ref Range Status   02/15/2019 104 %      FEV1FVC-Pred   Date Value Ref Range Status   02/15/2019 82 %      FEV1FVC-Pre   Date Value Ref Range Status   02/15/2019 84 %      No results found for: 20029  FEFMax-Pred   Date Value Ref Range Status   02/15/2019 5.93 L/sec      FEFMax-Pre   Date Value Ref Range Status   02/15/2019 3.98 L/sec      FEFMax-%Pred-Pre   Date Value Ref Range Status   02/15/2019 67 %      ExpTime-Pre   Date Value Ref Range Status   02/15/2019 4.79 sec      FIFMax-Pre   Date Value Ref Range Status   02/15/2019 4.28 L/sec      FEV1FEV6-Pred   Date Value Ref Range Status   02/15/2019 82 %      FEV1FEV6-Pre   Date Value Ref Range Status   02/15/2019 84 %      No results found for: 20055         Previous Chest  Imaging   No images are attached to the encounter.  No images are attached to the encounter or orders placed in the encounter.         Previous Cardiology Imaging   No results found for this or any previous visit (from the past 8760 hour(s)).

## 2022-03-29 NOTE — TELEPHONE ENCOUNTER
RN called but no answer so RN left detailed VM.  RN received referral from Dr. Bashir regarding her recurrent low back pain.  Patient was previously seen back in 2019 for par fx. Per Dr. Coates's notes, it will heal itself and refer patient to Pain clinic for non op management in the form of PT and injection.  If patient calls back, please advise patient to reach out to Pain clinic with Provider Dr. Bagley to follow up with patient.  If patient has leg symptoms or progressive leg weakness, then please schedule patient with Dr. Coates. Otherwise, patient should go back to Pain clinic.  Thank you    Filipe Lott RN

## 2022-03-29 NOTE — LETTER
3/29/2022     RE: Kristina Eldridge  [address]  [facility] [address]     Dear Colleague,    Thank you for referring your patient, Kristina Eldridge, to the [facility] at [facility]. Please see a copy of my visit note below.    LUNG NODULE & INTERVENTIONAL PULMONARY CLINIC  CLINICS & SURGERY [facility]     Kristina Eldridge MRN# [id number]   Age: 51 year old YOB: 1970     Reason for Consultation: lung nodule(s)    Requesting Physician:   Milton Brothers MD  [address] [place] [address]       Assessment and Plan:    1. New multiple pulmonary lung nodule(s). Given the characteristics on current/previous imaging and risk factors; I would classify this to be Intermediate (6-65%) risk for cancer. Sub-6mm nodules on 5/2021. No recent followup CT. Would have previously planned a 12mo followup back in 5/2021, so will repeat CT in 3mo.     2. COPD/asthma. Stable on inhalers.      Billing: I spent 45-59min ([place], 64440) on the date of the encounter doing chart review, history and exam, documentation and further activities as described in this note.    Mekhi Orosco MD   of Medicine  Interventional Pulmonology  Department of Pulmonary, Allergy, Critical Care and Sleep Medicine   [facility]  Pager: [phone]          History:     Kristina Eldridge is a 51 year old female with sig h/o for HCV, COPD/asthma, RYGB, LTB, RLS  who is here for evaluation/followup of lung nodule(s).    - No new resp sx or complaints. Denies dyspnea or cough.   - Had CT chest which demonstrated nodules back in 5/2021. She is here for evaluation  - Personal hx of cancer: NA  - Family hx of cancer: No  - Exposure hx: Denies asbestos or radon exposure   - Tobacco hx: Past Smoker: currently smoking 2ppd since 6yo.   - My interpretation of the images relevant  for this visit includes: nodules   - My interpretation of the PFT's relevant for this visit includes: None     Culprit Nodule(s):   Abdominal cystic change medially in the left lung apex  appears similar. Lingular subsegmental atelectasis and scarring is  present. Scattered sub-4 mm pulmonary nodules are incidentally noted.  Mild bronchiectasis in the medial right middle lobe appears similar.    Other active medical problems include:   - has COPD/asthma overlap. On dulera, montelukast and prn atrovent. No recent AECOPD. Vaccinated.    - had LTB.    - has HCV.           Past Medical History:      Past Medical History:   Diagnosis Date     Abnormal Pap smear 2/21/2017    Dr said pap was abnormal     Arthritis      Chronic back pain     hx spondylolisthesis     Chronic pain      COPD (chronic obstructive pulmonary disease) (H)     PFT's 2013 FEV1/FVC = 2.68/3.20 = 84%     H/O gastric bypass 2007     Hepatitis C     treated ribaviron & interferon in 2008 for serotype 2 with failed 6 month treatment     Hyperlipidemia     on simvatatin     Insomnia      Other chronic pain      Positive TB test     has taken INH     RLS (restless legs syndrome)      Seasonal allergies      TB lung, latent 1994    treated     Uncomplicated asthma      Wounds and injuries 2003           Past Surgical History:      Past Surgical History:   Procedure Laterality Date     CHOLECYSTECTOMY       D & C  1987     ENDOSCOPIC ULTRASOUND UPPER GASTROINTESTINAL TRACT (GI) N/A 10/21/2019    Procedure: ENDOSCOPIC ULTRASOUND, ESOPHAGOSCOPY / UPPER GASTROINTESTINAL TRACT (GI) with jejunalgastrostomy with stent placement x 2, dilation tract;  Surgeon: Ramesh Self MD;  [facility]     ENDOSCOPIC ULTRASOUND UPPER GASTROINTESTINAL TRACT (GI) N/A 1/8/2020    Procedure: ENDOSCOPIC ULTRASOUND, ESOPHAGOSCOPY / UPPER GASTROINTESTINAL TRACT (GI)  with stent exchange;  Surgeon: Ramesh Self MD;  [facility]     ENDOSCOPIC ULTRASOUND UPPER  GASTROINTESTINAL TRACT (GI) N/A 6/2/2020    Procedure: ENDOSCOPIC ULTRASOUND, ESOPHAGOSCOPY / UPPER GASTROINTESTINAL TRACT (GI)  **Latex Allergy** stent exchange;  Surgeon: Ramesh Self MD;  [facility]     ESOPHAGOSCOPY, GASTROSCOPY, DUODENOSCOPY (EGD), COMBINED  7/30/2014    Procedure: COMBINED ESOPHAGOSCOPY, GASTROSCOPY, DUODENOSCOPY (EGD);  Surgeon: Ramesh Self MD;  [facility]     ESOPHAGOSCOPY, GASTROSCOPY, DUODENOSCOPY (EGD), COMBINED N/A 8/14/2019    Procedure: ESOPHAGOGASTRODUODENOSCOPY, WITH BIOPSY;  Surgeon: Johann Hazel MD;  [facility]     FRACTURE TX, ANKLE RT/LT  1991    ORIF rt ankle     FUSION SPINE POSTERIOR ONE LEVEL N/A 11/21/2014    Procedure: FUSION SPINE POSTERIOR ONE LEVEL;  Surgeon: Amilcar Baldwin MD;  [facility]     FUSION SPINE POSTERIOR ONE LEVEL Right 7/30/2015    Procedure: FUSION SPINE POSTERIOR ONE LEVEL;  Surgeon: Amilcar Baldwin MD;  [facility]     GASTRIC BYPASS  2007    lost 150 lbs     GRAFT BONE FROM ILIAC CREST Left 11/21/2014    Procedure: GRAFT BONE FROM ILIAC CREST;  Surgeon: Amilcar Baldwin MD;  [facility]     hardware removal       INJECT BLOCK MEDIAL BRANCH CERVICAL/THORACIC/LUMBAR Bilateral 7/10/2020    Procedure: Bilateral lumbar 3, lumbar 4, and lumbar 5 medial branch nerve diagnostic blocks #2;  Surgeon: Pravin Bagley MD;  [facility]     INJECT PARAVERTEBRAL FACET JOINT LUMBAR / SACRAL FIRST Bilateral 9/30/2019    Procedure: Lumbar Medial Branch Nerve Block Injection Bilateral;  Surgeon: Hira Rodríguez MD;  [facility]     OPTICAL TRACKING SYSTEM FUSION POSTERIOR SPINE LUMBAR  11/20/2013    Procedure: OPTICAL TRACKING SYSTEM FUSION SPINE POSTERIOR LUMBAR ONE LEVEL;  Lumbar 4-5 Transforaminal Interbody Fusion;  Surgeon: Amilcar Baldwin MD;  [facility]     ORTHOPEDIC SURGERY      shoulder surgery, removed bone spur      PHACOEMULSIFICATION CLEAR CORNEA WITH STANDARD INTRAOCULAR LENS IMPLANT Right 2020    Procedure: RIGHT PHACOEMULSIFICATION, CATARACT, WITH INTRAOCULAR LENS IMPLANT;  Surgeon: Lyla Mahoney MD;  [facility]     PHACOEMULSIFICATION CLEAR CORNEA WITH STANDARD INTRAOCULAR LENS IMPLANT Left 2020    Procedure: LEFT EYE CATARACT REMOVAL WITH INTRAOCULAR LENS IMPLANT;  Surgeon: Lyla Mahoney MD;  [facility]     TUBAL LIGATION            Social History:     Social History     Tobacco Use     Smoking status: Former Smoker     Packs/day: 1.00     Years: 30.00     Pack years: 30.00     Types: Cigarettes     Quit date: 2014     Years since quittin.7     Smokeless tobacco: Never Used     Tobacco comment: pack a day   Substance Use Topics     Alcohol use: Not Currently     Alcohol/week: 0.0 standard drinks     Comment: x3 drinks per year          Family History:     Family History   Problem Relation Age of Onset     Hypertension Father      Respiratory Father         COPD, smoked     Glaucoma Father      Sleep Apnea Father      C.A.D. Mother         CHF,  age 59 of [place], smoked     Crohn's Disease Brother      Diabetes Maternal Grandmother         insulin dependent     Diabetes Paternal Grandmother      Glaucoma Paternal Grandmother      Alzheimer Disease Paternal Grandmother      Melanoma No family hx of      Skin Cancer No family hx of      Macular Degeneration No family hx of      Anesthesia Reaction No family hx of      Deep Vein Thrombosis (DVT) No family hx of            Allergies:      Allergies   Allergen Reactions     Bee Venom Anaphylaxis, Anxiety, Difficulty breathing, Hives, Itching, Nausea and Vomiting, Palpitations, Shortness Of Breath and Swelling     Opium Alkaloids, Hcls Itching     Can take opiate derived narcotics with Benadryl.     Morphine Itching     Latex Rash and Itching          Medications:     Current Outpatient Medications   Medication Sig     albuterol (PROVENTIL)  "(2.5 MG/3ML) 0.083% neb solution Take 1 vial (2.5 mg) by nebulization every 6 hours as needed for shortness of breath / dyspnea or wheezing     albuterol (VENTOLIN HFA) 108 (90 Base) MCG/ACT inhaler Inhale 2 puffs into the lungs every 4 hours as needed for shortness of breath / dyspnea or wheezing     Calcium Carb-Cholecalciferol 500-400 MG-UNIT CHEW Take 1 tablet by mouth 2 times daily     cetirizine (ZYRTEC) 10 MG tablet Take 1 tablet (10 mg) by mouth daily     cyanocobalamin (CYANOCOBALAMIN) 1000 MCG/ML injection Inject 1 mL (1,000 mcg) into the muscle every 30 days     ferrous sulfate (FEROSUL) 325 (65 Fe) MG tablet Take 1 tablet (325 mg) by mouth every other day Take with citrus food or beverage     gabapentin (NEURONTIN) 300 MG capsule      ipratropium (ATROVENT HFA) 17 MCG/ACT inhaler INHALE 2 PUFFS BY MOUTH INTO LUNGS EVERY 6 HOURS     mometasone-formoterol (DULERA) 100-5 MCG/ACT inhaler Inhale 2 puffs into the lungs 2 times daily     mometasone-formoterol (DULERA) 200-5 MCG/ACT inhaler Inhale 2 puffs into the lungs 2 times daily     montelukast (SINGULAIR) 10 MG tablet Take 1 tablet (10 mg) by mouth At Bedtime     ofloxacin (OCUFLOX) 0.3 % ophthalmic solution 1 drop 4x daily in the surgical eye for 1 week after surgery, then stop     order for DME Nebulizer     rOPINIRole (REQUIP) 1 MG tablet Take 1 tablet (1 mg) by mouth 2 times daily     spacer (OPTICHAMBER OTTONIEL) holding chamber USE ONCE DAILY     spacer (OPTICHAMBER OTTONIEL) holding chamber USE ONCE DAILY     Spacer/Aero Chamber Mouthpiece MISC 1 Units daily     syringe/needle, disp, (B-D LUER-JIMMY SYRINGE) 23G X 1\" 3 ML MISC Use 1 monthly with B12     Syringe/Needle, Disp, 23G X 1\" 1 ML MISC Use for b12 injection monthly     traZODone (DESYREL) 100 MG tablet TAKE 2 TABLETS(200 MG) BY MOUTH EVERY NIGHT AS NEEDED FOR SLEEP     vitamin D2 (ERGOCALCIFEROL) 35216 units (1250 mcg) capsule Take 1 capsule (50,000 Units) by mouth once a week     No current " facility-administered medications for this visit.          Review of Systems:     CONSTITUTIONAL: negative for fever, chills, change in weight  INTEGUMENTARY/SKIN: no rash or obvious new lesions  ENT/MOUTH: no sore throat, new sinus pain or nasal drainage  RESP: see interval history  CV: negative for chest pain, palpitations or peripheral edema  GI: no nausea, vomiting, change in stools  : no dysuria  MUSCULOSKELETAL: no myalgias, arthralgias  ENDOCRINE: blood sugars with adequate control  PSYCHIATRIC: mood stable  LYMPHATIC: no new lymphadenopathy  HEME: no bleeding or easy bruisability  NEURO: no numbness, weakness, headaches         Physical Exam:     Constitutional - looks well, in no apparent distress  Eyes - no redness or discharge  Respiratory -breathing appears comfortable. No wheeze or rhonchi.   Cardiac -- Normal rate, rhythm.   Skin - No appreciable discoloration or lesions (very limited exam)  Neurological - No apparent tremors. Speech fluent and articlate  Psychiatric - no signs of delirium or anxiety          Current Laboratory Data:   All laboratory and imaging data reviewed.    No results found for this or any previous visit (from the past 24 hour(s)).         Previous Pulmonary Function Testing     FVC-Pred   Date Value Ref Range Status   02/15/2019 2.59 L      FVC-Pre   Date Value Ref Range Status   02/15/2019 2.64 L      FVC-%Pred-Pre   Date Value Ref Range Status   02/15/2019 101 %      FEV1-Pre   Date Value Ref Range Status   02/15/2019 2.22 L      FEV1-%Pred-Pre   Date Value Ref Range Status   02/15/2019 104 %      FEV1FVC-Pred   Date Value Ref Range Status   02/15/2019 82 %      FEV1FVC-Pre   Date Value Ref Range Status   02/15/2019 84 %      No results found for: 20029  FEFMax-Pred   Date Value Ref Range Status   02/15/2019 5.93 L/sec      FEFMax-Pre   Date Value Ref Range Status   02/15/2019 3.98 L/sec      FEFMax-%Pred-Pre   Date Value Ref Range Status   02/15/2019 67 %      ExpTime-Pre    Date Value Ref Range Status   02/15/2019 4.79 sec      FIFMax-Pre   Date Value Ref Range Status   02/15/2019 4.28 L/sec      FEV1FEV6-Pred   Date Value Ref Range Status   02/15/2019 82 %      FEV1FEV6-Pre   Date Value Ref Range Status   02/15/2019 84 %      No results found for: 20055         Previous Chest Imaging   No images are attached to the encounter.  No images are attached to the encounter or orders placed in the encounter.         Previous Cardiology Imaging   No results found for this or any previous visit (from the past 8760 hour(s)).           Again, thank you for allowing me to participate in the care of your patient.      Sincerely,    Mekhi Orosco MD

## 2022-03-31 ENCOUNTER — THERAPY VISIT (OUTPATIENT)
Dept: OCCUPATIONAL THERAPY | Facility: CLINIC | Age: 52
End: 2022-03-31
Attending: FAMILY MEDICINE
Payer: COMMERCIAL

## 2022-03-31 DIAGNOSIS — M79.645 THUMB PAIN, LEFT: Primary | ICD-10-CM

## 2022-03-31 DIAGNOSIS — M18.12 ARTHRITIS OF CARPOMETACARPAL (CMC) JOINT OF LEFT THUMB: ICD-10-CM

## 2022-03-31 PROCEDURE — 97165 OT EVAL LOW COMPLEX 30 MIN: CPT | Mod: GO | Performed by: OCCUPATIONAL THERAPIST

## 2022-03-31 PROCEDURE — 97760 ORTHOTIC MGMT&TRAING 1ST ENC: CPT | Mod: GO | Performed by: OCCUPATIONAL THERAPIST

## 2022-03-31 NOTE — PROGRESS NOTES
[facility] Hand Therapy Initial Evaluation     Current Date: 3/31/2022    Diagnosis: L thumb CMC OA  Orders 3/29/22  Precautions: Does have some issues with thin skin  Also R hand trigger digits.    Subjective:    Patient Health History  Kristina Eldridge being seen for Left thumb pain.       Problem occurred: Unknown   Pain is reported as 3/10 on pain scale.  General health as reported by patient is good.  Pertinent medical history includes: anemia, asthma, hepatitis, sleep disorder/apnea, smoking and tuberculosis.     Medical allergies: latex and other. Other medical allergies details: Bee venom.   Surgeries include:  Other. Other surgery history details: Tubal ligation, left shoulder surgery,right ankle surgery,5 back surgeries.    Current medications:  Other. Other medications details: COPD inhalers,singular,ropinirole.    Current occupation is Housewife.   Primary job tasks include:  Prolonged sitting and repetitive tasks.                    Occupational Profile Information:  Right hand dominant  Prior functional level:  no limitations  Patient reports symptoms of pain and weakness/loss of strength  Special tests:  x-ray.    Previous treatment: none  Barriers include:none  Mobility: No difficulty  Transportation: drives  Other: has a kitten and [facility]    Functional Outcome Measure:   Upper Extremity Functional Index Score:  SCORE:   Column Totals: /80: (P) 59   (A lower score indicates greater disability.)    Objective:  Pain Level (Scale 0-10)   3/31/2022   At Rest 3-4   With Use Can get excruciating     Pain Description  Date 3/31/2022   Location thumb   Pain Quality Achy, sharp   Frequency intermittent     Pain is worst  daytime or nighttime   Exacerbated by  overuse   Relieved by rest   Progression Not improved     ROM  Thumb 3/31/2022 3/31/2022   AROM  (PROM) R L   MP /45 /52   IP /50 /62   RABD 53 40   PABD 43 50+   Retropulsion (cm)     Kapandji Opposition Scale (0-10/10) 9 5     Thumb  Observation/Appearance   - none  + mild    ++ moderate    +++ severe     3/31/2022   Shoulder deformity present over CMC R: mild  L: mild   Edema over the CMC joint R: -  L: -      Provocative Tests  Deferred    Strength    deferred    Palpation   Pain Report:  - none    + mild    ++ moderate    +++ severe    3/31/2022   CMC Joint Line R: -  L: +   Thenar Eminence R: -  L: +   Web Space R: -  L: -     Assessment:  Patient presents with symptoms consistent with diagnosis of left thumb CMC thumb arthritis, with conservative intervention.    Patient's limitations or Problem List includes:  Pain and Weakness of the left thumb which interferes with the patient's ability to perform Self Care Tasks  and Household Chores as compared to previous level of function.    Rehab Potential:  Good - Return to full activity, some limitations    Patient will benefit from skilled Occupational Therapy to increase overall strength and decrease pain to return to previous activity level and resume normal daily tasks and to reach their rehab potential.    Barriers to Learning:  No barrier    Communication Issues:  Patient appears to be able to clearly communicate and understand verbal and written communication and follow directions correctly.    Chart Review: Brief history including review of medical and/or therapy records relating to the presenting problem and Detailed history review with patient    Identified Performance Deficits: dressing, care of pets, home establishment and management and meal preparation and cleanup    Assessment of Occupational Performance:  3-5 Performance Deficits    Clinical Decision Making (Complexity): Low complexity    Treatment Explanation:  The following has been discussed with the patient:    RX ordered/plan of care  Anticipated outcomes  Possible risks and side effects    Plan:  Frequency:  1 X week, once daily  Duration:  for 3 weeks tapering to 2 X a month over 4 weeks    Treatment Plan:    Modalities:     US, Fluidotherapy and Paraffin   Therapeutic Exercise:    AROM, Place and Hold, Isometrics and Stabilization  Therapeutic Activities:   Functional activities   Neuromuscular re-ed:   Proprioceptive Training  Manual Techniques:   Joint mobilization  Orthotic Fabrication:    Static and Hand based  Self Care:    Self Care Tasks and Ergonomic Considerations    Discharge Plan:  Achieve all LTG  Topmost in home treatment program.  Reach maximal therapeutic benefit.    Home Program:  Hand based Thumb Spica Orthosis - orfit and neoprene    Next Visit:  Possible thumb stabilization exercises  AE/JPE  Check orthoses

## 2022-04-02 PROBLEM — M79.645 THUMB PAIN, LEFT: Status: ACTIVE | Noted: 2022-04-02

## 2022-04-02 NOTE — PROGRESS NOTES
[facility]    OUTPATIENT Occupational Therapy ORTHOPEDIC EVALUATION  PLAN OF TREATMENT FOR OUTPATIENT REHABILITATION  (COMPLETE FOR INITIAL CLAIMS ONLY)  Patient's Last Name, First Name, M.I.  YOB: 1970  Kristina Eldridge    Provider s Name:  [facility]   Medical Record No.  [id number]   Start of Care Date:  03/31/22   Onset Date:   03/29/22   Type:     OT Medical Diagnosis:    Encounter Diagnoses   Name Primary?     Arthritis of carpometacarpal (CMC) joint of left thumb      Thumb pain, left Yes        Treatment Diagnosis:  L CMC OA        Goals:     03/31/22 1200   Goal #1   Goal #1 household chores   Previous Performance Level Independent   Current Functional Task Other - on additional line   Other Household Chores perform home chores   Current Performance Level moderate difficulty and up to 10/10 pain   STG Target Perfomance Other - on additional line   Other Household Chores perform home chores   STG Target Perform Level mild difficulty with L hand, 5/10 pain or less   Due Date 04/30/22   LTG Target Task/Performance Other - on additional line   Other Household Chores mild difficulty and 2/10 pain or less   Due Date 05/31/22       Therapy Frequency:  1 X week, once daily  Predicted Duration of Therapy Intervention:  for 3 weeks tapering to 2 X a month over 4 weeks    Becky Hui OT                 I CERTIFY THE NEED FOR THESE SERVICES FURNISHED UNDER        THIS PLAN OF TREATMENT AND WHILE UNDER MY CARE     (Physician attestation of this document indicates review and certification of the therapy plan).                       Certification Date From:  03/31/22   Certification Date To:  06/16/22    Referring Provider:  Asa Bashir    Initial Assessment        See Epic Evaluation SOC Date: 03/31/22

## 2022-04-11 DIAGNOSIS — Z11.59 ENCOUNTER FOR SCREENING FOR OTHER VIRAL DISEASES: Primary | ICD-10-CM

## 2022-04-20 ENCOUNTER — PATIENT OUTREACH (OUTPATIENT)
Dept: GASTROENTEROLOGY | Facility: CLINIC | Age: 52
End: 2022-04-20
Payer: COMMERCIAL

## 2022-04-20 ENCOUNTER — PREP FOR PROCEDURE (OUTPATIENT)
Dept: GASTROENTEROLOGY | Facility: CLINIC | Age: 52
End: 2022-04-20
Payer: COMMERCIAL

## 2022-04-20 DIAGNOSIS — Z11.59 ENCOUNTER FOR SCREENING FOR OTHER VIRAL DISEASES: Primary | ICD-10-CM

## 2022-04-20 DIAGNOSIS — Z98.84 HISTORY OF GASTRIC BYPASS: Primary | ICD-10-CM

## 2022-04-20 NOTE — TELEPHONE ENCOUNTER
Called pt to discuss follow up procedure as recommended by Dr. Self    Please assist in scheduling:     Procedure/Imaging/Clinic: EGD  Physician: Dr. Self  Timing: May 10  Procedure length:provider average  Anesthesia:gen  Dx: hx gastric bypass  Tier:2  [facility]       Called to discuss with patient. Explained they can expect a call from  for date and time of procedure, will need a , someone to stay with them for 24 hours and should stay in town for 24 hours (within 45 min of Hospital) post procedure    Patient needs to get pre-op physical completed. If outside [facility] system will need physical faxed to number [phone]   If you do not get a preop physical, your procedure could be cancelled, patient voiced understanding*    Preop Plan:PCP will do    Med Review    Blood thinner -  no  ASA - no  Diabetic - no    COVID test discussed:3-4 days prior    Patient Education r/t procedure:done    Does patient have any history of gastric bypass/gastric surgery/altered panc/bili anatomy?yes, MD aware    A pre-op nurse will call 1-2 days prior to the procedure. I    NPO/Prep: not discussed    Other specific details/comments:none     Verbalized understanding of all instructions. All questions answered.

## 2022-04-27 ENCOUNTER — PATIENT OUTREACH (OUTPATIENT)
Dept: GASTROENTEROLOGY | Facility: CLINIC | Age: 52
End: 2022-04-27

## 2022-04-27 ENCOUNTER — OFFICE VISIT (OUTPATIENT)
Dept: FAMILY MEDICINE | Facility: CLINIC | Age: 52
End: 2022-04-27
Payer: COMMERCIAL

## 2022-04-27 ENCOUNTER — LAB (OUTPATIENT)
Dept: LAB | Facility: CLINIC | Age: 52
End: 2022-04-27
Payer: COMMERCIAL

## 2022-04-27 VITALS
OXYGEN SATURATION: 94 % | HEIGHT: 60 IN | BODY MASS INDEX: 21.68 KG/M2 | WEIGHT: 110.4 LBS | DIASTOLIC BLOOD PRESSURE: 92 MMHG | SYSTOLIC BLOOD PRESSURE: 137 MMHG | HEART RATE: 105 BPM

## 2022-04-27 DIAGNOSIS — J45.30 MILD PERSISTENT ASTHMA, UNCOMPLICATED: ICD-10-CM

## 2022-04-27 DIAGNOSIS — J30.1 CHRONIC SEASONAL ALLERGIC RHINITIS DUE TO POLLEN: ICD-10-CM

## 2022-04-27 DIAGNOSIS — R79.89 LOW VITAMIN D LEVEL: ICD-10-CM

## 2022-04-27 DIAGNOSIS — E61.1 IRON DEFICIENCY: ICD-10-CM

## 2022-04-27 DIAGNOSIS — E61.1 IRON DEFICIENCY: Primary | ICD-10-CM

## 2022-04-27 DIAGNOSIS — Z01.818 PREOP GENERAL PHYSICAL EXAM: ICD-10-CM

## 2022-04-27 LAB
BASOPHILS # BLD AUTO: 0.1 10E3/UL (ref 0–0.2)
BASOPHILS NFR BLD AUTO: 1 %
EOSINOPHIL # BLD AUTO: 0.1 10E3/UL (ref 0–0.7)
EOSINOPHIL NFR BLD AUTO: 2 %
ERYTHROCYTE [DISTWIDTH] IN BLOOD BY AUTOMATED COUNT: 15.7 % (ref 10–15)
FERRITIN SERPL-MCNC: 15 NG/ML (ref 8–252)
HCT VFR BLD AUTO: 44.2 % (ref 35–47)
HGB BLD-MCNC: 14.4 G/DL (ref 11.7–15.7)
IMM GRANULOCYTES # BLD: 0 10E3/UL
IMM GRANULOCYTES NFR BLD: 0 %
IRON SATN MFR SERPL: 16 % (ref 15–46)
IRON SERPL-MCNC: 81 UG/DL (ref 35–180)
LYMPHOCYTES # BLD AUTO: 2.5 10E3/UL (ref 0.8–5.3)
LYMPHOCYTES NFR BLD AUTO: 31 %
MCH RBC QN AUTO: 30.4 PG (ref 26.5–33)
MCHC RBC AUTO-ENTMCNC: 32.6 G/DL (ref 31.5–36.5)
MCV RBC AUTO: 93 FL (ref 78–100)
MONOCYTES # BLD AUTO: 0.5 10E3/UL (ref 0–1.3)
MONOCYTES NFR BLD AUTO: 7 %
NEUTROPHILS # BLD AUTO: 4.7 10E3/UL (ref 1.6–8.3)
NEUTROPHILS NFR BLD AUTO: 59 %
NRBC # BLD AUTO: 0 10E3/UL
NRBC BLD AUTO-RTO: 0 /100
PLATELET # BLD AUTO: 309 10E3/UL (ref 150–450)
RBC # BLD AUTO: 4.73 10E6/UL (ref 3.8–5.2)
TIBC SERPL-MCNC: 510 UG/DL (ref 240–430)
WBC # BLD AUTO: 7.9 10E3/UL (ref 4–11)

## 2022-04-27 PROCEDURE — 83550 IRON BINDING TEST: CPT | Performed by: PATHOLOGY

## 2022-04-27 PROCEDURE — 99215 OFFICE O/P EST HI 40 MIN: CPT | Performed by: FAMILY MEDICINE

## 2022-04-27 PROCEDURE — 85025 COMPLETE CBC W/AUTO DIFF WBC: CPT | Performed by: PATHOLOGY

## 2022-04-27 PROCEDURE — 99000 SPECIMEN HANDLING OFFICE-LAB: CPT | Performed by: PATHOLOGY

## 2022-04-27 PROCEDURE — 36415 COLL VENOUS BLD VENIPUNCTURE: CPT | Performed by: PATHOLOGY

## 2022-04-27 PROCEDURE — 82306 VITAMIN D 25 HYDROXY: CPT | Mod: 90 | Performed by: PATHOLOGY

## 2022-04-27 PROCEDURE — 82728 ASSAY OF FERRITIN: CPT | Performed by: PATHOLOGY

## 2022-04-27 RX ORDER — IPRATROPIUM BROMIDE 17 UG/1
AEROSOL, METERED RESPIRATORY (INHALATION)
Qty: 25.8 G | Refills: 11 | Status: SHIPPED | OUTPATIENT
Start: 2022-04-27 | End: 2023-05-18

## 2022-04-27 ASSESSMENT — ENCOUNTER SYMPTOMS
SINUS PAIN: 0
DOUBLE VISION: 0
SNORES LOUDLY: 0
SHORTNESS OF BREATH: 1
SINUS CONGESTION: 0
TROUBLE SWALLOWING: 1
POSTURAL DYSPNEA: 0
NECK MASS: 0
WHEEZING: 1
EYE PAIN: 0
SORE THROAT: 0
HEMOPTYSIS: 0
EYE REDNESS: 0
COUGH: 0
SMELL DISTURBANCE: 0
EYE WATERING: 0
COUGH DISTURBING SLEEP: 1
DYSPNEA ON EXERTION: 1
TASTE DISTURBANCE: 0
HOARSE VOICE: 0
SPUTUM PRODUCTION: 0
EYE IRRITATION: 1

## 2022-04-27 NOTE — NURSING NOTE
Kristina Eldridge is a 51 year old female patient that presents today in clinic for the following:    Chief Complaint   Patient presents with     Pre-Op Exam     Questions about inhaler rx     The patient's allergies and medications were reviewed as noted. A set of vitals were recorded as noted without incident. The patient does not have any other questions for the provider.    Rell Thompson, EMT at 12:50 PM on 4/27/2022

## 2022-04-27 NOTE — PROGRESS NOTES
It is reasonable and necessary to admisiter albuterol and atrovent via nebulizer for the management of copd  Kerry Esteban MD  [facility] [address]  [address] [facility] [address]  Phone: [phone]  Fax: [phone]  Primary Provider: Kerry Esteban  Pre-op Performing Provider: KERRY ESTEBAN      PREOPERATIVE EVALUATION:  Today's date: 4/27/2022    Kristina Eldrideg is a 51 year old female who presents for a preoperative evaluation.    Surgical Information:  Surgery/Procedure: EGD  Surgery Location: [facility]  Surgeon: Dr Self  Surgery Date: May 10 2022  Time of Surgery: tbd  Where patient plans to recover: At home with family  Fax number for surgical facility: Note does not need to be faxed, will be available electronically in Epic.    Type of Anesthesia Anticipated: to be determined    Assessment & Plan     The proposed surgical procedure is considered LOW risk.    Mild persistent asthma, uncomplicated  Needs refill  - ipratropium (ATROVENT HFA) 17 MCG/ACT inhaler; INHALE 2 PUFFS BY MOUTH INTO LUNGS EVERY 6 HOURS  She also has COPD diagnosis, asthma and COPD are diagnoses which support the need for nebulizer machine, please allow her have a nebulizer machine for medicine to be administered for these diagnoses  Kerry Esteban MD  (addended today in response to insurance noting this note does not mention a diagnosis that supports needing a nebulizer machine). Kerry Esteban MD    Iron deficiency  Recheck after some time on iron tabs  - CBC with platelets differential; Future  - Iron and iron binding capacity; Future  - Ferritin; Future    Low vitamin D level  Same  - Vitamin D Deficiency; Future    Preop general physical exam  Cleared for EGD           Risks and Recommendations:  The patient has the following additional risks and recommendations for perioperative complications:   - No identified additional risk factors other than  previously addressed    Medication Instructions:  Patient is to take all scheduled medications on the day of surgery    RECOMMENDATION:  APPROVAL GIVEN to proceed with proposed procedure, without further diagnostic evaluation.      42 minutes spent on the date of the encounter doing chart review, history and exam, documentation and further activities per the note      Subjective     HPI related to upcoming procedure: Post   No appetite  If eats, nausea  H/o post gastric bypass surgery stenosis upper GI tract, has had stents and yearly EGD      Preop Questions 4/27/2022   1. Have you ever had a heart attack or stroke? No   2. Have you ever had surgery on your heart or blood vessels, such as a stent placement, a coronary artery bypass, or surgery on an artery in your head, neck, heart, or legs? No   3. Do you have chest pain with activity? No   4. Do you have a history of  heart failure? No   5. Do you currently have a cold, bronchitis or symptoms of other infection? No   6. Do you have a cough, shortness of breath, or wheezing? YES - no acute, has copd   7. Do you or anyone in your family have previous history of blood clots? YES - aunt had blood clots, no other FH of   8. Do you or does anyone in your family have a serious bleeding problem such as prolonged bleeding following surgeries or cuts? No   9. Have you ever had problems with anemia or been told to take iron pills? YES - on iron pills   10. Have you had any abnormal blood loss such as black, tarry or bloody stools, or abnormal vaginal bleeding? No   11. Have you ever had a blood transfusion? No   12. Are you willing to have a blood transfusion if it is medically needed before, during, or after your surgery? NO - discussed, per pt said no   13. Have you or any of your relatives ever had problems with anesthesia? No   14. Do you have sleep apnea, excessive snoring or daytime drowsiness? No   15. Do you have any artifical heart valves or other implanted medical  devices like a pacemaker, defibrillator, or continuous glucose monitor? No   16. Do you have artificial joints? No   17. Are you allergic to latex? YES:    18. Is there any chance that you may be pregnant? No       Health Care Directive:  Patient does not have a Health Care Directive or Living Will: per pt has one, and is on file here     Preoperative Review of :   reviewed - no record of controlled substances prescribed.      Status of Chronic Conditions:  See problem list for active medical problems.  Problems all longstanding and stable, except as noted/documented.  See ROS for pertinent symptoms related to these conditions.      Review of Systems  Answers for HPI/ROS submitted by the patient on 4/27/2022  General Symptoms: No  Skin Symptoms: No  HENT Symptoms: Yes  EYE SYMPTOMS: Yes  HEART SYMPTOMS: No  LUNG SYMPTOMS: Yes  INTESTINAL SYMPTOMS: No  URINARY SYMPTOMS: No  GYNECOLOGIC SYMPTOMS: No  BREAST SYMPTOMS: No  SKELETAL SYMPTOMS: No  BLOOD SYMPTOMS: No  NERVOUS SYSTEM SYMPTOMS: No  MENTAL HEALTH SYMPTOMS: No  Congestion: No   Voice hoarseness: No  Tooth pain: No  Gum tenderness: No  Bleeding gums: No  Change in taste: No  Change in sense of smell: No  Dry mouth: Yes  Hearing aid used: No  Neck lump: No  Vision loss: No  Dry eyes: Yes  Watery eyes: No  Eye bulging: No  Double vision: No  Flashing of lights: No  Spots: No  Floaters: Yes  Crossed eyes: No  Tunnel Vision: No  Yellowing of eyes: No  Eye irritation: Yes  Sputum or phlegm: No  Coughing up blood: No  Snoring: No  Difficulty breathing on exertion: Yes  Nighttime Cough: Yes  Difficulty breathing when lying flat: No        Patient Active Problem List    Diagnosis Date Noted     Thumb pain, left 04/02/2022     Priority: Medium     Hx of gastric bypass 03/29/2021     Priority: Medium     Added automatically from request for surgery [id number]       Adult failure to thrive 11/23/2020     Priority: Medium     Added automatically from request for surgery  [id number]       Age-related nuclear cataract, left 06/25/2020     Priority: Medium     Added automatically from request for surgery [id number]       Failure to thrive in adult 05/22/2020     Priority: Medium     Added automatically from request for surgery [id number]       Nuclear sclerosis of both eyes 03/10/2020     Priority: Medium     Added automatically from request for surgery [id number]       Lumbar spondylosis 02/27/2020     Priority: Medium     Added automatically from request for surgery [id number]       Encounter for pancreatic duct stent exchange 10/26/2019     Priority: Medium     Added automatically from request for surgery [id number]       Malnutrition (H) 10/10/2019     Priority: Medium     Added automatically from request for surgery [id number]       Dermal and epidermal nevus 04/30/2018     Priority: Medium     irritated       Weight loss 09/28/2016     Priority: Medium     Lumbar pseudoarthrosis 07/30/2015     Priority: Medium     COPD (chronic obstructive pulmonary disease) (H) 01/01/2015     Priority: Medium     Pseudoarthrosis of lumbar spine with nonunion 11/21/2014     Priority: Medium     HIV exposure 03/20/2014     Priority: Medium     Personal history of spine surgery 11/20/2013     Priority: Medium     Lumbar stenosis 10/23/2013     Priority: Medium     Depression 04/19/2013     Priority: Medium     Radicular leg pain 03/01/2013     Priority: Medium     Spondylolisthesis, grade 2 03/01/2013     Priority: Medium     Marijuana smoker, continuous 03/01/2013     Priority: Medium     Acute cholecystitis 12/11/2012     Priority: Medium     Chronic hepatitis C (H) 10/19/2012     Priority: Medium     Bariatric surgery status 10/19/2012     Priority: Medium     Hyperlipidemia      Priority: Medium     on simvatatin       LTBI (latent tuberculosis infection) 02/18/2010     Priority: Medium     sp INH       Tobacco dependence 06/24/2009     Priority: Medium     Vitamin D deficiency 06/24/2009     Priority: Medium      Gastric bypass status for obesity 01/09/2008     Priority: Medium     Restless leg syndrome 10/17/2007     Priority: Medium     Insomnia 08/17/2007     Priority: Medium     Osteoarthrosis 08/17/2007     Priority: Medium      Past Medical History:   Diagnosis Date     Abnormal Pap smear 2/21/2017    Dr said pap was abnormal     Arthritis      Chronic back pain     hx spondylolisthesis     Chronic pain      COPD (chronic obstructive pulmonary disease) (H)     PFT's 2013 FEV1/FVC = 2.68/3.20 = 84%     H/O gastric bypass 2007     Hepatitis C     treated ribaviron & interferon in 2008 for serotype 2 with failed 6 month treatment     Hyperlipidemia     on simvatatin     Insomnia      Other chronic pain      Positive TB test     has taken INH     RLS (restless legs syndrome)      Seasonal allergies      TB lung, latent 1994    treated     Uncomplicated asthma      Wounds and injuries 2003     Past Surgical History:   Procedure Laterality Date     CHOLECYSTECTOMY       D & C  1987     ENDOSCOPIC ULTRASOUND UPPER GASTROINTESTINAL TRACT (GI) N/A 10/21/2019    Procedure: ENDOSCOPIC ULTRASOUND, ESOPHAGOSCOPY / UPPER GASTROINTESTINAL TRACT (GI) with jejunalgastrostomy with stent placement x 2, dilation tract;  Surgeon: Ramesh Self MD;  [facility]     ENDOSCOPIC ULTRASOUND UPPER GASTROINTESTINAL TRACT (GI) N/A 1/8/2020    Procedure: ENDOSCOPIC ULTRASOUND, ESOPHAGOSCOPY / UPPER GASTROINTESTINAL TRACT (GI)  with stent exchange;  Surgeon: Ramesh Self MD;  [facility]     ENDOSCOPIC ULTRASOUND UPPER GASTROINTESTINAL TRACT (GI) N/A 6/2/2020    Procedure: ENDOSCOPIC ULTRASOUND, ESOPHAGOSCOPY / UPPER GASTROINTESTINAL TRACT (GI)  **Latex Allergy** stent exchange;  Surgeon: Ramesh Self MD;  Location:  OR     ESOPHAGOSCOPY, GASTROSCOPY, DUODENOSCOPY (EGD), COMBINED  7/30/2014    Procedure: COMBINED ESOPHAGOSCOPY, GASTROSCOPY, DUODENOSCOPY (EGD);  Surgeon: Ramesh Self MD;  Location:   GI     ESOPHAGOSCOPY, GASTROSCOPY, DUODENOSCOPY (EGD), COMBINED N/A 8/14/2019    Procedure: ESOPHAGOGASTRODUODENOSCOPY, WITH BIOPSY;  Surgeon: Johann Hazel MD;  [facility]     FRACTURE TX, ANKLE RT/LT  1991    ORIF rt ankle     FUSION SPINE POSTERIOR ONE LEVEL N/A 11/21/2014    Procedure: FUSION SPINE POSTERIOR ONE LEVEL;  Surgeon: Amilcar Baldwin MD;  [facility]: [facility]     FUSION SPINE POSTERIOR ONE LEVEL Right 7/30/2015    Procedure: FUSION SPINE POSTERIOR ONE LEVEL;  Surgeon: Amilcar Baldwin MD;  [facility]     GASTRIC BYPASS  2007    lost 150 lbs     GRAFT BONE FROM ILIAC CREST Left 11/21/2014    Procedure: GRAFT BONE FROM ILIAC CREST;  Surgeon: Amilcar Baldwin MD;  [facility] OR     hardware removal       INJECT BLOCK MEDIAL BRANCH CERVICAL/THORACIC/LUMBAR Bilateral 7/10/2020    Procedure: Bilateral lumbar 3, lumbar 4, and lumbar 5 medial branch nerve diagnostic blocks #2;  Surgeon: Pravin Bagley MD;  [facility]     INJECT PARAVERTEBRAL FACET JOINT LUMBAR / SACRAL FIRST Bilateral 9/30/2019    Procedure: Lumbar Medial Branch Nerve Block Injection Bilateral;  Surgeon: Hira Rodríguez MD;  [facility]     OPTICAL TRACKING SYSTEM FUSION POSTERIOR SPINE LUMBAR  11/20/2013    Procedure: OPTICAL TRACKING SYSTEM FUSION SPINE POSTERIOR LUMBAR ONE LEVEL;  Lumbar 4-5 Transforaminal Interbody Fusion;  Surgeon: Amilcar Baldwin MD;  [facility]     ORTHOPEDIC SURGERY      shoulder surgery, removed bone spur     PHACOEMULSIFICATION CLEAR CORNEA WITH STANDARD INTRAOCULAR LENS IMPLANT Right 5/29/2020    Procedure: RIGHT PHACOEMULSIFICATION, CATARACT, WITH INTRAOCULAR LENS IMPLANT;  Surgeon: Lyla Mahoney MD;  [facility]     PHACOEMULSIFICATION CLEAR CORNEA WITH STANDARD INTRAOCULAR LENS IMPLANT Left 7/17/2020    Procedure: LEFT EYE CATARACT REMOVAL WITH INTRAOCULAR LENS IMPLANT;  Surgeon: Lyla Mahoney MD;   [facility]     TUBAL LIGATION       Current Outpatient Medications   Medication Sig Dispense Refill     cetirizine (ZYRTEC) 10 MG tablet Take 1 tablet (10 mg) by mouth daily 30 tablet 11     ferrous sulfate (FEROSUL) 325 (65 Fe) MG tablet Take 1 tablet (325 mg) by mouth every other day Take with citrus food or beverage 15 tablet 3     ipratropium (ATROVENT HFA) 17 MCG/ACT inhaler INHALE 2 PUFFS BY MOUTH INTO LUNGS EVERY 6 HOURS 12.9 g 11     mometasone-formoterol (DULERA) 200-5 MCG/ACT inhaler Inhale 2 puffs into the lungs 2 times daily 13 g 1     montelukast (SINGULAIR) 10 MG tablet Take 1 tablet (10 mg) by mouth At Bedtime 90 tablet 3     order for DME Nebulizer 1 each 0     rOPINIRole (REQUIP) 1 MG tablet Take 1 tablet (1 mg) by mouth 2 times daily 180 tablet 3     Spacer/Aero Chamber Mouthpiece MISC 1 Units daily 1 each 0     vitamin D2 (ERGOCALCIFEROL) 82889 units (1250 mcg) capsule Take 1 capsule (50,000 Units) by mouth once a week 8 capsule 0       Allergies   Allergen Reactions     Bee Venom Anaphylaxis, Anxiety, Difficulty breathing, Hives, Itching, Nausea and Vomiting, Palpitations, Shortness Of Breath and Swelling     Opium Alkaloids, Hcls Itching     Can take opiate derived narcotics with Benadryl.     Morphine Itching     Latex Rash and Itching        Social History     Tobacco Use     Smoking status: Former Smoker     Packs/day: 1.00     Years: 30.00     Pack years: 30.00     Types: Cigarettes     Quit date: 2014     Years since quittin.8     Smokeless tobacco: Never Used     Tobacco comment: pack a day   Substance Use Topics     Alcohol use: Not Currently     Alcohol/week: 0.0 standard drinks     Comment: x3 drinks per year       History   Drug Use     Types: Marijuana     Comment: daily smoking use since age 14         Objective     BP (!) 137/92 (BP Location: Right arm, Patient Position: Sitting, Cuff Size: Adult Regular)   Pulse 105   Ht 1.524 m (5')   Wt 50.1 kg (110 lb 6.4 oz)    LMP 06/11/2012 (LMP Unknown)   SpO2 94%   BMI 21.56 kg/m      Physical Exam    GENERAL APPEARANCE: healthy, alert and no distress     EYES: EOMI,  PERRL     HENT: ear canals and TM's normal and nose and mouth without ulcers or lesions     NECK: no adenopathy, no asymmetry, masses, or scars and thyroid normal to palpation     RESP: lungs clear to auscultation - no rales, rhonchi or wheezes     CV: regular rates and rhythm, normal S1 S2, no S3 or S4 and no murmur, click or rub     ABDOMEN:  soft, nontender, no HSM or masses and bowel sounds normal     MS: extremities normal- no gross deformities noted, no evidence of inflammation in joints, FROM in all extremities.     SKIN: no suspicious lesions or rashes     NEURO: Normal strength and tone, sensory exam grossly normal, mentation intact and speech normal     PSYCH: mentation appears normal. and affect normal/bright     LYMPHATICS: No cervical adenopathy    Recent Labs   Lab Test 03/16/22  1535 04/09/21  1505 06/02/20  1500   HGB 11.9 12.4 12.6    321 305   INR  --   --  1.09   * 136  --    POTASSIUM 3.4 3.8  --    CR 0.82 0.89  --    A1C  --  5.5  --         Diagnostics:  Normal cbc done today, no EKG needed    Revised Cardiac Risk Index (RCRI):  The patient has the following serious cardiovascular risks for perioperative complications:   - No serious cardiac risks = 0 points     RCRI Interpretation: 0 points: Class I (very low risk - 0.4% complication rate)           Signed Electronically by: Varinder Crespo MD  Copy of this evaluation report is provided to requesting physician.

## 2022-04-27 NOTE — TELEPHONE ENCOUNTER
Called pt to clarify plan- EGD not needed with Dr Zuniga, will cancel, message sent to scheduling. Pt will reschedule her covid test to align with procedure with Dr. Self    ML

## 2022-04-28 ENCOUNTER — TELEPHONE (OUTPATIENT)
Dept: GASTROENTEROLOGY | Facility: CLINIC | Age: 52
End: 2022-04-28
Payer: COMMERCIAL

## 2022-04-28 LAB — DEPRECATED CALCIDIOL+CALCIFEROL SERPL-MC: 24 UG/L (ref 20–75)

## 2022-04-28 NOTE — TELEPHONE ENCOUNTER
Caller: Mery    Procedure: EGD    Date, Location, and Surgeon of Procedure Cancelled: 5/23/22 MICHELLE Zuniga    Ordering Provider:Ofstedal    Reason for cancel (please be detailed, any staff messages or encounters to note?): Not needed per Epic Message    Hilda Navarro, RN  P Endoscopy Scheduling Pool  Hi team     Can you please cancel scheduled visit with Dr Zuniga- she doesn't need that and will follow up as scheduled with Dr. Aramis Navarro, RN Care Coordinator           Rescheduled: No     If rescheduled:    Date:    Location:    Prep Resent: (changes to prep?)   Covid Test Rescheduled: Yes and No   Note any change or update to original order/sedation:

## 2022-05-02 RX ORDER — CETIRIZINE HYDROCHLORIDE 10 MG/1
10 TABLET ORAL DAILY
Qty: 90 TABLET | Refills: 3 | Status: SHIPPED | OUTPATIENT
Start: 2022-05-02 | End: 2023-04-20

## 2022-05-02 RX ORDER — MONTELUKAST SODIUM 10 MG/1
10 TABLET ORAL AT BEDTIME
Qty: 90 TABLET | Refills: 0 | Status: SHIPPED | OUTPATIENT
Start: 2022-05-02 | End: 2022-07-28

## 2022-05-02 NOTE — TELEPHONE ENCOUNTER
I called Mery to review ACT, though reached an outgoing message stating she is unavailable.     Montelukast refilled for #90.    Kim ([facility]) KESHAV Rose

## 2022-05-02 NOTE — TELEPHONE ENCOUNTER
montelukast (SINGULAIR) 10 MG tablet  Last Written Prescription Date:   4/9/2021  Last Fill Quantity: 90,   # refills: 3  Last Office Visit :  4/27/2022  Future Office visit:  None  Routing refill request to provider for review/approval because:  Several request  Needing updated ACT score on file  Refer to clinic for review     cetirizine (ZYRTEC) 10 MG tablet  Last Written Prescription Date:   4/9/2021  Last Fill Quantity: 30,   # refills: 11  Last Office Visit :  4/27/2022  Future Office visit:  None  Routing refill request to provider for review/approval because:  Refer to clinic for review       Simona Quintanilla RN  Central Triage Red Flags/Med Refills

## 2022-05-06 ENCOUNTER — LAB (OUTPATIENT)
Dept: LAB | Facility: CLINIC | Age: 52
End: 2022-05-06
Attending: INTERNAL MEDICINE
Payer: COMMERCIAL

## 2022-05-06 DIAGNOSIS — E55.9 VITAMIN D DEFICIENCY: ICD-10-CM

## 2022-05-06 DIAGNOSIS — Z11.59 ENCOUNTER FOR SCREENING FOR OTHER VIRAL DISEASES: ICD-10-CM

## 2022-05-06 DIAGNOSIS — D64.9 ANEMIA: ICD-10-CM

## 2022-05-06 LAB
BASOPHILS # BLD AUTO: 0.1 10E3/UL (ref 0–0.2)
BASOPHILS NFR BLD AUTO: 1 %
EOSINOPHIL # BLD AUTO: 0.1 10E3/UL (ref 0–0.7)
EOSINOPHIL NFR BLD AUTO: 1 %
ERYTHROCYTE [DISTWIDTH] IN BLOOD BY AUTOMATED COUNT: 15.6 % (ref 10–15)
FERRITIN SERPL-MCNC: 10 NG/ML (ref 8–252)
HCT VFR BLD AUTO: 39.7 % (ref 35–47)
HGB BLD-MCNC: 12.9 G/DL (ref 11.7–15.7)
IMM GRANULOCYTES # BLD: 0 10E3/UL
IMM GRANULOCYTES NFR BLD: 0 %
IRON SATN MFR SERPL: 13 % (ref 15–46)
IRON SERPL-MCNC: 60 UG/DL (ref 35–180)
LYMPHOCYTES # BLD AUTO: 3 10E3/UL (ref 0.8–5.3)
LYMPHOCYTES NFR BLD AUTO: 31 %
MCH RBC QN AUTO: 30.4 PG (ref 26.5–33)
MCHC RBC AUTO-ENTMCNC: 32.5 G/DL (ref 31.5–36.5)
MCV RBC AUTO: 94 FL (ref 78–100)
MONOCYTES # BLD AUTO: 0.5 10E3/UL (ref 0–1.3)
MONOCYTES NFR BLD AUTO: 5 %
NEUTROPHILS # BLD AUTO: 5.9 10E3/UL (ref 1.6–8.3)
NEUTROPHILS NFR BLD AUTO: 62 %
NRBC # BLD AUTO: 0 10E3/UL
NRBC BLD AUTO-RTO: 0 /100
PLATELET # BLD AUTO: 300 10E3/UL (ref 150–450)
RBC # BLD AUTO: 4.24 10E6/UL (ref 3.8–5.2)
TIBC SERPL-MCNC: 475 UG/DL (ref 240–430)
WBC # BLD AUTO: 9.5 10E3/UL (ref 4–11)

## 2022-05-06 PROCEDURE — 82306 VITAMIN D 25 HYDROXY: CPT | Mod: 90 | Performed by: PATHOLOGY

## 2022-05-06 PROCEDURE — 36415 COLL VENOUS BLD VENIPUNCTURE: CPT | Performed by: PATHOLOGY

## 2022-05-06 PROCEDURE — 99000 SPECIMEN HANDLING OFFICE-LAB: CPT | Performed by: PATHOLOGY

## 2022-05-06 PROCEDURE — 82728 ASSAY OF FERRITIN: CPT | Performed by: PATHOLOGY

## 2022-05-06 PROCEDURE — U0005 INFEC AGEN DETEC AMPLI PROBE: HCPCS | Mod: 90 | Performed by: PATHOLOGY

## 2022-05-06 PROCEDURE — U0003 INFECTIOUS AGENT DETECTION BY NUCLEIC ACID (DNA OR RNA); SEVERE ACUTE RESPIRATORY SYNDROME CORONAVIRUS 2 (SARS-COV-2) (CORONAVIRUS DISEASE [COVID-19]), AMPLIFIED PROBE TECHNIQUE, MAKING USE OF HIGH THROUGHPUT TECHNOLOGIES AS DESCRIBED BY CMS-2020-01-R: HCPCS | Mod: 90 | Performed by: PATHOLOGY

## 2022-05-06 PROCEDURE — 83550 IRON BINDING TEST: CPT | Performed by: PATHOLOGY

## 2022-05-06 PROCEDURE — 85025 COMPLETE CBC W/AUTO DIFF WBC: CPT | Performed by: PATHOLOGY

## 2022-05-07 LAB
DEPRECATED CALCIDIOL+CALCIFEROL SERPL-MC: 23 UG/L (ref 20–75)
SARS-COV-2 RNA RESP QL NAA+PROBE: NEGATIVE

## 2022-05-09 ENCOUNTER — ANESTHESIA EVENT (OUTPATIENT)
Dept: SURGERY | Facility: CLINIC | Age: 52
End: 2022-05-09
Payer: COMMERCIAL

## 2022-05-10 ENCOUNTER — APPOINTMENT (OUTPATIENT)
Dept: GENERAL RADIOLOGY | Facility: CLINIC | Age: 52
End: 2022-05-10
Attending: INTERNAL MEDICINE
Payer: COMMERCIAL

## 2022-05-10 ENCOUNTER — HOSPITAL ENCOUNTER (OUTPATIENT)
Facility: CLINIC | Age: 52
Discharge: HOME | End: 2022-05-10
Attending: INTERNAL MEDICINE | Admitting: INTERNAL MEDICINE
Payer: COMMERCIAL

## 2022-05-10 ENCOUNTER — ANESTHESIA (OUTPATIENT)
Dept: SURGERY | Facility: CLINIC | Age: 52
End: 2022-05-10
Payer: COMMERCIAL

## 2022-05-10 VITALS
DIASTOLIC BLOOD PRESSURE: 82 MMHG | TEMPERATURE: 98.3 F | WEIGHT: 106.48 LBS | SYSTOLIC BLOOD PRESSURE: 126 MMHG | BODY MASS INDEX: 20.91 KG/M2 | HEART RATE: 118 BPM | HEIGHT: 60 IN | OXYGEN SATURATION: 95 % | RESPIRATION RATE: 20 BRPM

## 2022-05-10 LAB
GLUCOSE BLDC GLUCOMTR-MCNC: 110 MG/DL (ref 70–99)
UPPER GI ENDOSCOPY: NORMAL

## 2022-05-10 PROCEDURE — 999N000179 XR SURGERY CARM FLUORO LESS THAN 5 MIN W STILLS: Mod: TC

## 2022-05-10 PROCEDURE — 250N000025 HC SEVOFLURANE, PER MIN: Performed by: INTERNAL MEDICINE

## 2022-05-10 PROCEDURE — 710N000010 HC RECOVERY PHASE 1, LEVEL 2, PER MIN: Performed by: INTERNAL MEDICINE

## 2022-05-10 PROCEDURE — 272N000001 HC OR GENERAL SUPPLY STERILE: Performed by: INTERNAL MEDICINE

## 2022-05-10 PROCEDURE — 250N000009 HC RX 250: Performed by: ANESTHESIOLOGY

## 2022-05-10 PROCEDURE — 999N000141 HC STATISTIC PRE-PROCEDURE NURSING ASSESSMENT: Performed by: INTERNAL MEDICINE

## 2022-05-10 PROCEDURE — 82962 GLUCOSE BLOOD TEST: CPT

## 2022-05-10 PROCEDURE — 250N000011 HC RX IP 250 OP 636: Performed by: ANESTHESIOLOGY

## 2022-05-10 PROCEDURE — 258N000003 HC RX IP 258 OP 636: Performed by: ANESTHESIOLOGY

## 2022-05-10 PROCEDURE — 250N000009 HC RX 250: Performed by: INTERNAL MEDICINE

## 2022-05-10 PROCEDURE — C1876 STENT, NON-COA/NON-COV W/DEL: HCPCS | Performed by: INTERNAL MEDICINE

## 2022-05-10 PROCEDURE — 370N000017 HC ANESTHESIA TECHNICAL FEE, PER MIN: Performed by: INTERNAL MEDICINE

## 2022-05-10 PROCEDURE — 360N000082 HC SURGERY LEVEL 2 W/ FLUORO, PER MIN: Performed by: INTERNAL MEDICINE

## 2022-05-10 PROCEDURE — 710N000012 HC RECOVERY PHASE 2, PER MINUTE: Performed by: INTERNAL MEDICINE

## 2022-05-10 DEVICE — STENT ESU AXIOS W/DEL SYS 20X10MM 10.8FR 138CM M00553660
Type: IMPLANTABLE DEVICE | Site: JEJUNUM | Status: NON-FUNCTIONAL
Removed: 2023-05-17

## 2022-05-10 RX ORDER — LABETALOL HYDROCHLORIDE 5 MG/ML
5 INJECTION, SOLUTION INTRAVENOUS EVERY 5 MIN PRN
Status: DISCONTINUED | OUTPATIENT
Start: 2022-05-10 | End: 2022-05-10 | Stop reason: HOSPADM

## 2022-05-10 RX ORDER — ONDANSETRON 4 MG/1
4 TABLET, ORALLY DISINTEGRATING ORAL EVERY 30 MIN PRN
Status: DISCONTINUED | OUTPATIENT
Start: 2022-05-10 | End: 2022-05-10 | Stop reason: HOSPADM

## 2022-05-10 RX ORDER — LIDOCAINE 40 MG/G
CREAM TOPICAL
Status: DISCONTINUED | OUTPATIENT
Start: 2022-05-10 | End: 2022-05-10 | Stop reason: HOSPADM

## 2022-05-10 RX ORDER — DEXAMETHASONE SODIUM PHOSPHATE 4 MG/ML
INJECTION, SOLUTION INTRA-ARTICULAR; INTRALESIONAL; INTRAMUSCULAR; INTRAVENOUS; SOFT TISSUE PRN
Status: DISCONTINUED | OUTPATIENT
Start: 2022-05-10 | End: 2022-05-10

## 2022-05-10 RX ORDER — ESMOLOL HYDROCHLORIDE 10 MG/ML
INJECTION INTRAVENOUS PRN
Status: DISCONTINUED | OUTPATIENT
Start: 2022-05-10 | End: 2022-05-10

## 2022-05-10 RX ORDER — FENTANYL CITRATE 50 UG/ML
25 INJECTION, SOLUTION INTRAMUSCULAR; INTRAVENOUS
Status: DISCONTINUED | OUTPATIENT
Start: 2022-05-10 | End: 2022-05-10 | Stop reason: HOSPADM

## 2022-05-10 RX ORDER — ACETAMINOPHEN 325 MG/1
975 TABLET ORAL
Status: DISCONTINUED | OUTPATIENT
Start: 2022-05-10 | End: 2022-05-10 | Stop reason: HOSPADM

## 2022-05-10 RX ORDER — DIPHENHYDRAMINE HYDROCHLORIDE 50 MG/ML
25 INJECTION INTRAMUSCULAR; INTRAVENOUS
Status: DISCONTINUED | OUTPATIENT
Start: 2022-05-10 | End: 2022-05-10 | Stop reason: HOSPADM

## 2022-05-10 RX ORDER — ONDANSETRON 2 MG/ML
INJECTION INTRAMUSCULAR; INTRAVENOUS PRN
Status: DISCONTINUED | OUTPATIENT
Start: 2022-05-10 | End: 2022-05-10

## 2022-05-10 RX ORDER — ONDANSETRON 2 MG/ML
4 INJECTION INTRAMUSCULAR; INTRAVENOUS
Status: DISCONTINUED | OUTPATIENT
Start: 2022-05-10 | End: 2022-05-10 | Stop reason: HOSPADM

## 2022-05-10 RX ORDER — PROPOFOL 10 MG/ML
INJECTION, EMULSION INTRAVENOUS PRN
Status: DISCONTINUED | OUTPATIENT
Start: 2022-05-10 | End: 2022-05-10

## 2022-05-10 RX ORDER — FENTANYL CITRATE 50 UG/ML
25-50 INJECTION, SOLUTION INTRAMUSCULAR; INTRAVENOUS EVERY 5 MIN PRN
Status: DISCONTINUED | OUTPATIENT
Start: 2022-05-10 | End: 2022-05-10 | Stop reason: HOSPADM

## 2022-05-10 RX ORDER — LIDOCAINE HYDROCHLORIDE 20 MG/ML
INJECTION, SOLUTION INFILTRATION; PERINEURAL PRN
Status: DISCONTINUED | OUTPATIENT
Start: 2022-05-10 | End: 2022-05-10

## 2022-05-10 RX ORDER — ONDANSETRON 2 MG/ML
4 INJECTION INTRAMUSCULAR; INTRAVENOUS EVERY 30 MIN PRN
Status: DISCONTINUED | OUTPATIENT
Start: 2022-05-10 | End: 2022-05-10 | Stop reason: HOSPADM

## 2022-05-10 RX ORDER — SODIUM CHLORIDE, SODIUM LACTATE, POTASSIUM CHLORIDE, CALCIUM CHLORIDE 600; 310; 30; 20 MG/100ML; MG/100ML; MG/100ML; MG/100ML
INJECTION, SOLUTION INTRAVENOUS CONTINUOUS
Status: DISCONTINUED | OUTPATIENT
Start: 2022-05-10 | End: 2022-05-10 | Stop reason: HOSPADM

## 2022-05-10 RX ORDER — SODIUM CHLORIDE, SODIUM LACTATE, POTASSIUM CHLORIDE, CALCIUM CHLORIDE 600; 310; 30; 20 MG/100ML; MG/100ML; MG/100ML; MG/100ML
INJECTION, SOLUTION INTRAVENOUS CONTINUOUS PRN
Status: DISCONTINUED | OUTPATIENT
Start: 2022-05-10 | End: 2022-05-10

## 2022-05-10 RX ORDER — ALBUTEROL SULFATE 0.83 MG/ML
2.5 SOLUTION RESPIRATORY (INHALATION) EVERY 4 HOURS PRN
Status: DISCONTINUED | OUTPATIENT
Start: 2022-05-10 | End: 2022-05-10 | Stop reason: HOSPADM

## 2022-05-10 RX ORDER — HYDRALAZINE HYDROCHLORIDE 20 MG/ML
2.5-5 INJECTION INTRAMUSCULAR; INTRAVENOUS EVERY 10 MIN PRN
Status: DISCONTINUED | OUTPATIENT
Start: 2022-05-10 | End: 2022-05-10 | Stop reason: HOSPADM

## 2022-05-10 RX ADMIN — MIDAZOLAM 2 MG: 1 INJECTION INTRAMUSCULAR; INTRAVENOUS at 11:25

## 2022-05-10 RX ADMIN — ROCURONIUM BROMIDE 40 MG: 50 INJECTION, SOLUTION INTRAVENOUS at 11:36

## 2022-05-10 RX ADMIN — SUGAMMADEX 200 MG: 100 INJECTION, SOLUTION INTRAVENOUS at 12:14

## 2022-05-10 RX ADMIN — PROPOFOL 200 MG: 10 INJECTION, EMULSION INTRAVENOUS at 11:34

## 2022-05-10 RX ADMIN — SODIUM CHLORIDE, POTASSIUM CHLORIDE, SODIUM LACTATE AND CALCIUM CHLORIDE: 600; 310; 30; 20 INJECTION, SOLUTION INTRAVENOUS at 11:24

## 2022-05-10 RX ADMIN — DEXAMETHASONE SODIUM PHOSPHATE 4 MG: 4 INJECTION, SOLUTION INTRA-ARTICULAR; INTRALESIONAL; INTRAMUSCULAR; INTRAVENOUS; SOFT TISSUE at 11:32

## 2022-05-10 RX ADMIN — LIDOCAINE HYDROCHLORIDE 60 MG: 20 INJECTION, SOLUTION INFILTRATION; PERINEURAL at 11:34

## 2022-05-10 RX ADMIN — ESMOLOL HYDROCHLORIDE 20 MG: 10 INJECTION, SOLUTION INTRAVENOUS at 12:05

## 2022-05-10 RX ADMIN — ONDANSETRON 4 MG: 2 INJECTION INTRAMUSCULAR; INTRAVENOUS at 11:48

## 2022-05-10 ASSESSMENT — COPD QUESTIONNAIRES: COPD: 1

## 2022-05-10 ASSESSMENT — LIFESTYLE VARIABLES: TOBACCO_USE: 1

## 2022-05-10 NOTE — DISCHARGE INSTRUCTIONS
[facility]  Same-Day Surgery   Adult Discharge Orders & Instructions     For 24 hours after surgery    Get plenty of rest.  A responsible adult must stay with you for at least 24 hours after you leave the hospital.   Do not drive or use heavy equipment.  If you have weakness or tingling, don't drive or use heavy equipment until this feeling goes away.  Do not drink alcohol.  Avoid strenuous or risky activities.  Ask for help when climbing stairs.   You may feel lightheaded.  IF so, sit for a few minutes before standing.  Have someone help you get up.   If you have nausea (feel sick to your stomach): Drink only clear liquids such as apple juice, ginger ale, broth or 7-Up.  Rest may also help.  Be sure to drink enough fluids.  Move to a regular diet as you feel able.  You may have a slight fever. Call the doctor if your fever is over 100 F (37.7 C) (taken under the tongue) or lasts longer than 24 hours.  You may have a dry mouth, a sore throat, muscle aches or trouble sleeping.  These should go away after 24 hours.  Do not make important or legal decisions.   Call your doctor for any of the followin.  Signs of infection (fever, growing tenderness at the surgery site, a large amount of drainage or bleeding, severe pain, foul-smelling drainage, redness, swelling).    2. It has been over 8 to 10 hours since surgery and you are still not able to urinate (pass water).    3.  Headache for over 24 hours.    4.  Numbness, tingling or weakness the day after surgery (if you had spinal anesthesia).  To contact a doctor, call  Dr Self at [phone] (General Surgery clinic)  or:    '   [phone] and ask for the resident on call for   Gastroenterology (answered 24 hours a day)  '   Emergency Department:    [facility]: [phone]       (TTY for hearing impaired: [phone])    [facility]: [phone]       (TTY for hearing impaired: [phone])

## 2022-05-10 NOTE — ANESTHESIA PREPROCEDURE EVALUATION
Anesthesia Pre-Procedure Evaluation    Patient: Kristina Eldridge   MRN: [id number] : 1970        Procedure : Procedure(s):  ESOPHAGOGASTRODUODENOSCOPY (EGD)  **Latex Allergy**          Past Medical History:   Diagnosis Date     Abnormal Pap smear 2017     said pap was abnormal     Arthritis      Chronic back pain     hx spondylolisthesis     Chronic pain      COPD (chronic obstructive pulmonary disease) (H)     PFT's  FEV1/FVC = 2.68/3.20 = 84%     H/O gastric bypass      Hepatitis C     treated ribaviron & interferon in  for serotype 2 with failed 6 month treatment     Hyperlipidemia     on simvatatin     Insomnia      Other chronic pain      Positive TB test     has taken INH     RLS (restless legs syndrome)      Seasonal allergies      TB lung, latent     treated     Uncomplicated asthma      Wounds and injuries       Past Surgical History:   Procedure Laterality Date     CHOLECYSTECTOMY       D & C       ENDOSCOPIC ULTRASOUND UPPER GASTROINTESTINAL TRACT (GI) N/A 10/21/2019    Procedure: ENDOSCOPIC ULTRASOUND, ESOPHAGOSCOPY / UPPER GASTROINTESTINAL TRACT (GI) with jejunalgastrostomy with stent placement x 2, dilation tract;  Surgeon: Ramesh Self MD;  [facility]     ENDOSCOPIC ULTRASOUND UPPER GASTROINTESTINAL TRACT (GI) N/A 2020    Procedure: ENDOSCOPIC ULTRASOUND, ESOPHAGOSCOPY / UPPER GASTROINTESTINAL TRACT (GI)  with stent exchange;  Surgeon: Ramesh Self MD;  [facility]     ENDOSCOPIC ULTRASOUND UPPER GASTROINTESTINAL TRACT (GI) N/A 2020    Procedure: ENDOSCOPIC ULTRASOUND, ESOPHAGOSCOPY / UPPER GASTROINTESTINAL TRACT (GI)  **Latex Allergy** stent exchange;  Surgeon: Ramesh Self MD;  [facility]     ESOPHAGOSCOPY, GASTROSCOPY, DUODENOSCOPY (EGD), COMBINED  2014    Procedure: COMBINED ESOPHAGOSCOPY, GASTROSCOPY, DUODENOSCOPY (EGD);  Surgeon: Ramesh Self MD;  [facility]     ESOPHAGOSCOPY,  GASTROSCOPY, DUODENOSCOPY (EGD), COMBINED N/A 8/14/2019    Procedure: ESOPHAGOGASTRODUODENOSCOPY, WITH BIOPSY;  Surgeon: Johann Hazel MD;  [facility]     FRACTURE TX, ANKLE RT/LT  1991    ORIF rt ankle     FUSION SPINE POSTERIOR ONE LEVEL N/A 11/21/2014    Procedure: FUSION SPINE POSTERIOR ONE LEVEL;  Surgeon: Amilcar Baldwin MD;  [facility]     FUSION SPINE POSTERIOR ONE LEVEL Right 7/30/2015    Procedure: FUSION SPINE POSTERIOR ONE LEVEL;  Surgeon: Amilcar Baldwin MD;  [facility]     GASTRIC BYPASS  2007    lost 150 lbs     GRAFT BONE FROM ILIAC CREST Left 11/21/2014    Procedure: GRAFT BONE FROM ILIAC CREST;  Surgeon: Amilcar Baldwin MD;  [facility]     hardware removal       INJECT BLOCK MEDIAL BRANCH CERVICAL/THORACIC/LUMBAR Bilateral 7/10/2020    Procedure: Bilateral lumbar 3, lumbar 4, and lumbar 5 medial branch nerve diagnostic blocks #2;  Surgeon: Pravin Bagley MD;  [facility]     INJECT PARAVERTEBRAL FACET JOINT LUMBAR / SACRAL FIRST Bilateral 9/30/2019    Procedure: Lumbar Medial Branch Nerve Block Injection Bilateral;  Surgeon: Hira Rodríguez MD;  [facility]     OPTICAL TRACKING SYSTEM FUSION POSTERIOR SPINE LUMBAR  11/20/2013    Procedure: OPTICAL TRACKING SYSTEM FUSION SPINE POSTERIOR LUMBAR ONE LEVEL;  Lumbar 4-5 Transforaminal Interbody Fusion;  Surgeon: Amilcar Baldwin MD;  [facility]     ORTHOPEDIC SURGERY      shoulder surgery, removed bone spur     PHACOEMULSIFICATION CLEAR CORNEA WITH STANDARD INTRAOCULAR LENS IMPLANT Right 5/29/2020    Procedure: RIGHT PHACOEMULSIFICATION, CATARACT, WITH INTRAOCULAR LENS IMPLANT;  Surgeon: Lyla Mahoney MD;  [facility]     PHACOEMULSIFICATION CLEAR CORNEA WITH STANDARD INTRAOCULAR LENS IMPLANT Left 7/17/2020    Procedure: LEFT EYE CATARACT REMOVAL WITH INTRAOCULAR LENS IMPLANT;  Surgeon: Lyla Mahoney MD;  [facility]     TUBAL  LIGATION        Allergies   Allergen Reactions     Bee Venom Anaphylaxis, Anxiety, Difficulty breathing, Hives, Itching, Nausea and Vomiting, Palpitations, Shortness Of Breath and Swelling     Opium Alkaloids, Hcls Itching     Can take opiate derived narcotics with Benadryl.     Morphine Itching     Latex Rash and Itching      Social History     Tobacco Use     Smoking status: Former Smoker     Packs/day: 1.00     Years: 30.00     Pack years: 30.00     Types: Cigarettes     Quit date: 2014     Years since quittin.9     Smokeless tobacco: Never Used     Tobacco comment: pack a day   Substance Use Topics     Alcohol use: Not Currently     Alcohol/week: 0.0 standard drinks     Comment: x3 drinks per year      Wt Readings from Last 1 Encounters:   05/10/22 48.3 kg (106 lb 7.7 oz)        Anesthesia Evaluation   Pt has had prior anesthetic. Type: General.        ROS/MED HX  ENT/Pulmonary:     (+) tobacco use, Past use, COPD,     Neurologic:     (+) peripheral neuropathy, - h/o facial palsy.     Cardiovascular:     (+) Dyslipidemia -----    METS/Exercise Tolerance:     Hematologic:       Musculoskeletal:   (+) arthritis,     GI/Hepatic:     (+) hepatitis liver disease,     Renal/Genitourinary:       Endo:       Psychiatric/Substance Use:     (+) H/O chronic opiod use . Recreational drug usage: Cannabis.    Infectious Disease:       Malignancy:  - neg malignancy ROS     Other:      (+) , H/O Chronic Pain,        Physical Exam    Airway        Mallampati: I   TM distance: > 3 FB   Neck ROM: full   Mouth opening: > 3 cm    Respiratory Devices and Support         Dental       (+) upper dentures and lower dentures      Cardiovascular             Pulmonary                   OUTSIDE LABS:  CBC:   Lab Results   Component Value Date    WBC 9.5 2022    WBC 7.9 2022    HGB 12.9 2022    HGB 14.4 2022    HCT 39.7 2022    HCT 44.2 2022     2022     2022     BMP:    Lab Results   Component Value Date     (L) 03/16/2022     04/09/2021    POTASSIUM 3.4 03/16/2022    POTASSIUM 3.8 04/09/2021    CHLORIDE 99 03/16/2022    CHLORIDE 102 04/09/2021    CO2 27 03/16/2022    CO2 30 04/09/2021    BUN 2 (L) 03/16/2022    BUN 3 (L) 04/09/2021    CR 0.82 03/16/2022    CR 0.89 04/09/2021     (H) 05/10/2022    GLC 86 03/16/2022     COAGS:   Lab Results   Component Value Date    PTT 30 04/16/2015    INR 1.09 06/02/2020     POC:   Lab Results   Component Value Date    BGM 97 05/05/2021    HCG Negative 11/21/2014    HCGS Negative 04/16/2015     HEPATIC:   Lab Results   Component Value Date    ALBUMIN 3.7 03/16/2022    PROTTOTAL 7.1 03/16/2022    ALT 17 03/16/2022    AST 21 03/16/2022    ALKPHOS 118 03/16/2022    BILITOTAL 0.3 03/16/2022     OTHER:   Lab Results   Component Value Date    A1C 5.5 04/09/2021    MARIA FERNANDA 8.0 (L) 03/16/2022    PHOS 3.1 09/12/2012    MAG 2.0 09/16/2015    TSH 1.44 03/16/2022    CRP 4.1 03/11/2019    SED 16 01/31/2013       Anesthesia Plan    ASA Status:  3   NPO Status:  NPO Appropriate    Anesthesia Type: General.     - Airway: ETT   Induction: Intravenous, Propofol.   Maintenance: Balanced.        Consents    Anesthesia Plan(s) and associated risks, benefits, and realistic alternatives discussed. Questions answered and patient/representative(s) expressed understanding.    - Discussed:     - Discussed with:  Patient      - Extended Intubation/Ventilatory Support Discussed: No.      - Patient is DNR/DNI Status: No         Postoperative Care    Pain management: IV analgesics.   PONV prophylaxis: Ondansetron (or other 5HT-3), Dexamethasone or Solumedrol     Comments:                Grabiel Loyola MD

## 2022-05-10 NOTE — OP NOTE
Upper GI Endoscopy 05/10/2022 11:16 AM [address] [place] [address] [phone]     Endoscopy Department   _______________________________________________________________________________   Patient Name: Kristina Eldridge            Procedure Date: 5/10/2022 11:16 AM   MRN: [id number]                       Account Number: [id number]   YOB: 1970              Admit Type: Outpatient   Age: 51                               Room:  OR    Gender: Female                        Note Status: Finalized   Attending MD: ARYAN ALLISON MD  Total Sedation Time:   _______________________________________________________________________________       Procedure:             Upper GI endoscopy   Indications:           Stent follow-up, Malnutrition   Providers:             ARYAN ALLISON MD, Ny Pereira   Patient Profile:       Ms Eldridge is a 51yo woman with a 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 She now returns over 12m since the                          placement of a 20mm stent for scheduled stent exchange                          by upper endoscopy.   Referring MD:          SARAVANAN MARQUEZ MD   Medicines:             General Anesthesia   Complications:         No immediate complications.   _______________________________________________________________________________   Procedure:             Pre-Anesthesia Assessment:                          - Prior to the procedure, a History and Physical was                          performed, and patient medications and allergies were                          reviewed. The patient is competent. The risks and                           benefits of the procedure and the sedation options and                          risks were discussed with the patient. All questions                          were answered and informed consent was obtained.                          Patient identification and proposed procedure were                          verified by the nurse in the pre-procedure area.                          Mental Status Examination: alert and oriented. Airway                          Examination: Mallampati Class II (the uvula but not                          tonsillar pillars visualized). Respiratory                          Examination: clear to auscultation. CV Examination:                          normal. ASA Grade Assessment: III - A patient with                          severe systemic disease. After reviewing the risks and                          benefits, the patient was deemed in satisfactory                          condition to undergo the procedure. The anesthesia                          plan was to use general anesthesia. Immediately prior                          to administration of medications, the patient was                          re-assessed for adequacy to receive sedatives. The                          heart rate, respiratory rate, oxygen saturations,                          blood pressure, adequacy of pulmonary ventilation, and                          response to care were monitored throughout the                          procedure. The physical status of the patient was                          re-assessed after the procedure. After obtaining                          informed consent, the endoscope was passed under                          direct vision. Throughout the procedure, the patient's                          blood pressure, pulse, and oxygen saturations were                          monitored continuously. The 1T gastroscope was                          introduced through the mouth, and advanced to the                           jejunum. The upper GI endoscopy was accomplished                          without difficulty. The patient tolerated the                          procedure well.                                                                                     Findings:         films demonstrated surgical clips and a well expanded Axios stent        in its anticipated position. The proximal, mid and distal esophagus were        unremarkable without inflammation or lesion. The squamocolumnar line was        found at the gastroesophageal junction without significant irregularity.        The pouch was modestly sized and the gastrojejunostomy generously patent        without inflammation or lesion. This is an end to side anastomosis. A        few centimeters into the Alicia a widely patent Axios was found and        traversed leading to the remnant and duodenal sweep. An eroded suture        was found in the pouch and removed using scissors. The existing stent        was removed in toto with a rat tooth and a [id number] lumen apposing        covered metal stent was deployed across what appeared to be a matured        tract. The stent seated well and expanded as anticipated.                                                                                     Impression:            - RYGB anatomy with well postioned 20mm Axios                          maintaining a now mature jejunogastrostomy                          - Uncomplicated exchange of the 20mm Axios with a                          fresh 20mm Axios using cold, endoscopically guided                          technique                          - Uncomplicated removal of a large eroded suture   Recommendation:        - General anesthesia recovery with probable discharge                          home this afternoon                          - Previous diet and all medications may be resumed                          immediately upon discharge                           - Follow up with estabslihed physicians as scheduled                          - Return for stent removal/exchange in 12m                          - The findings and recommendations were discussed with                          the patient and their family                                                                                       electronically signed by CHER Self

## 2022-05-10 NOTE — ANESTHESIA PROCEDURE NOTES
Airway       Patient location during procedure: OR       Procedure Start/Stop Times: 5/10/2022 11:42 AM  Staff -        Anesthesiologist:  Grabiel Loyola MD       Performed By: CRNA  Consent for Airway        Urgency: elective  Indications and Patient Condition       Indications for airway management: luis alberto-procedural       Induction type:intravenous       Mask difficulty assessment: 2 - vent by mask + OA or adjuvant +/- NMBA (edentulous)    Final Airway Details       Final airway type: endotracheal airway       Successful airway: ETT - single  Endotracheal Airway Details        ETT size (mm): 8.0       Cuffed: yes       Successful intubation technique: video laryngoscopy       VL Blade Size: MAC 3       Grade View of Cords: 1       Adjucts: stylet       Position: Right       Measured from: lips       Bite block used: None    Post intubation assessment        Placement verified by: capnometry, equal breath sounds and chest rise        Number of attempts at approach: 1       Secured with: pink tape       Ease of procedure: easy       Dentition: Intact and Unchanged    Medication(s) Administered   Medication Administration Time: 5/10/2022 11:42 AM    Additional Comments       Intubated by medical student Meet BRIONES with assistance from Dr. Loyola. VL used for teaching, would be easy DL in future

## 2022-05-10 NOTE — ANESTHESIA POSTPROCEDURE EVALUATION
Patient: Kristina Eldridge    Procedure: Procedure(s):  ESOPHAGOGASTRODUODENOSCOPY (EGD)  **Latex Allergy** with axios stent exchanged       Anesthesia Type:  General    Note:  Disposition: Outpatient   Postop Pain Control: Uneventful   PONV: No   Neuro/Psych: Uneventful            Sign Out: Acceptable/Baseline neuro status   Airway/Respiratory: Uneventful            Sign Out: Acceptable/Baseline resp. status   CV/Hemodynamics: Uneventful            Sign Out: Acceptable CV status; No obvious hypovolemia; No obvious fluid overload   Other NRE: NONE   DID A NON-ROUTINE EVENT OCCUR? No           Last vitals:  Vitals Value Taken Time   /63 05/10/22 1330   Temp 37.1  C (98.7  F) 05/10/22 1230   Pulse 71 05/10/22 1330   Resp 15 05/10/22 1316   SpO2 94 % 05/10/22 1333   Vitals shown include unvalidated device data.    Electronically Signed By: Preeti Yancey MD  May 10, 2022  1:34 PM

## 2022-05-10 NOTE — ANESTHESIA CARE TRANSFER NOTE
Patient: Kristina Eldridge    Procedure: Procedure(s):  ESOPHAGOGASTRODUODENOSCOPY (EGD)  **Latex Allergy**       Diagnosis: History of gastric bypass [Z98.84]  Diagnosis Additional Information: No value filed.    Anesthesia Type:   General     Note:    Oropharynx: oropharynx clear of all foreign objects and spontaneously breathing  Level of Consciousness: awake  Oxygen Supplementation: face mask  Level of Supplemental Oxygen (L/min / FiO2): 6  Independent Airway: airway patency satisfactory and stable  Dentition: dentition unchanged  Vital Signs Stable: post-procedure vital signs reviewed and stable  Report to RN Given: handoff report given  Patient transferred to: PACU    Handoff Report: Identifed the Patient, Identified the Reponsible Provider, Reviewed the pertinent medical history, Discussed the surgical course, Reviewed Intra-OP anesthesia mangement and issues during anesthesia, Set expectations for post-procedure period and Allowed opportunity for questions and acknowledgement of understanding      Vitals:  Vitals Value Taken Time   /68    Temp 36    Pulse 75    Resp 16 05/10/22 1228   SpO2 100 % 05/10/22 1228   Vitals shown include unvalidated device data.    Electronically Signed By: MILEY Dey CRNA  May 10, 2022  12:29 PM

## 2022-05-17 ENCOUNTER — OFFICE VISIT (OUTPATIENT)
Dept: ORTHOPEDICS | Facility: CLINIC | Age: 52
End: 2022-05-17
Payer: COMMERCIAL

## 2022-05-17 VITALS — HEIGHT: 60 IN | WEIGHT: 106 LBS | BODY MASS INDEX: 20.81 KG/M2

## 2022-05-17 DIAGNOSIS — M65.321 TRIGGER INDEX FINGER OF RIGHT HAND: Primary | ICD-10-CM

## 2022-05-17 DIAGNOSIS — J44.9 CHRONIC OBSTRUCTIVE PULMONARY DISEASE, UNSPECIFIED COPD TYPE (H): Primary | ICD-10-CM

## 2022-05-17 DIAGNOSIS — M18.12 ARTHRITIS OF CARPOMETACARPAL (CMC) JOINT OF LEFT THUMB: ICD-10-CM

## 2022-05-17 DIAGNOSIS — E55.9 VITAMIN D DEFICIENCY: ICD-10-CM

## 2022-05-17 PROCEDURE — 99213 OFFICE O/P EST LOW 20 MIN: CPT | Mod: 25 | Performed by: FAMILY MEDICINE

## 2022-05-17 PROCEDURE — 20600 DRAIN/INJ JOINT/BURSA W/O US: CPT | Mod: FA | Performed by: FAMILY MEDICINE

## 2022-05-17 RX ORDER — LIDOCAINE HYDROCHLORIDE 10 MG/ML
1 INJECTION, SOLUTION EPIDURAL; INFILTRATION; INTRACAUDAL; PERINEURAL
Status: SHIPPED | OUTPATIENT
Start: 2022-05-17

## 2022-05-17 RX ORDER — IPRATROPIUM BROMIDE AND ALBUTEROL SULFATE 2.5; .5 MG/3ML; MG/3ML
1 SOLUTION RESPIRATORY (INHALATION) EVERY 6 HOURS PRN
Qty: 90 ML | Refills: 1 | Status: SHIPPED | OUTPATIENT
Start: 2022-05-17 | End: 2022-07-29

## 2022-05-17 RX ORDER — TRIAMCINOLONE ACETONIDE 40 MG/ML
40 INJECTION, SUSPENSION INTRA-ARTICULAR; INTRAMUSCULAR
Status: SHIPPED | OUTPATIENT
Start: 2022-05-17

## 2022-05-17 RX ADMIN — TRIAMCINOLONE ACETONIDE 40 MG: 40 INJECTION, SUSPENSION INTRA-ARTICULAR; INTRAMUSCULAR at 13:40

## 2022-05-17 RX ADMIN — LIDOCAINE HYDROCHLORIDE 1 ML: 10 INJECTION, SOLUTION EPIDURAL; INFILTRATION; INTRACAUDAL; PERINEURAL at 13:40

## 2022-05-17 NOTE — LETTER
5/17/2022      RE: Kristina Eldridge  [address]  [facility] [address]     Dear Colleague,    Thank you for referring your patient, Kristina Eldridge, to the [facility] [place]. Please see a copy of my visit note below.    ASSESSMENT/PLAN:    (M65.30) Trigger finger of right hand, unspecified finger  (primary encounter diagnosis)  Comment: wishes to get a trigger release procedure; injection deferred  Plan: Orthopedic  Referral          (M18.12) Arthritis of carpometacarpal (CMC) joint of left thumb  Comment: cortisone today for 1st CMC OA; if no better, will discuss w/ hand surgery as well; precautions/ anticipatory guidance given; f/u prn   Plan: Orthopedic  Referral, lidocaine (PF)         (XYLOCAINE) 1 % injection, triamcinolone         (KENALOG-40) 40 MG/ML injection, Hand / Upper         Extremity Injection: L thumb CMC          Asa Bashir MD  May 17, 2022  2:30 PM          Pt is a 51 year old female last seen on 3/29/2022 here for follow up of:     1) L 1st CMC OA - She has had continued pain in her hand, nothing has helped so far.     2) R trigger finger - She has continued locking particularly with fine motor movements. -> she wishes to proceed w/ surgery     Per my last note:  (M18.12) Arthritis of carpometacarpal (CMC) joint of left thumb  (primary encounter diagnosis)  Comment: exam consistent w/ 1st CMC OA; will start w/ hand therapy/ custom splint; f/u in 6 wks; if no better, consider an injection  Plan: Hand Therapy Referral          (M65.30) Trigger finger of right hand, unspecified finger  Comment:   Plan: no acute trigger at this time; explained etiology and tx options; can follow-up prn for injection; will buy otc finger splint to use prn    Past Medical History:   Diagnosis Date     Abnormal Pap smear 2/21/2017     said pap was abnormal     Arthritis      Chronic back pain     hx spondylolisthesis     Chronic pain      COPD  (chronic obstructive pulmonary disease) (H)     PFT's 2013 FEV1/FVC = 2.68/3.20 = 84%     H/O gastric bypass 2007     Hepatitis C     treated ribaviron & interferon in 2008 for serotype 2 with failed 6 month treatment     Hyperlipidemia     on simvatatin     Insomnia      Other chronic pain      Positive TB test     has taken INH     RLS (restless legs syndrome)      Seasonal allergies      TB lung, latent 1994    treated     Uncomplicated asthma      Wounds and injuries 2003      Current Outpatient Medications   Medication Sig Dispense Refill     cetirizine (ZYRTEC) 10 MG tablet Take 1 tablet (10 mg) by mouth daily 90 tablet 3     ferrous sulfate (FEROSUL) 325 (65 Fe) MG tablet Take 1 tablet (325 mg) by mouth every other day Take with citrus food or beverage 15 tablet 3     ipratropium (ATROVENT HFA) 17 MCG/ACT inhaler INHALE 2 PUFFS BY MOUTH INTO LUNGS EVERY 6 HOURS 25.8 g 11     ipratropium - albuterol 0.5 mg/2.5 mg/3 mL (DUONEB) 0.5-2.5 (3) MG/3ML neb solution Take 1 vial (3 mLs) by nebulization every 6 hours as needed for shortness of breath / dyspnea or wheezing 90 mL 1     mometasone-formoterol (DULERA) 200-5 MCG/ACT inhaler Inhale 2 puffs into the lungs 2 times daily 13 g 1     montelukast (SINGULAIR) 10 MG tablet Take 1 tablet (10 mg) by mouth At Bedtime 90 tablet 0     order for DME Nebulizer 1 each 0     rOPINIRole (REQUIP) 1 MG tablet Take 1 tablet (1 mg) by mouth 2 times daily 180 tablet 3     Spacer/Aero Chamber Mouthpiece MISC 1 Units daily 1 each 0     vitamin D2 (ERGOCALCIFEROL) 33683 units (1250 mcg) capsule Take 1 capsule (50,000 Units) by mouth once a week 8 capsule 0      Allergies   Allergen Reactions     Bee Venom Anaphylaxis, Anxiety, Difficulty breathing, Hives, Itching, Nausea and Vomiting, Palpitations, Shortness Of Breath and Swelling     Opium Alkaloids, Hcls Itching     Can take opiate derived narcotics with Benadryl.     Morphine Itching     Latex Rash and Itching      ROS:   Gen- no  fevers/chills   Remainder of ROS negative.     Exam:   Ht 1.524 m (5')   Wt 48.1 kg (106 lb)   LMP 06/11/2012 (LMP Unknown)   BMI 20.70 kg/m      L hand:  +TTP at 1st CMC       Procedure Note:  Pt verbally consented regarding risks/benefits. L radial wrist was sterilely prepped in usual fashion. 1 cc of 40mg/mL kenalog and 1 cc of 1% lidocaine was injected into the 1st CMC joint without complication. U/S was used for landmark guidance. Pt tolerated procedure well.     Hand / Upper Extremity Injection: L thumb CMC    Date/Time: 5/17/2022 1:40 PM  Performed by: Asa Bashir MD  Authorized by: Asa Bashir MD     Indications:  Pain  Needle Size:  25 G  Guidance: landmark    Approach:  Dorsal  Condition: osteoarthritis    Location:  Thumb  Site:  L thumb CMC    Medications:  40 mg triamcinolone 40 MG/ML; 1 mL lidocaine (PF) 1 %  Outcome:  Tolerated well, no immediate complications  Procedure discussed: discussed risks, benefits, and alternatives    Consent Given by:  Patient  Timeout: timeout called immediately prior to procedure          Again, thank you for allowing me to participate in the care of your patient.      Sincerely,    Asa Bashir MD

## 2022-05-17 NOTE — NURSING NOTE
[address] [address]  Dept: [phone]  ______________________________________________________________________________    Patient: Kristina Eldridge   : 1970   MRN: [id number]   May 17, 2022    INVASIVE PROCEDURE SAFETY CHECKLIST    Date: 22   Procedure: Left CMC joint kenalog injection  Patient Name: Kristina Eldridge  MRN: [id number]  YOB: 1970    Action: Complete sections as appropriate. Any discrepancy results in a HARD COPY until resolved.     PRE PROCEDURE:  Patient ID verified with 2 identifiers (name and  or MRN): Yes  Procedure and site verified with patient/designee (when able): Yes  Accurate consent documentation in medical record: Yes  H&P (or appropriate assessment) documented in medical record: Yes  H&P must be up to 20 days prior to procedure and updates within 24 hours of procedure as applicable: NA  Relevant diagnostic and radiology test results appropriately labeled and displayed as applicable: Yes  Procedure site(s) marked with provider initials: NA    TIMEOUT:  Time-Out performed immediately prior to starting procedure, including verbal and active participation of all team members addressing the following:Yes  * Correct patient identify  * Confirmed that the correct side and site are marked  * An accurate procedure consent form  * Agreement on the procedure to be done  * Correct patient position  * Relevant images and results are properly labeled and appropriately displayed  * The need to administer antibiotics or fluids for irrigation purposes during the procedure as applicable   * Safety precautions based on patient history or medication use    DURING PROCEDURE: Verification of correct person, site, and procedures any time the responsibility for care of the patient is transferred to another member of the care team.       Prior to injection, verified patient identity using patient's name and date of  birth.  Due to injection administration, patient instructed to remain in clinic for 15 minutes  afterwards, and to report any adverse reaction to me immediately.    Joint injection was performed.      Drug Amount Wasted:  Yes: 4 mg/ml   Vial/Syringe: Single dose vial  Expiration Date:  01/2025      Alyssa Bello ATC  May 17, 2022

## 2022-05-17 NOTE — PROGRESS NOTES
ASSESSMENT/PLAN:    (M65.321) Trigger index finger of right hand  (primary encounter diagnosis)  Comment: pt w/ triggering of R index and middle fingers; wishes to get a trigger release procedure; injection deferred  Plan: Orthopedic  Referral                (M18.12) Arthritis of carpometacarpal (CMC) joint of left thumb  Comment: cortisone today for 1st CMC OA; if no better, will discuss w/ hand surgery as well; precautions/ anticipatory guidance given; f/u prn   Plan: Orthopedic  Referral, lidocaine (PF)         (XYLOCAINE) 1 % injection, triamcinolone         (KENALOG-40) 40 MG/ML injection, Hand / Upper         Extremity Injection: L thumb CMC          Asa Bashir MD  May 17, 2022  2:30 PM          Pt is a 51 year old female last seen on 3/29/2022 here for follow up of:     1) L 1st CMC OA - She has had continued pain in her hand, nothing has helped so far.     2) R index/middle trigger finger - She has continued locking particularly with fine motor movements. -> she wishes to proceed w/ surgery     Per my last note:  (M18.12) Arthritis of carpometacarpal (CMC) joint of left thumb  (primary encounter diagnosis)  Comment: exam consistent w/ 1st CMC OA; will start w/ hand therapy/ custom splint; f/u in 6 wks; if no better, consider an injection  Plan: Hand Therapy Referral          (M65.30) Trigger finger of right hand, unspecified finger  Comment:   Plan: no acute trigger at this time; explained etiology and tx options; can follow-up prn for injection; will buy otc finger splint to use prn    Past Medical History:   Diagnosis Date     Abnormal Pap smear 2/21/2017     said pap was abnormal     Arthritis      Chronic back pain     hx spondylolisthesis     Chronic pain      COPD (chronic obstructive pulmonary disease) (H)     PFT's 2013 FEV1/FVC = 2.68/3.20 = 84%     H/O gastric bypass 2007     Hepatitis C     treated ribaviron & interferon in 2008 for serotype 2 with failed 6 month treatment      Hyperlipidemia     on simvatatin     Insomnia      Other chronic pain      Positive TB test     has taken INH     RLS (restless legs syndrome)      Seasonal allergies      TB lung, latent 1994    treated     Uncomplicated asthma      Wounds and injuries 2003      Current Outpatient Medications   Medication Sig Dispense Refill     cetirizine (ZYRTEC) 10 MG tablet Take 1 tablet (10 mg) by mouth daily 90 tablet 3     ferrous sulfate (FEROSUL) 325 (65 Fe) MG tablet Take 1 tablet (325 mg) by mouth every other day Take with citrus food or beverage 15 tablet 3     ipratropium (ATROVENT HFA) 17 MCG/ACT inhaler INHALE 2 PUFFS BY MOUTH INTO LUNGS EVERY 6 HOURS 25.8 g 11     ipratropium - albuterol 0.5 mg/2.5 mg/3 mL (DUONEB) 0.5-2.5 (3) MG/3ML neb solution Take 1 vial (3 mLs) by nebulization every 6 hours as needed for shortness of breath / dyspnea or wheezing 90 mL 1     mometasone-formoterol (DULERA) 200-5 MCG/ACT inhaler Inhale 2 puffs into the lungs 2 times daily 13 g 1     montelukast (SINGULAIR) 10 MG tablet Take 1 tablet (10 mg) by mouth At Bedtime 90 tablet 0     order for DME Nebulizer 1 each 0     rOPINIRole (REQUIP) 1 MG tablet Take 1 tablet (1 mg) by mouth 2 times daily 180 tablet 3     Spacer/Aero Chamber Mouthpiece MISC 1 Units daily 1 each 0     vitamin D2 (ERGOCALCIFEROL) 20983 units (1250 mcg) capsule Take 1 capsule (50,000 Units) by mouth once a week 8 capsule 0      Allergies   Allergen Reactions     Bee Venom Anaphylaxis, Anxiety, Difficulty breathing, Hives, Itching, Nausea and Vomiting, Palpitations, Shortness Of Breath and Swelling     Opium Alkaloids, Hcls Itching     Can take opiate derived narcotics with Benadryl.     Morphine Itching     Latex Rash and Itching      ROS:   Gen- no fevers/chills   Remainder of ROS negative.     Exam:   Ht 1.524 m (5')   Wt 48.1 kg (106 lb)   LMP 06/11/2012 (LMP Unknown)   BMI 20.70 kg/m      L hand:  +TTP at 1st [facility]       Procedure Note:  Pt verbally consented  regarding risks/benefits. L radial wrist was sterilely prepped in usual fashion. 1 cc of 40mg/mL kenalog and 1 cc of 1% lidocaine was injected into the 1st CMC joint without complication. U/S was used for landmark guidance. Pt tolerated procedure well.     Hand / Upper Extremity Injection: L thumb CMC    Date/Time: 5/17/2022 1:40 PM  Performed by: Asa Bashir MD  Authorized by: Asa Bashir MD     Indications:  Pain  Needle Size:  25 G  Guidance: landmark    Approach:  Dorsal  Condition: osteoarthritis    Location:  Thumb  Site:  L thumb CMC    Medications:  40 mg triamcinolone 40 MG/ML; 1 mL lidocaine (PF) 1 %  Outcome:  Tolerated well, no immediate complications  Procedure discussed: discussed risks, benefits, and alternatives    Consent Given by:  Patient  Timeout: timeout called immediately prior to procedure

## 2022-05-18 RX ORDER — ERGOCALCIFEROL 1.25 MG/1
CAPSULE, LIQUID FILLED ORAL
Qty: 8 CAPSULE | OUTPATIENT
Start: 2022-05-18

## 2022-05-18 NOTE — TELEPHONE ENCOUNTER
VITAMIN D2 50,000IU (ERGO) CAP RX      Last Written Prescription Date:  3-22-22  Last Fill Quantity: 8,   # refills: 0  Last Office Visit : 4-27-22  Future Office visit:  none    Routing refill request to provider for review/approval because:  Drug not on the [facility], [facility] or [facility] refill protocol or controlled substance

## 2022-05-25 DIAGNOSIS — J44.9 CHRONIC OBSTRUCTIVE PULMONARY DISEASE, UNSPECIFIED COPD TYPE (H): ICD-10-CM

## 2022-05-31 RX ORDER — MOMETASONE FUROATE AND FORMOTEROL FUMARATE DIHYDRATE 200; 5 UG/1; UG/1
2 AEROSOL RESPIRATORY (INHALATION) 2 TIMES DAILY
Qty: 13 G | Refills: 3 | Status: SHIPPED | OUTPATIENT
Start: 2022-05-31 | End: 2022-09-23

## 2022-05-31 NOTE — TELEPHONE ENCOUNTER
DULERA 200-5MCG ORAL INHALER 120INH      Last Written Prescription Date:  3/16/22  Last Fill Quantity: 13 g,   # refills: 1  Last Office Visit : 4/27/22  Future Office visit:  None scheduled    Routing refill request to provider for review/approval because:  Overdue for ACT  ACT and ATAQ Scores  12/19/2019    ACT TOTAL SCORE (Goal Greater than or Equal to 20) 13    12/19/2019

## 2022-06-14 PROBLEM — M79.645 THUMB PAIN, LEFT: Status: RESOLVED | Noted: 2022-04-02 | Resolved: 2022-06-14

## 2022-06-29 NOTE — TELEPHONE ENCOUNTER
DIAGNOSIS:   Trigger index finger of right hand   Arthritis of carpometacarpal (CMC) joint of left thumb      APPOINTMENT DATE: 7/13/2022   NOTES STATUS DETAILS   OFFICE NOTE from referring provider Internal Asa Bashir MD   OFFICE NOTE from other specialist Internal Sports Med Records are in EPIC   OPERATIVE REPORT Internal See injection note below   MEDICATION LIST Internal    LABS     CBC/DIFF Internal    INJECTIONS DONE IN RADIOLOGY Internal Hand/ Upper Extremity Injection 5/17/2022   XRAYS (IMAGES & REPORTS) Internal Xray Hand Bilateral 3/29/2022

## 2022-07-08 ENCOUNTER — PATIENT OUTREACH (OUTPATIENT)
Dept: GASTROENTEROLOGY | Facility: CLINIC | Age: 52
End: 2022-07-08

## 2022-07-08 ENCOUNTER — TELEPHONE (OUTPATIENT)
Dept: FAMILY MEDICINE | Facility: CLINIC | Age: 52
End: 2022-07-08

## 2022-07-08 NOTE — PROGRESS NOTES
"Pt called in with severe stomach pain \"cannot walk\" pain is so bad. Started on 7/4, was camping, got sunburned badly, didn't eat much since then. Starting the 7/7, stomach started to cramp, last 2 days on the couch with a heating pad.Cannot not keep food down. Back pain \"so bad\". Pt unsure if related to stent, infection \"like Ecoli that I had before\" or something else.    Recommended patient come to [facility] ER for assessment and work up.    Hilda Navarro RN Care Coordinator    "

## 2022-07-08 NOTE — TELEPHONE ENCOUNTER
[facility] Call Center    Phone Message    May a detailed message be left on voicemail: yes     Reason for Call: Symptoms or Concerns     If patient has red-flag symptoms, warm transfer to triage line    Current symptom or concern: nausea, vomiting stomach cramping, not able to keep food down, not able to eat.     Symptoms have been present for:  4-5 day(s)    Has patient previously been seen for this? No      Are there any new or worsening symptoms? Yes: Patient calling with the above symptoms. Requesting a return call to discuss also stating she has feeding tube for the past 4 years. Please call to discuss thank you.       Action Taken: Message routed to:  [facility]    Travel Screening: Not Applicable

## 2022-07-08 NOTE — TELEPHONE ENCOUNTER
RN called patient and reviewed her symptoms.  She went on vacation to [place] and thinks she may have contracted an E Coli infection.  Her symptoms are similar to an episode of this that she had previously.  She has severe cramping, nausea and vomiting, and has not been able to eat for the past few days.  RN advised that she should go to urgent care or ER today since she has weakness and is unable to ambulate far due to cramping.  Patient also relayed she may call GI clinic, but will plan to go to ER soon today if she doesn't hear back from them soon.    Eva Winston RN on 7/8/2022 at 8:50 AM

## 2022-07-13 ENCOUNTER — PRE VISIT (OUTPATIENT)
Dept: ORTHOPEDICS | Facility: CLINIC | Age: 52
End: 2022-07-13

## 2022-07-26 DIAGNOSIS — J45.30 MILD PERSISTENT ASTHMA, UNCOMPLICATED: ICD-10-CM

## 2022-07-28 DIAGNOSIS — J44.9 CHRONIC OBSTRUCTIVE PULMONARY DISEASE, UNSPECIFIED COPD TYPE (H): ICD-10-CM

## 2022-07-28 RX ORDER — MONTELUKAST SODIUM 10 MG/1
10 TABLET ORAL AT BEDTIME
Qty: 90 TABLET | Refills: 3 | Status: SHIPPED | OUTPATIENT
Start: 2022-07-28 | End: 2023-08-04

## 2022-07-28 NOTE — TELEPHONE ENCOUNTER
MONTELUKAST 10MG TABLETS  Last Written Prescription Date:   5/2/2022  Last Fill Quantity: 90,   # refills: 0  Last Office Visit :  4/27/2022  Future Office visit:  None    Routing refill request to provider for review/approval because:  Needing updated ACT score on file  Refer to clinic for review       Simona Quintanilla RN  Central Triage Red Flags/Med Refills

## 2022-07-29 RX ORDER — IPRATROPIUM BROMIDE AND ALBUTEROL SULFATE 2.5; .5 MG/3ML; MG/3ML
1 SOLUTION RESPIRATORY (INHALATION) EVERY 6 HOURS PRN
Qty: 90 ML | Refills: 1 | Status: SHIPPED | OUTPATIENT
Start: 2022-07-29 | End: 2022-09-26

## 2022-07-29 NOTE — TELEPHONE ENCOUNTER
IPRATROPI/ALB 0.5/3MG INH SL 30X3ML  Last Written Prescription Date:   5/17/2022  Last Fill Quantity: 90,   # refills: 1  Last Office Visit :  4/27/2022  Future Office visit:  None    Routing refill request to provider for review/approval because:  Needing an updated ACT score for this med  Refer to clinic for review       Simona Quintanilla RN  Central Triage Red Flags/Med Refills

## 2022-08-01 ENCOUNTER — TELEPHONE (OUTPATIENT)
Dept: FAMILY MEDICINE | Facility: CLINIC | Age: 52
End: 2022-08-01

## 2022-08-01 DIAGNOSIS — J44.9 CHRONIC OBSTRUCTIVE PULMONARY DISEASE, UNSPECIFIED COPD TYPE (H): Primary | ICD-10-CM

## 2022-08-01 DIAGNOSIS — J45.30 MILD PERSISTENT ASTHMA, UNCOMPLICATED: ICD-10-CM

## 2022-08-01 NOTE — TELEPHONE ENCOUNTER
[facility] Call Center    Phone Message    May a detailed message be left on voicemail: yes     Reason for Call: Order(s): Other: needs a new Nebulizer machine.  Reason for requested: Patient machine has quit working on her this AM. Patient depends on this and needs this ASAP!      Date needed: Today 8/1/2022  Provider name: Dr Crespo      Action Taken: Message routed to:  [facility]    Travel Screening: Not Applicable

## 2022-08-01 NOTE — TELEPHONE ENCOUNTER
Printed and faxed information for a request for nebulizer device to [employer], and left message on patient voicemail to inform her of that and provided her the supply companies phone number.

## 2022-09-09 ENCOUNTER — MEDICAL CORRESPONDENCE (OUTPATIENT)
Dept: FAMILY MEDICINE | Facility: CLINIC | Age: 52
End: 2022-09-09

## 2022-09-13 ENCOUNTER — DOCUMENTATION ONLY (OUTPATIENT)
Dept: FAMILY MEDICINE | Facility: CLINIC | Age: 52
End: 2022-09-13

## 2022-09-13 NOTE — PROGRESS NOTES
Type of Form Received: [employer]    Form Received (Date) 8/23/22   Form Filled out Yes, date 9/13/22   Placed in provider folder Yes

## 2022-09-15 ENCOUNTER — DOCUMENTATION ONLY (OUTPATIENT)
Dept: FAMILY MEDICINE | Facility: CLINIC | Age: 52
End: 2022-09-15

## 2022-09-15 NOTE — PROGRESS NOTES
Type of Form Received: [employer]    Form Received (Date) 9/15/22   Form Filled out Yes 9/19/22   Placed in provider folder Yes

## 2022-09-21 DIAGNOSIS — J44.9 CHRONIC OBSTRUCTIVE PULMONARY DISEASE, UNSPECIFIED COPD TYPE (H): ICD-10-CM

## 2022-09-22 ENCOUNTER — DOCUMENTATION ONLY (OUTPATIENT)
Dept: FAMILY MEDICINE | Facility: CLINIC | Age: 52
End: 2022-09-22

## 2022-09-22 NOTE — PROGRESS NOTES
Type of Form Received: [employer]    Form Received (Date) 9/22/22   Form Filled out Yes 9/29/22   Placed in provider folder Yes

## 2022-09-23 RX ORDER — MOMETASONE FUROATE AND FORMOTEROL FUMARATE DIHYDRATE 200; 5 UG/1; UG/1
AEROSOL RESPIRATORY (INHALATION)
Qty: 13 G | Refills: 3 | Status: SHIPPED | OUTPATIENT
Start: 2022-09-23 | End: 2023-02-21

## 2022-09-23 NOTE — TELEPHONE ENCOUNTER
DULERA 200-5MCG ORAL INHALER 120INH      Last Written Prescription Date:  5-31-22  Last Fill Quantity: 13 g,   # refills: 3  Last Office Visit : 4-27-22  Future Office visit:  none    Routing refill request to provider for review/approval because:  Overdue ACT,   previous malik RF

## 2022-09-26 NOTE — TELEPHONE ENCOUNTER
IPRATROPI/ALB 0.5/3MG INH SL 30X3ML      Last Written Prescription Date:  7/29/22  Last Fill Quantity: 90 ml,   # refills: 1  Last Office Visit : 4/27/22  Future Office visit:  None scheduled    Routing refill request to provider for review/approval because:  Overdue for ACT  ACT and ATAQ Scores  12/19/2019    ACT TOTAL SCORE (Goal Greater than or Equal to 20) 13    12/19/2019     Received malik refill with last fill

## 2022-09-27 RX ORDER — IPRATROPIUM BROMIDE AND ALBUTEROL SULFATE 2.5; .5 MG/3ML; MG/3ML
1 SOLUTION RESPIRATORY (INHALATION) EVERY 6 HOURS PRN
Qty: 90 ML | Refills: 3 | Status: SHIPPED | OUTPATIENT
Start: 2022-09-27 | End: 2023-01-26

## 2022-12-26 ENCOUNTER — HEALTH MAINTENANCE LETTER (OUTPATIENT)
Age: 52
End: 2022-12-26

## 2022-12-30 NOTE — TELEPHONE ENCOUNTER
----- Message from Varinder Crespo MD sent at 4/11/2021  9:28 AM CDT -----  I saw her Friday  Vit D low  She should take vit D rx 50,000 unit(s) once a week by mouth for 8 weeks, rx 8 doses, no refills  After that take 2000 international unit(s) a day by mouth (otc but might need rx, we can do that later)  See me in two mo, discuss, recheck then     100.2

## 2023-01-23 DIAGNOSIS — J44.9 CHRONIC OBSTRUCTIVE PULMONARY DISEASE, UNSPECIFIED COPD TYPE (H): ICD-10-CM

## 2023-01-26 RX ORDER — IPRATROPIUM BROMIDE AND ALBUTEROL SULFATE 2.5; .5 MG/3ML; MG/3ML
1 SOLUTION RESPIRATORY (INHALATION) EVERY 6 HOURS PRN
Qty: 90 ML | Refills: 2 | Status: SHIPPED | OUTPATIENT
Start: 2023-01-26 | End: 2023-03-22

## 2023-01-26 NOTE — TELEPHONE ENCOUNTER
IPRATROPI/ALB 0.5/3MG INH SL 30X3ML      Last Written Prescription Date:  9/27/22  Last Fill Quantity: 90ml,   # refills: 3  Last Office Visit : 4/27/22  Future Office visit:  none    Routing refill request to provider for review/approval because:  Overdue for 6mo office visit and ACT    90d malik refill sent, routed to RN for follow up  Scheduling has been notified to contact the pt for appointment.

## 2023-01-26 NOTE — TELEPHONE ENCOUNTER
Pt stated she is unable to schedule at this time and does not have a time frame. Provided pcc number for pt to schedule overdue clinic visit for med refills.

## 2023-02-08 DIAGNOSIS — G25.81 RESTLESS LEG SYNDROME: ICD-10-CM

## 2023-02-10 RX ORDER — ROPINIROLE 1 MG/1
1 TABLET, FILM COATED ORAL 2 TIMES DAILY
Qty: 180 TABLET | Refills: 0 | Status: SHIPPED | OUTPATIENT
Start: 2023-02-10 | End: 2023-05-02

## 2023-02-17 DIAGNOSIS — J44.9 CHRONIC OBSTRUCTIVE PULMONARY DISEASE, UNSPECIFIED COPD TYPE (H): Primary | ICD-10-CM

## 2023-02-21 RX ORDER — MOMETASONE FUROATE AND FORMOTEROL FUMARATE DIHYDRATE 200; 5 UG/1; UG/1
2 AEROSOL RESPIRATORY (INHALATION) 2 TIMES DAILY
Qty: 13 G | Refills: 1 | Status: SHIPPED | OUTPATIENT
Start: 2023-02-21 | End: 2023-05-22

## 2023-03-21 ENCOUNTER — TELEPHONE (OUTPATIENT)
Dept: FAMILY MEDICINE | Facility: CLINIC | Age: 53
End: 2023-03-21
Payer: COMMERCIAL

## 2023-03-21 DIAGNOSIS — J45.30 MILD PERSISTENT ASTHMA, UNCOMPLICATED: ICD-10-CM

## 2023-03-21 DIAGNOSIS — J44.9 CHRONIC OBSTRUCTIVE PULMONARY DISEASE, UNSPECIFIED COPD TYPE (H): ICD-10-CM

## 2023-03-21 NOTE — TELEPHONE ENCOUNTER
[facility] Call Center    Phone Message    May a detailed message be left on voicemail: yes     Reason for Call: Medication Question or concern regarding medication   Prescription Clarification  Name of Medication: ipratropium - albuterol 0.5 mg/2.5 mg/3 mL (DUONEB) 0.5-2.5 (3) MG/3ML neb solution  Prescribing Provider: Dr. Crespo   Pharmacy: [id number] [address] AT [facility] OF [facility]   What on the order needs clarification? Patient is wording if her dosage on the albuterol solution can be increased to make it stronger due to her needing to take many breathing treatments a day and it not getting easier.         Action Taken: Message routed to:  [facility] mode    Travel Screening: Not Applicable

## 2023-03-21 NOTE — TELEPHONE ENCOUNTER
Spoke with pt, she states that she recently moved to a new apartment building with lots of stairs. And she feels more wheezing and shortness of breath with exsersion. She is using dulera, atrovent inhalers along with 2 vials of duoneb (instead of 1 vial), but she is still having wheezing and RODGERS. No other sxs. I advised to be seen for further evaluation, but she declined, saying she does not want to come out of the house when the ground is still covered with snow. She is wondering if duo neb dosage can be increased.

## 2023-03-22 RX ORDER — IPRATROPIUM BROMIDE AND ALBUTEROL SULFATE 2.5; .5 MG/3ML; MG/3ML
1 SOLUTION RESPIRATORY (INHALATION) EVERY 4 HOURS PRN
Qty: 90 ML | Refills: 2 | Status: SHIPPED | OUTPATIENT
Start: 2023-03-22 | End: 2023-05-10

## 2023-03-22 NOTE — TELEPHONE ENCOUNTER
Ok neb dose change from every six hours to every four hours prn on the rx, can send in refill with that    She should be seen soon anyway, it's been 11 months, snow melting so should be clear walks by time she'd come in

## 2023-03-22 NOTE — TELEPHONE ENCOUNTER
Pt was informed the increased dose, but she has been already using the increased dose, so I recommend her to be seen for further evaluation. She will discuss.

## 2023-04-03 ENCOUNTER — PREP FOR PROCEDURE (OUTPATIENT)
Dept: GASTROENTEROLOGY | Facility: CLINIC | Age: 53
End: 2023-04-03
Payer: COMMERCIAL

## 2023-04-03 ENCOUNTER — PATIENT OUTREACH (OUTPATIENT)
Dept: GASTROENTEROLOGY | Facility: CLINIC | Age: 53
End: 2023-04-03
Payer: COMMERCIAL

## 2023-04-03 DIAGNOSIS — Z98.84 HX OF GASTRIC BYPASS: Primary | ICD-10-CM

## 2023-04-03 NOTE — TELEPHONE ENCOUNTER
Called patient to discuss repeat EGD, 12 months after last one with Dr. Self in May 2023  Return for stent removal/exchange in 12m    Please assist in scheduling:     Procedure/Imaging/Clinic: EGD  Physician: Dr. Self  Timing: May 16, 2023  Procedure length:provider average  Anesthesia:gen  Dx: hx of gastric bypass  Tier:3  Location: [facility]       Comments:

## 2023-04-18 DIAGNOSIS — J30.1 CHRONIC SEASONAL ALLERGIC RHINITIS DUE TO POLLEN: ICD-10-CM

## 2023-04-20 RX ORDER — CETIRIZINE HYDROCHLORIDE 10 MG/1
10 TABLET ORAL DAILY
Qty: 90 TABLET | Refills: 0 | Status: SHIPPED | OUTPATIENT
Start: 2023-04-20 | End: 2023-08-04

## 2023-04-20 NOTE — TELEPHONE ENCOUNTER
cetirizine (ZYRTEC) 10 MG tablet    Office Visit    4/27/2022  [facility] Varinder Escobar MD  Family Medicine       90d malik refill sent, routed to clinic staff for follow up    Scheduling has been notified to contact the pt for appointment.

## 2023-04-22 ENCOUNTER — HEALTH MAINTENANCE LETTER (OUTPATIENT)
Age: 53
End: 2023-04-22

## 2023-05-01 DIAGNOSIS — G25.81 RESTLESS LEG SYNDROME: ICD-10-CM

## 2023-05-02 RX ORDER — ROPINIROLE 1 MG/1
1 TABLET, FILM COATED ORAL 2 TIMES DAILY
Qty: 60 TABLET | Refills: 0 | Status: SHIPPED | OUTPATIENT
Start: 2023-05-02 | End: 2023-06-01

## 2023-05-02 NOTE — TELEPHONE ENCOUNTER
rOPINIRole (REQUIP) 1 MG tablet      Last Written Prescription Date:  2/10/23  Last Fill Quantity: 180,   # refills: 0  Last Office Visit : 4/27/22  Future Office visit:  5/16/23    Routing refill request to provider for review/approval because:  Overdue for labs: ALT, Cr  Overdue for 6mo office visit  Cancel/No Show last 3 appts      30d malik refill sent to cover pt until next visit, routed to clinic staff for follow up

## 2023-05-03 NOTE — TELEPHONE ENCOUNTER
Pt was last seen in clinic on 4/27/22- pt has upcoming appt on 5/16/23.     CLEMENT GOSS RN on 5/3/2023 at 10:00 AM

## 2023-05-08 DIAGNOSIS — J44.9 CHRONIC OBSTRUCTIVE PULMONARY DISEASE, UNSPECIFIED COPD TYPE (H): ICD-10-CM

## 2023-05-10 NOTE — TELEPHONE ENCOUNTER
ipratropium - albuterol 0.5 mg/2.5 mg/3 mL (DUONEB) 0.5-2.5 (3) MG/3ML neb solution      Last Written Prescription Date:  3/22/23  Last Fill Quantity: 90,   # refills: 2  Last Office Visit : 4/27/22  Future Office visit:  5/16/23    Routing refill request to provider for review/approval because:  Overdue for 6mo office visit and ACT  Per last MD order, appt needed before next refill  Cancel/No Show last 8 appts

## 2023-05-11 RX ORDER — IPRATROPIUM BROMIDE AND ALBUTEROL SULFATE 2.5; .5 MG/3ML; MG/3ML
1 SOLUTION RESPIRATORY (INHALATION) EVERY 4 HOURS PRN
Qty: 90 ML | Refills: 1 | Status: SHIPPED | OUTPATIENT
Start: 2023-05-11 | End: 2023-05-16

## 2023-05-13 ASSESSMENT — ENCOUNTER SYMPTOMS
DYSPNEA ON EXERTION: 0
WHEEZING: 1
SINUS CONGESTION: 0
TROUBLE SWALLOWING: 1
SPUTUM PRODUCTION: 1
HEMOPTYSIS: 0
EYE PAIN: 0
SHORTNESS OF BREATH: 1
EYE REDNESS: 0
EYE IRRITATION: 1
HOARSE VOICE: 0
SINUS PAIN: 1
SORE THROAT: 0
POSTURAL DYSPNEA: 0
COUGH DISTURBING SLEEP: 1
DOUBLE VISION: 0
NECK MASS: 0
COUGH: 1
SNORES LOUDLY: 0
TASTE DISTURBANCE: 0
EYE WATERING: 1
SMELL DISTURBANCE: 0

## 2023-05-15 ENCOUNTER — PATIENT OUTREACH (OUTPATIENT)
Dept: GASTROENTEROLOGY | Facility: CLINIC | Age: 53
End: 2023-05-15
Payer: COMMERCIAL

## 2023-05-15 NOTE — TELEPHONE ENCOUNTER
"Called patient to discuss symptoms and possible delay of procedure  Per patient, \"she had the flu, over the flu, but still has the cough\", states she feels ok enough for procedure.    Patient has preop tomorrow, advised they can assess her ability for procedure as scheduled.  ML  "

## 2023-05-16 ENCOUNTER — LAB (OUTPATIENT)
Dept: LAB | Facility: CLINIC | Age: 53
End: 2023-05-16
Payer: COMMERCIAL

## 2023-05-16 ENCOUNTER — ANESTHESIA EVENT (OUTPATIENT)
Dept: SURGERY | Facility: CLINIC | Age: 53
End: 2023-05-16
Payer: COMMERCIAL

## 2023-05-16 ENCOUNTER — ANCILLARY PROCEDURE (OUTPATIENT)
Dept: GENERAL RADIOLOGY | Facility: CLINIC | Age: 53
End: 2023-05-16
Attending: HOSPITALIST
Payer: COMMERCIAL

## 2023-05-16 ENCOUNTER — OFFICE VISIT (OUTPATIENT)
Dept: INTERNAL MEDICINE | Facility: CLINIC | Age: 53
End: 2023-05-16
Payer: COMMERCIAL

## 2023-05-16 VITALS
WEIGHT: 91.9 LBS | SYSTOLIC BLOOD PRESSURE: 111 MMHG | HEART RATE: 90 BPM | HEIGHT: 60 IN | BODY MASS INDEX: 18.04 KG/M2 | OXYGEN SATURATION: 100 % | DIASTOLIC BLOOD PRESSURE: 79 MMHG

## 2023-05-16 DIAGNOSIS — J45.30 MILD PERSISTENT ASTHMA, UNCOMPLICATED: ICD-10-CM

## 2023-05-16 DIAGNOSIS — R05.2 SUBACUTE COUGH: ICD-10-CM

## 2023-05-16 DIAGNOSIS — J44.9 CHRONIC OBSTRUCTIVE PULMONARY DISEASE, UNSPECIFIED COPD TYPE (H): ICD-10-CM

## 2023-05-16 DIAGNOSIS — B18.2 CHRONIC HEPATITIS C WITHOUT HEPATIC COMA (H): ICD-10-CM

## 2023-05-16 DIAGNOSIS — Z01.818 PREOP GENERAL PHYSICAL EXAM: Primary | ICD-10-CM

## 2023-05-16 DIAGNOSIS — J30.2 SEASONAL ALLERGIC RHINITIS, UNSPECIFIED TRIGGER: ICD-10-CM

## 2023-05-16 DIAGNOSIS — Z91.030 BEE STING ALLERGY: ICD-10-CM

## 2023-05-16 DIAGNOSIS — Z01.818 PREOP GENERAL PHYSICAL EXAM: ICD-10-CM

## 2023-05-16 DIAGNOSIS — Z98.84 BARIATRIC SURGERY STATUS: ICD-10-CM

## 2023-05-16 LAB
ALBUMIN SERPL BCG-MCNC: 4.2 G/DL (ref 3.5–5.2)
ALP SERPL-CCNC: 131 U/L (ref 35–104)
ALT SERPL W P-5'-P-CCNC: 18 U/L (ref 10–35)
ANION GAP SERPL CALCULATED.3IONS-SCNC: 6 MMOL/L (ref 7–15)
AST SERPL W P-5'-P-CCNC: 26 U/L (ref 10–35)
BILIRUB SERPL-MCNC: 0.2 MG/DL
BUN SERPL-MCNC: 9 MG/DL (ref 6–20)
CALCIUM SERPL-MCNC: 8.9 MG/DL (ref 8.6–10)
CHLORIDE SERPL-SCNC: 97 MMOL/L (ref 98–107)
CREAT SERPL-MCNC: 0.83 MG/DL (ref 0.51–0.95)
DEPRECATED HCO3 PLAS-SCNC: 28 MMOL/L (ref 22–29)
ERYTHROCYTE [DISTWIDTH] IN BLOOD BY AUTOMATED COUNT: 12.4 % (ref 10–15)
GFR SERPL CREATININE-BSD FRML MDRD: 83 ML/MIN/1.73M2
GLUCOSE SERPL-MCNC: 54 MG/DL (ref 70–99)
HCT VFR BLD AUTO: 43.9 % (ref 35–47)
HGB BLD-MCNC: 14.8 G/DL (ref 11.7–15.7)
INR PPP: 0.93 (ref 0.85–1.15)
MCH RBC QN AUTO: 32.9 PG (ref 26.5–33)
MCHC RBC AUTO-ENTMCNC: 33.7 G/DL (ref 31.5–36.5)
MCV RBC AUTO: 98 FL (ref 78–100)
PLATELET # BLD AUTO: 218 10E3/UL (ref 150–450)
POTASSIUM SERPL-SCNC: 4.5 MMOL/L (ref 3.4–5.3)
PROT SERPL-MCNC: 7.1 G/DL (ref 6.4–8.3)
RBC # BLD AUTO: 4.5 10E6/UL (ref 3.8–5.2)
SODIUM SERPL-SCNC: 131 MMOL/L (ref 136–145)
WBC # BLD AUTO: 10 10E3/UL (ref 4–11)

## 2023-05-16 PROCEDURE — 85027 COMPLETE CBC AUTOMATED: CPT | Performed by: PATHOLOGY

## 2023-05-16 PROCEDURE — 99214 OFFICE O/P EST MOD 30 MIN: CPT | Mod: 25 | Performed by: HOSPITALIST

## 2023-05-16 PROCEDURE — 71046 X-RAY EXAM CHEST 2 VIEWS: CPT | Mod: GC | Performed by: RADIOLOGY

## 2023-05-16 PROCEDURE — 93000 ELECTROCARDIOGRAM COMPLETE: CPT | Performed by: HOSPITALIST

## 2023-05-16 PROCEDURE — 80053 COMPREHEN METABOLIC PANEL: CPT | Performed by: PATHOLOGY

## 2023-05-16 PROCEDURE — 85610 PROTHROMBIN TIME: CPT | Performed by: PATHOLOGY

## 2023-05-16 PROCEDURE — 36415 COLL VENOUS BLD VENIPUNCTURE: CPT | Performed by: PATHOLOGY

## 2023-05-16 RX ORDER — EPINEPHRINE 0.3 MG/.3ML
0.3 INJECTION SUBCUTANEOUS PRN
Qty: 2 EACH | Refills: 1 | Status: SHIPPED | OUTPATIENT
Start: 2023-05-16

## 2023-05-16 RX ORDER — FLUTICASONE PROPIONATE 50 MCG
1 SPRAY, SUSPENSION (ML) NASAL 2 TIMES DAILY PRN
Qty: 16 G | Refills: 4 | Status: SHIPPED | OUTPATIENT
Start: 2023-05-16 | End: 2023-07-20

## 2023-05-16 RX ORDER — BENZONATATE 100 MG/1
100 CAPSULE ORAL 3 TIMES DAILY PRN
Qty: 45 CAPSULE | Refills: 0 | Status: SHIPPED | OUTPATIENT
Start: 2023-05-16 | End: 2023-07-20

## 2023-05-16 RX ORDER — IPRATROPIUM BROMIDE AND ALBUTEROL SULFATE 2.5; .5 MG/3ML; MG/3ML
1 SOLUTION RESPIRATORY (INHALATION) EVERY 4 HOURS PRN
Qty: 90 ML | Refills: 1 | Status: SHIPPED | OUTPATIENT
Start: 2023-05-16 | End: 2023-06-26

## 2023-05-16 ASSESSMENT — COPD QUESTIONNAIRES: COPD: 1

## 2023-05-16 ASSESSMENT — LIFESTYLE VARIABLES: TOBACCO_USE: 1

## 2023-05-16 NOTE — PATIENT INSTRUCTIONS
Approved for procedure.     - No ibuprofen, naproxen, motrin, advil, aleve, or excedrin with aspirin within 7 days of procedure.   - No alcohol within 7 days of procedure.   - Labs for CBC, CMP, INR.   - Obtain EKG.   - CXR.   - Sending flonase nasal spray, duoneb (has albuterol and ipratropium), and epi pen.     Follow up as needed.

## 2023-05-16 NOTE — ASSESSMENT & PLAN NOTE
Continue cough the past month. Clear lung sounds on exam.   - Patient may continue dulera, atrovent prn and duonebs prn.   - Tessalon prn for now.   - Check CXR.

## 2023-05-16 NOTE — NURSING NOTE
Kristina Eldridge is a 52 year old female patient that presents today in clinic for the following:    Chief Complaint   Patient presents with     Pre-Op Exam     Feed tube change, Dr. Self, [address] [place] [address] 5/16/23     The patient's allergies and medications were reviewed as noted. A set of vitals were recorded as noted without incident. The patient does not have any other questions for the provider.    Rell Thompson, EMT at 3:07 PM on 5/16/2023

## 2023-05-16 NOTE — ASSESSMENT & PLAN NOTE
Last PFT was in 2019 with FEV1 pred at 104%.   - Continued on dulera, singulair, flovent prn, duoneb prn.  - Shortness of breath, likely from some nasal congestion. Will continue on flonase for patient.

## 2023-05-16 NOTE — PROGRESS NOTES
[facility] INTERNAL MEDICINE [address] [address]  Phone: [phone]  Fax: [phone]  Primary Provider: Varinder Crespo  Pre-op Performing Provider: BIB CARTER      PREOPERATIVE EVALUATION:  Today's date: 5/16/2023    Kristina Eldridge is a 52 year old female who presents for a preoperative evaluation.    Surgical Information:  Surgery/Procedure: EGD with jejunogastrostomy stent exchange  Surgery [facility]: [facility]  Surgeon: Ramesh Self MD  Surgery Date: 5/17/23  Time of Surgery: 11:45am  Where patient plans to recover: At home with family  Fax number for surgical facility: Note does not need to be faxed, will be available electronically in Epic.    Assessment & Plan     The proposed surgical procedure is considered INTERMEDIATE risk.    Problem List Items Addressed This Visit        Respiratory    COPD (chronic obstructive pulmonary disease) (H)     Last PFT was in 2019 with FEV1 pred at 104%.   - Continued on dulera, singulair, flovent prn, duoneb prn.  - Shortness of breath, likely from some nasal congestion. Will continue on flonase for patient.          Relevant Medications    ipratropium - albuterol 0.5 mg/2.5 mg/3 mL (DUONEB) 0.5-2.5 (3) MG/3ML neb solution    fluticasone (FLONASE) 50 MCG/ACT nasal spray    benzonatate (TESSALON) 100 MG capsule    Subacute cough     Continue cough the past month. Clear lung sounds on exam.   - Patient may continue dulera, atrovent prn and duonebs prn.   - Tessalon prn for now.   - Check CXR.          Relevant Medications    ipratropium - albuterol 0.5 mg/2.5 mg/3 mL (DUONEB) 0.5-2.5 (3) MG/3ML neb solution    fluticasone (FLONASE) 50 MCG/ACT nasal spray    benzonatate (TESSALON) 100 MG capsule    Other Relevant Orders    XR Chest 2 Views    Seasonal allergic rhinitis, unspecified trigger     Part of patient allergies likely related to nasal congestion.   - Will have patient back  on flonase twice a day as needed.  - Continued on zyrtec.         Relevant Medications    ipratropium - albuterol 0.5 mg/2.5 mg/3 mL (DUONEB) 0.5-2.5 (3) MG/3ML neb solution    fluticasone (FLONASE) 50 MCG/ACT nasal spray       Digestive    Chronic hepatitis C (H)     Treated around 2000.         Relevant Orders    CBC with platelets (Completed)    Comprehensive metabolic panel (BMP + Alb, Alk Phos, ALT, AST, Total. Bili, TP) (Completed)    INR (Completed)       Other    Bariatric surgery status    Preop general physical exam - Primary     Approved for intermediate risk procedure. Has been having a stent exchanges annually since 2019 for jejunogastrostomy for a prior Alicia en Y gastric bypass. Hx of COPD, last PFT was in 2019 with FEV1 pred at 104%. Some shortness of breath, likely contributed by nasal congestion mainly with seasonal allergies. Patient does continue to smoke. Clear lung sounds on exam. Saturating at 100% on RA today. Has a hx of treated Hepatitis C. No hx of bleeding or clots. RCRI score 0 of 6. EKG with NSR. She does have a latex allergy.     - No ibuprofen, naproxen, motrin, advil, aleve, or excedrin with aspirin.   - No alcohol before procedure. Patient does not drink alcohol any ways.  - Labs for CBC, CMP, INR for baseline.   - CXR with no infiltrates on my view, pending radiology read.          Relevant Orders    EKG 12-lead complete w/read - Clinics (Completed)    CBC with platelets (Completed)    Comprehensive metabolic panel (BMP + Alb, Alk Phos, ALT, AST, Total. Bili, TP) (Completed)    INR (Completed)    Bee sting allergy     - Sending epipen for patient to use as needed for bee or wasp stings.         Relevant Medications    ipratropium - albuterol 0.5 mg/2.5 mg/3 mL (DUONEB) 0.5-2.5 (3) MG/3ML neb solution    fluticasone (FLONASE) 50 MCG/ACT nasal spray    EPINEPHrine (ANY BX GENERIC EQUIV) 0.3 MG/0.3ML injection 2-pack                RECOMMENDATION:  APPROVAL GIVEN to proceed with  proposed procedure, without further diagnostic evaluation.    Review of external notes as documented elsewhere in note  30 minutes spent by me on the date of the encounter doing chart review, history and exam, documentation and further activities per the note      Subjective     HPI related to upcoming procedure: Will be going for stent exchange for Alicia en Y gastric bypass with jejunogastrostomy created in 2019 by EGD, which she gets exchanges on an annual bases. Problems with meals currently but does mention she has to chew her food entirely so she does not have problems. Has been having congestion of her nose since March. Was doing better during winter time. Continues with use of albuterol nebulizer every 4 hours. Having more nasal congestion, ran out of her flonase which she uses twice a day. No hx of blood clots. No bleeding issues. No chest pains. No strokes or heart disease in the past. No fevers or chills. Has some sputum in the morning. Had a cold that also started about 1 month ago. Has a hx of Hepatitis C that was treated around 2000 with success. Patient does continues to smoke about 2 packs a day.         5/13/2023    12:15 PM   Preop Questions   1. Have you ever had a heart attack or stroke? No   2. Have you ever had surgery on your heart or blood vessels, such as a stent placement, a coronary artery bypass, or surgery on an artery in your head, neck, heart, or legs? No   3. Do you have chest pain with activity? No   4. Do you have a history of  heart failure? No   5. Do you currently have a cold, bronchitis or symptoms of other infection? No   6. Do you have a cough, shortness of breath, or wheezing? YES - Currently having allergic seasonal allergies.    7. Do you or anyone in your family have previous history of blood clots? YES - Aunt moo with DVT.    8. Do you or does anyone in your family have a serious bleeding problem such as prolonged bleeding following surgeries or cuts? No   9. Have you ever  had problems with anemia or been told to take iron pills? YES   10. Have you had any abnormal blood loss such as black, tarry or bloody stools, or abnormal vaginal bleeding? No   11. Have you ever had a blood transfusion? No   12. Are you willing to have a blood transfusion if it is medically needed before, during, or after your surgery? NO    13. Have you or any of your relatives ever had problems with anesthesia? No   14. Do you have sleep apnea, excessive snoring or daytime drowsiness? No   15. Do you have any artifical heart valves or other implanted medical devices like a pacemaker, defibrillator, or continuous glucose monitor? No   16. Do you have artificial joints? No   17. Are you allergic to latex? YES   18. Is there any chance that you may be pregnant? No       Health Care Directive:  Patient does not have a Health Care Directive or Living Will: Advance Directive received and scanned. Click on Code in the patient header to view. Advanced directives does not specify choice for CPR if needed. Patient would like to have CPR if needed. Does not want resuscitation if she had loss of limbs.     Preoperative Review of :   reviewed - no record of controlled substances prescribed.      Review of Systems  CONSTITUTIONAL: NEGATIVE for fever, chills, change in weight  INTEGUMENTARY/SKIN: NEGATIVE for worrisome rashes, moles or lesions  EYES: NEGATIVE for vision changes or irritation  ENT/MOUTH: nasal congestion  RESP:shortness of breath  CV: NEGATIVE for chest pain, palpitations or peripheral edema  GI: NEGATIVE for nausea, abdominal pain, heartburn, or change in bowel habits  : NEGATIVE for frequency, dysuria, or hematuria  MUSCULOSKELETAL: NEGATIVE for significant arthralgias or myalgia  NEURO: NEGATIVE for weakness, dizziness or paresthesias  ENDOCRINE: NEGATIVE for temperature intolerance, skin/hair changes  HEME: NEGATIVE for bleeding problems  PSYCHIATRIC: NEGATIVE for changes in mood or  affect    Patient Active Problem List    Diagnosis Date Noted     Hx of gastric bypass 03/29/2021     Priority: Medium     Added automatically from request for surgery [id number]       Adult failure to thrive 11/23/2020     Priority: Medium     Added automatically from request for surgery [id number]       Age-related nuclear cataract, left 06/25/2020     Priority: Medium     Added automatically from request for surgery [id number]       Failure to thrive in adult 05/22/2020     Priority: Medium     Added automatically from request for surgery [id number]       Nuclear sclerosis of both eyes 03/10/2020     Priority: Medium     Added automatically from request for surgery [id number]       Lumbar spondylosis 02/27/2020     Priority: Medium     Added automatically from request for surgery [id number]       Encounter for pancreatic duct stent exchange 10/26/2019     Priority: Medium     Added automatically from request for surgery [id number]       Malnutrition (H) 10/10/2019     Priority: Medium     Added automatically from request for surgery [id number]       Dermal and epidermal nevus 04/30/2018     Priority: Medium     irritated       Weight loss 09/28/2016     Priority: Medium     Lumbar pseudoarthrosis 07/30/2015     Priority: Medium     COPD (chronic obstructive pulmonary disease) (H) 01/01/2015     Priority: Medium     Pseudoarthrosis of lumbar spine with nonunion 11/21/2014     Priority: Medium     HIV exposure 03/20/2014     Priority: Medium     Personal history of spine surgery 11/20/2013     Priority: Medium     Lumbar stenosis 10/23/2013     Priority: Medium     Depression 04/19/2013     Priority: Medium     Radicular leg pain 03/01/2013     Priority: Medium     Spondylolisthesis, grade 2 03/01/2013     Priority: Medium     Marijuana smoker, continuous 03/01/2013     Priority: Medium     Acute cholecystitis 12/11/2012     Priority: Medium     Chronic hepatitis C (H) 10/19/2012     Priority: Medium     Bariatric surgery status  10/19/2012     Priority: Medium     Hyperlipidemia      Priority: Medium     on simvatatin       LTBI (latent tuberculosis infection) 02/18/2010     Priority: Medium     sp INH       Tobacco dependence 06/24/2009     Priority: Medium     Vitamin D deficiency 06/24/2009     Priority: Medium     Gastric bypass status for obesity 01/09/2008     Priority: Medium     Restless leg syndrome 10/17/2007     Priority: Medium     Insomnia 08/17/2007     Priority: Medium     Osteoarthrosis 08/17/2007     Priority: Medium      Past Medical History:   Diagnosis Date     Abnormal Pap smear 2/21/2017     said pap was abnormal     Arthritis      Chronic back pain     hx spondylolisthesis     Chronic pain      COPD (chronic obstructive pulmonary disease) (H)     PFT's 2013 FEV1/FVC = 2.68/3.20 = 84%     H/O gastric bypass 2007     Hepatitis C     treated ribaviron & interferon in 2008 for serotype 2 with failed 6 month treatment     Hyperlipidemia     on simvatatin     Insomnia      Other chronic pain      Positive TB test     has taken INH     RLS (restless legs syndrome)      Seasonal allergies      TB lung, latent 1994    treated     Uncomplicated asthma      Wounds and injuries 2003     Past Surgical History:   Procedure Laterality Date     CHOLECYSTECTOMY       D & C  1987     ENDOSCOPIC ULTRASOUND UPPER GASTROINTESTINAL TRACT (GI) N/A 10/21/2019    Procedure: ENDOSCOPIC ULTRASOUND, ESOPHAGOSCOPY / UPPER GASTROINTESTINAL TRACT (GI) with jejunalgastrostomy with stent placement x 2, dilation tract;  Surgeon: Ramesh Self MD;  [facility]     ENDOSCOPIC ULTRASOUND UPPER GASTROINTESTINAL TRACT (GI) N/A 1/8/2020    Procedure: ENDOSCOPIC ULTRASOUND, ESOPHAGOSCOPY / UPPER GASTROINTESTINAL TRACT (GI)  with stent exchange;  Surgeon: Ramesh Self MD;  [facility]     ENDOSCOPIC ULTRASOUND UPPER GASTROINTESTINAL TRACT (GI) N/A 6/2/2020    Procedure: ENDOSCOPIC ULTRASOUND, ESOPHAGOSCOPY / UPPER GASTROINTESTINAL  TRACT (GI)  **Latex Allergy** stent exchange;  Surgeon: Ramesh Self MD;  [facility]     ESOPHAGOSCOPY, GASTROSCOPY, DUODENOSCOPY (EGD), COMBINED  7/30/2014    Procedure: COMBINED ESOPHAGOSCOPY, GASTROSCOPY, DUODENOSCOPY (EGD);  Surgeon: Ramesh Self MD;  [facility]     ESOPHAGOSCOPY, GASTROSCOPY, DUODENOSCOPY (EGD), COMBINED N/A 8/14/2019    Procedure: ESOPHAGOGASTRODUODENOSCOPY, WITH BIOPSY;  Surgeon: Johann Hazel MD;  [facility]     ESOPHAGOSCOPY, GASTROSCOPY, DUODENOSCOPY (EGD), COMBINED N/A 5/10/2022    Procedure: ESOPHAGOGASTRODUODENOSCOPY (EGD)  **Latex Allergy** with axios stent exchanged;  Surgeon: Ramesh Self MD;  [facility]     FRACTURE TX, ANKLE RT/LT  1991    ORIF rt ankle     FUSION SPINE POSTERIOR ONE LEVEL N/A 11/21/2014    Procedure: FUSION SPINE POSTERIOR ONE LEVEL;  Surgeon: Amilcar Baldwin MD;  [facility]     FUSION SPINE POSTERIOR ONE LEVEL Right 7/30/2015    Procedure: FUSION SPINE POSTERIOR ONE LEVEL;  Surgeon: Amilcar Baldwin MD;  [facility]     GASTRIC BYPASS  2007    lost 150 lbs     GRAFT BONE FROM ILIAC CREST Left 11/21/2014    Procedure: GRAFT BONE FROM ILIAC CREST;  Surgeon: Amilcar Baldwin MD;  [facility]     hardware removal       INJECT BLOCK MEDIAL BRANCH CERVICAL/THORACIC/LUMBAR Bilateral 7/10/2020    Procedure: Bilateral lumbar 3, lumbar 4, and lumbar 5 medial branch nerve diagnostic blocks #2;  Surgeon: Pravin Bagley MD;  [facility]     INJECT PARAVERTEBRAL FACET JOINT LUMBAR / SACRAL FIRST Bilateral 9/30/2019    Procedure: Lumbar Medial Branch Nerve Block Injection Bilateral;  Surgeon: Hira Rodríguez MD;  [facility]     OPTICAL TRACKING SYSTEM FUSION POSTERIOR SPINE LUMBAR  11/20/2013    Procedure: OPTICAL TRACKING SYSTEM FUSION SPINE POSTERIOR LUMBAR ONE LEVEL;  Lumbar 4-5 Transforaminal Interbody Fusion;  Surgeon: Amilcar Baldwin  MD [place];  [facility]     ORTHOPEDIC SURGERY      shoulder surgery, removed bone spur     PHACOEMULSIFICATION CLEAR CORNEA WITH STANDARD INTRAOCULAR LENS IMPLANT Right 5/29/2020    Procedure: RIGHT PHACOEMULSIFICATION, CATARACT, WITH INTRAOCULAR LENS IMPLANT;  Surgeon: Lyla Mahoney MD;  [facility]     PHACOEMULSIFICATION CLEAR CORNEA WITH STANDARD INTRAOCULAR LENS IMPLANT Left 7/17/2020    Procedure: LEFT EYE CATARACT REMOVAL WITH INTRAOCULAR LENS IMPLANT;  Surgeon: Lyla Mahoney MD;  [facility]     TUBAL LIGATION       Current Outpatient Medications   Medication Sig Dispense Refill     cetirizine (ZYRTEC) 10 MG tablet Take 1 tablet (10 mg) by mouth daily Call clinic to schedule follow up appointment. 90 tablet 0     ipratropium (ATROVENT HFA) 17 MCG/ACT inhaler INHALE 2 PUFFS BY MOUTH INTO LUNGS EVERY 6 HOURS 25.8 g 11     ipratropium - albuterol 0.5 mg/2.5 mg/3 mL (DUONEB) 0.5-2.5 (3) MG/3ML neb solution Take 1 vial (3 mLs) by nebulization every 4 hours as needed for shortness of breath, wheezing or cough *NEEDS APPT BEFORE NEXT REFILL* 90 mL 1     mometasone-formoterol (DULERA) 200-5 MCG/ACT inhaler Inhale 2 puffs into the lungs 2 times daily 13 g 1     montelukast (SINGULAIR) 10 MG tablet Take 1 tablet (10 mg) by mouth At Bedtime 90 tablet 3     order for DME Nebulizer 1 each 0     rOPINIRole (REQUIP) 1 MG tablet Take 1 tablet (1 mg) by mouth 2 times daily Keep appt on 5/16/23 for further refills 60 tablet 0     Spacer/Aero Chamber Mouthpiece MISC 1 Units daily 1 each 0     ferrous sulfate (FEROSUL) 325 (65 Fe) MG tablet Take 1 tablet (325 mg) by mouth every other day Take with citrus food or beverage 15 tablet 3     vitamin D2 (ERGOCALCIFEROL) 26668 units (1250 mcg) capsule Take 1 capsule (50,000 Units) by mouth once a week 8 capsule 0       Allergies   Allergen Reactions     Bee Venom Anaphylaxis, Anxiety, Difficulty breathing, Hives, Itching, Nausea and Vomiting, Palpitations,  Shortness Of Breath and Swelling     Opium Alkaloids, Hcls Itching     Can take opiate derived narcotics with Benadryl.     Morphine Itching     Latex Rash and Itching        Social History     Tobacco Use     Smoking status: Former     Packs/day: 1.00     Years: 30.00     Pack years: 30.00     Types: Cigarettes     Quit date: 2014     Years since quittin.9     Smokeless tobacco: Never     Tobacco comments:     pack a day   Vaping Use     Vaping status: Not on file   Substance Use Topics     Alcohol use: Not Currently     Alcohol/week: 0.0 standard drinks of alcohol     Comment: x3 drinks per year     Family History   Problem Relation Age of Onset     Hypertension Father      Respiratory Father         COPD, smoked     Glaucoma Father      Sleep Apnea Father      C.A.D. Mother         CHF,  age 59 of MI, smoked     Crohn's Disease Brother      Diabetes Maternal Grandmother         insulin dependent     Diabetes Paternal Grandmother      Glaucoma Paternal Grandmother      Alzheimer Disease Paternal Grandmother      Melanoma No family hx of      Skin Cancer No family hx of      Macular Degeneration No family hx of      Anesthesia Reaction No family hx of      Deep Vein Thrombosis (DVT) No family hx of      History   Drug Use     Types: Marijuana     Comment: daily smoking use since age 14         Objective     /79 (BP Location: Right arm, Patient Position: Sitting, Cuff Size: Adult Regular)   Pulse 90   Ht 1.524 m (5')   Wt 41.7 kg (91 lb 14.4 oz)   LMP 2012 (LMP Unknown)   SpO2 100%   BMI 17.95 kg/m      Physical Exam    GENERAL APPEARANCE: healthy, alert and no distress     EYES: EOMI, PERRL     HENT: ear canals and TM's normal and nose and mouth without ulcers or lesions     RESP: lungs clear to auscultation - no rales, rhonchi or wheezes     CV: regular rates and rhythm, normal S1 S2, no S3 or S4 and no murmur, click or rub     ABDOMEN:  soft, nontender, no HSM or masses and bowel  sounds normal     MS: extremities normal- no gross deformities noted, no evidence of inflammation in joints, FROM in all extremities.     SKIN: no suspicious lesions or rashes     NEURO: Normal strength and tone, sensory exam grossly normal, mentation intact and speech normal     PSYCH: mentation appears normal. and affect normal/bright    Recent Labs   Lab Test 05/06/22  1425 04/27/22  1354 03/16/22  1535   HGB 12.9 14.4 11.9    309 326   NA  --   --  132*   POTASSIUM  --   --  3.4   CR  --   --  0.82        Diagnostics:  Labs pending at this time.  Results will be reviewed when available.   EKG: NSR, rate 65, no ST changes, no q waves, QTc 370    Revised Cardiac Risk Index (RCRI):  The patient has the following serious cardiovascular risks for perioperative complications:   - No serious cardiac risks = 0 points     RCRI Interpretation: 0 points: Class I (very low risk - 0.4% complication rate)           Signed Electronically by: Mat Ayala MD  Copy of this evaluation report is provided to requesting physician.        Answers for HPI/ROS submitted by the patient on 5/13/2023  General Symptoms: No  Skin Symptoms: No  HENT Symptoms: Yes  EYE SYMPTOMS: Yes  HEART SYMPTOMS: No  LUNG SYMPTOMS: Yes  INTESTINAL SYMPTOMS: No  URINARY SYMPTOMS: No  GYNECOLOGIC SYMPTOMS: No  BREAST SYMPTOMS: No  SKELETAL SYMPTOMS: No  BLOOD SYMPTOMS: No  NERVOUS SYSTEM SYMPTOMS: No  MENTAL HEALTH SYMPTOMS: No  Ear pain: Yes  Ear discharge: No  Hearing loss: No  Tinnitus: Yes  Nosebleeds: No  Congestion: No  Sinus pain: Yes  Trouble swallowing: Yes   Voice hoarseness: No  Mouth sores: Yes  Sore throat: No  Tooth pain: No  Gum tenderness: No  Bleeding gums: No  Change in taste: No  Change in sense of smell: No  Dry mouth: Yes  Hearing aid used: No  Neck lump: No  Eye pain: No  Vision loss: No  Dry eyes: Yes  Watery eyes: Yes  Eye bulging: No  Double vision: No  Flashing of lights: No  Spots: No  Floaters: Yes  Redness:  No  Crossed eyes: No  Tunnel Vision: No  Yellowing of eyes: No  Eye irritation: Yes  Cough: Yes  Sputum or phlegm: Yes  Coughing up blood: No  Difficulty breating or shortness of breath: Yes  Snoring: No  Wheezing: Yes  Difficulty breathing on exertion: No  Nighttime Cough: Yes  Difficulty breathing when lying flat: No

## 2023-05-16 NOTE — ASSESSMENT & PLAN NOTE
Part of patient allergies likely related to nasal congestion.   - Will have patient back on flonase twice a day as needed.  - Continued on zyrtec.

## 2023-05-16 NOTE — ASSESSMENT & PLAN NOTE
Approved for intermediate risk procedure. Has been having a stent exchanges annually since 2019 for jejunogastrostomy for a prior Alicia en Y gastric bypass. Hx of COPD, last PFT was in 2019 with FEV1 pred at 104%. Some shortness of breath, likely contributed by nasal congestion mainly with seasonal allergies. Patient does continue to smoke. Clear lung sounds on exam. Saturating at 100% on RA today. Has a hx of treated Hepatitis C. No hx of bleeding or clots. RCRI score 0 of 6. EKG with NSR. She does have a latex allergy.     - No ibuprofen, naproxen, motrin, advil, aleve, or excedrin with aspirin.   - No alcohol before procedure. Patient does not drink alcohol any ways.  - Labs for CBC, CMP, INR for baseline.   - CXR with no infiltrates on my view, pending radiology read.

## 2023-05-16 NOTE — ANESTHESIA PREPROCEDURE EVALUATION
Anesthesia Pre-Procedure Evaluation    Patient: Kristina Eldridge   MRN: [id number] : 1970        Procedure : Procedure(s):  ESOPHAGOGASTRODUODENOSCOPY **Latex Allergy**        Past Medical History:   Diagnosis Date     Abnormal Pap smear 2017     said pap was abnormal     Arthritis      Chronic back pain     hx spondylolisthesis     Chronic pain      COPD (chronic obstructive pulmonary disease) (H)     PFT's  FEV1/FVC = 2.68/3.20 = 84%     H/O gastric bypass      Hepatitis C     treated ribaviron & interferon in  for serotype 2 with failed 6 month treatment     Hyperlipidemia     on simvatatin     Insomnia      Other chronic pain      Positive TB test     has taken INH     RLS (restless legs syndrome)      Seasonal allergies      TB lung, latent     treated     Uncomplicated asthma      Wounds and injuries       Past Surgical History:   Procedure Laterality Date     CHOLECYSTECTOMY       D & C       ENDOSCOPIC ULTRASOUND UPPER GASTROINTESTINAL TRACT (GI) N/A 10/21/2019    Procedure: ENDOSCOPIC ULTRASOUND, ESOPHAGOSCOPY / UPPER GASTROINTESTINAL TRACT (GI) with jejunalgastrostomy with stent placement x 2, dilation tract;  Surgeon: Ramesh Self MD;  [facility]     ENDOSCOPIC ULTRASOUND UPPER GASTROINTESTINAL TRACT (GI) N/A 2020    Procedure: ENDOSCOPIC ULTRASOUND, ESOPHAGOSCOPY / UPPER GASTROINTESTINAL TRACT (GI)  with stent exchange;  Surgeon: Ramesh Self MD;  [facility]     ENDOSCOPIC ULTRASOUND UPPER GASTROINTESTINAL TRACT (GI) N/A 2020    Procedure: ENDOSCOPIC ULTRASOUND, ESOPHAGOSCOPY / UPPER GASTROINTESTINAL TRACT (GI)  **Latex Allergy** stent exchange;  Surgeon: Ramesh Self MD;  [facility]     ESOPHAGOSCOPY, GASTROSCOPY, DUODENOSCOPY (EGD), COMBINED  2014    Procedure: COMBINED ESOPHAGOSCOPY, GASTROSCOPY, DUODENOSCOPY (EGD);  Surgeon: Ramesh Self MD;  [facility]     ESOPHAGOSCOPY, GASTROSCOPY,  DUODENOSCOPY (EGD), COMBINED N/A 8/14/2019    Procedure: ESOPHAGOGASTRODUODENOSCOPY, WITH BIOPSY;  Surgeon: Johann Hazel MD;  [facility]     ESOPHAGOSCOPY, GASTROSCOPY, DUODENOSCOPY (EGD), COMBINED N/A 5/10/2022    Procedure: ESOPHAGOGASTRODUODENOSCOPY (EGD)  **Latex Allergy** with axios stent exchanged;  Surgeon: Ramseh Self MD;  [facility]     FRACTURE TX, ANKLE RT/LT  1991    ORIF rt ankle     FUSION SPINE POSTERIOR ONE LEVEL N/A 11/21/2014    Procedure: FUSION SPINE POSTERIOR ONE LEVEL;  Surgeon: Amilcar Baldwin MD;  [facility]     FUSION SPINE POSTERIOR ONE LEVEL Right 7/30/2015    Procedure: FUSION SPINE POSTERIOR ONE LEVEL;  Surgeon: Amilcar Baldwin MD;  [facility]     GASTRIC BYPASS  2007    lost 150 lbs     GRAFT BONE FROM ILIAC CREST Left 11/21/2014    Procedure: GRAFT BONE FROM ILIAC CREST;  Surgeon: Amilcar Baldwin MD;  [facility]     hardware removal       INJECT BLOCK MEDIAL BRANCH CERVICAL/THORACIC/LUMBAR Bilateral 7/10/2020    Procedure: Bilateral lumbar 3, lumbar 4, and lumbar 5 medial branch nerve diagnostic blocks #2;  Surgeon: Pravin Bagley MD;  [facility]     INJECT PARAVERTEBRAL FACET JOINT LUMBAR / SACRAL FIRST Bilateral 9/30/2019    Procedure: Lumbar Medial Branch Nerve Block Injection Bilateral;  Surgeon: Hira Rodríguez MD;  [facility]     OPTICAL TRACKING SYSTEM FUSION POSTERIOR SPINE LUMBAR  11/20/2013    Procedure: OPTICAL TRACKING SYSTEM FUSION SPINE POSTERIOR LUMBAR ONE LEVEL;  Lumbar 4-5 Transforaminal Interbody Fusion;  Surgeon: Amilcar Baldwin MD;  [facility]     ORTHOPEDIC SURGERY      shoulder surgery, removed bone spur     PHACOEMULSIFICATION CLEAR CORNEA WITH STANDARD INTRAOCULAR LENS IMPLANT Right 5/29/2020    Procedure: RIGHT PHACOEMULSIFICATION, CATARACT, WITH INTRAOCULAR LENS IMPLANT;  Surgeon: Lyla Mahoney MD;  [facility]      PHACOEMULSIFICATION CLEAR CORNEA WITH STANDARD INTRAOCULAR LENS IMPLANT Left 2020    Procedure: LEFT EYE CATARACT REMOVAL WITH INTRAOCULAR LENS IMPLANT;  Surgeon: Lyla Mahoney MD;  [facility]     TUBAL LIGATION        Allergies   Allergen Reactions     Bee Venom Anaphylaxis, Anxiety, Difficulty breathing, Hives, Itching, Nausea and Vomiting, Palpitations, Shortness Of Breath and Swelling     Opium Alkaloids, Hcls Itching     Can take opiate derived narcotics with Benadryl.     Morphine Itching     Latex Rash and Itching      Social History     Tobacco Use     Smoking status: Former     Packs/day: 1.00     Years: 30.00     Pack years: 30.00     Types: Cigarettes     Quit date: 2014     Years since quittin.9     Smokeless tobacco: Never     Tobacco comments:     pack a day   Vaping Use     Vaping status: Not on file   Substance Use Topics     Alcohol use: Not Currently     Alcohol/week: 0.0 standard drinks of alcohol     Comment: x3 drinks per year      Wt Readings from Last 1 Encounters:   22 48.1 kg (106 lb)        Anesthesia Evaluation   Pt has had prior anesthetic. Type: General.        ROS/MED HX  ENT/Pulmonary:     (+) tobacco use, Past use, COPD,     Neurologic:     (+) peripheral neuropathy, - h/o facial palsy.     Cardiovascular:     (+) Dyslipidemia -----    METS/Exercise Tolerance:     Hematologic:       Musculoskeletal:   (+) arthritis,     GI/Hepatic:     (+) hepatitis liver disease,     Renal/Genitourinary:       Endo:       Psychiatric/Substance Use:     (+) H/O chronic opiod use . Recreational drug usage: Cannabis.    Infectious Disease:       Malignancy:  - neg malignancy ROS     Other:      (+) , H/O Chronic Pain,        Physical Exam    Airway        Mallampati: I   TM distance: > 3 FB   Neck ROM: full   Mouth opening: > 3 cm    Respiratory Devices and Support         Dental       (+) Edentulous      Cardiovascular             Pulmonary                   OUTSIDE  LABS:  CBC:   Lab Results   Component Value Date    WBC 9.5 05/06/2022    WBC 7.9 04/27/2022    HGB 12.9 05/06/2022    HGB 14.4 04/27/2022    HCT 39.7 05/06/2022    HCT 44.2 04/27/2022     05/06/2022     04/27/2022     BMP:   Lab Results   Component Value Date     (L) 03/16/2022     04/09/2021    POTASSIUM 3.4 03/16/2022    POTASSIUM 3.8 04/09/2021    CHLORIDE 99 03/16/2022    CHLORIDE 102 04/09/2021    CO2 27 03/16/2022    CO2 30 04/09/2021    BUN 2 (L) 03/16/2022    BUN 3 (L) 04/09/2021    CR 0.82 03/16/2022    CR 0.89 04/09/2021     (H) 05/10/2022    GLC 86 03/16/2022     COAGS:   Lab Results   Component Value Date    PTT 30 04/16/2015    INR 1.09 06/02/2020     POC:   Lab Results   Component Value Date    BGM 97 05/05/2021    HCG Negative 11/21/2014    HCGS Negative 04/16/2015     HEPATIC:   Lab Results   Component Value Date    ALBUMIN 3.7 03/16/2022    PROTTOTAL 7.1 03/16/2022    ALT 17 03/16/2022    AST 21 03/16/2022    ALKPHOS 118 03/16/2022    BILITOTAL 0.3 03/16/2022     OTHER:   Lab Results   Component Value Date    A1C 5.5 04/09/2021    MARIA FERNANDA 8.0 (L) 03/16/2022    PHOS 3.1 09/12/2012    MAG 2.0 09/16/2015    TSH 1.44 03/16/2022    CRP 4.1 03/11/2019    SED 16 01/31/2013       Anesthesia Plan    ASA Status:  3   NPO Status:  NPO Appropriate    Anesthesia Type: General.     - Airway: ETT   Induction: Intravenous, Propofol.   Maintenance: Balanced.        Consents    Anesthesia Plan(s) and associated risks, benefits, and realistic alternatives discussed. Questions answered and patient/representative(s) expressed understanding.    - Discussed:     - Discussed with:  Patient         Postoperative Care    Pain management: IV analgesics, Multi-modal analgesia.   PONV prophylaxis: Ondansetron (or other 5HT-3), Dexamethasone or Solumedrol     Comments:                    John Lopez MD

## 2023-05-17 ENCOUNTER — ANESTHESIA (OUTPATIENT)
Dept: SURGERY | Facility: CLINIC | Age: 53
End: 2023-05-17
Payer: COMMERCIAL

## 2023-05-17 ENCOUNTER — HOSPITAL ENCOUNTER (OUTPATIENT)
Facility: CLINIC | Age: 53
Discharge: HOME | End: 2023-05-17
Attending: INTERNAL MEDICINE | Admitting: INTERNAL MEDICINE
Payer: COMMERCIAL

## 2023-05-17 ENCOUNTER — APPOINTMENT (OUTPATIENT)
Dept: GENERAL RADIOLOGY | Facility: CLINIC | Age: 53
End: 2023-05-17
Attending: INTERNAL MEDICINE
Payer: COMMERCIAL

## 2023-05-17 VITALS
RESPIRATION RATE: 12 BRPM | OXYGEN SATURATION: 100 % | HEIGHT: 60 IN | HEART RATE: 63 BPM | BODY MASS INDEX: 18.61 KG/M2 | SYSTOLIC BLOOD PRESSURE: 130 MMHG | WEIGHT: 94.8 LBS | TEMPERATURE: 98.2 F | DIASTOLIC BLOOD PRESSURE: 85 MMHG

## 2023-05-17 LAB
ATRIAL RATE - MUSE: 65 BPM
DIASTOLIC BLOOD PRESSURE - MUSE: NORMAL MMHG
INTERPRETATION ECG - MUSE: NORMAL
P AXIS - MUSE: 67 DEGREES
PR INTERVAL - MUSE: 136 MS
QRS DURATION - MUSE: 86 MS
QT - MUSE: 356 MS
QTC - MUSE: 370 MS
R AXIS - MUSE: 62 DEGREES
SYSTOLIC BLOOD PRESSURE - MUSE: NORMAL MMHG
T AXIS - MUSE: 58 DEGREES
UPPER GI ENDOSCOPY: NORMAL
VENTRICULAR RATE- MUSE: 65 BPM

## 2023-05-17 PROCEDURE — 250N000025 HC SEVOFLURANE, PER MIN: Performed by: INTERNAL MEDICINE

## 2023-05-17 PROCEDURE — 250N000011 HC RX IP 250 OP 636

## 2023-05-17 PROCEDURE — 250N000009 HC RX 250: Performed by: ANESTHESIOLOGY

## 2023-05-17 PROCEDURE — 250N000009 HC RX 250: Performed by: NURSE ANESTHETIST, CERTIFIED REGISTERED

## 2023-05-17 PROCEDURE — 999N000180 XR SURGERY CARM FLUORO LESS THAN 5 MIN: Mod: TC

## 2023-05-17 PROCEDURE — 370N000017 HC ANESTHESIA TECHNICAL FEE, PER MIN: Performed by: INTERNAL MEDICINE

## 2023-05-17 PROCEDURE — 272N000001 HC OR GENERAL SUPPLY STERILE: Performed by: INTERNAL MEDICINE

## 2023-05-17 PROCEDURE — 710N000012 HC RECOVERY PHASE 2, PER MINUTE: Performed by: INTERNAL MEDICINE

## 2023-05-17 PROCEDURE — 250N000011 HC RX IP 250 OP 636: Performed by: NURSE ANESTHETIST, CERTIFIED REGISTERED

## 2023-05-17 PROCEDURE — 999N000141 HC STATISTIC PRE-PROCEDURE NURSING ASSESSMENT: Performed by: INTERNAL MEDICINE

## 2023-05-17 PROCEDURE — 250N000009 HC RX 250: Performed by: INTERNAL MEDICINE

## 2023-05-17 PROCEDURE — 250N000009 HC RX 250

## 2023-05-17 PROCEDURE — 360N000082 HC SURGERY LEVEL 2 W/ FLUORO, PER MIN: Performed by: INTERNAL MEDICINE

## 2023-05-17 PROCEDURE — C1874 STENT, COATED/COV W/DEL SYS: HCPCS | Performed by: INTERNAL MEDICINE

## 2023-05-17 PROCEDURE — 710N000010 HC RECOVERY PHASE 1, LEVEL 2, PER MIN: Performed by: INTERNAL MEDICINE

## 2023-05-17 PROCEDURE — 258N000003 HC RX IP 258 OP 636

## 2023-05-17 DEVICE — STENT ESU AXIOS W/DEL SYS 20X10MM 10.8FR 138CM M00553660: Type: IMPLANTABLE DEVICE | Site: JEJUNUM | Status: FUNCTIONAL

## 2023-05-17 RX ORDER — ONDANSETRON 2 MG/ML
4 INJECTION INTRAMUSCULAR; INTRAVENOUS EVERY 30 MIN PRN
Status: DISCONTINUED | OUTPATIENT
Start: 2023-05-17 | End: 2023-05-17 | Stop reason: HOSPADM

## 2023-05-17 RX ORDER — ONDANSETRON 2 MG/ML
4 INJECTION INTRAMUSCULAR; INTRAVENOUS
Status: DISCONTINUED | OUTPATIENT
Start: 2023-05-17 | End: 2023-05-17 | Stop reason: HOSPADM

## 2023-05-17 RX ORDER — HYDROMORPHONE HCL IN WATER/PF 6 MG/30 ML
0.4 PATIENT CONTROLLED ANALGESIA SYRINGE INTRAVENOUS EVERY 5 MIN PRN
Status: DISCONTINUED | OUTPATIENT
Start: 2023-05-17 | End: 2023-05-17 | Stop reason: HOSPADM

## 2023-05-17 RX ORDER — LIDOCAINE HYDROCHLORIDE 20 MG/ML
INJECTION, SOLUTION INFILTRATION; PERINEURAL PRN
Status: DISCONTINUED | OUTPATIENT
Start: 2023-05-17 | End: 2023-05-17

## 2023-05-17 RX ORDER — ONDANSETRON 4 MG/1
4 TABLET, ORALLY DISINTEGRATING ORAL EVERY 30 MIN PRN
Status: DISCONTINUED | OUTPATIENT
Start: 2023-05-17 | End: 2023-05-17 | Stop reason: HOSPADM

## 2023-05-17 RX ORDER — PROPOFOL 10 MG/ML
INJECTION, EMULSION INTRAVENOUS PRN
Status: DISCONTINUED | OUTPATIENT
Start: 2023-05-17 | End: 2023-05-17

## 2023-05-17 RX ORDER — LIDOCAINE 40 MG/G
CREAM TOPICAL
Status: DISCONTINUED | OUTPATIENT
Start: 2023-05-17 | End: 2023-05-17 | Stop reason: HOSPADM

## 2023-05-17 RX ORDER — FENTANYL CITRATE 50 UG/ML
25 INJECTION, SOLUTION INTRAMUSCULAR; INTRAVENOUS EVERY 5 MIN PRN
Status: DISCONTINUED | OUTPATIENT
Start: 2023-05-17 | End: 2023-05-17 | Stop reason: HOSPADM

## 2023-05-17 RX ORDER — DEXAMETHASONE SODIUM PHOSPHATE 4 MG/ML
INJECTION, SOLUTION INTRA-ARTICULAR; INTRALESIONAL; INTRAMUSCULAR; INTRAVENOUS; SOFT TISSUE PRN
Status: DISCONTINUED | OUTPATIENT
Start: 2023-05-17 | End: 2023-05-17

## 2023-05-17 RX ORDER — FENTANYL CITRATE 50 UG/ML
50 INJECTION, SOLUTION INTRAMUSCULAR; INTRAVENOUS EVERY 5 MIN PRN
Status: DISCONTINUED | OUTPATIENT
Start: 2023-05-17 | End: 2023-05-17 | Stop reason: HOSPADM

## 2023-05-17 RX ORDER — HYDROMORPHONE HCL IN WATER/PF 6 MG/30 ML
0.2 PATIENT CONTROLLED ANALGESIA SYRINGE INTRAVENOUS EVERY 5 MIN PRN
Status: DISCONTINUED | OUTPATIENT
Start: 2023-05-17 | End: 2023-05-17 | Stop reason: HOSPADM

## 2023-05-17 RX ORDER — ONDANSETRON 2 MG/ML
INJECTION INTRAMUSCULAR; INTRAVENOUS PRN
Status: DISCONTINUED | OUTPATIENT
Start: 2023-05-17 | End: 2023-05-17

## 2023-05-17 RX ORDER — ESMOLOL HYDROCHLORIDE 10 MG/ML
INJECTION INTRAVENOUS PRN
Status: DISCONTINUED | OUTPATIENT
Start: 2023-05-17 | End: 2023-05-17

## 2023-05-17 RX ORDER — SODIUM CHLORIDE, SODIUM LACTATE, POTASSIUM CHLORIDE, CALCIUM CHLORIDE 600; 310; 30; 20 MG/100ML; MG/100ML; MG/100ML; MG/100ML
INJECTION, SOLUTION INTRAVENOUS CONTINUOUS
Status: DISCONTINUED | OUTPATIENT
Start: 2023-05-17 | End: 2023-05-17 | Stop reason: HOSPADM

## 2023-05-17 RX ORDER — SODIUM CHLORIDE, SODIUM LACTATE, POTASSIUM CHLORIDE, CALCIUM CHLORIDE 600; 310; 30; 20 MG/100ML; MG/100ML; MG/100ML; MG/100ML
INJECTION, SOLUTION INTRAVENOUS CONTINUOUS PRN
Status: DISCONTINUED | OUTPATIENT
Start: 2023-05-17 | End: 2023-05-17

## 2023-05-17 RX ADMIN — PHENYLEPHRINE HYDROCHLORIDE 100 MCG: 10 INJECTION INTRAVENOUS at 11:00

## 2023-05-17 RX ADMIN — LIDOCAINE HYDROCHLORIDE 60 MG: 20 INJECTION, SOLUTION INFILTRATION; PERINEURAL at 10:46

## 2023-05-17 RX ADMIN — ONDANSETRON 4 MG: 2 INJECTION INTRAMUSCULAR; INTRAVENOUS at 11:23

## 2023-05-17 RX ADMIN — PROPOFOL 60 MG: 10 INJECTION, EMULSION INTRAVENOUS at 10:50

## 2023-05-17 RX ADMIN — Medication 30 MG: at 10:46

## 2023-05-17 RX ADMIN — ESMOLOL HYDROCHLORIDE 10 MG: 10 INJECTION, SOLUTION INTRAVENOUS at 10:47

## 2023-05-17 RX ADMIN — SUGAMMADEX 200 MG: 100 INJECTION, SOLUTION INTRAVENOUS at 11:23

## 2023-05-17 RX ADMIN — SODIUM CHLORIDE, POTASSIUM CHLORIDE, SODIUM LACTATE AND CALCIUM CHLORIDE: 600; 310; 30; 20 INJECTION, SOLUTION INTRAVENOUS at 10:40

## 2023-05-17 RX ADMIN — MIDAZOLAM 2 MG: 1 INJECTION INTRAMUSCULAR; INTRAVENOUS at 10:37

## 2023-05-17 RX ADMIN — DEXAMETHASONE SODIUM PHOSPHATE 4 MG: 4 INJECTION, SOLUTION INTRA-ARTICULAR; INTRALESIONAL; INTRAMUSCULAR; INTRAVENOUS; SOFT TISSUE at 11:00

## 2023-05-17 RX ADMIN — PROPOFOL 100 MG: 10 INJECTION, EMULSION INTRAVENOUS at 10:46

## 2023-05-17 ASSESSMENT — ACTIVITIES OF DAILY LIVING (ADL)
ADLS_ACUITY_SCORE: 20
ADLS_ACUITY_SCORE: 33

## 2023-05-17 NOTE — OP NOTE
Upper GI Endoscopy 05/17/2023 10:40 AM [address] [place] [address] [phone]     Endoscopy Department   _______________________________________________________________________________   Patient Name: Kristina Eldridge            Procedure Date: 5/17/2023 10:40 AM   MRN: [id number]                       Account Number: [id number]   YOB: 1970              Admit Type: Outpatient   Age: 52                               Room: [place] [address]   Gender: Female                        Note Status: Finalized   Attending MD: ARYAN ALLISON MD,   Total Sedation Time:   _______________________________________________________________________________       Procedure:             Upper GI endoscopy   Indications:           Stent follow-up   Providers:             ARYAN ALLISON MD, TAMI LIRA   Patient Profile:       Ms Eldridge is a 51yo woman with a 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                          She now returns 12m since the placement of a 20mm                          stent for scheduled stent exchange by upper endoscopy.   Referring MD:          KERRY ESTEBAN MD   Medicines:             General Anesthesia   Complications:         No immediate complications.   _______________________________________________________________________________   Procedure:             Pre-Anesthesia Assessment:                          - Prior to the procedure, a History and Physical was                          performed, and patient medications and allergies were                          reviewed. The patient is competent. The risks and                           benefits of the procedure and the sedation options and                          risks were discussed with the patient. All questions                          were answered and informed consent was obtained.                          Patient identification and proposed procedure were                          verified by the nurse in the pre-procedure area.                          Mental Status Examination: alert and oriented. Airway                          Examination: Mallampati Class II (the uvula but not                          tonsillar pillars visualized). Respiratory                          Examination: clear to auscultation. CV Examination:                          normal. ASA Grade Assessment: III - A patient with                          severe systemic disease. After reviewing the risks and                          benefits, the patient was deemed in satisfactory                          condition to undergo the procedure. The anesthesia                          plan was to use general anesthesia. Immediately prior                          to administration of medications, the patient was                          re-assessed for adequacy to receive sedatives. The                          heart rate, respiratory rate, oxygen saturations,                          blood pressure, adequacy of pulmonary ventilation, and                          response to care were monitored throughout the                          procedure. The physical status of the patient was                          re-assessed after the procedure. After obtaining                          informed consent, the endoscope was passed under                          direct vision. Throughout the procedure, the patient's                          blood pressure, pulse, and oxygen saturations were                          monitored continuously. The 1T gastroscope was                          introduced through the mouth, and advanced  to the                          second part of duodenum. The upper GI endoscopy was                          accomplished without difficulty. The patient tolerated                          the procedure well.                                                                                     Findings:        The patient was supine under general and a 1T was used.  films        demonstrated surgical clips and a well expanded Axios stent in its        anticipated position. The proximal, mid and distal esophagus were        unremarkable without inflammation or lesion. The squamocolumnar line was        found at the gastroesophageal junction without significant irregularity.        The pouch was modestly sized and the gastrojejunostomy generously patent        without inflammation or lesion. This is an end to side anastomosis. A        few centimeters into the Alicia a widely patent Axios was found and        traversed leading to the remnant and duodenal sweep. The existing stent        was removed in toto with a rat tooth and a [id number] lumen apposing        covered metal stent was deployed across what appeared to be a matured        tract. The stent seated well and expandedÂ as anticipated.                                                                                     Impression:            - RYGB anatomy with well postioned 20mm Axios                          maintaining a now mature jejunogastrostomy                          - Uncomplicated exchange of the 20mm Axios with a                          fresh 20mm Axios using cold, endoscopically guided                          technique   Recommendation:        - General anesthesia recovery with probable discharge                          home this afternoon                          - Previous diet and all medications may be resumed                          immediately upon discharge                          - [facility]                           - Return for stent removal/exchange in 12m                          - The findings and recommendations were discussed with                          the patient and their family.                                                                                      electronically signed by CHER Allison   ________________________   ARYAN ALLISON MD   5/17/2023 11:31:02 AM   I was physically present for the entire viewing portion of the exam.   __________________________   Signature of teaching physician   Davida/Alverto ALLISON MD

## 2023-05-17 NOTE — ANESTHESIA PROCEDURE NOTES
Airway       Patient location during procedure: OR       Procedure Start/Stop Times: 5/17/2023 10:51 AM  Staff -        Anesthesiologist:  John Lopez MD       CRNA: Viktor Min APRN CRNA       Performed By: resident  Consent for Airway        Urgency: elective  Indications and Patient Condition       Indications for airway management: luis alberto-procedural       Induction type:intravenous       Mask difficulty assessment: 1 - vent by mask    Final Airway Details       Final airway type: endotracheal airway       Successful airway: ETT - single and Oral  Endotracheal Airway Details        ETT size (mm): 6.5       Cuffed: yes       Successful intubation technique: direct laryngoscopy       DL Blade Type: MAC 3       Grade View of Cords: 1       Adjucts: stylet       Position: Right       Measured from: lips       Secured at (cm): 22       Bite Block used: EGD BB.    Post intubation assessment        Placement verified by: capnometry and equal breath sounds        Number of attempts at approach: 1       Number of other approaches attempted: 0       Secured with: silk tape       Ease of procedure: easy       Dentition: Unchanged    Medication(s) Administered   Medication Administration Time: 5/17/2023 10:51 AM    Additional Comments       Airway by dental resident Tamia Sr

## 2023-05-17 NOTE — ANESTHESIA CARE TRANSFER NOTE
Patient: Kristina Eldridge    Procedure: Procedure(s):  ESOPHAGOGASTRODUODENOSCOPY with jejunogastrostomy axios stent replacement       Diagnosis: Hx of gastric bypass [Z98.84]  Diagnosis Additional Information: No value filed.    Anesthesia Type:   General     Note:    Oropharynx: spontaneously breathing  Level of Consciousness: awake  Oxygen Supplementation: nasal cannula  Level of Supplemental Oxygen (L/min / FiO2): 2  Independent Airway: airway patency satisfactory and stable  Dentition: dentition unchanged  Vital Signs Stable: post-procedure vital signs reviewed and stable  Report to RN Given: handoff report given  Patient transferred to: PACU    Handoff Report: Identifed the Patient, Identified the Reponsible Provider, Reviewed the pertinent medical history, Discussed the surgical course, Reviewed Intra-OP anesthesia mangement and issues during anesthesia, Set expectations for post-procedure period and Allowed opportunity for questions and acknowledgement of understanding      Vitals:  Vitals Value Taken Time   BP     Temp     Pulse 66 05/17/23 1137   Resp 34 05/17/23 1137   SpO2 98 % 05/17/23 1137   Vitals shown include unvalidated device data.    Electronically Signed By: MILEY Mcelroy CRNA  May 17, 2023  11:38 AM

## 2023-05-17 NOTE — DISCHARGE INSTRUCTIONS
[facility], [place]  Same-Day EGD Procedure  Adult Discharge Orders & Instructions     You had a procedure known as an Esophagogastroduodenoscopy (EGD) of either the upper gastrointestinal (GI) tract. An UPPER EGD will place a camera into either your mouth or nose to examine your esophagus, stomach, and/or small intestines. Biopsies, small samples of tissue, are often taken to help diagnose and/or classify stages of disease growth. The EGD is also used to help locate areas of the GI tract that may require further treatment (dilation, stenting, clipping, removal, etc.) or medical interventions (medication specific).      After your procedure   Make sure to clarify with your healthcare provider any diet restrictions (For example, clear liquid, low fat, no caffeine, etc.)   Do NOT take aspirin containing medications or any other blood-thinning medicines (anticoagulants) until your healthcare provider says it's OK.   You MAY be prescribed antibiotics, depending on what was done and/or found during your EUS, make sure to take antibiotics as prescribed by your healthcare provider    For 24 hours after surgery  Get plenty of rest.  A responsible adult must stay with you for at least 24 hours after you leave the hospital.   Do not drive or use heavy equipment.  If you have weakness or tingling, don't drive or use heavy equipment until this feeling goes away.  Do not drink alcohol.  Avoid strenuous or risky activities (gym, yoga, cycling, etc.).  Ask for help when climbing stairs.   You may feel lightheaded.  IF so, sit for a few minutes before standing.  Have someone help you get up.   If you have nausea (feel sick to your stomach): Drink only clear liquids such as apple juice, ginger ale, broth or 7-Up.  Rest may also help.  Be sure to drink enough fluids.  Move to a regular diet as you feel able.  If you feel bloated or have too much gas, use a heating pad on your belly to help reduce the  discomfort. This should help you feel better.   You may have a slight fever. This is normal for the first 24 hours.   You may have a dry mouth, a sore throat, muscle aches or trouble sleeping.  These are normal and will go away after 24 hours. A sore throat is most common. Use lozenges or gargle with salt water to ease the discomfort.   Do not make important or legal decisions.      Call your doctor for any of the following:  Chest pain, and/or shortness of breath  Abdominal  pain, bloating or cramping that has not improved or does not respond to pain reliving medications (Tylenol or narcotics if prescribed)   Difficulty swallowing or feeling as though food or liquids are stuck in your throat   Sore throat lasting more than 2 days or pain that has worsened over time   Black or tarry stools   Nausea and/or vomiting that is not resolving or has not responded to anti-nausea medications prescribed to you   It has been over 8 to 10 hours since surgery and you are still not able to urinate (pass water)   Headache for over 24 hours   Fever over 100.5 F (38 C) lasting more than 24 hours after the procedure   Signs of jaundice or blockage (fever, chills, abdominal pain, yellowing of the whites of your eyes, yellowing of your skin, and/or passing darker than normal urine)     To contact a doctor, call:   [ ] Dr Self at [phone] (Endoscopy-Gastroenterology Clinic)  (Monday thru Friday 8:00am to 4:30pm)   [ ] [phone] and ask for the Gastroenterology resident on call (answered 24 hours a day)   [ ] Emergency Department: [facility]: [phone]   Take it easy when you get home.  Remember, same day surgery DOES NOT MEAN SAME DAY RECOVERY!  Healing is a gradual process.  You will need some time to recover - you may be more tired than you realize at first.  Rest and relax for at least the first 24 hours at home.  You'll feel better and heal faster if you take good care of yourself.

## 2023-05-17 NOTE — ANESTHESIA POSTPROCEDURE EVALUATION
Patient: Kristina Eldridge    Procedure: Procedure(s):  ESOPHAGOGASTRODUODENOSCOPY with jejunogastrostomy axios stent replacement       Anesthesia Type:  General    Note:  Disposition: Outpatient   Postop Pain Control: Uneventful            Sign Out: Well controlled pain   PONV: No   Neuro/Psych: Uneventful            Sign Out: Acceptable/Baseline neuro status   Airway/Respiratory: Uneventful            Sign Out: Acceptable/Baseline resp. status   CV/Hemodynamics: Uneventful            Sign Out: Acceptable CV status; No obvious hypovolemia; No obvious fluid overload   Other NRE: NONE   DID A NON-ROUTINE EVENT OCCUR? No           Last vitals:  Vitals Value Taken Time   BP 97/63 05/17/23 1200   Temp     Pulse 62 05/17/23 1210   Resp 26 05/17/23 1210   SpO2 91 % 05/17/23 1210   Vitals shown include unvalidated device data.    Electronically Signed By: Nereyda Suero MD  May 17, 2023  12:12 PM

## 2023-05-18 RX ORDER — IPRATROPIUM BROMIDE 17 UG/1
AEROSOL, METERED RESPIRATORY (INHALATION)
Qty: 25.8 G | Refills: 3 | Status: SHIPPED | OUTPATIENT
Start: 2023-05-18 | End: 2023-11-24

## 2023-05-18 NOTE — TELEPHONE ENCOUNTER
ipratropium (ATROVENT HFA) 17 MCG/ACT inhaler      Last Written Prescription Date:  4/27/22  Last Fill Quantity: 25.8g,   # refills: 11  Last Office Visit : 5/16/23  Future Office visit:  NONE    Routing refill request to provider for review/approval because:  Overdue for 6mo office visit - n/a, saw IM on 5/16/23.    Overdue for ACT - last on record in 2019

## 2023-05-20 DIAGNOSIS — J44.9 CHRONIC OBSTRUCTIVE PULMONARY DISEASE, UNSPECIFIED COPD TYPE (H): ICD-10-CM

## 2023-05-22 RX ORDER — MOMETASONE FUROATE AND FORMOTEROL FUMARATE DIHYDRATE 200; 5 UG/1; UG/1
2 AEROSOL RESPIRATORY (INHALATION) 2 TIMES DAILY
Qty: 13 G | Refills: 1 | Status: SHIPPED | OUTPATIENT
Start: 2023-05-22 | End: 2023-07-26

## 2023-05-31 DIAGNOSIS — G25.81 RESTLESS LEG SYNDROME: ICD-10-CM

## 2023-06-01 RX ORDER — ROPINIROLE 1 MG/1
1 TABLET, FILM COATED ORAL 2 TIMES DAILY
Qty: 60 TABLET | Refills: 11 | Status: SHIPPED | OUTPATIENT
Start: 2023-06-01 | End: 2024-03-22

## 2023-06-01 NOTE — TELEPHONE ENCOUNTER
rOPINIRole (REQUIP) 1 MG tablet      Last Written Prescription Date:  5/2/23  Last Fill Quantity: 60,   # refills: 0  Last Office Visit : 5/16/23  Future Office visit:  none    Routing refill request to provider for review/approval because:  Overdue for 6mo office visit - n/a, saw IM on 5/16/23      Associated Diagnoses    Restless leg syndrome [G25.81]

## 2023-06-02 ENCOUNTER — HEALTH MAINTENANCE LETTER (OUTPATIENT)
Age: 53
End: 2023-06-02

## 2023-06-23 DIAGNOSIS — J44.9 CHRONIC OBSTRUCTIVE PULMONARY DISEASE, UNSPECIFIED COPD TYPE (H): ICD-10-CM

## 2023-06-26 RX ORDER — IPRATROPIUM BROMIDE AND ALBUTEROL SULFATE 2.5; .5 MG/3ML; MG/3ML
1 SOLUTION RESPIRATORY (INHALATION) EVERY 4 HOURS PRN
Qty: 90 ML | Refills: 0 | Status: SHIPPED | OUTPATIENT
Start: 2023-06-26 | End: 2023-07-06

## 2023-06-26 NOTE — TELEPHONE ENCOUNTER
ipratropium - albuterol 0.5 mg/2.5 mg/3 mL (DUONEB) 0.5-2.5 (3) MG/3ML neb solution      Last Written Prescription Date:  5/16/23  Last Fill Quantity: 90,   # refills: 1  Last Office Visit : 4/27/22  Future Office visit:  none    Routing refill request to provider for review/approval because:  Overdue for office visit  Overdue for ACT  Per last order notes: Needs appt before next refill    Routing to clinic for review/refill

## 2023-07-06 DIAGNOSIS — J44.9 CHRONIC OBSTRUCTIVE PULMONARY DISEASE, UNSPECIFIED COPD TYPE (H): ICD-10-CM

## 2023-07-06 RX ORDER — IPRATROPIUM BROMIDE AND ALBUTEROL SULFATE 2.5; .5 MG/3ML; MG/3ML
1 SOLUTION RESPIRATORY (INHALATION) EVERY 4 HOURS PRN
Qty: 90 ML | Refills: 3 | Status: SHIPPED | OUTPATIENT
Start: 2023-07-06 | End: 2023-07-07

## 2023-07-06 NOTE — PROGRESS NOTES
I'll resend the duoneb and removed the messaging that says needs a visit before next refill for patient.       Mat Ayala MD  Internal Medicine  [facility], [place], [place]

## 2023-07-07 ENCOUNTER — TELEPHONE (OUTPATIENT)
Dept: FAMILY MEDICINE | Facility: CLINIC | Age: 53
End: 2023-07-07
Payer: COMMERCIAL

## 2023-07-07 DIAGNOSIS — J44.9 CHRONIC OBSTRUCTIVE PULMONARY DISEASE, UNSPECIFIED COPD TYPE (H): ICD-10-CM

## 2023-07-07 RX ORDER — IPRATROPIUM BROMIDE AND ALBUTEROL SULFATE 2.5; .5 MG/3ML; MG/3ML
1 SOLUTION RESPIRATORY (INHALATION) EVERY 4 HOURS PRN
Qty: 90 ML | Refills: 3 | Status: SHIPPED | OUTPATIENT
Start: 2023-07-07 | End: 2023-09-26

## 2023-07-07 RX ORDER — IPRATROPIUM BROMIDE AND ALBUTEROL SULFATE 2.5; .5 MG/3ML; MG/3ML
1 SOLUTION RESPIRATORY (INHALATION) EVERY 4 HOURS PRN
Qty: 90 ML | Refills: 3 | Status: CANCELLED | OUTPATIENT
Start: 2023-07-07

## 2023-07-07 NOTE — TELEPHONE ENCOUNTER
The patient would like a call from the care team to discuss changing the solution to something stronger, please review and follow up thank you.

## 2023-07-07 NOTE — TELEPHONE ENCOUNTER
Spoke with pt. States that she is on Dulera, atrovent inhaler, duo neb and taking sigulair, but her breathing is not better, requesting another neb solution.

## 2023-07-07 NOTE — TELEPHONE ENCOUNTER
ipratropium - albuterol 0.5 mg/2.5 mg/3 mL (DUONEB) 0.5-2.5 (3) MG/3ML neb solution    ipratropium - albuterol 0.5 mg/2.5 mg/3 mL (DUONEB) 0.5-2.5 (3) MG/3ML neb solution 90 mL 3 7/6/2023  No   Sig - Route: Take 1 vial (3 mLs) by nebulization every 4 hours as needed for shortness of breath, wheezing or cough - Nebulization   Sent to pharmacy as: Ipratropium-Albuterol 0.5-2.5 (3) MG/3ML Inhalation Solution (DUONEB)   Class: E-Prescribe   Order: [id number]   E-Prescribing Status: Receipt confirmed by pharmacy (7/6/2023  3:28 PM CDT)     Printout Tracking    External Result Report     Pharmacy    [facility] #[id number] - [place], [place] - [address] AT [facility] [address]         Simona Quintanilla RN  Central Triage Red Flags/Med Refills     18

## 2023-07-07 NOTE — TELEPHONE ENCOUNTER
[facility] be [facility] in [facility]. Will send duoneb refill for now. I wonder if other factors in involved such as reflux.       Mat Ayala MD  Internal Medicine  Primary [facility] Clinic, [place], [place]

## 2023-07-17 DIAGNOSIS — M54.50 LOW BACK PAIN: Primary | ICD-10-CM

## 2023-07-20 ENCOUNTER — VIRTUAL VISIT (OUTPATIENT)
Dept: INTERNAL MEDICINE | Facility: CLINIC | Age: 53
End: 2023-07-20
Payer: COMMERCIAL

## 2023-07-20 VITALS — WEIGHT: 91 LBS | BODY MASS INDEX: 17.77 KG/M2

## 2023-07-20 DIAGNOSIS — R05.2 SUBACUTE COUGH: ICD-10-CM

## 2023-07-20 DIAGNOSIS — J44.9 CHRONIC OBSTRUCTIVE PULMONARY DISEASE, UNSPECIFIED COPD TYPE (H): ICD-10-CM

## 2023-07-20 DIAGNOSIS — J44.1 COPD EXACERBATION (H): Primary | ICD-10-CM

## 2023-07-20 PROCEDURE — 99213 OFFICE O/P EST LOW 20 MIN: CPT | Mod: VID | Performed by: HOSPITALIST

## 2023-07-20 RX ORDER — AZITHROMYCIN 250 MG/1
TABLET, FILM COATED ORAL
Qty: 6 TABLET | Refills: 0 | Status: SHIPPED | OUTPATIENT
Start: 2023-07-20 | End: 2023-07-25

## 2023-07-20 RX ORDER — PREDNISONE 20 MG/1
40 TABLET ORAL DAILY
Qty: 14 TABLET | Refills: 0 | Status: SHIPPED | OUTPATIENT
Start: 2023-07-20

## 2023-07-20 RX ORDER — BENZONATATE 100 MG/1
100 CAPSULE ORAL 3 TIMES DAILY PRN
Qty: 60 CAPSULE | Refills: 0 | Status: SHIPPED | OUTPATIENT
Start: 2023-07-20 | End: 2024-04-11

## 2023-07-20 RX ORDER — BUDESONIDE 0.5 MG/2ML
0.5 INHALANT ORAL DAILY PRN
Qty: 60 ML | Refills: 3 | Status: SHIPPED | OUTPATIENT
Start: 2023-07-20

## 2023-07-20 ASSESSMENT — ENCOUNTER SYMPTOMS
FEVER: 0
ABDOMINAL PAIN: 0
FATIGUE: 1
COUGH: 1
SORE THROAT: 1
SHORTNESS OF BREATH: 1
CHILLS: 0

## 2023-07-20 ASSESSMENT — PAIN SCALES - GENERAL: PAINLEVEL: NO PAIN (0)

## 2023-07-20 NOTE — PROGRESS NOTES
Virtual Visit Details    Type of service:  Video Visit   Video Start Time: 2:33pm  Video End Time:2:53pm    Originating Location (pt. Location): Home  Distant Location (provider location):  Off-site in [place], [place]  Platform used for Video Visit: VouchAR     Assessment/Plan  Problem List Items Addressed This Visit        Respiratory    COPD exacerbation (H) - Primary     - Continue current inhalers.   - May use duoneb as needed.   - Continue zyrtec daily use.   - May continue tessalon as needed for coughs.   - Will send pulmicort nebulizer daily as needed with worsening breathing as needed.   - Start on Azithromycin 2 tablet the first day, then daily for 4 days.   - Start on Prednisone 40mg daily for 7 days. Message Dr. Ayala if still coughing after 1 week, will plan to have you take a taper of Prednisone over several weeks.   - Message Dr. Ayala if you have thrush developing. Will need nystatin solution for 5 day.   - May stop flonase regularly.          Relevant Medications    budesonide (PULMICORT) 0.5 MG/2ML neb solution    benzonatate (TESSALON) 100 MG capsule    predniSONE (DELTASONE) 20 MG tablet    azithromycin (ZITHROMAX) 250 MG tablet    Subacute cough    Relevant Medications    budesonide (PULMICORT) 0.5 MG/2ML neb solution    benzonatate (TESSALON) 100 MG capsule       No results found for any visits on 07/20/23.    Health Maintenance Due   Topic Date Due     URINE DRUG SCREEN  Never done     ADVANCE CARE PLANNING  Never done     COPD ACTION PLAN  Never done     COVID-19 Vaccine (1) Never done     COLORECTAL CANCER SCREENING  Never done     HEPATITIS A IMMUNIZATION (1 of 2 - Risk 2-dose series) Never done     LUNG CANCER SCREENING  05/10/2022     DTAP/TDAP/TD IMMUNIZATION (3 - Td or Tdap) 01/31/2023     YEARLY PREVENTIVE VISIT  03/16/2023     HPV TEST  03/16/2023     PAP  03/16/2023     MAMMO SCREENING  03/29/2023         Subjective  Patient mentions continued coughing and last 4 days has  worsened voice changes with raspy voice. Has soreness of her throat. Mentions having some sputum in the morning with coughs. Zyrtec and flonase have not improved her coughs. Benzanatate has helped for a few hours with coughs but she is out of the medication. Tries use her duoneb nebulizer during the day. Mentions that I did not send in a stronger nebulizer to her pharmacy last time. No fevers or chills. Continues with shortness of breath but has not progressed.      Review of Systems   Constitutional: Positive for fatigue. Negative for chills and fever.   HENT: Positive for sore throat.    Respiratory: Positive for cough and shortness of breath.    Cardiovascular: Positive for chest pain.   Gastrointestinal: Negative for abdominal pain.   Psychiatric/Behavioral: Positive for mood changes.       History  Past Medical History:   Diagnosis Date     Abnormal Pap smear 2/21/2017     said pap was abnormal     Arthritis      Chronic back pain     hx spondylolisthesis     Chronic pain      COPD (chronic obstructive pulmonary disease) (H)     PFT's 2013 FEV1/FVC = 2.68/3.20 = 84%     H/O gastric bypass 2007     Hepatitis C     treated ribaviron & interferon in 2008 for serotype 2 with failed 6 month treatment     Hyperlipidemia     on simvatatin     Insomnia      Other chronic pain      Positive TB test     has taken INH     RLS (restless legs syndrome)      Seasonal allergies      TB lung, latent 1994    treated     Uncomplicated asthma      Wounds and injuries 2003       Past Surgical History:   Procedure Laterality Date     CHOLECYSTECTOMY       D & C  1987     ENDOSCOPIC ULTRASOUND UPPER GASTROINTESTINAL TRACT (GI) N/A 10/21/2019    Procedure: ENDOSCOPIC ULTRASOUND, ESOPHAGOSCOPY / UPPER GASTROINTESTINAL TRACT (GI) with jejunalgastrostomy with stent placement x 2, dilation tract;  Surgeon: Ramesh Self MD;  [facility]     ENDOSCOPIC ULTRASOUND UPPER GASTROINTESTINAL TRACT (GI) N/A 1/8/2020    Procedure:  ENDOSCOPIC ULTRASOUND, ESOPHAGOSCOPY / UPPER GASTROINTESTINAL TRACT (GI)  with stent exchange;  Surgeon: Ramesh Self MD;  [facility]     ENDOSCOPIC ULTRASOUND UPPER GASTROINTESTINAL TRACT (GI) N/A 6/2/2020    Procedure: ENDOSCOPIC ULTRASOUND, ESOPHAGOSCOPY / UPPER GASTROINTESTINAL TRACT (GI)  **Latex Allergy** stent exchange;  Surgeon: Ramesh Self MD;  [facility]     ESOPHAGOSCOPY, GASTROSCOPY, DUODENOSCOPY (EGD), COMBINED  7/30/2014    Procedure: COMBINED ESOPHAGOSCOPY, GASTROSCOPY, DUODENOSCOPY (EGD);  Surgeon: Ramesh Self MD;  [facility] GI     ESOPHAGOSCOPY, GASTROSCOPY, DUODENOSCOPY (EGD), COMBINED N/A 8/14/2019    Procedure: ESOPHAGOGASTRODUODENOSCOPY, WITH BIOPSY;  Surgeon: Johann Hazel MD;  [facility]     ESOPHAGOSCOPY, GASTROSCOPY, DUODENOSCOPY (EGD), COMBINED N/A 5/10/2022    Procedure: ESOPHAGOGASTRODUODENOSCOPY (EGD)  **Latex Allergy** with axios stent exchanged;  Surgeon: Ramesh Self MD;  [facility]     ESOPHAGOSCOPY, GASTROSCOPY, DUODENOSCOPY (EGD), COMBINED N/A 5/17/2023    Procedure: ESOPHAGOGASTRODUODENOSCOPY with jejunogastrostomy axios stent replacement;  Surgeon: Ramesh Self MD;  [facility]     FRACTURE TX, ANKLE RT/LT  1991    ORIF rt ankle     FUSION SPINE POSTERIOR ONE LEVEL N/A 11/21/2014    Procedure: FUSION SPINE POSTERIOR ONE LEVEL;  Surgeon: Amilcar Baldwin MD;  [facility]     FUSION SPINE POSTERIOR ONE LEVEL Right 7/30/2015    Procedure: FUSION SPINE POSTERIOR ONE LEVEL;  Surgeon: Amilcar Baldwin MD;  [facility] OR     GASTRIC BYPASS  2007    lost 150 lbs     GRAFT BONE FROM ILIAC CREST Left 11/21/2014    Procedure: GRAFT BONE FROM ILIAC CREST;  Surgeon: Amilcar Baldwin MD;  [facility] OR     hardware removal       INJECT BLOCK MEDIAL BRANCH CERVICAL/THORACIC/LUMBAR Bilateral 7/10/2020    Procedure: Bilateral lumbar 3, lumbar 4, and lumbar 5 medial  branch nerve diagnostic blocks #2;  Surgeon: Pravin Bagley MD;  [facility]     INJECT PARAVERTEBRAL FACET JOINT LUMBAR / SACRAL FIRST Bilateral 2019    Procedure: Lumbar Medial Branch Nerve Block Injection Bilateral;  Surgeon: Hira Rodríguez MD;  [facility]     OPTICAL TRACKING SYSTEM FUSION POSTERIOR SPINE LUMBAR  2013    Procedure: OPTICAL TRACKING SYSTEM FUSION SPINE POSTERIOR LUMBAR ONE LEVEL;  Lumbar 4-5 Transforaminal Interbody Fusion;  Surgeon: Amilcar Baldwin MD;  [facility]     ORTHOPEDIC SURGERY      shoulder surgery, removed bone spur     PHACOEMULSIFICATION CLEAR CORNEA WITH STANDARD INTRAOCULAR LENS IMPLANT Right 2020    Procedure: RIGHT PHACOEMULSIFICATION, CATARACT, WITH INTRAOCULAR LENS IMPLANT;  Surgeon: Lyla Mahoney MD;  [facility]     PHACOEMULSIFICATION CLEAR CORNEA WITH STANDARD INTRAOCULAR LENS IMPLANT Left 2020    Procedure: LEFT EYE CATARACT REMOVAL WITH INTRAOCULAR LENS IMPLANT;  Surgeon: Lyla Mahoney MD;  [facility]     TUBAL LIGATION         Family History   Problem Relation Age of Onset     Hypertension Father      Respiratory Father         COPD, smoked     Glaucoma Father      Sleep Apnea Father      C.A.D. Mother         CHF,  age 59 of [place], smoked     Crohn's Disease Brother      Diabetes Maternal Grandmother         insulin dependent     Diabetes Paternal Grandmother      Glaucoma Paternal Grandmother      Alzheimer Disease Paternal Grandmother      Melanoma No family hx of      Skin Cancer No family hx of      Macular Degeneration No family hx of      Anesthesia Reaction No family hx of      Deep Vein Thrombosis (DVT) No family hx of        Social History     Tobacco Use     Smoking status: Former     Packs/day: 1.00     Years: 30.00     Pack years: 30.00     Types: Cigarettes     Quit date: 2014     Years since quittin.0     Smokeless tobacco: Never     Tobacco comments:     pack a day    Substance Use Topics     Alcohol use: Not Currently     Alcohol/week: 0.0 standard drinks of alcohol     Comment: x3 drinks per year        Objective  Wt 41.3 kg (91 lb)   LMP 06/11/2012 (LMP Unknown)   BMI 17.77 kg/m    Vitals taken by Mat Ayala MD    Physical Exam  Constitutional:       General: She is not in acute distress.     Appearance: She is ill-appearing. She is not toxic-appearing.   Pulmonary:      Comments: Occasional dry cough, raspy voice. Able to talk in full sentences.   Neurological:      Mental Status: She is alert.   Psychiatric:         Mood and Affect: Mood normal.         20 minutes spent on the date of the encounter doing chart review, history and exam, documentation and further activities per the note.      Return if symptoms worsen or fail to improve.        Mat Ayala MD  [facility] INTERNAL MEDICINE [place]

## 2023-07-20 NOTE — PATIENT INSTRUCTIONS
- Continue current inhalers.   - May use duoneb as needed.   - Continue zyrtec daily use.   - Continue tessalon as needed for coughs.  - Will send pulmicort nebulizer daily as needed with worsening breathing as needed.   - Start on Azithromycin 2 tablet the first day, then daily for 4 days.   - Start on Prednisone 40mg daily for 7 days. Message Dr. Ayala if still coughing after 1 week, will plan to have you take a taper of Prednisone over several weeks.   - Message Dr. Ayala if you have thrush developing. Will need nystatin solution for 5 day.   - May stop flonase regularly.     Follow up as needed.

## 2023-07-20 NOTE — NURSING NOTE
Is the patient currently in the state of [place]? YES    Visit mode:VIDEO    If the visit is dropped, the patient can be reconnected by: VIDEO VISIT: Text to cell phone: [phone]    Will anyone else be joining the visit? NO      How would you like to obtain your AVS? MyChart    Are changes needed to the allergy or medication list? NO    Reason for visit: RECHECK    PT states not in pain but has been sick for 4 days.  Having difficulty talking much due to cough.    Dash Nolen    
229.9

## 2023-07-20 NOTE — ASSESSMENT & PLAN NOTE
- Continue current inhalers.   - May use duoneb as needed.   - Continue zyrtec daily use.   - May continue tessalon as needed for coughs.   - Will send pulmicort nebulizer daily as needed with worsening breathing as needed.   - Start on Azithromycin 2 tablet the first day, then daily for 4 days.   - Start on Prednisone 40mg daily for 7 days. Message Dr. Ayala if still coughing after 1 week, will plan to have you take a taper of Prednisone over several weeks.   - Message Dr. Ayala if you have thrush developing. Will need nystatin solution for 5 day.   - May stop flonase regularly.

## 2023-07-25 DIAGNOSIS — J44.9 CHRONIC OBSTRUCTIVE PULMONARY DISEASE, UNSPECIFIED COPD TYPE (H): ICD-10-CM

## 2023-07-26 RX ORDER — MOMETASONE FUROATE AND FORMOTEROL FUMARATE DIHYDRATE 200; 5 UG/1; UG/1
2 AEROSOL RESPIRATORY (INHALATION) 2 TIMES DAILY
Qty: 13 G | Refills: 1 | Status: SHIPPED | OUTPATIENT
Start: 2023-07-26 | End: 2023-11-22

## 2023-07-26 NOTE — TELEPHONE ENCOUNTER
mometasone-formoterol (DULERA) 200-5 MCG/ACT inhaler  Last Written Prescription Date:  5/22/2023  Last Fill Quantity: 13,   # refills: 1  Last Office Visit :  7/20/2023  Future Office visit:  None  Routing refill request to provider for review/approval because:  Second Request  Pt needing updated ACT score for this med  Please review       Simona Quintanilla RN  Central Triage Red Flags/Med Refills   Inhaled Steroids Protocol Failed 07/25/2023 07:41 AM   Protocol Details  Asthma control assessment score within normal limits in last 6 months    Recent (6 mo) or future (30 days) visit within the authorizing provider's specialty    Patient is age 12 or older    Medication is active on med list   Long-Acting Beta Agonist Inhalers Protocol  Failed   Protocol Details  Asthma control assessment score within normal limits in last 6 months    Recent (6 mo) or future (30 days) visit within the authorizing provider's specialty

## 2023-07-26 NOTE — TELEPHONE ENCOUNTER
Pt was last seen in clinic on 7/20/23. Pt last had mometasone-formoterol (DULERA) 200-5 MCG/ACT inhaler  refilled on 6/20/23 for a 30 day supply by PCP. Due for refill 7/20/23. Medication refill sent to pharmacy.     CLEMENT GOSS RN on 7/26/2023 at 8:01 AM

## 2023-07-28 ENCOUNTER — E-VISIT (OUTPATIENT)
Dept: INTERNAL MEDICINE | Facility: CLINIC | Age: 53
End: 2023-07-28
Payer: COMMERCIAL

## 2023-07-28 DIAGNOSIS — B37.0 THRUSH: Primary | ICD-10-CM

## 2023-07-28 PROCEDURE — 99207 PR NO BILLABLE SERVICE THIS VISIT: CPT | Performed by: HOSPITALIST

## 2023-07-28 RX ORDER — NYSTATIN 100000/ML
500000 SUSPENSION, ORAL (FINAL DOSE FORM) ORAL 4 TIMES DAILY
Qty: 100 ML | Refills: 0 | Status: SHIPPED | OUTPATIENT
Start: 2023-07-28

## 2023-07-28 NOTE — TELEPHONE ENCOUNTER
Provider E-Visit time total (minutes): 2 minutes    Patient suspect thrush. Will send nystatin solution for 5 day course for now.     Mat Ayala MD  Internal Medicine  Primary Care Clinic, [place], [place]

## 2023-07-28 NOTE — PATIENT INSTRUCTIONS
Thank you for choosing us for your care. I have placed an order for a prescription so that you can start treatment. View your full visit summary for details by clicking on the link below. Your pharmacist will able to address any questions you may have about the medication.     If you're not feeling better within 5-7 days, please schedule an appointment.  You can schedule an appointment right here in [facility], or call [phone]  If the visit is for the same symptoms as your eVisit, we'll refund the cost of your eVisit if seen within seven days.

## 2023-08-02 DIAGNOSIS — J30.1 CHRONIC SEASONAL ALLERGIC RHINITIS DUE TO POLLEN: ICD-10-CM

## 2023-08-02 DIAGNOSIS — J45.30 MILD PERSISTENT ASTHMA, UNCOMPLICATED: ICD-10-CM

## 2023-08-04 RX ORDER — CETIRIZINE HYDROCHLORIDE 10 MG/1
10 TABLET ORAL DAILY
Qty: 90 TABLET | Refills: 0 | Status: SHIPPED | OUTPATIENT
Start: 2023-08-04 | End: 2023-11-14

## 2023-08-04 RX ORDER — MONTELUKAST SODIUM 10 MG/1
10 TABLET ORAL AT BEDTIME
Qty: 90 TABLET | Refills: 0 | Status: SHIPPED | OUTPATIENT
Start: 2023-08-04 | End: 2023-11-13

## 2023-08-04 NOTE — TELEPHONE ENCOUNTER
Montelukast 10 mg & cetirizine 10 mg     Last Written Prescription Date:  07/28/22 & 4/20/23  Last Fill Quantity: 90  & 90,   # refills: 3 & 0  Last Office Visit : 04/27/22  Future Office visit:  None    Routing refill request to provider for review/approval because:   Asthma control assessment score within normal limits in last 6 months    Recent (6 mo) or future (30 days) visit within the authorizing provider's specialty

## 2023-09-13 ENCOUNTER — DOCUMENTATION ONLY (OUTPATIENT)
Dept: FAMILY MEDICINE | Facility: CLINIC | Age: 53
End: 2023-09-13
Payer: COMMERCIAL

## 2023-09-13 NOTE — PROGRESS NOTES
Type of Form Received:     Form Received (Date) 9/13/23   Form Filled out Yes, faxed 9/19   Placed in provider folder Yes

## 2023-09-14 ENCOUNTER — OFFICE VISIT (OUTPATIENT)
Dept: OPHTHALMOLOGY | Facility: CLINIC | Age: 53
End: 2023-09-14
Attending: OPHTHALMOLOGY
Payer: COMMERCIAL

## 2023-09-14 DIAGNOSIS — H04.123 BILATERAL DRY EYES: ICD-10-CM

## 2023-09-14 DIAGNOSIS — Z96.1 PSEUDOPHAKIA, BOTH EYES: ICD-10-CM

## 2023-09-14 DIAGNOSIS — B18.2 CHRONIC HEPATITIS C WITHOUT HEPATIC COMA (H): ICD-10-CM

## 2023-09-14 DIAGNOSIS — H26.493 BILATERAL POSTERIOR CAPSULAR OPACIFICATION: Primary | ICD-10-CM

## 2023-09-14 PROCEDURE — 99207 YAG CAPSULOTOMY OD (RIGHT EYE): CPT | Mod: RT | Performed by: OPHTHALMOLOGY

## 2023-09-14 PROCEDURE — 92015 DETERMINE REFRACTIVE STATE: CPT

## 2023-09-14 PROCEDURE — 66821 AFTER CATARACT LASER SURGERY: CPT | Mod: RT | Performed by: OPHTHALMOLOGY

## 2023-09-14 PROCEDURE — G0463 HOSPITAL OUTPT CLINIC VISIT: HCPCS | Mod: 25 | Performed by: OPHTHALMOLOGY

## 2023-09-14 PROCEDURE — 99214 OFFICE O/P EST MOD 30 MIN: CPT | Mod: 57 | Performed by: OPHTHALMOLOGY

## 2023-09-14 ASSESSMENT — VISUAL ACUITY
METHOD: SNELLEN - LINEAR
OS_CC: 20/20
CORRECTION_TYPE: GLASSES
OS_CC+: -2
OD_CC: 20/25
OD_CC+: -2

## 2023-09-14 ASSESSMENT — CONF VISUAL FIELD
OS_SUPERIOR_NASAL_RESTRICTION: 0
OD_SUPERIOR_TEMPORAL_RESTRICTION: 0
OS_INFERIOR_TEMPORAL_RESTRICTION: 0
METHOD: COUNTING FINGERS
OS_INFERIOR_NASAL_RESTRICTION: 0
OS_NORMAL: 1
OD_NORMAL: 1
OS_SUPERIOR_TEMPORAL_RESTRICTION: 0
OD_INFERIOR_TEMPORAL_RESTRICTION: 0
OD_SUPERIOR_NASAL_RESTRICTION: 0
OD_INFERIOR_NASAL_RESTRICTION: 0

## 2023-09-14 ASSESSMENT — REFRACTION_WEARINGRX
OD_ADD: +3.00
OS_SPHERE: PLANO
OS_AXIS: 158
OS_ADD: +3.00
OS_CYLINDER: +0.75
OD_SPHERE: -0.25
OD_AXIS: 015
OD_CYLINDER: +1.00

## 2023-09-14 ASSESSMENT — TONOMETRY
IOP_METHOD: ICARE
OS_IOP_MMHG: 14
OD_IOP_MMHG: 12

## 2023-09-14 ASSESSMENT — SLIT LAMP EXAM - LIDS
COMMENTS: NORMAL
COMMENTS: NORMAL

## 2023-09-14 ASSESSMENT — REFRACTION_MANIFEST
OD_CYLINDER: +1.00
OS_AXIS: 160
OS_SPHERE: PLANO
OD_ADD: +3.00
OD_AXIS: 015
OS_CYLINDER: +0.75
OD_SPHERE: +0.25
OS_ADD: +3.00

## 2023-09-14 ASSESSMENT — CUP TO DISC RATIO
OD_RATIO: 0.2
OS_RATIO: 0.2

## 2023-09-14 NOTE — PROGRESS NOTES
Chief Complaint   Patient presents with    COMPREHENSIVE EYE EXAM       HPI       COMPREHENSIVE EYE EXAM    In both eyes.  Since onset it is gradually worsening.  Associated symptoms include dryness and floaters.  Negative for eye pain and flashes.  Treatments tried include artificial tears.             Comments    Kristina Eldridge is a(n) 53 year old female who presents for a comprehensive exam. Last eye exam was 3 year(s) ago. Since exam, vision has worsened. Uses lubricating drops for dryness. Occasional flashes and floaters. No eye pain.     Lab Results       Component                Value               Date                       A1C                      5.5                 04/09/2021                 A1C                      5.3                 02/20/2020                 A1C                      5.5                 02/19/2019              Sriram Guadalupe COT 8:31 AM September 14, 2023               Last edited by Sriram Guadalupe on 9/14/2023  8:31 AM.          Assessment & Plan   Kristina Eldridge is a 53 year old female with the following diagnoses:   Encounter Diagnoses   Name Primary?    Bilateral posterior capsular opacification Yes    Pseudophakia, both eyes     Bilateral dry eyes     Chronic hepatitis C without hepatic coma (H)      S/p CE/IOL both eyes by Dr Mahoney    Right eye 05/29/2020   Left eye 07/17/2020  DCVA today 20/25+1 and 20/20; NCVA J1+ each eye   PCO right about as dense as left; could be reason why she is not 20/20  Mild dry eye syndrome     Plan  - R/B/A/I Nd:YAG capsulotomy right eye  - Dispense manifest refraction, would benefit from higher bifocal segment  - Annual exams    Patient disposition: Return in about 1 year (around 9/14/2024) for Annual Visit MRx and DFE.          My privilege to be part of your care,  Sivakumar Salazar MD, MSc  Ophthalmology PGY-4 resident physician    Attending Physician Attestation:  Complete documentation of historical and exam elements from today's encounter can be found in  the full encounter summary report (not reduplicated in this progress note).  I personally obtained the chief complaint(s) and history of present illness.  I confirmed and edited as necessary the review of systems, past medical/surgical history, family history, social history, and examination findings as documented by others; and I examined the patient myself.  I personally reviewed the relevant tests, images, and reports as documented above.  I formulated and edited as necessary the assessment and plan and discussed the findings and management plan with the patient and family. Attending Physician Procedure Attestation: I was present for the entire procedure    . - Scar Strickland MD

## 2023-09-14 NOTE — NURSING NOTE
Chief Complaints and History of Present Illnesses   Patient presents with    COMPREHENSIVE EYE EXAM     Chief Complaint(s) and History of Present Illness(es)       COMPREHENSIVE EYE EXAM              Laterality: both eyes    Course: gradually worsening    Associated symptoms: dryness and floaters.  Negative for eye pain and flashes    Treatments tried: artificial tears              Comments    Kristina Eldridge is a(n) 53 year old female who presents for a comprehensive exam. Last eye exam was 3 year(s) ago. Since exam, vision has worsened. Uses lubricating drops for dryness. Occasional flashes and floaters. No eye pain.     Lab Results       Component                Value               Date                       A1C                      5.5                 04/09/2021                 A1C                      5.3                 02/20/2020                 A1C                      5.5                 02/19/2019              Sriram Guadalupe COT 8:31 AM September 14, 2023

## 2023-09-15 ENCOUNTER — MEDICAL CORRESPONDENCE (OUTPATIENT)
Dept: HEALTH INFORMATION MANAGEMENT | Facility: CLINIC | Age: 53
End: 2023-09-15
Payer: COMMERCIAL

## 2023-09-26 DIAGNOSIS — J44.9 CHRONIC OBSTRUCTIVE PULMONARY DISEASE, UNSPECIFIED COPD TYPE (H): ICD-10-CM

## 2023-09-26 RX ORDER — IPRATROPIUM BROMIDE AND ALBUTEROL SULFATE 2.5; .5 MG/3ML; MG/3ML
1 SOLUTION RESPIRATORY (INHALATION) EVERY 4 HOURS PRN
Qty: 90 ML | Refills: 3 | Status: SHIPPED | OUTPATIENT
Start: 2023-09-26 | End: 2024-02-08

## 2023-09-26 NOTE — TELEPHONE ENCOUNTER
ipratropium - albuterol 0.5 mg/2.5 mg/3 mL (DUONEB) 0.5-2.5 (3) MG/3ML neb solution   Last Written Prescription Date:   7/7/2023  Last Fill Quantity: 90,   # refills: 3  Last Office Visit :  7/28/2023  Future Office visit:  None    Routing refill request to provider for review/approval because:  Per Protocol, Needing updated ACT score for this med  Refer to clinic for review and updating.     Simona Quintanilla RN  Central Triage Red Flags/Med Refills  Asthma Nebs Protocol Failed   Protocol Details Asthma control assessment score within normal limits in last 6 months    Recent (6 mo) or future (30 days) visit within the authorizing provider's specialty

## 2023-11-02 ENCOUNTER — TELEPHONE (OUTPATIENT)
Dept: FAMILY MEDICINE | Facility: CLINIC | Age: 53
End: 2023-11-02
Payer: COMMERCIAL

## 2023-11-02 DIAGNOSIS — J45.30 MILD PERSISTENT ASTHMA, UNCOMPLICATED: ICD-10-CM

## 2023-11-02 RX ORDER — MONTELUKAST SODIUM 10 MG/1
10 TABLET ORAL AT BEDTIME
Qty: 90 TABLET | OUTPATIENT
Start: 2023-11-02

## 2023-11-02 NOTE — TELEPHONE ENCOUNTER
montelukast (SINGULAIR) 10 MG tablet   Last Written Prescription Date:  8/4/2023  Last Fill Quantity: 90,   # refills: 0  Last Office Visit : 7/28/2023  Future Office visit:  None    Routing refill request to provider for review/approval because:  Per Protocol, Needing updated ACT score for this med.  Please call Pt to review or schedule.   Thank you    Simona Quintanilla RN  Central Triage Red Flags/Med Refills  Leukotriene Inhibitors Protocol Shnwor5711/02/2023 06:18 AM   Protocol Details Asthma control assessment score within normal limits in last 6 months    Recent (6 mo) or future (30 days) visit within the authorizing provider's specialty

## 2023-11-13 DIAGNOSIS — J45.30 MILD PERSISTENT ASTHMA, UNCOMPLICATED: ICD-10-CM

## 2023-11-13 RX ORDER — MONTELUKAST SODIUM 10 MG/1
10 TABLET ORAL AT BEDTIME
Qty: 90 TABLET | Refills: 0 | Status: SHIPPED | OUTPATIENT
Start: 2023-11-13 | End: 2024-02-14

## 2023-11-13 NOTE — TELEPHONE ENCOUNTER
[facility] Call Center    Phone Message    May a detailed message be left on voicemail: yes     Reason for Call: Medication Question or concern regarding medication   Prescription Clarification  Name of Medication:   montelukast (SINGULAIR) 10 MG tablet       Prescribing Provider: Varinder Crespo MD       Pharmacy:   [facility] #[id number] - [place], [place] - [address] AT [facility] [address]      What on the order needs clarification?   The patient said she don't feel like she has to schedule any kind of follow up for a ACT because she was just in the clinic a few months back and seen dr. Ayala =, please review and follow up with patient because she out of her medications thank you.      Action Taken: Message routed to:  [facility]    Travel Screening: Not Applicable

## 2023-11-14 DIAGNOSIS — J30.1 CHRONIC SEASONAL ALLERGIC RHINITIS DUE TO POLLEN: ICD-10-CM

## 2023-11-16 RX ORDER — CETIRIZINE HYDROCHLORIDE 10 MG/1
10 TABLET ORAL DAILY
Qty: 90 TABLET | Refills: 1 | Status: SHIPPED | OUTPATIENT
Start: 2023-11-16 | End: 2024-04-29

## 2023-11-16 NOTE — TELEPHONE ENCOUNTER
cetirizine (ZYRTEC) 10 MG tablet 90 tablet 0 8/4/2023  Last Office Visit : 7/20/2023   Future Office visit:  0

## 2023-11-22 DIAGNOSIS — J45.30 MILD PERSISTENT ASTHMA, UNCOMPLICATED: ICD-10-CM

## 2023-11-22 DIAGNOSIS — J44.9 CHRONIC OBSTRUCTIVE PULMONARY DISEASE, UNSPECIFIED COPD TYPE (H): ICD-10-CM

## 2023-11-27 RX ORDER — IPRATROPIUM BROMIDE 17 UG/1
AEROSOL, METERED RESPIRATORY (INHALATION)
Qty: 25.8 G | Refills: 0 | Status: SHIPPED | OUTPATIENT
Start: 2023-11-27 | End: 2023-12-20

## 2023-11-27 RX ORDER — MOMETASONE FUROATE AND FORMOTEROL FUMARATE DIHYDRATE 200; 5 UG/1; UG/1
2 AEROSOL RESPIRATORY (INHALATION) 2 TIMES DAILY
Qty: 13 G | Refills: 0 | Status: SHIPPED | OUTPATIENT
Start: 2023-11-27 | End: 2023-12-26

## 2023-11-27 NOTE — TELEPHONE ENCOUNTER
mometasone-formoterol (DULERA) 200-5 MCG/ACT inhaler 13 g 1 7/26/2023    ipratropium (ATROVENT HFA) 17 MCG/ACT inhaler 25.8 g 3 5/18/2023  Last Office Visit : 7/20/2023  [facility] Internal Medicine [place]    Future Office visit:  0    Routing refill request to provider for review/approval because:  FYI:  Per Protocol,  Need updated ACT for:  Dulera, Atrovent  Please have pt complete next visit, thank you!

## 2023-12-18 DIAGNOSIS — J45.30 MILD PERSISTENT ASTHMA, UNCOMPLICATED: ICD-10-CM

## 2023-12-21 RX ORDER — IPRATROPIUM BROMIDE 17 UG/1
AEROSOL, METERED RESPIRATORY (INHALATION)
Qty: 25.8 G | Refills: 5 | Status: SHIPPED | OUTPATIENT
Start: 2023-12-21 | End: 2024-07-31

## 2023-12-21 NOTE — TELEPHONE ENCOUNTER
ipratropium (ATROVENT HFA) 17 MCG/ACT inhaler       Last Written Prescription Date:  11-27-23  Last Fill Quantity: 25.8g,   # refills: 0  Last Office Visit : 7-20-23  Future Office visit:  none    Routing refill request to provider for review/approval because:  Overdue ACT, previous malik RF

## 2023-12-22 DIAGNOSIS — J44.9 CHRONIC OBSTRUCTIVE PULMONARY DISEASE, UNSPECIFIED COPD TYPE (H): ICD-10-CM

## 2023-12-27 RX ORDER — MOMETASONE FUROATE AND FORMOTEROL FUMARATE DIHYDRATE 200; 5 UG/1; UG/1
2 AEROSOL RESPIRATORY (INHALATION) 2 TIMES DAILY
Qty: 13 G | Refills: 0 | Status: SHIPPED | OUTPATIENT
Start: 2023-12-27 | End: 2024-02-04

## 2023-12-27 NOTE — TELEPHONE ENCOUNTER
mometasone-formoterol (DULERA) 200-5 MCG/ACT inhaler 13 g 0 11/27/2023    Last Office Visit : 7/20/2023  [facility] Internal Medicine [place]     Mat Ayala MD     Future Office visit:  0

## 2024-01-28 DIAGNOSIS — J44.9 CHRONIC OBSTRUCTIVE PULMONARY DISEASE, UNSPECIFIED COPD TYPE (H): ICD-10-CM

## 2024-02-01 NOTE — TELEPHONE ENCOUNTER
ipratropium - albuterol 0.5 mg/2.5 mg/3 mL (DUONEB) 0.5-2.5 (3) MG/3ML neb solution 90 mL 3 9/26/2023  Last Office Visit : 7/20/2023  [facility] Internal Medicine [place]     Mat Ayala MD     Future Office visit:  0    Routing refill request to provider for review/approval because:  Overdue for 6mo follow-up appt and ACT. Short-Acting Beta Agonist Inhalers/Asthma Nebs no longer on [facility] protocol.

## 2024-02-02 NOTE — TELEPHONE ENCOUNTER
Patient calling about the medications and her  is at the pharmacy right now waiting to get the medications. She needs  rOPINIRole (REQUIP) 1 MG tablet, mometasone-formoterol (DULERA) 200-5 MCG/ACT inhaler,    ipratropium (ATROVENT HFA) 17 MCG/ACT inhaler  Patient needs them sent to      [id number] - [place], [place] - [address]    Patient will make an appt in March and she is in the middle of moving and will call to make that appointment. Please call patient to discuss further.

## 2024-02-04 DIAGNOSIS — J44.9 CHRONIC OBSTRUCTIVE PULMONARY DISEASE, UNSPECIFIED COPD TYPE (H): ICD-10-CM

## 2024-02-04 RX ORDER — MOMETASONE FUROATE AND FORMOTEROL FUMARATE DIHYDRATE 200; 5 UG/1; UG/1
2 AEROSOL RESPIRATORY (INHALATION) 2 TIMES DAILY
Qty: 13 G | Refills: 1 | Status: SHIPPED | OUTPATIENT
Start: 2024-02-04

## 2024-02-04 NOTE — TELEPHONE ENCOUNTER
mometasone-formoterol (DULERA) 200-5 MCG/ACT inhaler     13 g 0 12/27/2023 7/20/2023  [facility] Internal Medicine [place]    Mat Ayala MD  Internal Medicine    Return if symptoms worsen or fail to improve.     Routed because: act

## 2024-02-08 RX ORDER — IPRATROPIUM BROMIDE AND ALBUTEROL SULFATE 2.5; .5 MG/3ML; MG/3ML
1 SOLUTION RESPIRATORY (INHALATION) EVERY 4 HOURS PRN
Qty: 90 ML | Refills: 3 | Status: SHIPPED | OUTPATIENT
Start: 2024-02-08 | End: 2024-04-10

## 2024-02-14 DIAGNOSIS — J45.30 MILD PERSISTENT ASTHMA, UNCOMPLICATED: ICD-10-CM

## 2024-02-20 NOTE — TELEPHONE ENCOUNTER
montelukast (SINGULAIR) 10 MG tablet     Last Written Prescription Date:  11/13/23  Last Fill Quantity: 90,   # refills: 0  Last Office Visit : 7/20/23  Future Office visit:  NONE    Routing refill request to provider for review/approval because:  LAST ACT 2019

## 2024-02-21 RX ORDER — MONTELUKAST SODIUM 10 MG/1
10 TABLET ORAL AT BEDTIME
Qty: 90 TABLET | Refills: 1 | Status: SHIPPED | OUTPATIENT
Start: 2024-02-21 | End: 2024-09-30

## 2024-03-20 DIAGNOSIS — G25.81 RESTLESS LEG SYNDROME: ICD-10-CM

## 2024-03-22 RX ORDER — ROPINIROLE 1 MG/1
1 TABLET, FILM COATED ORAL 2 TIMES DAILY
Qty: 60 TABLET | Refills: 5 | Status: SHIPPED | OUTPATIENT
Start: 2024-03-22

## 2024-03-22 NOTE — TELEPHONE ENCOUNTER
rOPINIRole (REQUIP) 1 MG tablet   Last Written Prescription Date:  6/1/2023  Last Fill Quantity: 60,   # refills: 11  Last Office Visit : 7/20/2023  Future Office visit:  None  Sending new order  Last order was closed out at the pharmacy.   New order sent 60 Tabs, 5 Refills sent to pharm for Pt care.     Simona Quintanilla RN  Central Triage Red Flags/Med Refills

## 2024-04-03 ENCOUNTER — PATIENT OUTREACH (OUTPATIENT)
Dept: GASTROENTEROLOGY | Facility: CLINIC | Age: 54
End: 2024-04-03
Payer: COMMERCIAL

## 2024-04-03 NOTE — TELEPHONE ENCOUNTER
As follow up to EGD with Dr. Self in May 2023:  Return for stent removal/exchange in 12m     Called patient to discuss follow up procedure.    Please assist in scheduling:     Procedure/Imaging/Clinic: EGD  Physician: Aramis  Timing: tbd  Scope time needed:provider average  Anesthesia:General  Dx: hx of gastric bypass  Tier:Tier 3 -   Surgeries/procedures that can be delayed  between 30 to 90 days with no significant  morbidity/mortality to patient or impact on  patient/disease outcome.   Location: [facility]  Header of letter for pt communication: Upper Endoscopy     Phone will not connect despite multiple attempts, home number not in service. Orders not placed yet. Letter mailed to home address    ML

## 2024-04-03 NOTE — LETTER
"April 3, 2024    Kristina Eldridge                              [address]  [facility] [address]    Dear Kristina,    We are reaching out to organize a follow up EGD with Dr. Self in May 2024, 12 months after your last one in May 2023.     Dr. Self's note from that procedure on 5/17/2023 stated:   \" - Return for stent removal/exchange in 12 months\"    Your healthcare is important to us. We tried to call you today, but calls would not go through to your cell number on file.Please reach out so we can organize your follow up EGD.     Thank you!    Hilda Navarro, RN Care Coordinator  Dr. Pathak & Dr. Self   [facility]  [phone]    "

## 2024-04-04 ENCOUNTER — TELEPHONE (OUTPATIENT)
Dept: GASTROENTEROLOGY | Facility: CLINIC | Age: 54
End: 2024-04-04
Payer: COMMERCIAL

## 2024-04-05 DIAGNOSIS — R05.2 SUBACUTE COUGH: ICD-10-CM

## 2024-04-09 ENCOUNTER — TELEPHONE (OUTPATIENT)
Dept: FAMILY MEDICINE | Facility: CLINIC | Age: 54
End: 2024-04-09
Payer: COMMERCIAL

## 2024-04-09 DIAGNOSIS — J44.9 CHRONIC OBSTRUCTIVE PULMONARY DISEASE, UNSPECIFIED COPD TYPE (H): ICD-10-CM

## 2024-04-09 NOTE — TELEPHONE ENCOUNTER
[facility] Call Center    Phone Message    May a detailed message be left on voicemail: yes     Reason for Call: Other: Patient called in is wanting a medication for a cough she got when traveling she states due to weather change as well as she states she needs a refill on ipratropium - albuterol 0.5 mg/2.5 mg/3 mL (DUONEB) 0.5-2.5 (3) MG/3ML neb solution [66507] until she can find a doctor in [place] please advise.    Action Taken: Message routed to:  [facility]    Travel Screening: Not Applicable

## 2024-04-09 NOTE — TELEPHONE ENCOUNTER
Spoke w patient- having issues with refills through [facility]- requesting refills be sent to [facility] at [address], [place], [place]. Additionally requesting refill on Benzonatate for cough- please advise.

## 2024-04-10 RX ORDER — IPRATROPIUM BROMIDE AND ALBUTEROL SULFATE 2.5; .5 MG/3ML; MG/3ML
1 SOLUTION RESPIRATORY (INHALATION) EVERY 4 HOURS PRN
Qty: 90 ML | Refills: 3 | Status: SHIPPED | OUTPATIENT
Start: 2024-04-10

## 2024-04-11 RX ORDER — BENZONATATE 100 MG/1
CAPSULE ORAL
Qty: 60 CAPSULE | Refills: 0 | Status: SHIPPED | OUTPATIENT
Start: 2024-04-11

## 2024-04-29 DIAGNOSIS — J30.1 CHRONIC SEASONAL ALLERGIC RHINITIS DUE TO POLLEN: ICD-10-CM

## 2024-05-08 RX ORDER — CETIRIZINE HYDROCHLORIDE 10 MG/1
10 TABLET ORAL DAILY
Qty: 90 TABLET | Refills: 0 | Status: SHIPPED | OUTPATIENT
Start: 2024-05-08

## 2024-06-10 NOTE — PATIENT INSTRUCTIONS
"[facility] Medication Refill Request Information:  * Please contact your pharmacy regarding ANY request for medication refills.  ** [facility] Prescription Fax = [phone]  * Please allow 3 business days for routine medication refills.  * Please allow 5 business days for controlled substance medication refills.     [facility] Test Result notification information:  *You will be notified with in 7-10 days of your appointment day regarding the results of your test.  If you are on MyChart you will be notified as soon as the provider has reviewed the results and signed off on them.    [facility]: [phone]     Preparing for Your Surgery  Getting started  A surgery nurse will call you to review your health history and instructions. They will give you an arrival time based on your scheduled surgery time.  Please be ready to share the following:    Your doctor's clinic name and phone number    Your medical, surgical and anesthesia history    A list of allergies and sensitivities    A list of medicines, including herbal treatments and over-the-counter drugs    Whether the patient has a legal guardian (ask how to send us the papers in advance)  If your child is having surgery, please ask for a copy of Preparing for Your Child's Surgery.    Preparing for surgery    Within 30 days of surgery: Have an exam at your family clinic (primary care clinic), or go to a pre-operative clinic. This exam is called a \"History and Physical,\" or H&P.    At your H&P exam, talk to your care team about all medicines you take. If you need to stop any medicines before surgery, ask when to start taking them again.  ? We do this for your safety. Many medicines can make you bleed too much during surgery. Some change how well surgery (anesthesia) drugs work.    Call your insurance company to see what it will and won't pay for. Ask if they need to pre-approve the surgery. (If no insurance, call [phone].)    Call your " surgeon's clinic if there's any change in your health. This includes signs of a cold or flu (sore throat, runny nose, cough, rash, fever). It also includes a scrape or scratch near the surgery site.    If you have questions on the day of surgery, call your surgery center.  Eating and drinking guidelines  For your safety: Unless your surgeon tells you otherwise, follow the guidelines below.    Eat and drink as usual until 8 hours before surgery. After that, no food or milk.    Drink clear liquids until 2 hours before surgery. These are liquids you can see through, like water, Gatorade and Propel Water. You may also have black coffee and tea (no cream or milk).    Nothing by mouth within 2 hours of surgery. This includes gum, candy and breath mints.    Stop alcohol the midnight before surgery.    If your family clinic tells you to take medicine on the morning of surgery, it's okay to take it with a sip of water.  Preventing infection    Shower or bathe the night before and morning of your surgery. Follow the instructions your clinic gave you. (If no instructions, use regular soap.)    Don't shave or clip hair near your surgery site. This can lead to skin infection.    Don't smoke the morning of surgery. Smoking increases the risk of infection. You may chew nicotine gum up to 2 hours before surgery. A nicotine patch is okay.  ? Note: Some surgeries require you to completely quit smoking and nicotine. Check with your surgeon.    Your care team will make every effort to keep you safe from infection. We will:  ? Clean our hands often with soap and water (or an alcohol-based hand rub).  ? Clean the skin at your surgery site with a special soap that kills germs. We'll also remove hair from the site as needed.  ? Wear special hair covers, masks, gowns and gloves during surgery.  ? Give antibiotic medicine, if prescribed. Not all surgeries need antibiotics.  What to bring on the day of surgery    Photo ID and insurance  card    Copy of your health care directive, if you have one    Glasses and hearing aides (bring cases)  ? You can't wear contacts during surgery    Inhaler and eye drops, if you use them (tell us about these when you arrive)    CPAP machine or breathing device, if you use them    A few personal items, if spending the night    If you have . . .  ? A pacemaker or ICD (cardiac defibrillator): Bring the ID card.  ? An implanted stimulator: Bring the remote control.  ? A legal guardian: Bring a copy of the certified (court-stamped) guardianship papers.  Please remove any jewelry, including body piercings. Leave jewelry and other valuables at home.  If you're going home the day of surgery  Important: If you don't follow the rules below, we must cancel your surgery.     Arrange for someone to drive you home after surgery. You may not drive, take a taxi or take public transportation by yourself (unless you'll have local anesthesia only).    Arrange for a responsible adult to stay with you overnight. If you don't, we may keep you in the hospital overnight, and you may need to pay the costs yourself.  Questions?   If you have any questions for your care team, list them here: _________________________________________________________________________________________________________________________________________________________________________________________________________________________________________________________________________________________________________________________  For informational purposes only. Not to replace the advice of your health care provider. Copyright   9461-9437 [facility]. All rights reserved. Clinically reviewed by Tina Vieyra MD. [employer] 709432 - REV 07/19.     Price (Do Not Change): 0.00 Instructions: This plan will send the code FBSE to the PM system.  DO NOT or CHANGE the price. Detail Level: Simple

## 2024-06-23 ENCOUNTER — HEALTH MAINTENANCE LETTER (OUTPATIENT)
Age: 54
End: 2024-06-23

## 2024-07-26 DIAGNOSIS — J45.30 MILD PERSISTENT ASTHMA, UNCOMPLICATED: ICD-10-CM

## 2024-07-30 NOTE — TELEPHONE ENCOUNTER
ipratropium (ATROVENT HFA) 17 MCG/ACT inhaler            Last Written Prescription Date:  12/21/23  Last Fill Quantity: 25.8g,   # refills: 5  Last Office Visit : 7/20/23  Future Office visit:  None    Routing refill request to provider for review/approval because:  Long-Acting Muscarinic Agonists (LAMA) (including combination products) [id number]/26/2024 01:02 PM   Protocol Details Asthma control assessment score within normal limits in last 6 months   ACT 13 on 12/19/19  Kristina KHAN RN  P Central Nursing/Red Flag Triage & Med Refill Team

## 2024-07-31 RX ORDER — IPRATROPIUM BROMIDE 17 UG/1
AEROSOL, METERED RESPIRATORY (INHALATION)
Qty: 12.9 G | Refills: 0 | Status: SHIPPED | OUTPATIENT
Start: 2024-07-31

## 2024-08-20 ENCOUNTER — DOCUMENTATION ONLY (OUTPATIENT)
Dept: FAMILY MEDICINE | Facility: CLINIC | Age: 54
End: 2024-08-20
Payer: COMMERCIAL

## 2024-08-23 ENCOUNTER — MEDICAL CORRESPONDENCE (OUTPATIENT)
Dept: HEALTH INFORMATION MANAGEMENT | Facility: CLINIC | Age: 54
End: 2024-08-23
Payer: COMMERCIAL

## 2024-09-23 DIAGNOSIS — J45.30 MILD PERSISTENT ASTHMA, UNCOMPLICATED: ICD-10-CM

## 2024-09-30 RX ORDER — MONTELUKAST SODIUM 10 MG/1
1 TABLET ORAL DAILY
Qty: 30 TABLET | Refills: 0 | Status: SHIPPED | OUTPATIENT
Start: 2024-09-30

## 2024-09-30 NOTE — TELEPHONE ENCOUNTER
montelukast (SINGULAIR) 10 MG tablet       Last Written Prescription Date:  2/21/24  Last Fill Quantity: 90,   # refills: 1  Last Office Visit : 7/20/23  Future Office visit:  None    Routing refill request to provider for review/approval because:    Leukotriene Inhibitors Protocol [id number]/23/2024 12:13 AM   Protocol Details Asthma control assessment score within normal limits in last 6 months    Recent (6 mo) or future (90 days) visit within the authorizing provider's specialty      ACT 13  12/19/19    Kristina KHAN RN  P Central Nursing/Red Flag Triage & Med Refill Team

## 2025-02-26 NOTE — TELEPHONE ENCOUNTER
[facility] Call Center    Phone Message    May a detailed message be left on voicemail: yes     Reason for Call: Medication Question or concern regarding medication   Prescription Clarification  Name of Medication: rOPINIRole (REQUIP) 0.25 MG tablet and pramipexole (MIRAPEX) 0.125 MG tablet  Prescribing Provider: Cherie    Pharmacy: [id number] - [place], [place] - [address] [place] [address] AT [address] [facility] [address]   What on the order needs clarification? Patient states she still had the MIRAPEX and was taking along with the REQUIP. Patient states that the REQUIP is not working and patient states she needs something else or needs to go back to MIRAPEX. Please follow up with patient to advise.           Action Taken: Message routed to:  [facility] mode    Travel Screening: Not Applicable                                                                         Response per Dr Dominguez \"YES TO CONTINUE, USE A SMALLER AMOUNT AND TAKE TYLENOL FOR HEADACHE \" Pt called using  Angela [id number] used. Pt answered and aware of advisement and verbalized understanding.

## 2025-07-12 ENCOUNTER — HEALTH MAINTENANCE LETTER (OUTPATIENT)
Age: 55
End: 2025-07-12

## (undated) DEVICE — SUCTION MANIFOLD DORNOCH ULTRA CART UL-CL500

## (undated) DEVICE — ENDO NDL ASPIRATION 19GA FLEX SLIMLINE EXPECT EUS M00555530

## (undated) DEVICE — GRASPING DEVICE RAPTOR RAT TOOTH 00711177

## (undated) DEVICE — EYE CANN IRR 25GA CYSTOTOME 581610

## (undated) DEVICE — PAD CHUX UNDERPAD 23X24" 7136

## (undated) DEVICE — EYE SHIELD PLASTIC

## (undated) DEVICE — PACK CATARACT CUSTOM ASC SEY15CPUMC

## (undated) DEVICE — EYE PACK CUSTOM ANTERIOR 30DEG TIP CENTURION PPK6682-04

## (undated) DEVICE — ENDO FCP GRASPING ROTATABLE 7.2MMX2.6MM FG-244NR

## (undated) DEVICE — PACK ENDOSCOPY GI CUSTOM UMMC

## (undated) DEVICE — LINEN TOWEL PACK X5 5464

## (undated) DEVICE — GLOVE PROTEXIS POWDER FREE SMT 8.0  2D72PT80X

## (undated) DEVICE — KIT ENDO FIRST STEP DISINFECTANT 200ML W/POUCH EP-4

## (undated) DEVICE — GLOVE PROTEXIS MICRO 6.5  2D73PM65

## (undated) DEVICE — ENDO TUBING CO2 SMARTCAP STERILE DISP 100145CO2EXT

## (undated) DEVICE — ENDO CAP AND TUBING STERILE FOR ENDOGATOR  100130

## (undated) DEVICE — ENDO BITE BLOCK ADULT OMNI-BLOC

## (undated) DEVICE — TRAY PAIN INJECTION 97A 640

## (undated) DEVICE — ENDO DEVICE LOCKING AND BIOPSY CAP M00545261

## (undated) DEVICE — NDL SPINAL 25GA 3.5" QUINCKE 405180

## (undated) DEVICE — EYE KNIFE STILETTO VISITEC 1.1MM ANG 45DEG SIDEPORT 376620

## (undated) DEVICE — SUCTION MANIFOLD NEPTUNE 2 SYS 4 PORT 0702-020-000

## (undated) DEVICE — CATH BALLOON ELATION ESOPH/PYLORIC 12-13.5-15MMX180CM EPB12

## (undated) DEVICE — EYE CANN IRR 27GA ANTERIOR CHAMBER 581280

## (undated) DEVICE — TUBING SUCTION 10'X3/16" N510

## (undated) DEVICE — SOL WATER IRRIG 1000ML BOTTLE 2F7114

## (undated) DEVICE — WIRE GUIDE 0.025"X450CM STR VISIGLIDE G-240-2545S

## (undated) DEVICE — EYE TIP IRRIGATION & ASPIRATION POLYMER CVD 0.3MM 8065751512

## (undated) DEVICE — EYE KNIFE SLIT XSTAR VISITEC 2.6MM 45DEG 373726

## (undated) DEVICE — SCISSORS ENDOSCOPIC ENSIZOR 165CM 2.6MM ES26165

## (undated) DEVICE — PREP CHLORAPREP W/ORANGE TINT 10.5ML 260715

## (undated) DEVICE — KIT CONNECTOR FOR OLYMPUS ENDOSCOPES DEFENDO 100310

## (undated) DEVICE — NDL SPINAL 22GA 3.5" QUINCKE 405181

## (undated) DEVICE — INFLATION DEVICE BIG 60 ENDO-AN6012

## (undated) DEVICE — BALLOON ULTRAMATRIX FOR OLYMPUS EUS LINEAR LATEX FREE USB-OL

## (undated) DEVICE — ENDO FCP GRASPING 6.5MMX2.4MMX230CM RAT TOOTH DGR-276-5

## (undated) RX ORDER — FENTANYL CITRATE 50 UG/ML
INJECTION, SOLUTION INTRAMUSCULAR; INTRAVENOUS
Status: DISPENSED
Start: 2020-06-02

## (undated) RX ORDER — GABAPENTIN 300 MG/1
CAPSULE ORAL
Status: DISPENSED
Start: 2020-05-29

## (undated) RX ORDER — CYANOCOBALAMIN 1000 UG/ML
INJECTION, SOLUTION INTRAMUSCULAR; SUBCUTANEOUS
Status: DISPENSED
Start: 2019-07-24

## (undated) RX ORDER — FENTANYL CITRATE 50 UG/ML
INJECTION, SOLUTION INTRAMUSCULAR; INTRAVENOUS
Status: DISPENSED
Start: 2019-10-21

## (undated) RX ORDER — THIAMINE HYDROCHLORIDE 100 MG/ML
INJECTION, SOLUTION INTRAMUSCULAR; INTRAVENOUS
Status: DISPENSED
Start: 2019-07-24

## (undated) RX ORDER — TRIAMCINOLONE ACETONIDE 40 MG/ML
INJECTION, SUSPENSION INTRA-ARTICULAR; INTRAMUSCULAR
Status: DISPENSED
Start: 2022-05-17

## (undated) RX ORDER — PHENYLEPHRINE HCL IN 0.9% NACL 1 MG/10 ML
SYRINGE (ML) INTRAVENOUS
Status: DISPENSED
Start: 2020-01-08

## (undated) RX ORDER — DEXAMETHASONE SODIUM PHOSPHATE 4 MG/ML
INJECTION, SOLUTION INTRA-ARTICULAR; INTRALESIONAL; INTRAMUSCULAR; INTRAVENOUS; SOFT TISSUE
Status: DISPENSED
Start: 2020-06-01

## (undated) RX ORDER — ACETAMINOPHEN 325 MG/1
TABLET ORAL
Status: DISPENSED
Start: 2020-05-29

## (undated) RX ORDER — ONDANSETRON 2 MG/ML
INJECTION INTRAMUSCULAR; INTRAVENOUS
Status: DISPENSED
Start: 2020-06-01

## (undated) RX ORDER — ACETAMINOPHEN 325 MG/1
TABLET ORAL
Status: DISPENSED
Start: 2020-07-17

## (undated) RX ORDER — IPRATROPIUM BROMIDE AND ALBUTEROL SULFATE 2.5; .5 MG/3ML; MG/3ML
SOLUTION RESPIRATORY (INHALATION)
Status: DISPENSED
Start: 2019-10-21

## (undated) RX ORDER — GABAPENTIN 300 MG/1
CAPSULE ORAL
Status: DISPENSED
Start: 2020-07-17

## (undated) RX ORDER — PHENYLEPHRINE HCL IN 0.9% NACL 1 MG/10 ML
SYRINGE (ML) INTRAVENOUS
Status: DISPENSED
Start: 2020-06-01

## (undated) RX ORDER — ESMOLOL HYDROCHLORIDE 10 MG/ML
INJECTION INTRAVENOUS
Status: DISPENSED
Start: 2020-06-02

## (undated) RX ORDER — LEVOFLOXACIN 5 MG/ML
INJECTION, SOLUTION INTRAVENOUS
Status: DISPENSED
Start: 2020-01-08

## (undated) RX ORDER — FENTANYL CITRATE 50 UG/ML
INJECTION, SOLUTION INTRAMUSCULAR; INTRAVENOUS
Status: DISPENSED
Start: 2019-08-14

## (undated) RX ORDER — FENTANYL CITRATE 50 UG/ML
INJECTION, SOLUTION INTRAMUSCULAR; INTRAVENOUS
Status: DISPENSED
Start: 2022-05-10

## (undated) RX ORDER — PROPOFOL 10 MG/ML
INJECTION, EMULSION INTRAVENOUS
Status: DISPENSED
Start: 2020-01-08

## (undated) RX ORDER — IPRATROPIUM BROMIDE AND ALBUTEROL SULFATE 2.5; .5 MG/3ML; MG/3ML
SOLUTION RESPIRATORY (INHALATION)
Status: DISPENSED
Start: 2020-01-08

## (undated) RX ORDER — DEXAMETHASONE SODIUM PHOSPHATE 4 MG/ML
INJECTION, SOLUTION INTRA-ARTICULAR; INTRALESIONAL; INTRAMUSCULAR; INTRAVENOUS; SOFT TISSUE
Status: DISPENSED
Start: 2020-01-08

## (undated) RX ORDER — ESMOLOL HYDROCHLORIDE 10 MG/ML
INJECTION INTRAVENOUS
Status: DISPENSED
Start: 2022-05-10

## (undated) RX ORDER — ONDANSETRON 2 MG/ML
INJECTION INTRAMUSCULAR; INTRAVENOUS
Status: DISPENSED
Start: 2020-01-08

## (undated) RX ORDER — PROPOFOL 10 MG/ML
INJECTION, EMULSION INTRAVENOUS
Status: DISPENSED
Start: 2020-06-02

## (undated) RX ORDER — LIDOCAINE HYDROCHLORIDE 20 MG/ML
INJECTION, SOLUTION EPIDURAL; INFILTRATION; INTRACAUDAL; PERINEURAL
Status: DISPENSED
Start: 2020-06-02

## (undated) RX ORDER — OXYCODONE AND ACETAMINOPHEN 5; 325 MG/1; MG/1
TABLET ORAL
Status: DISPENSED
Start: 2020-01-08

## (undated) RX ORDER — LIDOCAINE HYDROCHLORIDE 10 MG/ML
INJECTION, SOLUTION EPIDURAL; INFILTRATION; INTRACAUDAL; PERINEURAL
Status: DISPENSED
Start: 2022-05-17

## (undated) RX ORDER — FENTANYL CITRATE 50 UG/ML
INJECTION, SOLUTION INTRAMUSCULAR; INTRAVENOUS
Status: DISPENSED
Start: 2020-01-08

## (undated) RX ORDER — IOPAMIDOL 510 MG/ML
INJECTION, SOLUTION INTRAVASCULAR
Status: DISPENSED
Start: 2023-05-17

## (undated) RX ORDER — LIDOCAINE HYDROCHLORIDE 20 MG/ML
INJECTION, SOLUTION EPIDURAL; INFILTRATION; INTRACAUDAL; PERINEURAL
Status: DISPENSED
Start: 2020-01-08

## (undated) RX ORDER — LIDOCAINE HYDROCHLORIDE 20 MG/ML
INJECTION, SOLUTION EPIDURAL; INFILTRATION; INTRACAUDAL; PERINEURAL
Status: DISPENSED
Start: 2020-06-01

## (undated) RX ORDER — IPRATROPIUM BROMIDE AND ALBUTEROL SULFATE 2.5; .5 MG/3ML; MG/3ML
SOLUTION RESPIRATORY (INHALATION)
Status: DISPENSED
Start: 2020-05-29

## (undated) RX ORDER — FENTANYL CITRATE 50 UG/ML
INJECTION, SOLUTION INTRAMUSCULAR; INTRAVENOUS
Status: DISPENSED
Start: 2021-05-05

## (undated) RX ORDER — ONDANSETRON 2 MG/ML
INJECTION INTRAMUSCULAR; INTRAVENOUS
Status: DISPENSED
Start: 2023-05-17

## (undated) RX ORDER — EPHEDRINE SULFATE 50 MG/ML
INJECTION, SOLUTION INTRAMUSCULAR; INTRAVENOUS; SUBCUTANEOUS
Status: DISPENSED
Start: 2020-06-01

## (undated) RX ORDER — HYDRALAZINE HYDROCHLORIDE 20 MG/ML
INJECTION INTRAMUSCULAR; INTRAVENOUS
Status: DISPENSED
Start: 2019-10-21